# Patient Record
Sex: FEMALE | Race: WHITE | NOT HISPANIC OR LATINO | Employment: FULL TIME | URBAN - METROPOLITAN AREA
[De-identification: names, ages, dates, MRNs, and addresses within clinical notes are randomized per-mention and may not be internally consistent; named-entity substitution may affect disease eponyms.]

---

## 2017-03-08 ENCOUNTER — APPOINTMENT (OUTPATIENT)
Dept: WOMENS IMAGING | Facility: HOSPITAL | Age: 47
End: 2017-03-08

## 2017-03-08 PROCEDURE — 77067 SCR MAMMO BI INCL CAD: CPT | Performed by: RADIOLOGY

## 2017-03-08 PROCEDURE — 77063 BREAST TOMOSYNTHESIS BI: CPT | Performed by: RADIOLOGY

## 2018-03-13 ENCOUNTER — APPOINTMENT (OUTPATIENT)
Dept: WOMENS IMAGING | Facility: HOSPITAL | Age: 48
End: 2018-03-13

## 2018-03-13 PROCEDURE — 77067 SCR MAMMO BI INCL CAD: CPT | Performed by: RADIOLOGY

## 2018-03-13 PROCEDURE — 77063 BREAST TOMOSYNTHESIS BI: CPT | Performed by: RADIOLOGY

## 2018-08-15 ENCOUNTER — TELEPHONE (OUTPATIENT)
Dept: INTERNAL MEDICINE | Facility: CLINIC | Age: 48
End: 2018-08-15

## 2018-08-15 DIAGNOSIS — Z01.84 IMMUNITY STATUS TESTING: Primary | ICD-10-CM

## 2018-08-15 NOTE — TELEPHONE ENCOUNTER
The patient requested to have labs drawn at the office for her clinicals that will start soon. She needs varicella and MMR titers as well a TB test. Patient's last office visit was 8/24/16. Is this ok?    This is okay.  The TB test will be a quantiferon gold or spot T.

## 2018-08-29 LAB
ANNOTATION COMMENT IMP: NORMAL
GAMMA INTERFERON BACKGROUND BLD IA-ACNC: 0.07 IU/ML
M TB IFN-G BLD-IMP: NEGATIVE
M TB IFN-G CD4+ BCKGRND COR BLD-ACNC: 0.03 IU/ML
M TB IFN-G CD4+ T-CELLS BLD-ACNC: 0.1 IU/ML
MEV IGG SER IA-ACNC: 100 AU/ML
MITOGEN IGNF BLD-ACNC: >10 IU/ML
MUV IGG SER IA-ACNC: 85.5 AU/ML
QUANTIFERON INCUBATION: NORMAL
RUBV IGG SERPL IA-ACNC: 5.49 INDEX
SERVICE CMNT-IMP: NORMAL
VZV IGG SER IA-ACNC: 845 INDEX
VZV IGM SER IA-ACNC: <0.91 INDEX (ref 0–0.9)

## 2018-10-01 ENCOUNTER — OFFICE VISIT (OUTPATIENT)
Dept: INTERNAL MEDICINE | Facility: CLINIC | Age: 48
End: 2018-10-01

## 2018-10-01 VITALS
DIASTOLIC BLOOD PRESSURE: 82 MMHG | BODY MASS INDEX: 39.49 KG/M2 | RESPIRATION RATE: 16 BRPM | SYSTOLIC BLOOD PRESSURE: 152 MMHG | OXYGEN SATURATION: 98 % | HEART RATE: 83 BPM | WEIGHT: 237 LBS | HEIGHT: 65 IN | TEMPERATURE: 98.5 F

## 2018-10-01 DIAGNOSIS — I10 ESSENTIAL HYPERTENSION: ICD-10-CM

## 2018-10-01 DIAGNOSIS — Z23 NEED FOR IMMUNIZATION AGAINST INFLUENZA: ICD-10-CM

## 2018-10-01 DIAGNOSIS — Z00.00 ENCOUNTER FOR PREVENTIVE HEALTH EXAMINATION: Primary | ICD-10-CM

## 2018-10-01 LAB
ALBUMIN SERPL-MCNC: 4.2 G/DL (ref 3.5–5.2)
ALBUMIN/GLOB SERPL: 1.6 G/DL
ALP SERPL-CCNC: 99 U/L (ref 39–117)
ALT SERPL-CCNC: 15 U/L (ref 1–33)
AST SERPL-CCNC: 15 U/L (ref 1–32)
BASOPHILS # BLD AUTO: 0.04 10*3/MM3 (ref 0–0.2)
BASOPHILS NFR BLD AUTO: 0.6 % (ref 0–1.5)
BILIRUB SERPL-MCNC: 0.3 MG/DL (ref 0.1–1.2)
BUN SERPL-MCNC: 14 MG/DL (ref 6–20)
BUN/CREAT SERPL: 16.1 (ref 7–25)
CALCIUM SERPL-MCNC: 9.3 MG/DL (ref 8.6–10.5)
CHLORIDE SERPL-SCNC: 104 MMOL/L (ref 98–107)
CO2 SERPL-SCNC: 24 MMOL/L (ref 22–29)
CREAT SERPL-MCNC: 0.87 MG/DL (ref 0.57–1)
EOSINOPHIL # BLD AUTO: 0.14 10*3/MM3 (ref 0–0.7)
EOSINOPHIL NFR BLD AUTO: 2 % (ref 0.3–6.2)
ERYTHROCYTE [DISTWIDTH] IN BLOOD BY AUTOMATED COUNT: 12.9 % (ref 11.7–13)
GLOBULIN SER CALC-MCNC: 2.6 GM/DL
GLUCOSE SERPL-MCNC: 94 MG/DL (ref 65–99)
HCT VFR BLD AUTO: 41.7 % (ref 35.6–45.5)
HGB BLD-MCNC: 13.6 G/DL (ref 11.9–15.5)
IMM GRANULOCYTES # BLD: 0.02 10*3/MM3 (ref 0–0.03)
IMM GRANULOCYTES NFR BLD: 0.3 % (ref 0–0.5)
LYMPHOCYTES # BLD AUTO: 2.39 10*3/MM3 (ref 0.9–4.8)
LYMPHOCYTES NFR BLD AUTO: 34.1 % (ref 19.6–45.3)
MCH RBC QN AUTO: 29.7 PG (ref 26.9–32)
MCHC RBC AUTO-ENTMCNC: 32.6 G/DL (ref 32.4–36.3)
MCV RBC AUTO: 91 FL (ref 80.5–98.2)
MONOCYTES # BLD AUTO: 0.52 10*3/MM3 (ref 0.2–1.2)
MONOCYTES NFR BLD AUTO: 7.4 % (ref 5–12)
NEUTROPHILS # BLD AUTO: 3.89 10*3/MM3 (ref 1.9–8.1)
NEUTROPHILS NFR BLD AUTO: 55.6 % (ref 42.7–76)
PLATELET # BLD AUTO: 271 10*3/MM3 (ref 140–500)
POTASSIUM SERPL-SCNC: 4.4 MMOL/L (ref 3.5–5.2)
PROT SERPL-MCNC: 6.8 G/DL (ref 6–8.5)
RBC # BLD AUTO: 4.58 10*6/MM3 (ref 3.9–5.2)
SODIUM SERPL-SCNC: 140 MMOL/L (ref 136–145)
TSH SERPL DL<=0.005 MIU/L-ACNC: 1.1 MIU/ML (ref 0.27–4.2)
WBC # BLD AUTO: 7 10*3/MM3 (ref 4.5–10.7)

## 2018-10-01 PROCEDURE — 93000 ELECTROCARDIOGRAM COMPLETE: CPT | Performed by: INTERNAL MEDICINE

## 2018-10-01 PROCEDURE — 90471 IMMUNIZATION ADMIN: CPT | Performed by: INTERNAL MEDICINE

## 2018-10-01 PROCEDURE — 99396 PREV VISIT EST AGE 40-64: CPT | Performed by: INTERNAL MEDICINE

## 2018-10-01 PROCEDURE — 90674 CCIIV4 VAC NO PRSV 0.5 ML IM: CPT | Performed by: INTERNAL MEDICINE

## 2018-10-01 RX ORDER — FLUOXETINE HYDROCHLORIDE 40 MG/1
40 CAPSULE ORAL DAILY
Qty: 90 CAPSULE | Refills: 1 | Status: SHIPPED | OUTPATIENT
Start: 2018-10-01 | End: 2019-04-17

## 2018-10-01 RX ORDER — OLMESARTAN MEDOXOMIL 20 MG/1
20 TABLET ORAL DAILY
Qty: 14 TABLET | Refills: 0 | Status: SHIPPED | OUTPATIENT
Start: 2018-10-01 | End: 2018-10-01 | Stop reason: SDUPTHER

## 2018-10-01 RX ORDER — BUPROPION HYDROCHLORIDE 150 MG/1
150 TABLET ORAL DAILY
Qty: 90 TABLET | Refills: 1 | Status: SHIPPED | OUTPATIENT
Start: 2018-10-01 | End: 2019-04-17

## 2018-10-01 RX ORDER — OLMESARTAN MEDOXOMIL 20 MG/1
20 TABLET ORAL DAILY
Qty: 90 TABLET | Refills: 1 | Status: SHIPPED | OUTPATIENT
Start: 2018-10-01 | End: 2018-10-02

## 2018-10-01 NOTE — PROGRESS NOTES
"Subjective   Savannah Valenzuela is a 48 y.o. female.     Chief Complaint   Patient presents with   • Hypertension     \"147/90\" & \"180/90\"         In for annual preventative exam.  Sleeps about 6 hours per night.  No formal exercise.  Energy is poor.  Diet is poor.      Hypertension   This is a new problem. The current episode started in the past 7 days. The problem is unchanged. The problem is uncontrolled. Associated symptoms include chest pain and peripheral edema. Pertinent negatives include no headaches, orthopnea, palpitations or shortness of breath. There are no associated agents to hypertension. Risk factors for coronary artery disease include obesity. Past treatments include nothing. Current antihypertension treatment includes nothing. There is no history of angina, kidney disease, CVA or heart failure.        The following portions of the patient's history were reviewed and updated as appropriate: allergies, current medications, past social history and problem list.    HISTORY  Outpatient Prescriptions Marked as Taking for the 10/1/18 encounter (Office Visit) with Mathew Lemos MD   Medication Sig Dispense Refill   • buPROPion XL (WELLBUTRIN XL) 150 MG 24 hr tablet Take 1 tablet by mouth Daily. 90 tablet 1   • FLUoxetine (PROzac) 40 MG capsule Take 1 capsule by mouth Daily. 90 capsule 1   • [DISCONTINUED] buPROPion XL (WELLBUTRIN XL) 150 MG 24 hr tablet Take 1 tablet by mouth Daily. 90 tablet 1   • [DISCONTINUED] FLUoxetine (PROzac) 40 MG capsule TAKE 1 CAPSULE EVERY DAY 90 capsule 1     Social History     Social History   • Marital status:      Spouse name: N/A   • Number of children: N/A   • Years of education: N/A     Occupational History   • Not on file.     Social History Main Topics   • Smoking status: Never Smoker   • Smokeless tobacco: Not on file   • Alcohol use Not on file      Comment: Kay: rare   • Drug use: Unknown   • Sexual activity: Not on file     Other Topics Concern   • Not on " file     Social History Narrative   • No narrative on file     No family history on file.  Past Medical History:   Diagnosis Date   • Allergic rhinitis    • Depression    • Fatigue    • Hematuria, microscopic    • Medication management    • Migraine    • Multiple food allergies    • Physical exam, annual      Past Surgical History:   Procedure Laterality Date   •  SECTION     • DILATATION AND CURETTAGE     • HYSTERECTOMY         Review of Systems   Constitutional: Negative for appetite change, chills, fatigue and fever.   HENT: Negative for congestion, ear pain, hearing loss, nosebleeds, postnasal drip, rhinorrhea, sinus pressure and trouble swallowing.    Eyes: Negative for pain, itching and visual disturbance.   Respiratory: Negative for cough, chest tightness, shortness of breath and wheezing.    Cardiovascular: Positive for chest pain. Negative for palpitations, orthopnea and leg swelling.   Gastrointestinal: Positive for abdominal pain (Rare reflux). Negative for anal bleeding, constipation, diarrhea, nausea, rectal pain and vomiting.   Endocrine: Negative for cold intolerance, heat intolerance and polyuria.   Genitourinary: Negative for difficulty urinating, dysuria, flank pain, frequency, hematuria and urgency.   Musculoskeletal: Negative for arthralgias, back pain and myalgias.   Skin: Negative for rash.   Allergic/Immunologic: Negative for environmental allergies.   Neurological: Negative for dizziness, syncope, speech difficulty, weakness, light-headedness, numbness and headaches.   Hematological: Does not bruise/bleed easily.   Psychiatric/Behavioral: Positive for dysphoric mood. Negative for agitation, confusion and sleep disturbance. The patient is nervous/anxious.        Objective   Vitals:    10/01/18 1400   BP: 152/82   Pulse: 83   Resp: 16   Temp: 98.5 °F (36.9 °C)   SpO2: 98%      1    10/01/18  1400   Weight: 108 kg (237 lb)    [unfilled]  Body mass index is 39.44  kg/m².      Physical Exam   Constitutional: She is oriented to person, place, and time. She appears well-developed and well-nourished.   HENT:   Head: Normocephalic and atraumatic.   Right Ear: External ear normal.   Left Ear: External ear normal.   Nose: Nose normal.   Mouth/Throat: Oropharynx is clear and moist.   Eyes: Pupils are equal, round, and reactive to light. Conjunctivae and EOM are normal.   Neck: Normal range of motion. Neck supple. No JVD present. No thyromegaly present.   Cardiovascular: Normal rate, regular rhythm, normal heart sounds and intact distal pulses.  Exam reveals no gallop.    No murmur heard.  Pulmonary/Chest: Effort normal and breath sounds normal. No respiratory distress. She has no wheezes. She has no rales.   Abdominal: Soft. Bowel sounds are normal. She exhibits no distension and no mass. There is no tenderness. There is no guarding. No hernia.   Musculoskeletal: Normal range of motion. She exhibits no edema.   Lymphadenopathy:     She has no cervical adenopathy.   Neurological: She is alert and oriented to person, place, and time. She displays normal reflexes. No cranial nerve deficit. Coordination normal.   Skin: Skin is warm and dry.   Psychiatric: She has a normal mood and affect. Her behavior is normal. Judgment and thought content normal.   Nursing note and vitals reviewed.        Problem List Items Addressed This Visit        Cardiovascular and Mediastinum    Essential hypertension      Other Visit Diagnoses     Encounter for preventive health examination    -  Primary    Need for immunization against influenza        Relevant Orders    Flucelvax Quad=>4Years (PFS) (Completed)        Assessment/Plan   In today for annual preventative exam and with elevated blood pressure over the past few days.  She's been checking it at home.  Was high first at a biometric exam for work.  She's been having some pressure in her chest that may be associated.  Hyped up and stressed out.  We'll  add Benicar 20 mg daily for blood pressure today.  I think a beta blocker would be problematic with her history of depression and especially with her weight.  Recheck blood pressure in one month.  Lab work today including CBC, CMP, UA.  EKG.  Tobacco questionnaire today.      ECG 12 Lead  Date/Time: 10/1/2018 4:28 PM  Performed by: CARLOS العلي  Authorized by: CARLOS العلي   Comparison: compared with previous ECG from 9/5/2017  Similar to previous ECG  Rhythm: sinus rhythm  Rate: normal  Conduction: conduction normal  ST Segments: ST segments normal  T Waves: T waves normal  QRS axis: normal  Other: no other findings  Clinical impression: normal ECG  Comments: Normal sinus rhythm.  Heart rate is 71.  This is a normal EKG.  There is no change from a prior EKG dated 9/5/2017                Body mass index is 39.44 kg/m².           Dragon disclaimer:   Much of this encounter note is an electronic transcription/translation of spoken language to printed text. The electronic translation of spoken language may permit erroneous, or at times, nonsensical words or phrases to be inadvertently transcribed; Although I have reviewed the note for such errors, some may still exist.

## 2018-10-01 NOTE — PATIENT INSTRUCTIONS
Influenza Virus Vaccine injection  What is this medicine?  INFLUENZA VIRUS VACCINE (in [employer] EN [employer] SEEN) helps to reduce the risk of getting influenza also known as the flu. The vaccine only helps protect you against some strains of the flu.  This medicine may be used for other purposes; ask your health care provider or pharmacist if you have questions.  COMMON BRAND NAME(S): Afluria, Agriflu, Alfuria, FLUAD, Fluarix, Fluarix Quadrivalent, Flublok, Flublok Quadrivalent, FLUCELVAX, Flulaval, Fluvirin, Fluzone, Fluzone High-Dose, Fluzone Intradermal  What should I tell my health care provider before I take this medicine?  They need to know if you have any of these conditions:  -bleeding disorder like hemophilia  -fever or infection  -Guillain-[place] syndrome or other neurological problems  -immune system problems  -infection with the human immunodeficiency virus (HIV) or AIDS  -low blood platelet counts  -multiple sclerosis  -an unusual or allergic reaction to influenza virus vaccine, latex, other medicines, foods, dyes, or preservatives. Different brands of vaccines contain different allergens. Some may contain latex or eggs. Talk to your doctor about your allergies to make sure that you get the right vaccine.  -pregnant or trying to get pregnant  -breast-feeding  How should I use this medicine?  This vaccine is for injection into a muscle or under the skin. It is given by a health care professional.  A copy of Vaccine Information Statements will be given before each vaccination. Read this sheet carefully each time. The sheet may change frequently.  Talk to your healthcare provider to see which vaccines are right for you. Some vaccines should not be used in all age groups.  Overdosage: If you think you have taken too much of this medicine contact a poison control center or emergency room at once.  NOTE: This medicine is only for you. Do not share this medicine with others.  What if I miss a dose?  This  does not apply.  What may interact with this medicine?  -chemotherapy or radiation therapy  -medicines that lower your immune system like etanercept, anakinra, infliximab, and adalimumab  -medicines that treat or prevent blood clots like warfarin  -phenytoin  -steroid medicines like prednisone or cortisone  -theophylline  -vaccines  This list may not describe all possible interactions. Give your health care provider a list of all the medicines, herbs, non-prescription drugs, or dietary supplements you use. Also tell them if you smoke, drink alcohol, or use illegal drugs. Some items may interact with your medicine.  What should I watch for while using this medicine?  Report any side effects that do not go away within 3 days to your doctor or health care professional. Call your health care provider if any unusual symptoms occur within 6 weeks of receiving this vaccine.  You may still catch the flu, but the illness is not usually as bad. You cannot get the flu from the vaccine. The vaccine will not protect against colds or other illnesses that may cause fever. The vaccine is needed every year.  What side effects may I notice from receiving this medicine?  Side effects that you should report to your doctor or health care professional as soon as possible:  -allergic reactions like skin rash, itching or hives, swelling of the face, lips, or tongue  Side effects that usually do not require medical attention (report to your doctor or health care professional if they continue or are bothersome):  -fever  -headache  -muscle aches and pains  -pain, tenderness, redness, or swelling at the injection site  -tiredness  This list may not describe all possible side effects. Call your doctor for medical advice about side effects. You may report side effects to FDA at 0-121-FDA-0291.  Where should I keep my medicine?  The vaccine will be given by a health care professional in a clinic, pharmacy, doctor's office, or other health care  setting. You will not be given vaccine doses to store at home.  NOTE: This sheet is a summary. It may not cover all possible information. If you have questions about this medicine, talk to your doctor, pharmacist, or health care provider.  © 2018 [employer]vier/Gold Standard (2016-07-08 10:07:28)    Exercising to Lose Weight  Exercising can help you to lose weight. In order to lose weight through exercise, you need to do vigorous-intensity exercise. You can tell that you are exercising with vigorous intensity if you are breathing very hard and fast and cannot hold a conversation while exercising.  Moderate-intensity exercise helps to maintain your current weight. You can tell that you are exercising at a moderate level if you have a higher heart rate and faster breathing, but you are still able to hold a conversation.  How often should I exercise?  Choose an activity that you enjoy and set realistic goals. Your health care provider can help you to make an activity plan that works for you. Exercise regularly as directed by your health care provider. This may include:  · Doing resistance training twice each week, such as:  ? Push-ups.  ? Sit-ups.  ? Lifting weights.  ? Using resistance bands.  · Doing a given intensity of exercise for a given amount of time. Choose from these options:  ? 150 minutes of moderate-intensity exercise every week.  ? 75 minutes of vigorous-intensity exercise every week.  ? A mix of moderate-intensity and vigorous-intensity exercise every week.    Children, pregnant women, people who are out of shape, people who are overweight, and older adults may need to consult a health care provider for individual recommendations. If you have any sort of medical condition, be sure to consult your health care provider before starting a new exercise program.  What are some activities that can help me to lose weight?  · Walking at a rate of at least 4.5 miles an hour.  · Jogging or running at a rate of 5 miles  per hour.  · Biking at a rate of at least 10 miles per hour.  · Lap swimming.  · Roller-skating or in-line skating.  · Cross-country skiing.  · Vigorous competitive sports, such as football, basketball, and soccer.  · Jumping rope.  · Aerobic dancing.  How can I be more active in my day-to-day activities?  · Use the stairs instead of the elevator.  · Take a walk during your lunch break.  · If you drive, park your car farther away from work or school.  · If you take public transportation, get off one stop early and walk the rest of the way.  · Make all of your phone calls while standing up and walking around.  · Get up, stretch, and walk around every 30 minutes throughout the day.  What guidelines should I follow while exercising?  · Do not exercise so much that you hurt yourself, feel dizzy, or get very short of breath.  · Consult your health care provider prior to starting a new exercise program.  · Wear comfortable clothes and shoes with good support.  · Drink plenty of water while you exercise to prevent dehydration or heat stroke. Body water is lost during exercise and must be replaced.  · Work out until you breathe faster and your heart beats faster.  This information is not intended to replace advice given to you by your health care provider. Make sure you discuss any questions you have with your health care provider.  Document Released: 01/20/2012 Document Revised: 05/25/2017 Document Reviewed: 05/21/2015  [employer] Interactive Patient Education © 2018 [employer]  BMI for Adults  Body mass index (BMI) is a number that is calculated from a person's weight and height. In most adults, the number is used to find how much of an adult's weight is made up of fat. BMI is not as accurate as a direct measure of body fat.  How is BMI calculated?  BMI is calculated by dividing weight in kilograms by height in meters squared. It can also be calculated by dividing weight in pounds by height in inches squared, then  multiplying the resulting number by 703. Charts are available to help you find your BMI quickly and easily without doing this calculation.  How is BMI interpreted?  Health care professionals use BMI charts to identify whether an adult is underweight, at a normal weight, or overweight based on the following guidelines:  · Underweight: BMI less than 18.5.  · Normal weight: BMI between 18.5 and 24.9.  · Overweight: BMI between 25 and 29.9.  · Obese: BMI of 30 and above.    BMI is usually interpreted the same for males and females.  Weight includes both fat and muscle, so someone with a muscular build, such as an athlete, may have a BMI that is higher than 24.9. In cases like these, BMI may not accurately depict body fat. To determine if excess body fat is the cause of a BMI of 25 or higher, further assessments may need to be done by a health care provider.  Why is BMI a useful tool?  BMI is used to identify a possible weight problem that may be related to a medical problem or may increase the risk for medical problems. BMI can also be used to promote changes to reach a healthy weight.  This information is not intended to replace advice given to you by your health care provider. Make sure you discuss any questions you have with your health care provider.  Document Released: 08/29/2005 Document Revised: 04/27/2017 Document Reviewed: 05/15/2015  [employer] Interactive Patient Education © 2018 [employer]

## 2018-10-02 ENCOUNTER — TELEPHONE (OUTPATIENT)
Dept: INTERNAL MEDICINE | Facility: CLINIC | Age: 48
End: 2018-10-02

## 2018-10-02 RX ORDER — LOSARTAN POTASSIUM 50 MG/1
50 TABLET ORAL DAILY
Qty: 30 TABLET | Refills: 2 | Status: SHIPPED | OUTPATIENT
Start: 2018-10-02 | End: 2018-11-05 | Stop reason: SDUPTHER

## 2018-10-02 NOTE — TELEPHONE ENCOUNTER
Per pharmacy, Benicar is not covered by insurance, but will cover alternative like Losartan. Please advise.    Go with losartan 50 mg 1 daily #30.  2 refills.

## 2018-11-05 ENCOUNTER — OFFICE VISIT (OUTPATIENT)
Dept: INTERNAL MEDICINE | Facility: CLINIC | Age: 48
End: 2018-11-05

## 2018-11-05 VITALS
DIASTOLIC BLOOD PRESSURE: 82 MMHG | SYSTOLIC BLOOD PRESSURE: 134 MMHG | BODY MASS INDEX: 37.32 KG/M2 | RESPIRATION RATE: 16 BRPM | WEIGHT: 224 LBS | TEMPERATURE: 99 F | OXYGEN SATURATION: 98 % | HEART RATE: 85 BPM | HEIGHT: 65 IN

## 2018-11-05 DIAGNOSIS — F32.A DEPRESSION, UNSPECIFIED DEPRESSION TYPE: ICD-10-CM

## 2018-11-05 DIAGNOSIS — I10 ESSENTIAL HYPERTENSION: Primary | ICD-10-CM

## 2018-11-05 PROCEDURE — 99213 OFFICE O/P EST LOW 20 MIN: CPT | Performed by: INTERNAL MEDICINE

## 2018-11-05 RX ORDER — LOSARTAN POTASSIUM 100 MG/1
100 TABLET ORAL DAILY
Qty: 90 TABLET | Refills: 0 | Status: SHIPPED | OUTPATIENT
Start: 2018-11-05 | End: 2019-04-16

## 2018-11-05 RX ORDER — LOSARTAN POTASSIUM 100 MG/1
100 TABLET ORAL DAILY
Qty: 14 TABLET | Refills: 0 | Status: SHIPPED | OUTPATIENT
Start: 2018-11-05 | End: 2018-11-05 | Stop reason: SDUPTHER

## 2018-11-05 RX ORDER — LOSARTAN POTASSIUM 100 MG/1
100 TABLET ORAL DAILY
Qty: 90 TABLET | Refills: 0 | Status: SHIPPED | OUTPATIENT
Start: 2018-11-05 | End: 2018-11-05 | Stop reason: SDUPTHER

## 2018-11-05 NOTE — PROGRESS NOTES
Subjective   Savannah Valenzuela is a 48 y.o. female.     Chief Complaint   Patient presents with   • Hypertension         In for recheck of chronic depression.  She's doing very well on her Wellbutrin.  So on Prozac.  A little anxious at times but depression is not an issue right now.      Hypertension   This is a new problem. The current episode started 1 to 4 weeks ago. The problem has been rapidly improving since onset. The problem is controlled. Associated symptoms include headaches. Pertinent negatives include no chest pain, palpitations, peripheral edema, PND or shortness of breath. There are no associated agents to hypertension. There are no known risk factors for coronary artery disease. Current antihypertension treatment includes angiotensin blockers. The current treatment provides significant improvement. There are no compliance problems.  There is no history of angina, kidney disease, CAD/MI, CVA or heart failure.        The following portions of the patient's history were reviewed and updated as appropriate: allergies, current medications, past social history and problem list.    No outpatient prescriptions have been marked as taking for the 11/5/18 encounter (Office Visit) with Mathew Lemos MD.       Review of Systems   Respiratory: Negative for shortness of breath and wheezing.    Cardiovascular: Negative for chest pain, palpitations, leg swelling and PND.   Neurological: Positive for headaches.       Objective   Vitals:    11/05/18 1130   BP: 134/82   Pulse: 85   Resp: 16   Temp: 99 °F (37.2 °C)   SpO2: 98%      1    11/05/18  1130   Weight: 102 kg (224 lb)    [unfilled]  Body mass index is 37.28 kg/m².      Physical Exam   Constitutional: She appears well-developed and well-nourished. No distress.   HENT:   Head: Normocephalic and atraumatic.   Neck: Carotid bruit is not present.   Cardiovascular: Normal rate, regular rhythm, normal heart sounds and intact distal pulses.  Exam reveals no gallop.     No murmur heard.  Pulmonary/Chest: Effort normal and breath sounds normal. No respiratory distress. She has no wheezes. She has no rales.   Abdominal: Soft. Bowel sounds are normal. She exhibits no mass. There is no tenderness. There is no guarding.         Problem List Items Addressed This Visit        Cardiovascular and Mediastinum    Essential hypertension - Primary    Relevant Medications    losartan (COZAAR) 100 MG tablet       Other    Depression        Assessment/Plan   In for recheck of hypertension.  Also depression.  Depression doing well.  Hypertension also doing very well.  She's a little higher today than I would like to clearly improved.  We'll boost the Cozaar from 50 mg daily up to 100 mg per day.  Recheck blood pressure in 3 months.  Benicar has been okay so we can switch that if we need to at some point.           .bmifollowup  Dragon disclaimer:   Much of this encounter note is an electronic transcription/translation of spoken language to printed text. The electronic translation of spoken language may permit erroneous, or at times, nonsensical words or phrases to be inadvertently transcribed; Although I have reviewed the note for such errors, some may still exist.

## 2019-03-07 ENCOUNTER — OFFICE VISIT (OUTPATIENT)
Dept: INTERNAL MEDICINE | Facility: CLINIC | Age: 49
End: 2019-03-07

## 2019-03-07 VITALS
RESPIRATION RATE: 16 BRPM | TEMPERATURE: 98.3 F | WEIGHT: 229 LBS | SYSTOLIC BLOOD PRESSURE: 122 MMHG | BODY MASS INDEX: 38.15 KG/M2 | DIASTOLIC BLOOD PRESSURE: 88 MMHG | HEART RATE: 81 BPM | OXYGEN SATURATION: 98 % | HEIGHT: 65 IN

## 2019-03-07 DIAGNOSIS — F32.A DEPRESSION, UNSPECIFIED DEPRESSION TYPE: ICD-10-CM

## 2019-03-07 DIAGNOSIS — I10 ESSENTIAL HYPERTENSION: Primary | ICD-10-CM

## 2019-03-07 DIAGNOSIS — G43.009 MIGRAINE WITHOUT AURA AND WITHOUT STATUS MIGRAINOSUS, NOT INTRACTABLE: ICD-10-CM

## 2019-03-07 PROCEDURE — 99214 OFFICE O/P EST MOD 30 MIN: CPT | Performed by: INTERNAL MEDICINE

## 2019-03-07 RX ORDER — OLMESARTAN MEDOXOMIL 40 MG/1
40 TABLET ORAL DAILY
Qty: 90 TABLET | Refills: 1 | Status: SHIPPED | OUTPATIENT
Start: 2019-03-07 | End: 2019-06-17

## 2019-03-07 NOTE — PROGRESS NOTES
Subjective   Savannah Valenzuela is a 48 y.o. female.     Chief Complaint   Patient presents with   • Hypertension         In for recheck of chronic depression.  She's doing very well on her Wellbutrin.  So on Prozac.  A little anxious at times but depression is not an issue right now.      Hypertension   This is a new problem. The current episode started 1 to 4 weeks ago. The problem has been rapidly improving since onset. The problem is controlled. Associated symptoms include headaches. Pertinent negatives include no chest pain, palpitations, peripheral edema, PND or shortness of breath. There are no associated agents to hypertension. There are no known risk factors for coronary artery disease. Current antihypertension treatment includes angiotensin blockers. The current treatment provides significant improvement. There are no compliance problems.  There is no history of angina, kidney disease, CAD/MI, CVA or heart failure.        The following portions of the patient's history were reviewed and updated as appropriate: allergies, current medications, past social history and problem list.    Outpatient Medications Marked as Taking for the 3/7/19 encounter (Office Visit) with Mathew Lemos MD   Medication Sig Dispense Refill   • buPROPion XL (WELLBUTRIN XL) 150 MG 24 hr tablet Take 1 tablet by mouth Daily. 90 tablet 1   • FLUoxetine (PROzac) 40 MG capsule Take 1 capsule by mouth Daily. 90 capsule 1   • losartan (COZAAR) 100 MG tablet Take 1 tablet by mouth Daily. 90 tablet 0       Review of Systems   Respiratory: Negative for shortness of breath and wheezing.    Cardiovascular: Negative for chest pain, palpitations, leg swelling and PND.   Neurological: Positive for headaches.       Objective   Vitals:    03/07/19 0754   BP: 122/88   Pulse: 81   Resp: 16   Temp: 98.3 °F (36.8 °C)   SpO2: 98%          03/07/19  0754   Weight: 104 kg (229 lb)    [unfilled]  Body mass index is 38.11 kg/m².      Physical Exam    Constitutional: She appears well-developed and well-nourished. No distress.   HENT:   Head: Normocephalic and atraumatic.   Neck: Carotid bruit is not present.   Cardiovascular: Normal rate, regular rhythm, normal heart sounds and intact distal pulses. Exam reveals no gallop.   No murmur heard.  Pulmonary/Chest: Effort normal and breath sounds normal. No respiratory distress. She has no wheezes. She has no rales.   Abdominal: Soft. Bowel sounds are normal. She exhibits no mass. There is no tenderness. There is no guarding.         Problem List Items Addressed This Visit        Cardiovascular and Mediastinum    Migraine    Essential hypertension - Primary       Other    Depression        Assessment/Plan   In for recheck of hypertension.  Also depression.  Depression doing well.  Blood pressures have been up at home.  140s over low 90s.  We will switch Cozaar to Benicar 40 mg daily.  If still up next time we will add a diuretic.  Recheck blood pressure in 3 months.  Annual lab work will be October 2019.  Annual preventive exam October 2019.           .[email or web address]  Dragon disclaimer:   Much of this encounter note is an electronic transcription/translation of spoken language to printed text. The electronic translation of spoken language may permit erroneous, or at times, nonsensical words or phrases to be inadvertently transcribed; Although I have reviewed the note for such errors, some may still exist.

## 2019-04-16 ENCOUNTER — OFFICE VISIT (OUTPATIENT)
Dept: INTERNAL MEDICINE | Facility: CLINIC | Age: 49
End: 2019-04-16

## 2019-04-16 VITALS
WEIGHT: 231.2 LBS | BODY MASS INDEX: 38.52 KG/M2 | OXYGEN SATURATION: 94 % | TEMPERATURE: 98.4 F | DIASTOLIC BLOOD PRESSURE: 78 MMHG | HEART RATE: 78 BPM | RESPIRATION RATE: 16 BRPM | SYSTOLIC BLOOD PRESSURE: 122 MMHG | HEIGHT: 65 IN

## 2019-04-16 DIAGNOSIS — H92.02 LEFT EAR PAIN: Primary | ICD-10-CM

## 2019-04-16 PROCEDURE — 99213 OFFICE O/P EST LOW 20 MIN: CPT | Performed by: INTERNAL MEDICINE

## 2019-04-16 NOTE — PROGRESS NOTES
Subjective   Savannah Valenzuela is a 48 y.o. female.     Chief Complaint   Patient presents with   • Earache     left         Earache    There is pain in the left ear. This is a new problem. The current episode started in the past 7 days. The problem occurs hourly. The problem has been gradually worsening. There has been no fever. The pain is severe. Pertinent negatives include no coughing, headaches, rhinorrhea or sore throat. She has tried NSAIDs for the symptoms. The treatment provided no relief.        The following portions of the patient's history were reviewed and updated as appropriate: allergies, current medications, past social history and problem list.    Outpatient Medications Marked as Taking for the 4/16/19 encounter (Office Visit) with Mathew Lemos MD   Medication Sig Dispense Refill   • buPROPion XL (WELLBUTRIN XL) 150 MG 24 hr tablet Take 1 tablet by mouth Daily. 90 tablet 1   • FLUoxetine (PROzac) 40 MG capsule Take 1 capsule by mouth Daily. 90 capsule 1   • olmesartan (BENICAR) 40 MG tablet Take 1 tablet by mouth Daily. 90 tablet 1   • [DISCONTINUED] losartan (COZAAR) 100 MG tablet Take 1 tablet by mouth Daily. 90 tablet 0       Review of Systems   Constitutional: Negative for chills and fever.   HENT: Positive for ear pain. Negative for rhinorrhea and sore throat.    Respiratory: Negative for cough.    Neurological: Negative for headaches.       Objective   Vitals:    04/16/19 0957   BP: 122/78   Pulse: 78   Resp: 16   Temp: 98.4 °F (36.9 °C)   SpO2: 94%          04/16/19  0957   Weight: 105 kg (231 lb 3.2 oz)    [unfilled]  Body mass index is 38.47 kg/m².      Physical Exam   Constitutional: She appears well-developed and well-nourished.   HENT:   Head: Normocephalic and atraumatic.   Right Ear: External ear normal.   Left Ear: External ear normal.   Nose: Nose normal.   Mouth/Throat: Oropharynx is clear and moist.   Eyes: Conjunctivae are normal. Pupils are equal, round, and reactive to  light.   Pulmonary/Chest: Effort normal and breath sounds normal. No respiratory distress. She has no wheezes. She has no rales.   Skin: Skin is warm and dry.         Problem List Items Addressed This Visit     None      Visit Diagnoses     Left ear pain    -  Primary        Assessment/Plan   In with left ear pain for 4 days.  No URI symptoms.  No fever.  No trauma.  Tender when she pushes her finger on the tragus.  There is some increased blood vessels along the center part of the TM the look fairly normal other than the fact that the right ear is perfectly normal.  This is a nonspecific finding.  May be a viral illness.  Right now all we really have is nonspecific otalgia.  Will treat with Auralgan solution as needed and observe.  We can always add some amoxicillin if symptoms progress.           .bmifollowup  Dragon disclaimer:   Much of this encounter note is an electronic transcription/translation of spoken language to printed text. The electronic translation of spoken language may permit erroneous, or at times, nonsensical words or phrases to be inadvertently transcribed; Although I have reviewed the note for such errors, some may still exist.

## 2019-04-18 RX ORDER — BUPROPION HYDROCHLORIDE 150 MG/1
150 TABLET ORAL DAILY
Qty: 90 TABLET | Refills: 1 | Status: SHIPPED | OUTPATIENT
Start: 2019-04-18 | End: 2020-04-21 | Stop reason: SDUPTHER

## 2019-04-18 RX ORDER — FLUOXETINE HYDROCHLORIDE 40 MG/1
40 CAPSULE ORAL DAILY
Qty: 90 CAPSULE | Refills: 1 | Status: SHIPPED | OUTPATIENT
Start: 2019-04-18 | End: 2019-10-17 | Stop reason: SDUPTHER

## 2019-06-17 ENCOUNTER — OFFICE VISIT (OUTPATIENT)
Dept: INTERNAL MEDICINE | Facility: CLINIC | Age: 49
End: 2019-06-17

## 2019-06-17 VITALS
HEIGHT: 65 IN | TEMPERATURE: 98.4 F | HEART RATE: 82 BPM | BODY MASS INDEX: 38.49 KG/M2 | RESPIRATION RATE: 16 BRPM | SYSTOLIC BLOOD PRESSURE: 112 MMHG | DIASTOLIC BLOOD PRESSURE: 62 MMHG | OXYGEN SATURATION: 95 % | WEIGHT: 231 LBS

## 2019-06-17 DIAGNOSIS — F32.A DEPRESSION, UNSPECIFIED DEPRESSION TYPE: ICD-10-CM

## 2019-06-17 DIAGNOSIS — G43.009 MIGRAINE WITHOUT AURA AND WITHOUT STATUS MIGRAINOSUS, NOT INTRACTABLE: ICD-10-CM

## 2019-06-17 DIAGNOSIS — I10 ESSENTIAL HYPERTENSION: Primary | ICD-10-CM

## 2019-06-17 PROCEDURE — 99213 OFFICE O/P EST LOW 20 MIN: CPT | Performed by: INTERNAL MEDICINE

## 2019-06-17 RX ORDER — HYDROCHLOROTHIAZIDE 12.5 MG/1
12.5 CAPSULE, GELATIN COATED ORAL DAILY
Qty: 90 CAPSULE | Refills: 1 | Status: SHIPPED | OUTPATIENT
Start: 2019-06-17 | End: 2019-10-18

## 2019-06-17 RX ORDER — OLMESARTAN MEDOXOMIL 40 MG/1
20 TABLET ORAL DAILY
Qty: 45 TABLET | Refills: 1 | Status: SHIPPED | OUTPATIENT
Start: 2019-06-17 | End: 2019-10-17 | Stop reason: SDUPTHER

## 2019-06-17 RX ORDER — OLMESARTAN MEDOXOMIL 40 MG/1
20 TABLET ORAL DAILY
Qty: 45 TABLET | Refills: 1 | Status: SHIPPED | OUTPATIENT
Start: 2019-06-17 | End: 2019-06-17

## 2019-06-17 NOTE — PROGRESS NOTES
Subjective   Savannah Valenzuela is a 48 y.o. female.     Chief Complaint   Patient presents with   • Hypertension   • Depression         In for recheck of chronic depression.  She's doing very well on her Wellbutrin.  So on Prozac.  A little anxious at times but depression is not an issue right now.      Hypertension   This is a new problem. The current episode started 1 to 4 weeks ago. The problem has been rapidly improving since onset. The problem is controlled. Associated symptoms include headaches. Pertinent negatives include no chest pain, palpitations, peripheral edema, PND or shortness of breath. There are no associated agents to hypertension. There are no known risk factors for coronary artery disease. Current antihypertension treatment includes angiotensin blockers. The current treatment provides significant improvement. There are no compliance problems.  There is no history of angina, kidney disease, CAD/MI, CVA or heart failure.        The following portions of the patient's history were reviewed and updated as appropriate: allergies, current medications, past social history and problem list.    Outpatient Medications Marked as Taking for the 6/17/19 encounter (Office Visit) with Mathew Lemos MD   Medication Sig Dispense Refill   • buPROPion XL (WELLBUTRIN XL) 150 MG 24 hr tablet Take 1 tablet by mouth Daily. 90 tablet 1   • FLUoxetine (PROzac) 40 MG capsule Take 1 capsule by mouth Daily. 90 capsule 1   • olmesartan (BENICAR) 40 MG tablet Take 1 tablet by mouth Daily. 90 tablet 1   • [DISCONTINUED] antipyrine-benzocaine (AURALGAN) 5.4-1.4 % otic solution Administer 3 drops into the left ear Every 2 (Two) Hours As Needed for Ear Pain. 15 mL 0       Review of Systems   Respiratory: Negative for shortness of breath and wheezing.    Cardiovascular: Negative for chest pain, palpitations, leg swelling and PND.   Neurological: Positive for headaches.       Objective   Vitals:    06/17/19 0832   BP: 112/62    Pulse: 82   Resp: 16   Temp: 98.4 °F (36.9 °C)   SpO2: 95%          06/17/19  0832   Weight: 105 kg (231 lb)    [unfilled]  Body mass index is 38.44 kg/m².      Physical Exam   Constitutional: She appears well-developed and well-nourished. No distress.   HENT:   Head: Normocephalic and atraumatic.   Neck: Carotid bruit is not present.   Cardiovascular: Normal rate, regular rhythm, normal heart sounds and intact distal pulses. Exam reveals no gallop.   No murmur heard.  Pulmonary/Chest: Effort normal and breath sounds normal. No respiratory distress. She has no wheezes. She has no rales.   Abdominal: Soft. Bowel sounds are normal. She exhibits no mass. There is no tenderness. There is no guarding.         Problem List Items Addressed This Visit        Cardiovascular and Mediastinum    Essential hypertension - Primary    Migraine       Other    Depression        Assessment/Plan   In for recheck of hypertension.  Also depression.  Depression doing well.  Blood pressures have been good on Benicar 40 mg daily.  She still complains of ankle swelling.  Will cut Benicar down to 20 mg/day and add HCTZ 12.5 mg daily.  Annual lab work will be October 2019.  Annual preventive exam October 2019.  We will move office visits out to 4 months.           .bmifollowup  Dragon disclaimer:   Much of this encounter note is an electronic transcription/translation of spoken language to printed text. The electronic translation of spoken language may permit erroneous, or at times, nonsensical words or phrases to be inadvertently transcribed; Although I have reviewed the note for such errors, some may still exist.

## 2019-10-08 ENCOUNTER — OFFICE VISIT (OUTPATIENT)
Dept: INTERNAL MEDICINE | Facility: CLINIC | Age: 49
End: 2019-10-08

## 2019-10-08 VITALS
TEMPERATURE: 98.8 F | SYSTOLIC BLOOD PRESSURE: 138 MMHG | DIASTOLIC BLOOD PRESSURE: 72 MMHG | HEART RATE: 88 BPM | HEIGHT: 65 IN | WEIGHT: 242.6 LBS | OXYGEN SATURATION: 97 % | BODY MASS INDEX: 40.42 KG/M2 | RESPIRATION RATE: 16 BRPM

## 2019-10-08 DIAGNOSIS — H69.83 DYSFUNCTION OF BOTH EUSTACHIAN TUBES: ICD-10-CM

## 2019-10-08 DIAGNOSIS — H60.502 ACUTE OTITIS EXTERNA OF LEFT EAR, UNSPECIFIED TYPE: Primary | ICD-10-CM

## 2019-10-08 PROCEDURE — 99213 OFFICE O/P EST LOW 20 MIN: CPT | Performed by: INTERNAL MEDICINE

## 2019-10-08 RX ORDER — METHYLPREDNISOLONE 4 MG/1
TABLET ORAL
Qty: 21 TABLET | Refills: 0 | Status: SHIPPED | OUTPATIENT
Start: 2019-10-08 | End: 2019-10-17

## 2019-10-17 ENCOUNTER — OFFICE VISIT (OUTPATIENT)
Dept: INTERNAL MEDICINE | Facility: CLINIC | Age: 49
End: 2019-10-17

## 2019-10-17 VITALS
BODY MASS INDEX: 40.19 KG/M2 | HEART RATE: 78 BPM | OXYGEN SATURATION: 96 % | SYSTOLIC BLOOD PRESSURE: 128 MMHG | HEIGHT: 65 IN | WEIGHT: 241.2 LBS | RESPIRATION RATE: 16 BRPM | TEMPERATURE: 98.9 F | DIASTOLIC BLOOD PRESSURE: 74 MMHG

## 2019-10-17 DIAGNOSIS — F32.A DEPRESSION, UNSPECIFIED DEPRESSION TYPE: ICD-10-CM

## 2019-10-17 DIAGNOSIS — I10 ESSENTIAL HYPERTENSION: ICD-10-CM

## 2019-10-17 DIAGNOSIS — Z00.00 ENCOUNTER FOR PREVENTIVE HEALTH EXAMINATION: Primary | ICD-10-CM

## 2019-10-17 PROCEDURE — 99396 PREV VISIT EST AGE 40-64: CPT | Performed by: INTERNAL MEDICINE

## 2019-10-17 RX ORDER — OLMESARTAN MEDOXOMIL AND HYDROCHLOROTHIAZIDE 40/12.5 40; 12.5 MG/1; MG/1
1 TABLET ORAL DAILY
Qty: 90 TABLET | Refills: 1 | Status: SHIPPED | OUTPATIENT
Start: 2019-10-17 | End: 2020-09-02 | Stop reason: SDUPTHER

## 2019-10-17 NOTE — PROGRESS NOTES
Subjective   Savannah Valenzuela is a 49 y.o. female.     Chief Complaint   Patient presents with   • Annual Exam         In for annual preventative exam.  Sleeps about 6-7 hours per night.  No formal exercise.  Energy is poor.  Diet is poor.      Hypertension   This is a new problem. The current episode started in the past 7 days. The problem is unchanged. The problem is uncontrolled. Associated symptoms include peripheral edema. Pertinent negatives include no chest pain, headaches, orthopnea, palpitations or shortness of breath. There are no associated agents to hypertension. Risk factors for coronary artery disease include obesity. Past treatments include nothing. Current antihypertension treatment includes nothing. There is no history of angina, kidney disease, CVA or heart failure.        The following portions of the patient's history were reviewed and updated as appropriate: allergies, current medications, past social history and problem list.    HISTORY  Outpatient Medications Marked as Taking for the 10/17/19 encounter (Office Visit) with Mathew Lemos MD   Medication Sig Dispense Refill   • buPROPion XL (WELLBUTRIN XL) 150 MG 24 hr tablet Take 1 tablet by mouth Daily. 90 tablet 1   • FLUoxetine (PROzac) 40 MG capsule Take 1 capsule by mouth Daily. 90 capsule 1   • hydrochlorothiazide (MICROZIDE) 12.5 MG capsule Take 1 capsule by mouth Daily. 90 capsule 1   • olmesartan (BENICAR) 40 MG tablet Take 0.5 tablets by mouth Daily. 45 tablet 1     Social History     Socioeconomic History   • Marital status:      Spouse name: Not on file   • Number of children: Not on file   • Years of education: Not on file   • Highest education level: Not on file   Tobacco Use   • Smoking status: Never Smoker   • Smokeless tobacco: Never Used   Substance and Sexual Activity   • Alcohol use: No     Comment: Kay: rare   • Drug use: No   • Sexual activity: Not Currently     Partners: Male     Family History   Problem  Relation Age of Onset   • Heart disease Father    • Hyperlipidemia Father      Past Medical History:   Diagnosis Date   • Allergic rhinitis    • Depression    • Fatigue    • Hematuria, microscopic    • Hypertension    • Medication management    • Migraine    • Multiple food allergies    • Physical exam, annual      Past Surgical History:   Procedure Laterality Date   •  SECTION     • DILATATION AND CURETTAGE     • HYSTERECTOMY         Review of Systems   Constitutional: Negative for appetite change, chills, fatigue and fever.   HENT: Negative for congestion, ear pain, hearing loss, nosebleeds, postnasal drip, rhinorrhea, sinus pressure and trouble swallowing.    Eyes: Negative for pain, itching and visual disturbance.   Respiratory: Negative for cough, chest tightness, shortness of breath and wheezing.    Cardiovascular: Negative for chest pain, palpitations, orthopnea and leg swelling.   Gastrointestinal: Positive for abdominal pain (Rare reflux). Negative for anal bleeding, constipation, diarrhea, nausea, rectal pain and vomiting.   Endocrine: Negative for cold intolerance, heat intolerance and polyuria.   Genitourinary: Negative for difficulty urinating, dysuria, flank pain, frequency, hematuria and urgency.   Musculoskeletal: Negative for arthralgias, back pain and myalgias.   Skin: Negative for rash.   Allergic/Immunologic: Positive for environmental allergies.   Neurological: Negative for dizziness, syncope, speech difficulty, weakness, light-headedness, numbness and headaches.   Hematological: Does not bruise/bleed easily.   Psychiatric/Behavioral: Positive for dysphoric mood. Negative for agitation, confusion and sleep disturbance. The patient is nervous/anxious.        Objective   Vitals:    10/17/19 1018   BP: 128/74   Pulse: 78   Resp: 16   Temp: 98.9 °F (37.2 °C)   SpO2: 96%          10/17/19  1018   Weight: 109 kg (241 lb 3.2 oz)    [unfilled]  Body mass index is 40.14  kg/m².      Physical Exam   Constitutional: She is oriented to person, place, and time. She appears well-developed and well-nourished.   HENT:   Head: Normocephalic and atraumatic.   Right Ear: External ear normal.   Left Ear: External ear normal.   Nose: Nose normal.   Mouth/Throat: Oropharynx is clear and moist.   Eyes: Conjunctivae and EOM are normal. Pupils are equal, round, and reactive to light.   Neck: Normal range of motion. Neck supple. No JVD present. No thyromegaly present.   Cardiovascular: Normal rate, regular rhythm, normal heart sounds and intact distal pulses. Exam reveals no gallop.   No murmur heard.  Pulmonary/Chest: Effort normal and breath sounds normal. No respiratory distress. She has no wheezes. She has no rales.   Abdominal: Soft. Bowel sounds are normal. She exhibits no distension and no mass. There is no tenderness. There is no guarding. No hernia.   Musculoskeletal: Normal range of motion. She exhibits no edema.   Lymphadenopathy:     She has no cervical adenopathy.   Neurological: She is alert and oriented to person, place, and time. She displays normal reflexes. No cranial nerve deficit. Coordination normal.   Skin: Skin is warm and dry.   Psychiatric: She has a normal mood and affect. Her behavior is normal. Judgment and thought content normal.   Nursing note and vitals reviewed.        Problem List Items Addressed This Visit     None        Assessment/Plan   In today for annual preventative exam and follow-up of hypertension and depression.  Blood pressure is excellent.  Depressions doing very well.  She's been checking it at home.  Lab work today October 2019 including CBC, CMP.  Recheck blood pressure in 4 months.    Procedures         Dragon disclaimer:   Much of this encounter note is an electronic transcription/translation of spoken language to printed text. The electronic translation of spoken language may permit erroneous, or at times, nonsensical words or phrases to be  inadvertently transcribed; Although I have reviewed the note for such errors, some may still exist.

## 2019-10-18 LAB
ALBUMIN SERPL-MCNC: 4.6 G/DL (ref 3.5–5.2)
ALBUMIN/GLOB SERPL: 2.1 G/DL
ALP SERPL-CCNC: 107 U/L (ref 39–117)
ALT SERPL-CCNC: 19 U/L (ref 1–33)
AST SERPL-CCNC: 16 U/L (ref 1–32)
BASOPHILS # BLD AUTO: 0.06 10*3/MM3 (ref 0–0.2)
BASOPHILS NFR BLD AUTO: 1.1 % (ref 0–1.5)
BILIRUB SERPL-MCNC: 0.3 MG/DL (ref 0.2–1.2)
BUN SERPL-MCNC: 14 MG/DL (ref 6–20)
BUN/CREAT SERPL: 16.7 (ref 7–25)
CALCIUM SERPL-MCNC: 9.1 MG/DL (ref 8.6–10.5)
CHLORIDE SERPL-SCNC: 102 MMOL/L (ref 98–107)
CHOLEST SERPL-MCNC: 226 MG/DL (ref 0–200)
CO2 SERPL-SCNC: 24.2 MMOL/L (ref 22–29)
CREAT SERPL-MCNC: 0.84 MG/DL (ref 0.57–1)
EOSINOPHIL # BLD AUTO: 0.27 10*3/MM3 (ref 0–0.4)
EOSINOPHIL NFR BLD AUTO: 4.8 % (ref 0.3–6.2)
ERYTHROCYTE [DISTWIDTH] IN BLOOD BY AUTOMATED COUNT: 13.1 % (ref 12.3–15.4)
GLOBULIN SER CALC-MCNC: 2.2 GM/DL
GLUCOSE SERPL-MCNC: 93 MG/DL (ref 65–99)
HCT VFR BLD AUTO: 38.9 % (ref 34–46.6)
HDLC SERPL-MCNC: 47 MG/DL (ref 40–60)
HGB BLD-MCNC: 12.8 G/DL (ref 12–15.9)
IMM GRANULOCYTES # BLD AUTO: 0.02 10*3/MM3 (ref 0–0.05)
IMM GRANULOCYTES NFR BLD AUTO: 0.4 % (ref 0–0.5)
LDLC SERPL CALC-MCNC: 141 MG/DL (ref 0–100)
LYMPHOCYTES # BLD AUTO: 1.85 10*3/MM3 (ref 0.7–3.1)
LYMPHOCYTES NFR BLD AUTO: 32.6 % (ref 19.6–45.3)
MCH RBC QN AUTO: 28.3 PG (ref 26.6–33)
MCHC RBC AUTO-ENTMCNC: 32.9 G/DL (ref 31.5–35.7)
MCV RBC AUTO: 86.1 FL (ref 79–97)
MONOCYTES # BLD AUTO: 0.44 10*3/MM3 (ref 0.1–0.9)
MONOCYTES NFR BLD AUTO: 7.8 % (ref 5–12)
NEUTROPHILS # BLD AUTO: 3.03 10*3/MM3 (ref 1.7–7)
NEUTROPHILS NFR BLD AUTO: 53.3 % (ref 42.7–76)
NRBC BLD AUTO-RTO: 0 /100 WBC (ref 0–0.2)
PLATELET # BLD AUTO: 319 10*3/MM3 (ref 140–450)
POTASSIUM SERPL-SCNC: 4.6 MMOL/L (ref 3.5–5.2)
PROT SERPL-MCNC: 6.8 G/DL (ref 6–8.5)
RBC # BLD AUTO: 4.52 10*6/MM3 (ref 3.77–5.28)
SODIUM SERPL-SCNC: 139 MMOL/L (ref 136–145)
TRIGL SERPL-MCNC: 189 MG/DL (ref 0–150)
VLDLC SERPL CALC-MCNC: 37.8 MG/DL
WBC # BLD AUTO: 5.67 10*3/MM3 (ref 3.4–10.8)

## 2020-01-28 RX ORDER — FLUOXETINE HYDROCHLORIDE 40 MG/1
40 CAPSULE ORAL DAILY
Qty: 90 CAPSULE | Refills: 1 | Status: SHIPPED | OUTPATIENT
Start: 2020-01-28 | End: 2020-09-02 | Stop reason: SDUPTHER

## 2020-02-17 ENCOUNTER — OFFICE VISIT (OUTPATIENT)
Dept: INTERNAL MEDICINE | Facility: CLINIC | Age: 50
End: 2020-02-17

## 2020-02-17 VITALS
SYSTOLIC BLOOD PRESSURE: 122 MMHG | RESPIRATION RATE: 16 BRPM | OXYGEN SATURATION: 97 % | HEART RATE: 87 BPM | DIASTOLIC BLOOD PRESSURE: 78 MMHG | BODY MASS INDEX: 39.99 KG/M2 | HEIGHT: 65 IN | WEIGHT: 240 LBS | TEMPERATURE: 98.5 F

## 2020-02-17 DIAGNOSIS — G43.009 MIGRAINE WITHOUT AURA AND WITHOUT STATUS MIGRAINOSUS, NOT INTRACTABLE: ICD-10-CM

## 2020-02-17 DIAGNOSIS — I10 ESSENTIAL HYPERTENSION: Primary | ICD-10-CM

## 2020-02-17 DIAGNOSIS — F32.A DEPRESSION, UNSPECIFIED DEPRESSION TYPE: ICD-10-CM

## 2020-02-17 PROCEDURE — 99213 OFFICE O/P EST LOW 20 MIN: CPT | Performed by: INTERNAL MEDICINE

## 2020-02-17 NOTE — PROGRESS NOTES
Subjective   Savannah Valenzuela is a 49 y.o. female.     Chief Complaint   Patient presents with   • Hypertension   • Depression         In for recheck of chronic depression.  She's doing very well on her Wellbutrin.  So on Prozac.  A little anxious at times but depression is not an issue right now.    Hypertension   This is a new problem. The current episode started 1 to 4 weeks ago. The problem has been rapidly improving since onset. The problem is controlled. Pertinent negatives include no chest pain, headaches, palpitations, peripheral edema, PND or shortness of breath. There are no associated agents to hypertension. There are no known risk factors for coronary artery disease. Current antihypertension treatment includes angiotensin blockers. The current treatment provides significant improvement. There are no compliance problems.  There is no history of angina, kidney disease, CAD/MI, CVA or heart failure.   Depression   Visit Type: follow-up  Patient is not experiencing: palpitations and shortness of breath.  Frequency of symptoms: rarely          The following portions of the patient's history were reviewed and updated as appropriate: allergies, current medications, past social history and problem list.    Outpatient Medications Marked as Taking for the 2/17/20 encounter (Office Visit) with Mathew Lemos MD   Medication Sig Dispense Refill   • buPROPion XL (WELLBUTRIN XL) 150 MG 24 hr tablet Take 1 tablet by mouth Daily. 90 tablet 1   • FLUoxetine (PROzac) 40 MG capsule Take 1 capsule by mouth Daily. 90 capsule 1   • olmesartan-hydrochlorothiazide (BENICAR HCT) 40-12.5 MG per tablet Take 1 tablet by mouth Daily. 90 tablet 1       Review of Systems   Respiratory: Negative for shortness of breath and wheezing.    Cardiovascular: Negative for chest pain, palpitations, leg swelling and PND.   Neurological: Negative for headaches.       Objective   Vitals:    02/17/20 0849   BP: 122/78   Pulse: 87   Resp: 16   Temp:  98.5 °F (36.9 °C)   SpO2: 97%          02/17/20  0849   Weight: 109 kg (240 lb)    [unfilled]  Body mass index is 39.94 kg/m².      Physical Exam   Constitutional: She appears well-developed and well-nourished. No distress.   HENT:   Head: Normocephalic and atraumatic.   Neck: Carotid bruit is not present.   Cardiovascular: Normal rate, regular rhythm, normal heart sounds and intact distal pulses. Exam reveals no gallop.   No murmur heard.  Pulmonary/Chest: Effort normal and breath sounds normal. No respiratory distress. She has no wheezes. She has no rales.   Abdominal: Soft. Bowel sounds are normal. She exhibits no mass. There is no tenderness. There is no guarding.         Problem List Items Addressed This Visit        Cardiovascular and Mediastinum    Essential hypertension - Primary    Migraine       Other    Depression        Assessment/Plan   In for recheck of hypertension.  Also depression.  Depression doing well.  Blood pressures have been good on Benicar 20 mg/day and HCTZ 12.5 mg daily.  Edema resolved with the addition of HCTZ and blood pressure came down.  Annual lab work will be October 2020.  Annual preventive exam October 2019.  We will continue office visits out to 4 months.           .bmifollowup  Dragon disclaimer:   Much of this encounter note is an electronic transcription/translation of spoken language to printed text. The electronic translation of spoken language may permit erroneous, or at times, nonsensical words or phrases to be inadvertently transcribed; Although I have reviewed the note for such errors, some may still exist.

## 2020-03-27 PROBLEM — E78.2 ELEVATED TRIGLYCERIDES WITH HIGH CHOLESTEROL: Status: ACTIVE | Noted: 2020-03-27

## 2020-03-27 PROBLEM — R53.82 CHRONIC FATIGUE: Status: ACTIVE | Noted: 2020-03-27

## 2020-03-27 PROBLEM — M25.50 MULTIPLE JOINT PAIN: Status: ACTIVE | Noted: 2020-03-27

## 2020-04-16 PROBLEM — E66.813 CLASS 3 SEVERE OBESITY WITH BODY MASS INDEX (BMI) OF 40.0 TO 44.9 IN ADULT: Status: ACTIVE | Noted: 2020-04-16

## 2020-04-16 PROBLEM — E66.01 CLASS 3 SEVERE OBESITY WITH BODY MASS INDEX (BMI) OF 40.0 TO 44.9 IN ADULT: Status: ACTIVE | Noted: 2020-04-16

## 2020-04-20 ENCOUNTER — TELEMEDICINE (OUTPATIENT)
Dept: BARIATRICS/WEIGHT MGMT | Facility: CLINIC | Age: 50
End: 2020-04-20

## 2020-04-20 VITALS — BODY MASS INDEX: 41.15 KG/M2 | HEIGHT: 64 IN | WEIGHT: 241 LBS

## 2020-04-20 DIAGNOSIS — M25.50 MULTIPLE JOINT PAIN: ICD-10-CM

## 2020-04-20 DIAGNOSIS — E78.2 ELEVATED TRIGLYCERIDES WITH HIGH CHOLESTEROL: ICD-10-CM

## 2020-04-20 DIAGNOSIS — R53.82 CHRONIC FATIGUE: ICD-10-CM

## 2020-04-20 DIAGNOSIS — F32.A DEPRESSION, UNSPECIFIED DEPRESSION TYPE: ICD-10-CM

## 2020-04-20 DIAGNOSIS — E66.01 CLASS 3 SEVERE OBESITY WITH SERIOUS COMORBIDITY AND BODY MASS INDEX (BMI) OF 40.0 TO 44.9 IN ADULT, UNSPECIFIED OBESITY TYPE (HCC): Primary | ICD-10-CM

## 2020-04-20 DIAGNOSIS — I10 ESSENTIAL HYPERTENSION: ICD-10-CM

## 2020-04-20 PROBLEM — G89.29 CHRONIC KNEE PAIN: Status: ACTIVE | Noted: 2020-04-20

## 2020-04-20 PROBLEM — M25.569 CHRONIC KNEE PAIN: Status: ACTIVE | Noted: 2020-04-20

## 2020-04-20 PROCEDURE — 99205 OFFICE O/P NEW HI 60 MIN: CPT | Performed by: NURSE PRACTITIONER

## 2020-04-20 NOTE — PROGRESS NOTES
[facility]  [address] [facility] [address]  [facility] [id number] [phone]  [address] [facility] [address]  [facility] [id number] [phone]  Dept: [phone]  4/20/2020      Savannah Valenzuela.  [id number]  [id number]  1970  female      Chief Complaint of weight gain; unable to maintain weight loss    History of Present Illness:   Savannah is a 49 y.o. female who presents today for evaluation, education and consultation regarding weight loss surgery. The patient is interested in the sleeve gastrectomy.- or lapband      Diet History:Savannah has been overweight for at least 20 years, has been 35 pounds or more overweight for at least 20 years, has been 100 pounds or more overweight for 20 or more years. The most weight Savannah lost was 25 pounds on low carb diet and exercising 2 days per week and maintained the weight loss for 6 months . Savannah describes her eating habits as drinking soda, snacking without portion control, drinking caffeine. Savannah Valenzuela has tried Atkins, Alexa Renzo, Weight Watchers and Dietician monitored among others with success of losing up to 20 pounds, but in each instance regained the weight.   See dietician documentation for complete history.    Bariatric Surgery Evaluation: The patient is being seen for an initial visit for bariatric surgery evaluation.     Bariatric Co-morbidities:  hypertension, knee pain and depression    Patient Active Problem List   Diagnosis   • Migraine   • Allergic rhinitis   • Hematuria, microscopic   • Depression   • Essential hypertension   • Chronic fatigue   • Elevated triglycerides with high cholesterol   • Class 3 severe obesity with body mass index (BMI) of 40.0 to 44.9 in adult (CMS/[facility])   • Chronic knee pain       Past Medical History:   Diagnosis Date   • Allergic rhinitis    • Depression    • Fatigue    • Hematuria, microscopic    • Hyperlipidemia    • Hypertension    • Medication  management    • Migraine    • Multiple food allergies    • Physical exam, annual        Past Surgical History:   Procedure Laterality Date   •  SECTION     • DILATATION AND CURETTAGE  2008       Allergies   Allergen Reactions   • Latex Irritability     Painful urination with latex cath         Current Outpatient Medications:   •  aspirin-acetaminophen-caffeine (EXCEDRIN MIGRAINE) 250-250-65 MG per tablet, Take 1 tablet by mouth Every 6 (Six) Hours As Needed for Headache., Disp: , Rfl:   •  aspirin-sod bicarb-citric acid (BARRY-SELTZER) 325 MG effervescent tablet, Take 325 mg by mouth As Needed., Disp: , Rfl:   •  buPROPion XL (WELLBUTRIN XL) 150 MG 24 hr tablet, Take 1 tablet by mouth Daily., Disp: 90 tablet, Rfl: 1  •  FLUoxetine (PROzac) 40 MG capsule, Take 1 capsule by mouth Daily., Disp: 90 capsule, Rfl: 1  •  ibuprofen (ADVIL,MOTRIN) 600 MG tablet, Take 600 mg by mouth Every 6 (Six) Hours As Needed for Mild Pain ., Disp: , Rfl:   •  Multiple Vitamins-Minerals (MULTIVITAMIN WITH MINERALS) tablet tablet, Take 1 tablet by mouth Daily., Disp: , Rfl:   •  olmesartan-hydrochlorothiazide (BENICAR HCT) 40-12.5 MG per tablet, Take 1 tablet by mouth Daily., Disp: 90 tablet, Rfl: 1    Social History     Socioeconomic History   • Marital status:      Spouse name: Not on file   • Number of children: Not on file   • Years of education: Not on file   • Highest education level: Not on file   Tobacco Use   • Smoking status: Never Smoker   • Smokeless tobacco: Never Used   Substance and Sexual Activity   • Alcohol use: No     Comment: Kay: rare   • Drug use: No   • Sexual activity: Not Currently     Partners: Male       Family History   Problem Relation Age of Onset   • Heart disease Father    • Hyperlipidemia Father          Review of Systems:  Review of Systems   Constitutional: Positive for appetite change and fatigue.   HENT: Negative.    Respiratory: Negative.    Cardiovascular: Negative.     Gastrointestinal: Negative.    Neurological: Negative.    Psychiatric/Behavioral: Negative.        Physical Exam:  Vital Signs:  Weight: 109 kg (241 lb)   Body mass index is 40.1 kg/m².                Physical Exam   Constitutional: She is oriented to person, place, and time. She appears well-developed and well-nourished.   Pulmonary/Chest: Effort normal.   Neurological: She is alert and oriented to person, place, and time.   Skin: She is not diaphoretic.   Psychiatric: She has a normal mood and affect. Her behavior is normal.   Nursing note and vitals reviewed.         Assessment:         Savannah Valenzuela is a 49 y.o. year old female with medically complicated severe obesity. Weight: 109 kg (241 lb), Body mass index is 40.1 kg/m². and weight related problems including hypertension, knee pain and mental health disease.    I explained in detail the procedures that we are performing.  All of those procedures can be performed laparoscopically but there is a chance to convert to open if any technical challenges or complications do occur.  Bariatric surgery is not cosmetic surgery but rather a tool to help a patient make a life-long commitment lifestyle changes including diet, exercise, behavior changes, and taking supplemental vitamins and minerals.    Due to the patient's BMI and co-morbidities they are at a high risk for surgery and will have to follow our process for obtaining pre-operative testing and evaluation at their next in person appointment.      The risks, benefits, alternatives, and potential complications of all of the procedures were explained in detail including, but not limited to death, anesthesia and medication adverse effect/DVT, pulmonary embolism, trocar site/incisional hernia, wound infection, abdominal infection, bleeding, failure to lose weight or gain weight and change in body image, metabolic complications with calcium, thiamine, vitamin B12, folate, iron, and anemia.    The patient was  advised to start a high protein, low fat and low carbohydrate diet.     The patient was encouraged to start routine exercise including but not limited to 150 minutes per week.     The consultation plan was reviewed with the patient but will be discussed in further detail at their next appointment.    The patient understands the surgical procedures and the different surgical options that are available.  She understands the lifestyle changes that would be required after surgery and has agreed to participate in a pre-operative and postoperative weight management program.  She also expressed understanding of possible risks, had several questions answered and desires to proceed.    I think she is a good candidate for this surgery, and is interested in a sleeve gastrectomy or lapband    The patient was counseled face to face regarding this for a total time of 60 minutes with 45 spent on counseling.    Encounter Diagnoses   Name Primary?   • Class 3 severe obesity with serious comorbidity and body mass index (BMI) of 40.0 to 44.9 in adult, unspecified obesity type (CMS/HCC) Yes   • Essential hypertension    • Multiple joint pain    • Chronic fatigue    • Depression, unspecified depression type    • Elevated triglycerides with high cholesterol        Plan:    Patient will follow back up in our office for in detailed physical exam, dietary and exercise education and pre-operative testing scheduling. They will have evaluations and follow up with bariatric dieticians and a psychologist before undergoing a multidisciplinary review of her candidacy.  We also discussed the weight loss requirement and rationale, and other program requirements.      Bobbi Chavarria, CHAN  4/20/2020

## 2020-04-21 RX ORDER — BUPROPION HYDROCHLORIDE 150 MG/1
150 TABLET ORAL DAILY
Qty: 90 TABLET | Refills: 1 | Status: SHIPPED | OUTPATIENT
Start: 2020-04-21 | End: 2021-07-12 | Stop reason: SDUPTHER

## 2020-05-14 ENCOUNTER — HOSPITAL ENCOUNTER (OUTPATIENT)
Dept: GENERAL RADIOLOGY | Facility: HOSPITAL | Age: 50
Discharge: HOME | End: 2020-05-14
Admitting: NURSE PRACTITIONER

## 2020-05-14 ENCOUNTER — HOSPITAL ENCOUNTER (OUTPATIENT)
Dept: CARDIOLOGY | Facility: HOSPITAL | Age: 50
Discharge: HOME | End: 2020-05-14

## 2020-05-14 ENCOUNTER — CONSULT (OUTPATIENT)
Dept: BARIATRICS/WEIGHT MGMT | Facility: CLINIC | Age: 50
End: 2020-05-14

## 2020-05-14 ENCOUNTER — LAB (OUTPATIENT)
Dept: LAB | Facility: HOSPITAL | Age: 50
End: 2020-05-14

## 2020-05-14 ENCOUNTER — TRANSCRIBE ORDERS (OUTPATIENT)
Dept: CARDIOLOGY | Facility: HOSPITAL | Age: 50
End: 2020-05-14

## 2020-05-14 VITALS
HEIGHT: 65 IN | RESPIRATION RATE: 18 BRPM | WEIGHT: 239 LBS | BODY MASS INDEX: 39.82 KG/M2 | DIASTOLIC BLOOD PRESSURE: 84 MMHG | HEART RATE: 80 BPM | SYSTOLIC BLOOD PRESSURE: 145 MMHG | TEMPERATURE: 98.6 F

## 2020-05-14 DIAGNOSIS — E78.2 ELEVATED TRIGLYCERIDES WITH HIGH CHOLESTEROL: ICD-10-CM

## 2020-05-14 DIAGNOSIS — F32.A DEPRESSION, UNSPECIFIED DEPRESSION TYPE: ICD-10-CM

## 2020-05-14 DIAGNOSIS — K21.9 GASTROESOPHAGEAL REFLUX DISEASE, ESOPHAGITIS PRESENCE NOT SPECIFIED: ICD-10-CM

## 2020-05-14 DIAGNOSIS — M25.569 CHRONIC KNEE PAIN, UNSPECIFIED LATERALITY: ICD-10-CM

## 2020-05-14 DIAGNOSIS — R53.82 CHRONIC FATIGUE: ICD-10-CM

## 2020-05-14 DIAGNOSIS — G89.29 CHRONIC KNEE PAIN, UNSPECIFIED LATERALITY: ICD-10-CM

## 2020-05-14 DIAGNOSIS — I10 ESSENTIAL HYPERTENSION: ICD-10-CM

## 2020-05-14 DIAGNOSIS — E66.01 CLASS 3 SEVERE OBESITY WITH SERIOUS COMORBIDITY AND BODY MASS INDEX (BMI) OF 40.0 TO 44.9 IN ADULT, UNSPECIFIED OBESITY TYPE (HCC): ICD-10-CM

## 2020-05-14 DIAGNOSIS — E66.01 CLASS 3 SEVERE OBESITY WITH SERIOUS COMORBIDITY AND BODY MASS INDEX (BMI) OF 40.0 TO 44.9 IN ADULT, UNSPECIFIED OBESITY TYPE (HCC): Primary | ICD-10-CM

## 2020-05-14 DIAGNOSIS — Z01.811 PRE-OP CHEST EXAM: Primary | ICD-10-CM

## 2020-05-14 LAB
ALBUMIN SERPL-MCNC: 4.1 G/DL (ref 3.5–5.2)
ALBUMIN/GLOB SERPL: 1.3 G/DL
ALP SERPL-CCNC: 105 U/L (ref 39–117)
ALT SERPL W P-5'-P-CCNC: 23 U/L (ref 1–33)
ANION GAP SERPL CALCULATED.3IONS-SCNC: 13.4 MMOL/L (ref 5–15)
AST SERPL-CCNC: 19 U/L (ref 1–32)
BASOPHILS # BLD AUTO: 0.07 10*3/MM3 (ref 0–0.2)
BASOPHILS NFR BLD AUTO: 1.4 % (ref 0–1.5)
BILIRUB SERPL-MCNC: 0.6 MG/DL (ref 0.2–1.2)
BUN BLD-MCNC: 15 MG/DL (ref 6–20)
BUN/CREAT SERPL: 16.9 (ref 7–25)
CALCIUM SPEC-SCNC: 9.6 MG/DL (ref 8.6–10.5)
CHLORIDE SERPL-SCNC: 102 MMOL/L (ref 98–107)
CHOLEST SERPL-MCNC: 194 MG/DL (ref 0–200)
CO2 SERPL-SCNC: 26.6 MMOL/L (ref 22–29)
CREAT BLD-MCNC: 0.89 MG/DL (ref 0.57–1)
DEPRECATED RDW RBC AUTO: 40.4 FL (ref 37–54)
EOSINOPHIL # BLD AUTO: 0.11 10*3/MM3 (ref 0–0.4)
EOSINOPHIL NFR BLD AUTO: 2.2 % (ref 0.3–6.2)
ERYTHROCYTE [DISTWIDTH] IN BLOOD BY AUTOMATED COUNT: 13 % (ref 12.3–15.4)
GFR SERPL CREATININE-BSD FRML MDRD: 68 ML/MIN/1.73
GLOBULIN UR ELPH-MCNC: 3.1 GM/DL
GLUCOSE BLD-MCNC: 106 MG/DL (ref 65–99)
HBA1C MFR BLD: 5.59 % (ref 4.8–5.6)
HCT VFR BLD AUTO: 36.7 % (ref 34–46.6)
HDLC SERPL-MCNC: 48 MG/DL (ref 40–60)
HGB BLD-MCNC: 11.8 G/DL (ref 12–15.9)
IMM GRANULOCYTES # BLD AUTO: 0.02 10*3/MM3 (ref 0–0.05)
IMM GRANULOCYTES NFR BLD AUTO: 0.4 % (ref 0–0.5)
LDLC SERPL CALC-MCNC: 123 MG/DL (ref 0–100)
LDLC/HDLC SERPL: 2.56 {RATIO}
LYMPHOCYTES # BLD AUTO: 1.67 10*3/MM3 (ref 0.7–3.1)
LYMPHOCYTES NFR BLD AUTO: 34.1 % (ref 19.6–45.3)
MCH RBC QN AUTO: 27.6 PG (ref 26.6–33)
MCHC RBC AUTO-ENTMCNC: 32.2 G/DL (ref 31.5–35.7)
MCV RBC AUTO: 85.7 FL (ref 79–97)
MONOCYTES # BLD AUTO: 0.43 10*3/MM3 (ref 0.1–0.9)
MONOCYTES NFR BLD AUTO: 8.8 % (ref 5–12)
NEUTROPHILS # BLD AUTO: 2.6 10*3/MM3 (ref 1.7–7)
NEUTROPHILS NFR BLD AUTO: 53.1 % (ref 42.7–76)
NRBC BLD AUTO-RTO: 0 /100 WBC (ref 0–0.2)
PLATELET # BLD AUTO: 283 10*3/MM3 (ref 140–450)
PMV BLD AUTO: 9.6 FL (ref 6–12)
POTASSIUM BLD-SCNC: 4.1 MMOL/L (ref 3.5–5.2)
PROT SERPL-MCNC: 7.2 G/DL (ref 6–8.5)
RBC # BLD AUTO: 4.28 10*6/MM3 (ref 3.77–5.28)
SODIUM BLD-SCNC: 142 MMOL/L (ref 136–145)
TRIGL SERPL-MCNC: 116 MG/DL (ref 0–150)
TSH SERPL DL<=0.05 MIU/L-ACNC: 0.97 UIU/ML (ref 0.27–4.2)
VLDLC SERPL-MCNC: 23.2 MG/DL (ref 5–40)
WBC NRBC COR # BLD: 4.9 10*3/MM3 (ref 3.4–10.8)

## 2020-05-14 PROCEDURE — 80053 COMPREHEN METABOLIC PANEL: CPT

## 2020-05-14 PROCEDURE — 93010 ELECTROCARDIOGRAM REPORT: CPT | Performed by: INTERNAL MEDICINE

## 2020-05-14 PROCEDURE — 99214 OFFICE O/P EST MOD 30 MIN: CPT | Performed by: NURSE PRACTITIONER

## 2020-05-14 PROCEDURE — 84443 ASSAY THYROID STIM HORMONE: CPT

## 2020-05-14 PROCEDURE — 80061 LIPID PANEL: CPT

## 2020-05-14 PROCEDURE — 71046 X-RAY EXAM CHEST 2 VIEWS: CPT

## 2020-05-14 PROCEDURE — 85025 COMPLETE CBC W/AUTO DIFF WBC: CPT

## 2020-05-14 PROCEDURE — 93005 ELECTROCARDIOGRAM TRACING: CPT

## 2020-05-14 PROCEDURE — 36415 COLL VENOUS BLD VENIPUNCTURE: CPT

## 2020-05-14 PROCEDURE — 83036 HEMOGLOBIN GLYCOSYLATED A1C: CPT

## 2020-05-14 NOTE — PROGRESS NOTES
[facility] BARIATRIC SURGERY  [address] [address]  [phone]  [address] [address]  [phone]  Dept: [phone]  2020      Savannah Valenzuela.  [id number]  [id number]  1970  female      Chief Complaint of weight gain; unable to maintain weight loss    History of Present Illness:   Savannah is a 49 y.o. female who presents today for evaluation, education and consultation regarding weight loss surgery. The patient is interested in the sleeve gastrectomy.      Diet History: was addressed at their initial appointment and will be reviewed in detail with the dietician as well.  See dietician documentation for complete history.    Bariatric Surgery Evaluation: The patient is being seen for a follow up on their initial visit for bariatric surgery evaluation.     Bariatric Co-morbidities:  hypertension, dyslipidemia, knee pain, GERD and depression    Patient Active Problem List   Diagnosis   • Migraine   • Allergic rhinitis   • Hematuria, microscopic   • Depression   • Essential hypertension   • Chronic fatigue   • Elevated triglycerides with high cholesterol   • Class 3 severe obesity with body mass index (BMI) of 40.0 to 44.9 in adult (CMS/[facility])   • Chronic knee pain   • GERD (gastroesophageal reflux disease)       Past Medical History:   Diagnosis Date   • Allergic rhinitis    • Depression    • Fatigue    • Hematuria, microscopic    • Hyperlipidemia    • Hypertension    • Medication management    • Migraine    • Multiple food allergies    • Physical exam, annual        Past Surgical History:   Procedure Laterality Date   •  SECTION     • DILATATION AND CURETTAGE         Allergies   Allergen Reactions   • Latex Irritability     Painful urination with latex cath         Current Outpatient Medications:   •  aspirin-acetaminophen-caffeine (EXCEDRIN MIGRAINE) 250-250-65 MG per tablet, Take 1 tablet by  mouth Every 6 (Six) Hours As Needed for Headache., Disp: , Rfl:   •  aspirin-sod bicarb-citric acid (BARRY-SELTZER) 325 MG effervescent tablet, Take 325 mg by mouth As Needed., Disp: , Rfl:   •  buPROPion XL (WELLBUTRIN XL) 150 MG 24 hr tablet, Take 1 tablet by mouth Daily., Disp: 90 tablet, Rfl: 1  •  FLUoxetine (PROzac) 40 MG capsule, Take 1 capsule by mouth Daily., Disp: 90 capsule, Rfl: 1  •  ibuprofen (ADVIL,MOTRIN) 600 MG tablet, Take 600 mg by mouth Every 6 (Six) Hours As Needed for Mild Pain ., Disp: , Rfl:   •  Multiple Vitamins-Minerals (MULTIVITAMIN WITH MINERALS) tablet tablet, Take 1 tablet by mouth Daily., Disp: , Rfl:   •  olmesartan-hydrochlorothiazide (BENICAR HCT) 40-12.5 MG per tablet, Take 1 tablet by mouth Daily., Disp: 90 tablet, Rfl: 1    Social History     Socioeconomic History   • Marital status:      Spouse name: Not on file   • Number of children: Not on file   • Years of education: Not on file   • Highest education level: Not on file   Tobacco Use   • Smoking status: Never Smoker   • Smokeless tobacco: Never Used   Substance and Sexual Activity   • Alcohol use: No     Comment: Kay: rare   • Drug use: No   • Sexual activity: Not Currently     Partners: Male       Family History   Problem Relation Age of Onset   • Heart disease Father    • Hyperlipidemia Father          Review of Systems:  Review of Systems   Constitutional: Positive for fatigue. Negative for unexpected weight change.   HENT: Negative.    Respiratory: Negative.    Cardiovascular: Negative.    Gastrointestinal: Negative for constipation.   Endocrine: Negative.    Genitourinary: Negative.    Musculoskeletal: Negative for back pain.   Neurological: Negative.    Hematological: Negative.    Psychiatric/Behavioral: Negative.        Physical Exam:  Vital Signs:  Weight: 108 kg (239 lb)   Body mass index is 40.31 kg/m².  Temp: 98.6 °F (37 °C)   Heart Rate: 80   BP: 145/84     Physical Exam   Constitutional: She is  oriented to person, place, and time. She appears well-developed and well-nourished.   HENT:   Head: Normocephalic and atraumatic.   Eyes: Pupils are equal, round, and reactive to light.   Neck: Normal range of motion.   Cardiovascular: Normal rate and regular rhythm.   Pulmonary/Chest: Effort normal and breath sounds normal. No respiratory distress. She has no wheezes.   Abdominal: Soft. Bowel sounds are normal. She exhibits no distension. There is no tenderness.   Musculoskeletal: Normal range of motion. She exhibits no edema.   Neurological: She is alert and oriented to person, place, and time.   Skin: Skin is warm and dry.   Psychiatric: She has a normal mood and affect. Her behavior is normal.   Nursing note and vitals reviewed.         Assessment:         Savannah Valenzuela is a 49 y.o. year old female with medically complicated severe obesity. Weight: 108 kg (239 lb), Body mass index is 40.31 kg/m². and weight related problems including hypertension, dyslipidemia, knee pain, GERD and depression.    I explained in detail the procedures that we are performing.  All of those procedures can be performed laparoscopically but there is a chance to convert to open if any technical challenges or complications do occur.  Bariatric surgery is not cosmetic surgery but rather a tool to help a patient make a life-long commitment lifestyle changes including diet, exercise, behavior changes, and taking supplemental vitamins and minerals.    Due to the patient's BMI and co-morbidities they are at a high risk for surgery and will obtain the following:  The patient has been advised that a letter of medical support and a history and physical must be obtained from her primary care physician. A psychological evaluation will be arranged for this patient. CBC, CMP, FLP, TSH and HgbA1C will be drawn and patient will be notified of results. Savannah Valenzuela will obtain a pre-operative CXR and EKG.       Savannah Valenzuela will be set up for a  pre-operative diagnostic esophagogastroduodenoscopy with biopsy for evaluation. The risks and benefits of the procedure were discussed with the patient in detail and all questions were answered.  Possibility of perforation, bleeding, aspiration, anoxic brain injury, respiratory and/or cardiac arrest and death were discussed.   She received handouts regarding, all questions were answered.     The risks, benefits, alternatives, and potential complications of all of the procedures were explained in detail including, but not limited to death, anesthesia and medication adverse effect/DVT, pulmonary embolism, trocar site/incisional hernia, wound infection, abdominal infection, bleeding, failure to lose weight or gain weight and change in body image, metabolic complications with calcium, thiamine, vitamin B12, folate, iron, and anemia.    The patient was advised to start a high protein, low fat and low carbohydrate diet. The patient was given individualized information by our dietician along with general group information and handouts.       The patient was given information regarding the MIQUEL educational video. MIQUEL is an internet based educational video which explains the surgical procedure and answers basic questions regarding the procedure. The patient was provided with instructions and a password to watch the video.    The patient was encouraged to start routine exercise including but not limited to 150 minutes per week. The patient received a resistance band along with a handout of exercises.     The consultation plan was reviewed with the patient.    The patient understands the surgical procedures and the different surgical options that are available.  She understands the lifestyle changes that would be required after surgery and has agreed to participate in a pre-operative and postoperative weight management program.  She also expressed understanding of possible risks, had several questions answered and desires to  proceed.    I think she is a good candidate for this surgery, and is interested in a sleeve gastrectomy.    Encounter Diagnoses   Name Primary?   • Class 3 severe obesity with serious comorbidity and body mass index (BMI) of 40.0 to 44.9 in adult, unspecified obesity type (CMS/HCC) Yes   • Essential hypertension    • Elevated triglycerides with high cholesterol    • Chronic fatigue    • Depression, unspecified depression type    • Chronic knee pain, unspecified laterality    • Gastroesophageal reflux disease, esophagitis presence not specified        Plan:    Patient will have evaluations and follow up with bariatric dieticians and a psychologist before undergoing a multidisciplinary review of her candidacy.  We also discussed the weight loss requirement and rationale, and other program requirements.      Bobbi Chavarria, CHAN  5/14/2020

## 2020-05-15 ENCOUNTER — TELEPHONE (OUTPATIENT)
Dept: BARIATRICS/WEIGHT MGMT | Facility: CLINIC | Age: 50
End: 2020-05-15

## 2020-05-15 NOTE — TELEPHONE ENCOUNTER
Spoke to patient regarding her lab results and normal chest x-ray. Patient gave verbal understanding.     ----- Message from CHAN Forman sent at 5/15/2020  1:16 PM EDT -----  Mildly elevated fasting blood sugar. Otherwise these look fine.

## 2020-05-19 PROBLEM — K21.9 GASTROESOPHAGEAL REFLUX DISEASE: Status: ACTIVE | Noted: 2020-05-19

## 2020-05-19 PROBLEM — E66.813 CLASS 3 SEVERE OBESITY WITH SERIOUS COMORBIDITY AND BODY MASS INDEX (BMI) OF 40.0 TO 44.9 IN ADULT: Status: ACTIVE | Noted: 2020-05-19

## 2020-05-19 PROBLEM — E66.01 CLASS 3 SEVERE OBESITY WITH SERIOUS COMORBIDITY AND BODY MASS INDEX (BMI) OF 40.0 TO 44.9 IN ADULT (HCC): Status: ACTIVE | Noted: 2020-05-19

## 2020-06-01 ENCOUNTER — TRANSCRIBE ORDERS (OUTPATIENT)
Dept: SLEEP MEDICINE | Facility: HOSPITAL | Age: 50
End: 2020-06-01

## 2020-06-01 DIAGNOSIS — Z01.818 OTHER SPECIFIED PRE-OPERATIVE EXAMINATION: Primary | ICD-10-CM

## 2020-06-03 ENCOUNTER — LAB (OUTPATIENT)
Dept: LAB | Facility: HOSPITAL | Age: 50
End: 2020-06-03

## 2020-06-03 DIAGNOSIS — Z01.818 OTHER SPECIFIED PRE-OPERATIVE EXAMINATION: ICD-10-CM

## 2020-06-03 PROCEDURE — U0004 COV-19 TEST NON-CDC HGH THRU: HCPCS

## 2020-06-04 LAB
REF LAB TEST METHOD: NORMAL
SARS-COV-2 RNA RESP QL NAA+PROBE: NOT DETECTED

## 2020-06-05 ENCOUNTER — HOSPITAL ENCOUNTER (OUTPATIENT)
Facility: HOSPITAL | Age: 50
Setting detail: HOSPITAL OUTPATIENT SURGERY
Discharge: HOME | End: 2020-06-05
Attending: SURGERY | Admitting: SURGERY

## 2020-06-05 ENCOUNTER — ANESTHESIA EVENT (OUTPATIENT)
Dept: GASTROENTEROLOGY | Facility: HOSPITAL | Age: 50
End: 2020-06-05

## 2020-06-05 ENCOUNTER — ANESTHESIA (OUTPATIENT)
Dept: GASTROENTEROLOGY | Facility: HOSPITAL | Age: 50
End: 2020-06-05

## 2020-06-05 VITALS
DIASTOLIC BLOOD PRESSURE: 93 MMHG | TEMPERATURE: 98.1 F | WEIGHT: 241 LBS | HEART RATE: 75 BPM | HEIGHT: 64 IN | OXYGEN SATURATION: 98 % | RESPIRATION RATE: 14 BRPM | SYSTOLIC BLOOD PRESSURE: 131 MMHG | BODY MASS INDEX: 41.15 KG/M2

## 2020-06-05 DIAGNOSIS — M25.569 CHRONIC KNEE PAIN, UNSPECIFIED LATERALITY: ICD-10-CM

## 2020-06-05 DIAGNOSIS — E66.01 CLASS 3 SEVERE OBESITY WITH SERIOUS COMORBIDITY AND BODY MASS INDEX (BMI) OF 40.0 TO 44.9 IN ADULT, UNSPECIFIED OBESITY TYPE (HCC): ICD-10-CM

## 2020-06-05 DIAGNOSIS — K21.9 GASTROESOPHAGEAL REFLUX DISEASE, ESOPHAGITIS PRESENCE NOT SPECIFIED: ICD-10-CM

## 2020-06-05 DIAGNOSIS — G89.29 CHRONIC KNEE PAIN, UNSPECIFIED LATERALITY: ICD-10-CM

## 2020-06-05 DIAGNOSIS — E78.2 ELEVATED TRIGLYCERIDES WITH HIGH CHOLESTEROL: ICD-10-CM

## 2020-06-05 DIAGNOSIS — R53.82 CHRONIC FATIGUE: ICD-10-CM

## 2020-06-05 DIAGNOSIS — F32.A DEPRESSION, UNSPECIFIED DEPRESSION TYPE: ICD-10-CM

## 2020-06-05 DIAGNOSIS — I10 ESSENTIAL HYPERTENSION: ICD-10-CM

## 2020-06-05 PROBLEM — K44.9 HIATAL HERNIA: Status: ACTIVE | Noted: 2020-06-05

## 2020-06-05 LAB
B-HCG UR QL: NEGATIVE
INTERNAL NEGATIVE CONTROL: NEGATIVE
INTERNAL POSITIVE CONTROL: POSITIVE
Lab: NORMAL

## 2020-06-05 PROCEDURE — 88305 TISSUE EXAM BY PATHOLOGIST: CPT | Performed by: SURGERY

## 2020-06-05 PROCEDURE — 43239 EGD BIOPSY SINGLE/MULTIPLE: CPT | Performed by: SURGERY

## 2020-06-05 PROCEDURE — 81025 URINE PREGNANCY TEST: CPT | Performed by: SURGERY

## 2020-06-05 PROCEDURE — 87081 CULTURE SCREEN ONLY: CPT | Performed by: SURGERY

## 2020-06-05 PROCEDURE — 25010000002 PROPOFOL 10 MG/ML EMULSION: Performed by: ANESTHESIOLOGY

## 2020-06-05 RX ORDER — LIDOCAINE HYDROCHLORIDE 20 MG/ML
INJECTION, SOLUTION INFILTRATION; PERINEURAL AS NEEDED
Status: DISCONTINUED | OUTPATIENT
Start: 2020-06-05 | End: 2020-06-05 | Stop reason: SURG

## 2020-06-05 RX ORDER — PANTOPRAZOLE SODIUM 40 MG/1
40 TABLET, DELAYED RELEASE ORAL DAILY
Qty: 30 TABLET | Refills: 5 | Status: SHIPPED | OUTPATIENT
Start: 2020-06-05 | End: 2021-01-13

## 2020-06-05 RX ORDER — PROPOFOL 10 MG/ML
VIAL (ML) INTRAVENOUS AS NEEDED
Status: DISCONTINUED | OUTPATIENT
Start: 2020-06-05 | End: 2020-06-05 | Stop reason: SURG

## 2020-06-05 RX ORDER — SODIUM CHLORIDE, SODIUM LACTATE, POTASSIUM CHLORIDE, CALCIUM CHLORIDE 600; 310; 30; 20 MG/100ML; MG/100ML; MG/100ML; MG/100ML
30 INJECTION, SOLUTION INTRAVENOUS CONTINUOUS PRN
Status: DISCONTINUED | OUTPATIENT
Start: 2020-06-05 | End: 2020-06-05 | Stop reason: HOSPADM

## 2020-06-05 RX ADMIN — LIDOCAINE HYDROCHLORIDE 60 MG: 20 INJECTION, SOLUTION INFILTRATION; PERINEURAL at 06:57

## 2020-06-05 RX ADMIN — PROPOFOL 200 MG: 10 INJECTION, EMULSION INTRAVENOUS at 06:57

## 2020-06-05 RX ADMIN — PROPOFOL 50 MG: 10 INJECTION, EMULSION INTRAVENOUS at 07:04

## 2020-06-05 RX ADMIN — SODIUM CHLORIDE, POTASSIUM CHLORIDE, SODIUM LACTATE AND CALCIUM CHLORIDE 30 ML/HR: 600; 310; 30; 20 INJECTION, SOLUTION INTRAVENOUS at 06:21

## 2020-06-05 NOTE — ANESTHESIA PREPROCEDURE EVALUATION
Anesthesia Evaluation     Patient summary reviewed and Nursing notes reviewed                Airway   Mallampati: I  TM distance: >3 FB  Neck ROM: full  No difficulty expected  Dental - normal exam     Pulmonary - negative pulmonary ROS and normal exam   Cardiovascular - normal exam    (+) hypertension, hyperlipidemia,       Neuro/Psych- negative ROS  GI/Hepatic/Renal/Endo    (+) obesity, morbid obesity, GERD,      Musculoskeletal (-) negative ROS    Abdominal  - normal exam    Bowel sounds: normal.   Substance History - negative use     OB/GYN negative ob/gyn ROS         Other                        Anesthesia Plan    ASA 3     MAC       Anesthetic plan, all risks, benefits, and alternatives have been provided, discussed and informed consent has been obtained with: patient.

## 2020-06-05 NOTE — OP NOTE
Surgeon: Cayetano Khan Jr., M.D.    Preoperative Diagnosis: Dyspepsia    Postoperative Diagnosis: #1 gastritis #2 LA grade A esophagitis #3 hiatal hernia    Procedure Performed: Transoral esophagogastroduodenoscopy with gastric antral and distal esophageal biopsies    Indications: 49-year-old female with morbid obesity with complaints of heartburn.  Patient does not take H2 blocker or PPI on regular basis.    Procedure:     The procedure, indications, preparation and potential complications were explained to the patient, who indicated understanding and signed the corresponding consent forms.  The patient was identified, taken to the endoscopy suite, and placed on the left side down decubitus position.  The patient underwent a MAC anesthesia and was appropriately monitored through the case by the anesthesia personnel with continuous pulse oximetry, blood pressure, and cardiac monitoring.  A bite block was placed.  After adequate IV sedation and using a transoral technique a lubed flexible endoscope was placed in the hypopharynx and advanced to the second portion of the duodenum without difficulty. The scope was then withdrawn back into the antrum of the stomach.  Cold forcep biopsies of the antrum were taken to rule out Helicobacter pylori.  The scope was retroflexed noting the body, fundus and cardia.  The scope was then withdrawn back into the esophagus after decompressing the stomach.  The Z line was noted and GE junction measured from the incisors.  The scope was then completely withdrawn.  The patient tolerated the procedure well and left the endoscopy suite in stable condition.  The findings are listed below.    Duodenum: Unremarkable  Antrum: Mild patchy erythema  Body/Fundus: Unremarkable  Cardia: Small to moderate size defect  Esophagus: Z line irregular with 2 areas extending proximally less than 5 mm.  These were biopsies with cold forceps and sent off to pathology rule out Tobias's.  No active bleeding  noted    Recommendations:     We will start on PPI or H2 blocker and await biopsy results and follow-up in the office as scheduled

## 2020-06-05 NOTE — ANESTHESIA POSTPROCEDURE EVALUATION
Patient: Savannah Valenzuela    Procedure Summary     Date:  06/05/20 Room / Location:  [facility] ENDOSCOPY 9 /  [facility]    Anesthesia Start:  0655 Anesthesia Stop:  0711    Procedure:  ESOPHAGOGASTRODUODENOSCOPY WITH BIOPSY (N/A Esophagus) Diagnosis:       Class 3 severe obesity with serious comorbidity and body mass index (BMI) of 40.0 to 44.9 in adult, unspecified obesity type (CMS/HCC)      Essential hypertension      Elevated triglycerides with high cholesterol      Chronic fatigue      Depression, unspecified depression type      Chronic knee pain, unspecified laterality      Gastroesophageal reflux disease, esophagitis presence not specified      (Class 3 severe obesity with serious comorbidity and body mass index (BMI) of 40.0 to 44.9 in adult, unspecified obesity type (CMS/HCC) [E66.01, Z68.41])      (Essential hypertension [I10])      (Elevated triglycerides with high cholesterol [E78.2])      (Chronic fatigue [R53.82])      (Depression, unspecified depression type [F32.9])      (Chronic knee pain, unspecified laterality [M25.569, G89.29])      (Gastroesophageal reflux disease, esophagitis presence not specified [K21.9])    Surgeon:  Cayetano Khan Jr., MD Provider:  Bronson Us MD    Anesthesia Type:  MAC ASA Status:  3          Anesthesia Type: MAC    Vitals  Vitals Value Taken Time   /93 6/5/2020  7:31 AM   Temp     Pulse 75 6/5/2020  7:31 AM   Resp 14 6/5/2020  7:31 AM   SpO2 98 % 6/5/2020  7:31 AM           Post Anesthesia Care and Evaluation    Patient location during evaluation: PACU  Patient participation: complete - patient participated  Level of consciousness: awake  Pain score: 0  Pain management: adequate  Airway patency: patent  Anesthetic complications: No anesthetic complications  PONV Status: none  Cardiovascular status: acceptable  Respiratory status: acceptable  Hydration status: acceptable    Comments: /93 (BP Location: Left arm, Patient Position: Lying)   Pulse 75  "  Temp 36.7 °C (98.1 °F) (Oral)   Resp 14   Ht 162.6 cm (64\")   Wt 109 kg (241 lb)   SpO2 98%   BMI 41.37 kg/m²       "

## 2020-06-05 NOTE — DISCHARGE INSTRUCTIONS
For the next 24 hours patient needs to be with a responsible adult.    For 24 hours DO NOT drive, operate machinery, appliances, drink alcohol, make important decisions or sign legal documents.    Start with a light or bland diet and advance to regular diet as tolerated.    Follow recommendations on procedure report provided by your doctor.    Call Dr Khan for problems [phone] and for biopsy results in 7-10 days    Problems may include but not limited to: large amounts of bleeding, trouble breathing, repeated vomiting, severe unrelieved pain, fever or chills.

## 2020-06-06 LAB — UREASE TISS QL: NEGATIVE

## 2020-06-08 ENCOUNTER — TELEPHONE (OUTPATIENT)
Dept: BARIATRICS/WEIGHT MGMT | Facility: CLINIC | Age: 50
End: 2020-06-08

## 2020-06-08 LAB
CYTO UR: NORMAL
LAB AP CASE REPORT: NORMAL
PATH REPORT.FINAL DX SPEC: NORMAL
PATH REPORT.GROSS SPEC: NORMAL

## 2020-06-08 NOTE — TELEPHONE ENCOUNTER
LVM for pt to call office regarding test results      ----- Message from Cayetano Khan Jr., MD sent at 6/8/2020  6:59 AM EDT -----  Please call patient with negative results.

## 2020-06-16 ENCOUNTER — OFFICE VISIT (OUTPATIENT)
Dept: INTERNAL MEDICINE | Facility: CLINIC | Age: 50
End: 2020-06-16

## 2020-06-16 VITALS
OXYGEN SATURATION: 100 % | RESPIRATION RATE: 16 BRPM | TEMPERATURE: 97.1 F | SYSTOLIC BLOOD PRESSURE: 124 MMHG | HEART RATE: 70 BPM | HEIGHT: 64 IN | DIASTOLIC BLOOD PRESSURE: 82 MMHG | BODY MASS INDEX: 41.73 KG/M2 | WEIGHT: 244.4 LBS

## 2020-06-16 DIAGNOSIS — I10 ESSENTIAL HYPERTENSION: Primary | ICD-10-CM

## 2020-06-16 DIAGNOSIS — K21.9 GASTROESOPHAGEAL REFLUX DISEASE, ESOPHAGITIS PRESENCE NOT SPECIFIED: ICD-10-CM

## 2020-06-16 DIAGNOSIS — F32.A DEPRESSION, UNSPECIFIED DEPRESSION TYPE: ICD-10-CM

## 2020-06-16 DIAGNOSIS — G43.009 MIGRAINE WITHOUT AURA AND WITHOUT STATUS MIGRAINOSUS, NOT INTRACTABLE: ICD-10-CM

## 2020-06-16 DIAGNOSIS — E78.2 ELEVATED TRIGLYCERIDES WITH HIGH CHOLESTEROL: ICD-10-CM

## 2020-06-16 PROCEDURE — 99214 OFFICE O/P EST MOD 30 MIN: CPT | Performed by: INTERNAL MEDICINE

## 2020-06-16 NOTE — PROGRESS NOTES
Subjective   Savannah Valenzuela is a 49 y.o. female.     Chief Complaint   Patient presents with   • Follow-up     B/P follow up and bariatric surgery form to be completed.         In for recheck of chronic depression.  She's doing very well on her Wellbutrin.  So on Prozac.  A little anxious at times but depression is not an issue right now.    Hypertension   This is a new problem. The current episode started 1 to 4 weeks ago. The problem has been rapidly improving since onset. The problem is controlled. Pertinent negatives include no chest pain, headaches, palpitations, peripheral edema, PND or shortness of breath. There are no associated agents to hypertension. There are no known risk factors for coronary artery disease. Current antihypertension treatment includes angiotensin blockers. The current treatment provides significant improvement. There are no compliance problems.  There is no history of angina, kidney disease, CAD/MI, CVA or heart failure.   Depression   Visit Type: follow-up  Patient is not experiencing: palpitations and shortness of breath.  Frequency of symptoms: rarely          The following portions of the patient's history were reviewed and updated as appropriate: allergies, current medications, past social history and problem list.    Outpatient Medications Marked as Taking for the 6/16/20 encounter (Office Visit) with Mathew Lemos MD   Medication Sig Dispense Refill   • aspirin-acetaminophen-caffeine (EXCEDRIN MIGRAINE) 250-250-65 MG per tablet Take 1 tablet by mouth Every 6 (Six) Hours As Needed for Headache.     • aspirin-sod bicarb-citric acid (BARRY-SELTZER) 325 MG effervescent tablet Take 325 mg by mouth As Needed.     • buPROPion XL (WELLBUTRIN XL) 150 MG 24 hr tablet Take 1 tablet by mouth Daily. 90 tablet 1   • FLUoxetine (PROzac) 40 MG capsule Take 1 capsule by mouth Daily. 90 capsule 1   • ibuprofen (ADVIL,MOTRIN) 600 MG tablet Take 600 mg by mouth Every 6 (Six) Hours As Needed for Mild  Pain .     • Multiple Vitamins-Minerals (MULTIVITAMIN WITH MINERALS) tablet tablet Take 1 tablet by mouth Daily.     • olmesartan-hydrochlorothiazide (BENICAR HCT) 40-12.5 MG per tablet Take 1 tablet by mouth Daily. 90 tablet 1   • pantoprazole (PROTONIX) 40 MG EC tablet Take 1 tablet by mouth Daily. 30 tablet 5       Review of Systems   Respiratory: Negative for shortness of breath and wheezing.    Cardiovascular: Negative for chest pain, palpitations, leg swelling and PND.   Gastrointestinal: Negative for constipation, diarrhea and nausea.   Neurological: Negative for headaches.   Psychiatric/Behavioral: Negative for dysphoric mood.       Objective   Vitals:    06/16/20 0911   BP: 124/82   Pulse: 70   Resp: 16   Temp: 97.1 °F (36.2 °C)   SpO2: 100%          06/16/20 0911   Weight: 111 kg (244 lb 6.4 oz)    [unfilled]  Body mass index is 41.95 kg/m².      Physical Exam   Constitutional: She appears well-developed and well-nourished. No distress.   HENT:   Head: Normocephalic and atraumatic.   Neck: Carotid bruit is not present.   Cardiovascular: Normal rate, regular rhythm, normal heart sounds and intact distal pulses. Exam reveals no gallop.   No murmur heard.  Pulmonary/Chest: Effort normal and breath sounds normal. No respiratory distress. She has no wheezes. She has no rales.   Abdominal: Soft. Bowel sounds are normal. She exhibits no mass. There is no tenderness. There is no guarding.         Problem List Items Addressed This Visit        Cardiovascular and Mediastinum    Elevated triglycerides with high cholesterol    Essential hypertension - Primary    Migraine       Digestive    GERD (gastroesophageal reflux disease)       Other    Depression        Assessment/Plan   In for recheck of hypertension.  Also depression.  Depression doing well.  Blood pressures have been good on Benicar 20 mg/day and HCTZ 12.5 mg daily.  Annual lab work will be October 2020.  Annual preventive exam October 2020.  We will  continue office visits out to 4 months.  Has plans for upcoming gastric sleeve.  Has a 6-month waiting period per her insurance carrier.  Patient advised that she may need to cut down on blood pressure medicine or stop it entirely at some point after her weight loss.  Several medication changes will be made postoperatively most notably switching Wellbutrin to  mg daily to Wellbutrin 75 twice daily.  She will not be able to take Advil or aspirin or Amy-Washburn with aspirin after surgery.  She is due for colorectal cancer screening with new guidelines and would prefer to go with [facility].  We will need to check out her insurance coverage.  Form filled out today for bariatric surgery.           .bmifollowup  Dragon disclaimer:   Much of this encounter note is an electronic transcription/translation of spoken language to printed text. The electronic translation of spoken language may permit erroneous, or at times, nonsensical words or phrases to be inadvertently transcribed; Although I have reviewed the note for such errors, some may still exist.

## 2020-06-24 ENCOUNTER — OFFICE VISIT (OUTPATIENT)
Dept: BARIATRICS/WEIGHT MGMT | Facility: CLINIC | Age: 50
End: 2020-06-24

## 2020-06-24 VITALS
TEMPERATURE: 97.5 F | WEIGHT: 238 LBS | RESPIRATION RATE: 18 BRPM | BODY MASS INDEX: 40.63 KG/M2 | SYSTOLIC BLOOD PRESSURE: 140 MMHG | HEART RATE: 77 BPM | DIASTOLIC BLOOD PRESSURE: 87 MMHG | HEIGHT: 64 IN

## 2020-06-24 DIAGNOSIS — I10 ESSENTIAL HYPERTENSION: ICD-10-CM

## 2020-06-24 DIAGNOSIS — K21.9 GASTROESOPHAGEAL REFLUX DISEASE, ESOPHAGITIS PRESENCE NOT SPECIFIED: ICD-10-CM

## 2020-06-24 DIAGNOSIS — E66.01 CLASS 3 SEVERE OBESITY WITH SERIOUS COMORBIDITY AND BODY MASS INDEX (BMI) OF 40.0 TO 44.9 IN ADULT, UNSPECIFIED OBESITY TYPE (HCC): Primary | ICD-10-CM

## 2020-06-24 DIAGNOSIS — F32.A DEPRESSION, UNSPECIFIED DEPRESSION TYPE: ICD-10-CM

## 2020-06-24 PROBLEM — E66.813 CLASS 3 SEVERE OBESITY WITH SERIOUS COMORBIDITY AND BODY MASS INDEX (BMI) OF 40.0 TO 44.9 IN ADULT: Status: RESOLVED | Noted: 2020-05-19 | Resolved: 2020-06-24

## 2020-06-24 PROCEDURE — 99024 POSTOP FOLLOW-UP VISIT: CPT | Performed by: NURSE PRACTITIONER

## 2020-06-24 NOTE — PROGRESS NOTES
[facility]ATR[facility] BARIATRIC SURGERY  [address] [facility] [address]  [facility] [address]  [phone]  [address] [facility] [address]  [facility] [address]  [phone]  Dept: [phone]  6/24/2020      Savannah Valenzuela.  [id number]  [id number]  1970  female      Chief Complaint   Patient presents with   • Nutrition Counseling     Diet visit 3/6       The patient is here for month 3/6 of their pre-operative physician supervised diet. She had a loss of 1 lbs. The patient states that she is following the recommendations given by our office and dietician including a high lean protein, low carb and low fat diet. We recommended adequate fruits and vegetable intake along with limited portion sizes. Patient is working on eliminating fast foods, fried foods, sweets and soda. Savannah Valenzuela has been increasing her daily water intake. She has been exercising: he has been walking more often and putting in longer distances, she is planning to get back to the gym with her sister to [place].    Patient states they have made positive changes including she is trying to eat more often throughout the day instead of just at dinner. Previously during the week she would eat lunch. She has been trying to get more real meals. She has been trying to get breakfast as well. She has added a protein shake- to her morning routine. She is working on increasing her water intake, struggling with wearing her mask during the day to get it in. She reports that when she first came back in person work. She had started going to the cafe and picking up a coffee and realized all the sugar added to it. She has cut out sugar added to coffee and gotten back to making her own in her office.       Review of Systems   Constitutional: Positive for appetite change. Negative for fatigue and unexpected weight change.   HENT: Negative.    Eyes: Negative.    Respiratory: Negative.    Cardiovascular: Negative.  Negative for leg  swelling.   Gastrointestinal: Negative for abdominal distention, abdominal pain, constipation, diarrhea, nausea and vomiting.   Genitourinary: Negative for difficulty urinating, frequency and urgency.   Musculoskeletal: Negative for back pain.   Skin: Negative.    Psychiatric/Behavioral: Negative.    All other systems reviewed and are negative.    Vitals:    06/24/20 0957   BP: 140/87   Pulse: 77   Resp: 18   Temp: 97.5 °F (36.4 °C)     Patient Active Problem List   Diagnosis   • Migraine   • Allergic rhinitis   • Hematuria, microscopic   • Depression   • Essential hypertension   • Chronic fatigue   • Elevated triglycerides with high cholesterol   • Class 3 severe obesity with body mass index (BMI) of 40.0 to 44.9 in adult (CMS/HCC)   • Chronic knee pain   • GERD (gastroesophageal reflux disease)   • Hiatal hernia     Body mass index is 40.83 kg/m².    The following portions of the patient's history were reviewed and updated as appropriate: active problem list, medication list, notes from last encounter    Physical Exam   Constitutional: She appears well-developed and well-nourished.   Neck: No thyromegaly present.   Cardiovascular: Normal rate, regular rhythm and normal heart sounds.   Pulmonary/Chest: Effort normal and breath sounds normal. No respiratory distress. She has no wheezes.   Abdominal: Soft. Bowel sounds are normal. She exhibits no distension. There is no tenderness. There is no guarding. No hernia.   Musculoskeletal: She exhibits no edema or tenderness.   Neurological: She is alert.   Skin: Skin is warm and dry. No rash noted. No erythema.   Psychiatric: She has a normal mood and affect. Her behavior is normal.   Nursing note and vitals reviewed.      Discussion/Plan:  Obesity/Morbid Obesity: Currently the patient's weight is decreasing. There are no medications prescribed.Treatment plan includes prescribed diet, prescribed exercise regimen and behavior modification.    I reviewed the appropriate  dietary choices with the patient and encouraged the necessary changes. Recommended at least 70 grams of protein per day, around 35 grams of fats and less than 100 grams of carbohydrates. Reviewed calorie intake if patient wanted to calorie count and/or had BMR. Instructed patient to drink half of body weight in ounces per day and exercise a minimum of 150 minutes per week including both cardio and strength training. Discussed the option of keeping a food journal which will help patient become more aware of the nutritional value of foods so they are more prepared after surgery.    The patient was given written materials from our office for education.   I answered all of the patients questions regarding dietary changes, exercise or surgical options.  The patient will follow up in 1 month. The total time spent face to face was 20 minutes with 15 minutes spent counseling.    Encounter Diagnoses   Name Primary?   • Class 3 severe obesity with serious comorbidity and body mass index (BMI) of 40.0 to 44.9 in adult, unspecified obesity type (CMS/[facility]) Yes   • Essential hypertension    • Gastroesophageal reflux disease, esophagitis presence not specified    • Depression, unspecified depression type

## 2020-06-29 ENCOUNTER — APPOINTMENT (OUTPATIENT)
Dept: WOMENS IMAGING | Facility: HOSPITAL | Age: 50
End: 2020-06-29

## 2020-06-29 PROCEDURE — 77067 SCR MAMMO BI INCL CAD: CPT | Performed by: RADIOLOGY

## 2020-06-29 PROCEDURE — 77063 BREAST TOMOSYNTHESIS BI: CPT | Performed by: RADIOLOGY

## 2020-07-29 ENCOUNTER — OFFICE VISIT (OUTPATIENT)
Dept: BARIATRICS/WEIGHT MGMT | Facility: CLINIC | Age: 50
End: 2020-07-29

## 2020-07-29 VITALS
RESPIRATION RATE: 18 BRPM | BODY MASS INDEX: 39.44 KG/M2 | WEIGHT: 231 LBS | HEART RATE: 82 BPM | TEMPERATURE: 98 F | HEIGHT: 64 IN | SYSTOLIC BLOOD PRESSURE: 122 MMHG | DIASTOLIC BLOOD PRESSURE: 75 MMHG

## 2020-07-29 DIAGNOSIS — R53.82 CHRONIC FATIGUE: ICD-10-CM

## 2020-07-29 DIAGNOSIS — K21.9 GASTROESOPHAGEAL REFLUX DISEASE, ESOPHAGITIS PRESENCE NOT SPECIFIED: ICD-10-CM

## 2020-07-29 DIAGNOSIS — E66.9 OBESITY, CLASS II, BMI 35-39.9: Primary | ICD-10-CM

## 2020-07-29 DIAGNOSIS — F32.A DEPRESSION, UNSPECIFIED DEPRESSION TYPE: ICD-10-CM

## 2020-07-29 DIAGNOSIS — I10 ESSENTIAL HYPERTENSION: ICD-10-CM

## 2020-07-29 PROBLEM — E66.812 OBESITY, CLASS II, BMI 35-39.9: Status: ACTIVE | Noted: 2020-04-16

## 2020-07-29 PROCEDURE — 99213 OFFICE O/P EST LOW 20 MIN: CPT | Performed by: NURSE PRACTITIONER

## 2020-07-29 NOTE — PROGRESS NOTES
[facility]ATR[facility] BARIATRIC SURGERY  [address] [facility] [address]  [facility] [address]  [phone]  [address] [id number] [phone]  Dept: [phone]  7/29/2020      Savannah Valenzuela.  [id number]  [id number]  1970  female      Chief Complaint   Patient presents with   • Nutrition Counseling     Diet 4/6       The patient is here for month 4/6 of their pre-operative physician supervised diet. Today's weight is 105 kg (231 lb) pounds and had a loss of 7 lbs. The patient states that she is following the recommendations given by our office and dietician including a high lean protein, low carb and low fat diet. We recommended adequate fruits and vegetable intake along with limited portion sizes. Patient is working on eliminating fast foods, fried foods, sweets and soda. Savannah Valenzuela has been increasing her daily water intake. 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    Patient states they have made positive changes including she cut way back on sodas and coffee. She is getting two meals per day. She isn't much of a snacker. She is getting close to 50oz of water in daily. She does do propel zero water or body armour lite to help get her fluids in. She is cooking most of her meals at home. She gets protein at most meals- always at dinner, not always with lunch.     Review of Systems   Constitutional: Positive for appetite change. Negative for fatigue and unexpected weight change.   HENT: Negative.    Eyes: Negative.    Respiratory: Negative.    Cardiovascular: Negative.  Negative for leg swelling.   Gastrointestinal: Negative for abdominal distention, abdominal pain, constipation, diarrhea, nausea and vomiting.   Genitourinary: Negative for difficulty urinating, frequency and urgency.   Musculoskeletal: Negative for back pain.   Skin: Negative.    Psychiatric/Behavioral: Negative.    All  other systems reviewed and are negative.    Vitals:    07/29/20 1506   BP: 122/75   Pulse: 82   Resp: 18   Temp: 98 °F (36.7 °C)     Patient Active Problem List   Diagnosis   • Migraine   • Allergic rhinitis   • Hematuria, microscopic   • Depression   • Essential hypertension   • Chronic fatigue   • Elevated triglycerides with high cholesterol   • Obesity, Class II, BMI 35-39.9   • Chronic knee pain   • GERD (gastroesophageal reflux disease)   • Hiatal hernia     Body mass index is 39.63 kg/m².    The following portions of the patient's history were reviewed and updated as appropriate: active problem list, medication list, family history, health maintenance, notes from last encounter    Physical Exam   Constitutional: She appears well-developed and well-nourished.   Neck: No thyromegaly present.   Cardiovascular: Normal rate, regular rhythm and normal heart sounds.   Pulmonary/Chest: Effort normal and breath sounds normal. No respiratory distress. She has no wheezes.   Abdominal: Soft. Bowel sounds are normal. She exhibits no distension. There is no tenderness. There is no guarding. No hernia.   Musculoskeletal: She exhibits no edema or tenderness.   Neurological: She is alert.   Skin: Skin is warm and dry. No rash noted. No erythema.   Psychiatric: She has a normal mood and affect. Her behavior is normal.   Nursing note and vitals reviewed.      Discussion/Plan:  Obesity/Morbid Obesity: Currently the patient's weight is decreasing. There are no medications prescribed.Treatment plan includes prescribed diet, prescribed exercise regimen and behavior modification.    I reviewed the appropriate dietary choices with the patient and encouraged the necessary changes. Recommended at least 70 grams of protein per day, around 35 grams of fats and less than 100 grams of carbohydrates. Reviewed calorie intake if patient wanted to calorie count and/or had BMR. Instructed patient to drink half of body weight in ounces per day  and exercise a minimum of 150 minutes per week including both cardio and strength training. Discussed the option of keeping a food journal which will help patient become more aware of the nutritional value of foods so they are more prepared after surgery.    The patient was given written materials from our office for education.   I answered all of the patients questions regarding dietary changes, exercise or surgical options.  The patient will follow up in 1 month. The total time spent face to face was 20 minutes with 15 minutes spent counseling.    Encounter Diagnoses   Name Primary?   • Obesity, Class II, BMI 35-39.9 Yes   • Essential hypertension    • Gastroesophageal reflux disease, esophagitis presence not specified    • Chronic fatigue    • Depression, unspecified depression type

## 2020-08-27 ENCOUNTER — OFFICE VISIT (OUTPATIENT)
Dept: BARIATRICS/WEIGHT MGMT | Facility: CLINIC | Age: 50
End: 2020-08-27

## 2020-09-02 RX ORDER — FLUOXETINE HYDROCHLORIDE 40 MG/1
40 CAPSULE ORAL DAILY
Qty: 90 CAPSULE | Refills: 1 | Status: SHIPPED | OUTPATIENT
Start: 2020-09-02 | End: 2021-11-09 | Stop reason: SDUPTHER

## 2020-09-02 RX ORDER — OLMESARTAN MEDOXOMIL AND HYDROCHLOROTHIAZIDE 40/12.5 40; 12.5 MG/1; MG/1
1 TABLET ORAL DAILY
Qty: 90 TABLET | Refills: 1 | Status: SHIPPED | OUTPATIENT
Start: 2020-09-02 | End: 2020-11-11

## 2020-09-17 ENCOUNTER — PREP FOR SURGERY (OUTPATIENT)
Dept: OTHER | Facility: HOSPITAL | Age: 50
End: 2020-09-17

## 2020-09-17 DIAGNOSIS — E66.01 OBESITY, CLASS III, BMI 40-49.9 (MORBID OBESITY) (HCC): Primary | ICD-10-CM

## 2020-09-17 DIAGNOSIS — K44.9 HIATAL HERNIA: ICD-10-CM

## 2020-09-17 RX ORDER — SODIUM CHLORIDE 0.9 % (FLUSH) 0.9 %
3 SYRINGE (ML) INJECTION EVERY 12 HOURS SCHEDULED
Status: CANCELLED | OUTPATIENT
Start: 2020-09-17

## 2020-09-17 RX ORDER — ACETAMINOPHEN 160 MG/5ML
975 SOLUTION ORAL ONCE
Status: CANCELLED | OUTPATIENT
Start: 2020-10-05 | End: 2020-09-17

## 2020-09-17 RX ORDER — SODIUM CHLORIDE, SODIUM LACTATE, POTASSIUM CHLORIDE, CALCIUM CHLORIDE 600; 310; 30; 20 MG/100ML; MG/100ML; MG/100ML; MG/100ML
100 INJECTION, SOLUTION INTRAVENOUS CONTINUOUS
Status: CANCELLED | OUTPATIENT
Start: 2020-10-05

## 2020-09-17 RX ORDER — CHLORHEXIDINE GLUCONATE 0.12 MG/ML
15 RINSE ORAL SEE ADMIN INSTRUCTIONS
Status: CANCELLED | OUTPATIENT
Start: 2020-10-05

## 2020-09-17 RX ORDER — SODIUM CHLORIDE 0.9 % (FLUSH) 0.9 %
3-10 SYRINGE (ML) INJECTION AS NEEDED
Status: CANCELLED | OUTPATIENT
Start: 2020-10-05

## 2020-09-17 RX ORDER — PANTOPRAZOLE SODIUM 40 MG/10ML
40 INJECTION, POWDER, LYOPHILIZED, FOR SOLUTION INTRAVENOUS ONCE
Status: CANCELLED | OUTPATIENT
Start: 2020-10-05 | End: 2020-09-17

## 2020-09-17 RX ORDER — SCOLOPAMINE TRANSDERMAL SYSTEM 1 MG/1
1 PATCH, EXTENDED RELEASE TRANSDERMAL CONTINUOUS
Status: CANCELLED | OUTPATIENT
Start: 2020-10-05 | End: 2020-10-08

## 2020-09-17 RX ORDER — CEFAZOLIN SODIUM IN 0.9 % NACL 3 G/100 ML
3 INTRAVENOUS SOLUTION, PIGGYBACK (ML) INTRAVENOUS
Status: CANCELLED | OUTPATIENT
Start: 2020-10-05

## 2020-09-17 RX ORDER — METOCLOPRAMIDE HYDROCHLORIDE 5 MG/ML
10 INJECTION INTRAMUSCULAR; INTRAVENOUS ONCE
Status: CANCELLED | OUTPATIENT
Start: 2020-10-05 | End: 2020-09-17

## 2020-09-24 ENCOUNTER — LAB (OUTPATIENT)
Dept: LAB | Facility: HOSPITAL | Age: 50
End: 2020-09-24

## 2020-09-24 ENCOUNTER — CONSULT (OUTPATIENT)
Dept: BARIATRICS/WEIGHT MGMT | Facility: CLINIC | Age: 50
End: 2020-09-24

## 2020-09-24 VITALS
SYSTOLIC BLOOD PRESSURE: 151 MMHG | RESPIRATION RATE: 18 BRPM | HEIGHT: 65 IN | HEART RATE: 78 BPM | WEIGHT: 230 LBS | DIASTOLIC BLOOD PRESSURE: 83 MMHG | TEMPERATURE: 98.2 F | BODY MASS INDEX: 38.32 KG/M2

## 2020-09-24 DIAGNOSIS — G89.29 CHRONIC KNEE PAIN, UNSPECIFIED LATERALITY: ICD-10-CM

## 2020-09-24 DIAGNOSIS — G43.009 MIGRAINE WITHOUT AURA AND WITHOUT STATUS MIGRAINOSUS, NOT INTRACTABLE: ICD-10-CM

## 2020-09-24 DIAGNOSIS — E66.9 OBESITY, CLASS II, BMI 35-39.9: Primary | ICD-10-CM

## 2020-09-24 DIAGNOSIS — E66.01 OBESITY, CLASS III, BMI 40-49.9 (MORBID OBESITY) (HCC): ICD-10-CM

## 2020-09-24 DIAGNOSIS — F32.A DEPRESSION, UNSPECIFIED DEPRESSION TYPE: ICD-10-CM

## 2020-09-24 DIAGNOSIS — M25.569 CHRONIC KNEE PAIN, UNSPECIFIED LATERALITY: ICD-10-CM

## 2020-09-24 DIAGNOSIS — K44.9 HIATAL HERNIA: ICD-10-CM

## 2020-09-24 DIAGNOSIS — R53.82 CHRONIC FATIGUE: ICD-10-CM

## 2020-09-24 DIAGNOSIS — K21.9 GASTROESOPHAGEAL REFLUX DISEASE WITHOUT ESOPHAGITIS: ICD-10-CM

## 2020-09-24 DIAGNOSIS — I10 ESSENTIAL HYPERTENSION: ICD-10-CM

## 2020-09-24 LAB
ALBUMIN SERPL-MCNC: 4.5 G/DL (ref 3.5–5.2)
ALBUMIN/GLOB SERPL: 1.9 G/DL
ALP SERPL-CCNC: 125 U/L (ref 39–117)
ALT SERPL W P-5'-P-CCNC: 17 U/L (ref 1–33)
ANION GAP SERPL CALCULATED.3IONS-SCNC: 10.6 MMOL/L (ref 5–15)
AST SERPL-CCNC: 13 U/L (ref 1–32)
BILIRUB SERPL-MCNC: 0.3 MG/DL (ref 0–1.2)
BUN SERPL-MCNC: 16 MG/DL (ref 6–20)
BUN/CREAT SERPL: 18.4 (ref 7–25)
CALCIUM SPEC-SCNC: 9.3 MG/DL (ref 8.6–10.5)
CHLORIDE SERPL-SCNC: 103 MMOL/L (ref 98–107)
CO2 SERPL-SCNC: 25.4 MMOL/L (ref 22–29)
CREAT SERPL-MCNC: 0.87 MG/DL (ref 0.57–1)
DEPRECATED RDW RBC AUTO: 44.5 FL (ref 37–54)
ERYTHROCYTE [DISTWIDTH] IN BLOOD BY AUTOMATED COUNT: 14.6 % (ref 12.3–15.4)
GFR SERPL CREATININE-BSD FRML MDRD: 69 ML/MIN/1.73
GLOBULIN UR ELPH-MCNC: 2.4 GM/DL
GLUCOSE SERPL-MCNC: 104 MG/DL (ref 65–99)
HCT VFR BLD AUTO: 37.5 % (ref 34–46.6)
HGB BLD-MCNC: 12.5 G/DL (ref 12–15.9)
MCH RBC QN AUTO: 27.8 PG (ref 26.6–33)
MCHC RBC AUTO-ENTMCNC: 33.3 G/DL (ref 31.5–35.7)
MCV RBC AUTO: 83.5 FL (ref 79–97)
PLATELET # BLD AUTO: 305 10*3/MM3 (ref 140–450)
PMV BLD AUTO: 9.7 FL (ref 6–12)
POTASSIUM SERPL-SCNC: 4.5 MMOL/L (ref 3.5–5.2)
PROT SERPL-MCNC: 6.9 G/DL (ref 6–8.5)
RBC # BLD AUTO: 4.49 10*6/MM3 (ref 3.77–5.28)
SODIUM SERPL-SCNC: 139 MMOL/L (ref 136–145)
WBC # BLD AUTO: 6.74 10*3/MM3 (ref 3.4–10.8)

## 2020-09-24 PROCEDURE — 80053 COMPREHEN METABOLIC PANEL: CPT

## 2020-09-24 PROCEDURE — 36415 COLL VENOUS BLD VENIPUNCTURE: CPT

## 2020-09-24 PROCEDURE — 99215 OFFICE O/P EST HI 40 MIN: CPT | Performed by: SURGERY

## 2020-09-24 PROCEDURE — 85027 COMPLETE CBC AUTOMATED: CPT

## 2020-09-24 RX ORDER — URSODIOL 300 MG/1
300 CAPSULE ORAL 2 TIMES DAILY
Qty: 60 CAPSULE | Refills: 5 | Status: SHIPPED | OUTPATIENT
Start: 2020-09-24 | End: 2021-04-14

## 2020-09-24 NOTE — H&P
Bariatric Consult:  Referred by Mathew Lemos MD    Savannah Valenzuela is here today for consult on Consult ([facility]LE[facility]E CONSULTATION )      History of Present Illness:     Savannah Valenzuela is a 50 y.o. female with morbid obesity with co-morbidities including hypertension, dyslipidemia, osteoarthritis, knee pain, GERD and depression who presents for surgical consultation for the above procedure. Savannah has completed the initial intake visit and has been examined by our nurse practitioner, dietician, psychologist and underwent the extensive educational teaching process under the guidance of our bariatric coordinator and myself. Savannah also has seen the educational video MIQUEL on the surgical procedure if available. Savannah attended today more educational teaching from our bariatric coordinator and myself. Savannah has had an extensive medical workup including a visit with their primary care physician, EKG, chest radiograph, blood work, EGD or UGI and possibly further testing. These have been reviewed by me and discussed with the patient. Savannah is now ready to proceed with surgery. Savannah presently denies nausea, vomiting, fever, chills, chest pain, shortness of air, melena, hematochezia, hemetemesis, dysuria, frequency, hematuria, jaundice or abdominal pain.       Past Medical History:   Diagnosis Date   • Allergic rhinitis    • Depression    • Fatigue    • Hematuria, microscopic    • Hyperlipidemia    • Hypertension    • Medication management    • Migraine    • Multiple food allergies    • Physical exam, annual        Encounter Diagnoses   Name Primary?   • Obesity, Class II, BMI 35-39.9 Yes   • Hiatal hernia    • Gastroesophageal reflux disease without esophagitis    • Essential hypertension    • Depression, unspecified depression type    • Chronic fatigue    • Migraine without aura and without status migrainosus, not intractable    • Chronic knee pain, unspecified laterality        Past Surgical History:    Procedure Laterality Date   •  SECTION     • DILATATION AND CURETTAGE     • ENDOSCOPY N/A 2020    Procedure: ESOPHAGOGASTRODUODENOSCOPY WITH BIOPSY;  Surgeon: Cayetano Khan Jr., MD;  Location: [facility] ENDOSCOPY;  Service: General;  Laterality: N/A;  PREOP/ DYSPEPSIA  POSTOP/ GASTRITIS, ESOPHAGITIS, HIATAL HERNIA   • WISDOM TOOTH EXTRACTION         Patient Active Problem List   Diagnosis   • Migraine   • Allergic rhinitis   • Hematuria, microscopic   • Depression   • Essential hypertension   • Chronic fatigue   • Elevated triglycerides with high cholesterol   • Obesity, Class II, BMI 35-39.9   • Chronic knee pain   • GERD (gastroesophageal reflux disease)   • Hiatal hernia       Allergies   Allergen Reactions   • Latex Irritability     Painful urination with latex cath         Current Outpatient Medications:   •  aspirin-acetaminophen-caffeine (EXCEDRIN MIGRAINE) 250-250-65 MG per tablet, Take 1 tablet by mouth Every 6 (Six) Hours As Needed for Headache., Disp: , Rfl:   •  aspirin-sod bicarb-citric acid (BARRY-SELTZER) 325 MG effervescent tablet, Take 325 mg by mouth As Needed., Disp: , Rfl:   •  buPROPion XL (WELLBUTRIN XL) 150 MG 24 hr tablet, Take 1 tablet by mouth Daily., Disp: 90 tablet, Rfl: 1  •  FLUoxetine (PROzac) 40 MG capsule, Take 1 capsule by mouth Daily., Disp: 90 capsule, Rfl: 1  •  ibuprofen (ADVIL,MOTRIN) 600 MG tablet, Take 600 mg by mouth Every 6 (Six) Hours As Needed for Mild Pain ., Disp: , Rfl:   •  Multiple Vitamins-Minerals (MULTIVITAMIN WITH MINERALS) tablet tablet, Take 1 tablet by mouth Daily., Disp: , Rfl:   •  olmesartan-hydrochlorothiazide (BENICAR HCT) 40-12.5 MG per tablet, Take 1 tablet by mouth Daily., Disp: 90 tablet, Rfl: 1  •  pantoprazole (PROTONIX) 40 MG EC tablet, Take 1 tablet by mouth Daily., Disp: 30 tablet, Rfl: 5  •  folic acid-vit B6-vit B12 (FOLBEE) 2.5-25-1 MG tablet tablet, Take 1 tablet by mouth Daily., Disp: 40 tablet, Rfl: 0  •  ursodiol  (Actigall) 300 MG capsule, Take 1 capsule by mouth 2 (Two) Times a Day., Disp: 60 capsule, Rfl: 5    Social History     Socioeconomic History   • Marital status:      Spouse name: Not on file   • Number of children: Not on file   • Years of education: Not on file   • Highest education level: Not on file   Tobacco Use   • Smoking status: Never Smoker   • Smokeless tobacco: Never Used   Substance and Sexual Activity   • Alcohol use: No     Comment: Kay: rare   • Drug use: No   • Sexual activity: Not Currently     Partners: Male       Family History   Problem Relation Age of Onset   • Heart disease Father    • Hyperlipidemia Father    • Malig Hyperthermia Neg Hx        Review of Systems:  Review of Systems   Constitutional: Positive for fatigue.   Musculoskeletal: Positive for arthralgias.   All other systems reviewed and are negative.        Physical Exam:    Vital Signs:  Weight: 104 kg (230 lb)   Body mass index is 38.79 kg/m².  Temp: 98.2 °F (36.8 °C)   Heart Rate: 78   BP: 151/83       Physical Exam  Vitals signs reviewed.   HENT:      Head: Normocephalic and atraumatic.   Eyes:      General: No scleral icterus.     Conjunctiva/sclera: Conjunctivae normal.      Pupils: Pupils are equal, round, and reactive to light.   Neck:      Musculoskeletal: Normal range of motion and neck supple.      Thyroid: No thyromegaly.   Cardiovascular:      Rate and Rhythm: Normal rate and regular rhythm.   Pulmonary:      Effort: Pulmonary effort is normal.      Breath sounds: Normal breath sounds.   Abdominal:      General: Bowel sounds are normal. There is no distension.      Palpations: Abdomen is soft. There is no mass.      Tenderness: There is no abdominal tenderness. There is no right CVA tenderness, left CVA tenderness, guarding or rebound.      Hernia: No hernia is present.   Musculoskeletal: Normal range of motion.   Lymphadenopathy:      Cervical: No cervical adenopathy.   Skin:     General: Skin is warm and  dry.      Findings: No erythema.   Neurological:      Mental Status: She is alert and oriented to person, place, and time.      Cranial Nerves: No cranial nerve deficit.      Coordination: Coordination normal.   Psychiatric:         Behavior: Behavior normal.           Assessment:    Savannah Valenzuela is a 50 y.o. year old female with medically complicated severe obesity with a BMI of Body mass index is 38.79 kg/m². and multiple co-morbidities listed in the encounter diagnosis.    I think she is an appropriate candidate for this surgery, and is ready to proceed.      Plan/Discussion/Summary:  Hiatal hernia per me.  Patient does take PPI.  H. pylori negative.  Esophageal biopsies negative    The patient has returned to the office for a surgical consultation and has requested to proceed with a laparoscopic gastric sleeve.  I have had the opportunity to obtain a history, examine the patient and review the patient's chart.    The patient understands that surgery is a tool and that weight loss is not guaranteed but only seen in the context of appropriate use, regular follow up, exercise and making appropriate food choices.     I personally discussed the potential complications of the laparoscopic gastric sleeve with this patient.  The patient is well aware of potential complications of the surgery that include but not limited to bleeding, infections, deep vein thrombosis, pulmonary embolism, pulmonary complications such as pneumonia, cardiac event, hernias, small bowel obstruction, damage to the spleen or other organs, bowel injury, disfiguring scars, failure to lose weight, need for additional surgery, conversion to an open procedure and death.  The patient is also aware of complications which apply in particular to the gastric sleeve and can include but not limited to the leakage of gastric contents at the staple line, the development of an intra-abdominal abscess, gastroesophageal reflux disease, Tobias's esophagus,  ulcers, vitamin/mineral deficiencies, strictures, and the possibility of converting this procedure to a Catrina-en-Y gastric bypass. The patient also understands the possibility of requiring an acid reducer medication for the rest of their life.    The risks, benefits, potential complications and alternative therapies were discussed at great length as outlined in our extensive consent forms, online consent and educational teaching processes.    The patient has confirmed the participation in the programs extensive educational activities.    All questions and concerns were answered to patient's satisfaction.  The patient now wishes to proceed with surgery.    Patient has declined the pre-operative insertion of an IVC filter.     The patient has declined a postoperative course of anitcoagulant therapy.      I instructed patient to start on a H2 blocker or proton pump inhibitor if not already on one of these medications.    I explained in detail the procedures that we perform.  All of these procedures have a chance to convert to open if any technical challenges or complications do occur.  Bariatric surgery is not cosmetic surgery but rather a tool to help a patient make a life-long commitment lifestyle change including diet, exercise, behavior changes, and taking supplemental vitamins and minerals.    Problems after surgery may require more operations to correct them.    The risks, benefits, alternatives, and potential complications of all of the procedures were explained in detail including, but not limited to death, anesthesia and medication adverse effect, deep venous thrombosis, pulmonary embolism, trocar site/incisional hernia, wound infection, abdominal infection, bleeding, failure to lose weight, gain weight, a change in body image, metabolic complications with vitamin deficiences and anemia.    Weight loss expectations were discussed with the patient in detail. The weight loss operations most commonly performed are  the sleeve gastrectomy and the Catrina-en-Y gastric bypass. These operations result in weight losses up to approximately 25-35% of initial body weight 12 to 24 months after surgery with the gastric bypass usually the higher percent of weight loss but depends on patient using the tool.    For the gastric bypass and loop duodenal switch (JENNY-S) the risks include but not limited to the following early complications:  Anastomotic leak/peritonitis, Catrina/Alimentary/biliopancreatic limb obstruction, severe & minor wound infection/seroma, and nausea/vomiting.  Late complications can include but are not limited to malnutrition, vitamin deficiencies, frequent loose stools,  stomal stenosis, marginal ulcer, bowel obstruction, intussusception, internal, and incisional hernia.    Regarding the gastric sleeve, there is less long-term outcome data and higher risk of dysphagia and reflux compared to a gastric bypass, as well as risk of internal visceral/organ injury, splenectomy, bleeding, infection, leak (which could require further intervention possible conversion to Catrina-en-Y gastric bypass), stenosis and possibility of regaining weight.    Savannah was counseled regarding diagnostic results, instructions for management, risk factor reductions, prognosis, patient and family education, impressions, risks and benefits of treatment options and importance of compliance with treatment. Total face to face time of the encounter was over 45 minutes and over 30 minutes was spent counseling.     David Quinonez   As part of this patient's treatment plan I am prescribing controlled substances. The patient has been made aware of appropriate use of such medications, including potential risk of somnolence, limited ability to drive and /or work safely, and potential for dependence or overdose. It has also been made clear that these medications are for use by this patient only, without concomitant use of alcohol or other substances unless  prescribed.    Savannah has completed prescribing agreement detailing terms of continued prescribing of controlled substances, including monitoring SERGIO reports, urine drug screening, and pill counts if necessary. Savannah is aware that inappropriate use will result in cessation of prescribing such medications.    SERGIO report has been reviewed      History and physical exam exhibit continued safe and appropriate use of controlled substances.      Savannah understands the surgical procedures and the different surgical options that are available.  She understands the lifestyle changes that are required after surgery and has agreed to follow the guidelines outlined in the weight management program.  She also expressed understanding of the risks involved and had all of female questions answered and desires to proceed.      Cayetano Khan MD  9/24/2020

## 2020-09-24 NOTE — PATIENT INSTRUCTIONS
Bariatric Manual    You were provided a manual specific to the procedure that you have chosen.  Please refer to that with any questions or call the office at [phone]

## 2020-09-24 NOTE — PROGRESS NOTES
Answers for HPI/ROS submitted by the patient on 9/24/2020   What is the primary reason for your visit?: Other  Please describe your symptoms.: No symptoms. Preop visit for surgery  Have you had these symptoms before?: No  How long have you been having these symptoms?: 1-4 days

## 2020-09-28 PROBLEM — E66.01 OBESITY, CLASS III, BMI 40-49.9 (MORBID OBESITY) (HCC): Status: ACTIVE | Noted: 2020-09-18

## 2020-10-02 ENCOUNTER — APPOINTMENT (OUTPATIENT)
Dept: PREADMISSION TESTING | Facility: HOSPITAL | Age: 50
End: 2020-10-02

## 2020-10-02 VITALS
OXYGEN SATURATION: 99 % | HEART RATE: 81 BPM | TEMPERATURE: 98.4 F | RESPIRATION RATE: 16 BRPM | DIASTOLIC BLOOD PRESSURE: 92 MMHG | SYSTOLIC BLOOD PRESSURE: 143 MMHG | BODY MASS INDEX: 38.27 KG/M2 | HEIGHT: 65 IN

## 2020-10-02 PROCEDURE — U0004 COV-19 TEST NON-CDC HGH THRU: HCPCS | Performed by: NURSE PRACTITIONER

## 2020-10-02 PROCEDURE — C9803 HOPD COVID-19 SPEC COLLECT: HCPCS

## 2020-10-02 NOTE — DISCHARGE INSTRUCTIONS
Take only the following medications the morning of surgery:   PANTOPRAZOLE, BUPROPION,FLUOXETINE     PLEASE ARRIVE AT THE HOSPITAL AT 7:30 AM ON October 5, 2020    Do not take Bariatric Vitamins, Folic Acid, Actigall (if applicable) or Lovenox Injections (if applicable) the morning of surgery.  If you have a history of blood clots or have a BMI greater than 50, Dr. Khan may order Lovenox for after surgery. Do not take Lovenox blood thinner before surgery.      General Instructions:   • Drink one 20 ounce Gatorade G2 the evening before surgery.  Nothing red in color.  • Do not eat solid food after midnight the night before surgery.    • The morning of surgery have another 20 ounce Gatorade G2.  Again, nothing red in color.  Your drink must be completed 2 hours before your arrival time. NOTHING TO DRINK AFTER 5:30 AM  • Patients who avoid smoking, chewing tobacco and alcohol for 4 weeks prior to surgery have a reduced risk of post-operative complications.  Quit smoking as many days before surgery as you can.  • Do not smoke, use chewing tobacco or drink alcohol the day of surgery.   • Bring any papers given to you in the doctor's office.  Wear clean comfortable clothes.  • Do not wear contact lenses, false eyelashes or make-up.  Bring a case for your glasses.   • Bring crutches or walker if applicable.  • Remove all piercings.  Leave jewelry and any other valuables at home.  • Remove fingernail polish, gel overlays or any artificial nails.  • Hair extensions with metal clips must be removed prior to surgery.  • The Pre-Admission Testing nurse will instruct you to bring medications if unable to obtain an accurate list in Pre-Admission Testing.    Preventing a Surgical Site Infection:  • For 2 to 3 days before surgery, avoid shaving with a razor because the razor can irritate skin and make it easier to develop an infection.    • Any areas of open skin can increase the risk of a post-operative wound infection by  allowing bacteria to enter and travel throughout the body.  Notify your surgeon if you have any skin wounds / rashes even if it is not near the expected surgical site.  The area will need assessed to determine if surgery should be delayed until it is healed.  • 2 days prior to surgery, take a shower using a fresh bar of anti-bacterial soap (such as Dial).  Use a clean washcloth and dry with a clean towel.    • The day prior to surgery, take a shower using a fresh bar of anti-bacterial soap (such as Dial).  Use a clean washcloth and dry with a clean towel.  Sleep in a clean bed with clean clothing.  Do not allow pets to sleep with you.  • The morning of surgery shower using a fresh bar of anti-bacterial soap (such as Dial).  Use a clean washcloth and dry with a clean towel.  Follow the Chlorhexidine instructions below.    CHLORHEXIDINE CLOTH INSTRUCTIONS  The morning of surgery follow these instructions using the Chlorhexidine cloths you've been given.  These steps reduce bacteria on the body.  Do not use the cloths near your eyes, ears mouth, genitalia or on open wounds.  Throw the cloths away after use but do not try to flush them down a toilet.    • Open and remove one cloth at a time from the package.    • Leave the cloth unfolded and begin the bathing.  • Massage the skin with the cloths using gentle pressure to remove bacteria.  Do not scrub harshly.   • Follow the steps below with one 2% CHG cloth per area (6 total cloths).  • One cloth for neck, shoulders and chest.  • One cloth for both arms, hands, fingers and underarms (do underarms last).  • One cloth for the abdomen followed by groin.  • One cloth for right leg and foot including between the toes.  • One cloth for left leg and foot including between the toes.  • The last cloth is to be used for the back of the neck, back and buttocks.    Allow the CHG to air dry 3 minutes on the skin which will give it time to work and decrease the chance of irritation.   The skin may feel sticky until it is dry.  Do not rinse with water or any other liquid or you will lose the beneficial effects of the CHG.  If mild skin irritation occurs, do rinse the skin to remove the CHG.  Report this to the nurse at time of admission.  Do not apply lotions, creams, ointments, deodorants or perfumes after using the clothes. Dress in clean clothes before coming to the hospital.    • Ask your surgeon if you will be receiving antibiotics prior to surgery.  • Make sure you, your family, and all healthcare providers clean their hands with soap and water or an alcohol based hand  before caring for you or your wound.      Day of surgery:  Your arrival time is approximately two hours before your scheduled surgery time.  Upon arrival, a Pre-op nurse and Anesthesiologist will review your health history, obtain vital signs, and answer questions you may have.  A Pre-op nurse will start an IV and you may receive medication in preparation for surgery, including something to help you relax.  Your family will be able to see you in the Pre-op area.  Two visitors at a time will be allowed in the Pre-op room.  While you are in surgery, your family should notify the waiting room  if they leave the waiting room area and provide a contact phone number.  If you are staying overnight your family can leave your belongings in the car and bring them to your room later.  If applicable, we do ask that you have your C-PAP/BI-PAP machine available. It can be utilized the night of surgery.     Please be aware that surgery does come with discomfort.  We want to make every effort to control your discomfort so please discuss any uncontrolled symptoms with your nurse.   Your doctor will most likely have prescribed pain medications.      If you are going home after surgery you will receive individualized written care instructions before being discharged.  A responsible adult must drive you to and from the  hospital on the day of your surgery and stay with you for 24 hours.    If you are staying overnight following surgery, you will be transported to your hospital room following the recovery period.  [facility] has all private rooms.    If you have any questions please call Pre-Admission Testing at [phone].  Deductibles and co-payments are collected on the day of service. Please be prepared to pay the required co-pay, deductible or deposit on the day of service as defined by your plan.    Patient Education for Self-Quarantine Process    Following your COVID testing, we strongly recommend that you do not leave your home after you have been tested for COVID except to get medical care. This includes not going to work, school or to public areas.  If this is not possible for you to do please limit your activities to only required outings.  Be sure to wear a mask when you are with other people, practice social distancing and wash your hands frequently.      The following items provide additional details to keep you safe.  • Wash your hands with soap and water frequently for at least 20 seconds.   • Avoid touching your eyes, nose and mouth with unwashed hands.  • Do not share anything - utensils, towels, food from the same bowl.   • Have your own utensils, drinking glass, dishes, towels and bedding.   • Do not have visitors.   • Do use FaceTime to stay in touch with family and friends.  • You should stay in a specific room away from others if possible.   • Stay at least 6 feet away from others in the home if you cannot have a dedicated room to yourself.   • Do not snuggle with your pet. While the CDC says there is no evidence that pets can spread COVID-19 or be infected from humans, it is probably best to avoid “petting, snuggling, being kissed or licked and sharing food (during self-quarantine)”, according to the CDC.   • Sanitize household surfaces daily. Include all high touch areas (door handles,  light switches, phones, countertops, etc.)  • Do not share a bathroom with others, if possible.   • Wear a mask around others in your home if you are unable to stay in a separate room or 6 feet apart. If  you are unable to wear a mask, have your family member wear a mask if they must be within 6 feet of you.   Call your surgeon immediately if you experience any of the following symptoms:  • Sore Throat  • Shortness of Breath or difficulty breathing  • Cough  • Chills  • Body soreness or muscle pain  • Headache  • Fever  • New loss of taste or smell  • Do not arrive for your surgery ill.  Your procedure will need to be rescheduled to another time.  You will need to call your physician before the day of surgery to avoid any unnecessary exposure to hospital staff as well as other patients.

## 2020-10-03 LAB — SARS-COV-2 RNA RESP QL NAA+PROBE: NOT DETECTED

## 2020-10-05 ENCOUNTER — HOSPITAL ENCOUNTER (INPATIENT)
Facility: HOSPITAL | Age: 50
LOS: 1 days | Discharge: HOME | End: 2020-10-06
Attending: SURGERY | Admitting: SURGERY

## 2020-10-05 ENCOUNTER — ANESTHESIA (OUTPATIENT)
Dept: PERIOP | Facility: HOSPITAL | Age: 50
End: 2020-10-05

## 2020-10-05 ENCOUNTER — ANESTHESIA EVENT (OUTPATIENT)
Dept: PERIOP | Facility: HOSPITAL | Age: 50
End: 2020-10-05

## 2020-10-05 DIAGNOSIS — E66.01 OBESITY, CLASS III, BMI 40-49.9 (MORBID OBESITY) (HCC): ICD-10-CM

## 2020-10-05 DIAGNOSIS — Z98.84 S/P LAPAROSCOPIC SLEEVE GASTRECTOMY: Primary | ICD-10-CM

## 2020-10-05 DIAGNOSIS — K44.9 HIATAL HERNIA: ICD-10-CM

## 2020-10-05 PROCEDURE — 43281 LAP PARAESOPHAG HERN REPAIR: CPT | Performed by: SURGERY

## 2020-10-05 PROCEDURE — 25010000002 METOCLOPRAMIDE PER 10 MG: Performed by: SURGERY

## 2020-10-05 PROCEDURE — 43775 LAP SLEEVE GASTRECTOMY: CPT | Performed by: SURGERY

## 2020-10-05 PROCEDURE — 25010000002 FENTANYL CITRATE (PF) 100 MCG/2ML SOLUTION: Performed by: REGISTERED NURSE

## 2020-10-05 PROCEDURE — 0BQT4ZZ REPAIR DIAPHRAGM, PERCUTANEOUS ENDOSCOPIC APPROACH: ICD-10-PCS | Performed by: SURGERY

## 2020-10-05 PROCEDURE — 25010000002 MIDAZOLAM PER 1 MG: Performed by: STUDENT IN AN ORGANIZED HEALTH CARE EDUCATION/TRAINING PROGRAM

## 2020-10-05 PROCEDURE — 81025 URINE PREGNANCY TEST: CPT | Performed by: STUDENT IN AN ORGANIZED HEALTH CARE EDUCATION/TRAINING PROGRAM

## 2020-10-05 PROCEDURE — 0DB64Z3 EXCISION OF STOMACH, PERCUTANEOUS ENDOSCOPIC APPROACH, VERTICAL: ICD-10-PCS | Performed by: SURGERY

## 2020-10-05 PROCEDURE — 25010000002 HYDROMORPHONE PER 4 MG: Performed by: REGISTERED NURSE

## 2020-10-05 PROCEDURE — 25010000002 ONDANSETRON PER 1 MG: Performed by: REGISTERED NURSE

## 2020-10-05 PROCEDURE — 88307 TISSUE EXAM BY PATHOLOGIST: CPT | Performed by: SURGERY

## 2020-10-05 PROCEDURE — 25010000002 PROPOFOL 10 MG/ML EMULSION: Performed by: REGISTERED NURSE

## 2020-10-05 PROCEDURE — 25010000002 DEXAMETHASONE PER 1 MG: Performed by: REGISTERED NURSE

## 2020-10-05 PROCEDURE — 25010000002 SUCCINYLCHOLINE PER 20 MG: Performed by: REGISTERED NURSE

## 2020-10-05 DEVICE — ENDOPATH ECHELON ENDOSCOPIC LINEAR CUTTER RELOADS, GREEN, 60MM
Type: IMPLANTABLE DEVICE | Site: STOMACH | Status: FUNCTIONAL
Brand: ECHELON ENDOPATH

## 2020-10-05 DEVICE — SEALANT WND FIBRIN TISSEEL PREFIL/SYR/PRIMAFZ 4ML: Type: IMPLANTABLE DEVICE | Site: STOMACH | Status: FUNCTIONAL

## 2020-10-05 DEVICE — ENDOPATH ECHELON ENDOSCOPIC LINEAR CUTTER RELOADS, GOLD, 60MM
Type: IMPLANTABLE DEVICE | Site: STOMACH | Status: FUNCTIONAL
Brand: ECHELON ENDOPATH

## 2020-10-05 DEVICE — STPL/LN REINF PERISTRIP DRY VERITAS THN: Type: IMPLANTABLE DEVICE | Site: STOMACH | Status: FUNCTIONAL

## 2020-10-05 RX ORDER — LORAZEPAM 1 MG/1
1 TABLET ORAL EVERY 12 HOURS PRN
Status: DISCONTINUED | OUTPATIENT
Start: 2020-10-05 | End: 2020-10-06 | Stop reason: HOSPADM

## 2020-10-05 RX ORDER — MIDAZOLAM HYDROCHLORIDE 1 MG/ML
1 INJECTION INTRAMUSCULAR; INTRAVENOUS
Status: DISCONTINUED | OUTPATIENT
Start: 2020-10-05 | End: 2020-10-05 | Stop reason: HOSPADM

## 2020-10-05 RX ORDER — PROMETHAZINE HYDROCHLORIDE 25 MG/1
25 TABLET ORAL ONCE AS NEEDED
Status: DISCONTINUED | OUTPATIENT
Start: 2020-10-05 | End: 2020-10-05 | Stop reason: HOSPADM

## 2020-10-05 RX ORDER — METOCLOPRAMIDE HYDROCHLORIDE 5 MG/ML
10 INJECTION INTRAMUSCULAR; INTRAVENOUS EVERY 6 HOURS
Status: DISCONTINUED | OUTPATIENT
Start: 2020-10-05 | End: 2020-10-06 | Stop reason: HOSPADM

## 2020-10-05 RX ORDER — DEXAMETHASONE SODIUM PHOSPHATE 10 MG/ML
INJECTION INTRAMUSCULAR; INTRAVENOUS AS NEEDED
Status: DISCONTINUED | OUTPATIENT
Start: 2020-10-05 | End: 2020-10-05 | Stop reason: SURG

## 2020-10-05 RX ORDER — LORAZEPAM 2 MG/ML
1 INJECTION INTRAMUSCULAR EVERY 12 HOURS PRN
Status: DISCONTINUED | OUTPATIENT
Start: 2020-10-05 | End: 2020-10-06 | Stop reason: HOSPADM

## 2020-10-05 RX ORDER — LABETALOL HYDROCHLORIDE 5 MG/ML
5 INJECTION, SOLUTION INTRAVENOUS
Status: DISCONTINUED | OUTPATIENT
Start: 2020-10-05 | End: 2020-10-05 | Stop reason: HOSPADM

## 2020-10-05 RX ORDER — SODIUM CHLORIDE, SODIUM LACTATE, POTASSIUM CHLORIDE, CALCIUM CHLORIDE 600; 310; 30; 20 MG/100ML; MG/100ML; MG/100ML; MG/100ML
150 INJECTION, SOLUTION INTRAVENOUS CONTINUOUS
Status: DISCONTINUED | OUTPATIENT
Start: 2020-10-05 | End: 2020-10-06 | Stop reason: HOSPADM

## 2020-10-05 RX ORDER — SODIUM CHLORIDE, SODIUM LACTATE, POTASSIUM CHLORIDE, CALCIUM CHLORIDE 600; 310; 30; 20 MG/100ML; MG/100ML; MG/100ML; MG/100ML
100 INJECTION, SOLUTION INTRAVENOUS CONTINUOUS
Status: DISCONTINUED | OUTPATIENT
Start: 2020-10-05 | End: 2020-10-05 | Stop reason: HOSPADM

## 2020-10-05 RX ORDER — SODIUM CHLORIDE 0.9 % (FLUSH) 0.9 %
3 SYRINGE (ML) INJECTION EVERY 12 HOURS SCHEDULED
Status: DISCONTINUED | OUTPATIENT
Start: 2020-10-05 | End: 2020-10-05 | Stop reason: HOSPADM

## 2020-10-05 RX ORDER — ONDANSETRON 2 MG/ML
4 INJECTION INTRAMUSCULAR; INTRAVENOUS ONCE AS NEEDED
Status: DISCONTINUED | OUTPATIENT
Start: 2020-10-05 | End: 2020-10-05 | Stop reason: HOSPADM

## 2020-10-05 RX ORDER — HYDROCODONE BITARTRATE AND ACETAMINOPHEN 7.5; 325 MG/1; MG/1
1 TABLET ORAL ONCE AS NEEDED
Status: DISCONTINUED | OUTPATIENT
Start: 2020-10-05 | End: 2020-10-05 | Stop reason: HOSPADM

## 2020-10-05 RX ORDER — MORPHINE SULFATE 2 MG/ML
2 INJECTION, SOLUTION INTRAMUSCULAR; INTRAVENOUS
Status: DISCONTINUED | OUTPATIENT
Start: 2020-10-05 | End: 2020-10-06 | Stop reason: HOSPADM

## 2020-10-05 RX ORDER — SCOLOPAMINE TRANSDERMAL SYSTEM 1 MG/1
1 PATCH, EXTENDED RELEASE TRANSDERMAL CONTINUOUS
Status: DISCONTINUED | OUTPATIENT
Start: 2020-10-05 | End: 2020-10-06 | Stop reason: HOSPADM

## 2020-10-05 RX ORDER — MAGNESIUM HYDROXIDE 1200 MG/15ML
LIQUID ORAL AS NEEDED
Status: DISCONTINUED | OUTPATIENT
Start: 2020-10-05 | End: 2020-10-05 | Stop reason: HOSPADM

## 2020-10-05 RX ORDER — SODIUM CHLORIDE 0.9 % (FLUSH) 0.9 %
3-10 SYRINGE (ML) INJECTION AS NEEDED
Status: DISCONTINUED | OUTPATIENT
Start: 2020-10-05 | End: 2020-10-05 | Stop reason: HOSPADM

## 2020-10-05 RX ORDER — LABETALOL HYDROCHLORIDE 5 MG/ML
10 INJECTION, SOLUTION INTRAVENOUS
Status: DISCONTINUED | OUTPATIENT
Start: 2020-10-05 | End: 2020-10-06 | Stop reason: HOSPADM

## 2020-10-05 RX ORDER — ALBUTEROL SULFATE 2.5 MG/3ML
2.5 SOLUTION RESPIRATORY (INHALATION) EVERY 6 HOURS PRN
Status: DISCONTINUED | OUTPATIENT
Start: 2020-10-05 | End: 2020-10-06 | Stop reason: HOSPADM

## 2020-10-05 RX ORDER — NALOXONE HCL 0.4 MG/ML
0.1 VIAL (ML) INJECTION
Status: DISCONTINUED | OUTPATIENT
Start: 2020-10-05 | End: 2020-10-06 | Stop reason: HOSPADM

## 2020-10-05 RX ORDER — PROMETHAZINE HYDROCHLORIDE 25 MG/1
25 SUPPOSITORY RECTAL ONCE AS NEEDED
Status: DISCONTINUED | OUTPATIENT
Start: 2020-10-05 | End: 2020-10-05 | Stop reason: HOSPADM

## 2020-10-05 RX ORDER — ONDANSETRON 4 MG/1
4 TABLET, ORALLY DISINTEGRATING ORAL EVERY 4 HOURS PRN
Status: DISCONTINUED | OUTPATIENT
Start: 2020-10-05 | End: 2020-10-06 | Stop reason: HOSPADM

## 2020-10-05 RX ORDER — NALOXONE HCL 0.4 MG/ML
0.2 VIAL (ML) INJECTION AS NEEDED
Status: DISCONTINUED | OUTPATIENT
Start: 2020-10-05 | End: 2020-10-05 | Stop reason: HOSPADM

## 2020-10-05 RX ORDER — GABAPENTIN 300 MG/1
300 CAPSULE ORAL EVERY 8 HOURS SCHEDULED
Status: DISCONTINUED | OUTPATIENT
Start: 2020-10-05 | End: 2020-10-06 | Stop reason: HOSPADM

## 2020-10-05 RX ORDER — HALOPERIDOL 5 MG/ML
0.5 INJECTION INTRAMUSCULAR EVERY 4 HOURS PRN
Status: DISCONTINUED | OUTPATIENT
Start: 2020-10-05 | End: 2020-10-06 | Stop reason: HOSPADM

## 2020-10-05 RX ORDER — BUPIVACAINE HYDROCHLORIDE AND EPINEPHRINE 5; 5 MG/ML; UG/ML
INJECTION, SOLUTION PERINEURAL AS NEEDED
Status: DISCONTINUED | OUTPATIENT
Start: 2020-10-05 | End: 2020-10-05 | Stop reason: HOSPADM

## 2020-10-05 RX ORDER — CHLORHEXIDINE GLUCONATE 0.12 MG/ML
15 RINSE ORAL SEE ADMIN INSTRUCTIONS
Status: COMPLETED | OUTPATIENT
Start: 2020-10-05 | End: 2020-10-05

## 2020-10-05 RX ORDER — OXYCODONE AND ACETAMINOPHEN 7.5; 325 MG/1; MG/1
1 TABLET ORAL ONCE AS NEEDED
Status: DISCONTINUED | OUTPATIENT
Start: 2020-10-05 | End: 2020-10-05 | Stop reason: HOSPADM

## 2020-10-05 RX ORDER — ACETAMINOPHEN 160 MG/5ML
975 SOLUTION ORAL ONCE
Status: COMPLETED | OUTPATIENT
Start: 2020-10-05 | End: 2020-10-05

## 2020-10-05 RX ORDER — PANTOPRAZOLE SODIUM 40 MG/10ML
40 INJECTION, POWDER, LYOPHILIZED, FOR SOLUTION INTRAVENOUS ONCE
Status: DISCONTINUED | OUTPATIENT
Start: 2020-10-05 | End: 2020-10-05 | Stop reason: HOSPADM

## 2020-10-05 RX ORDER — DIPHENHYDRAMINE HYDROCHLORIDE 50 MG/ML
12.5 INJECTION INTRAMUSCULAR; INTRAVENOUS
Status: DISCONTINUED | OUTPATIENT
Start: 2020-10-05 | End: 2020-10-05 | Stop reason: HOSPADM

## 2020-10-05 RX ORDER — MIRTAZAPINE 15 MG/1
15 TABLET, ORALLY DISINTEGRATING ORAL NIGHTLY
Status: DISCONTINUED | OUTPATIENT
Start: 2020-10-05 | End: 2020-10-06 | Stop reason: HOSPADM

## 2020-10-05 RX ORDER — HYDROMORPHONE HYDROCHLORIDE 1 MG/ML
0.5 INJECTION, SOLUTION INTRAMUSCULAR; INTRAVENOUS; SUBCUTANEOUS
Status: DISCONTINUED | OUTPATIENT
Start: 2020-10-05 | End: 2020-10-06 | Stop reason: HOSPADM

## 2020-10-05 RX ORDER — FAMOTIDINE 10 MG/ML
20 INJECTION, SOLUTION INTRAVENOUS EVERY 12 HOURS SCHEDULED
Status: DISCONTINUED | OUTPATIENT
Start: 2020-10-05 | End: 2020-10-06 | Stop reason: HOSPADM

## 2020-10-05 RX ORDER — NITROGLYCERIN 0.4 MG/1
0.4 TABLET SUBLINGUAL
Status: DISCONTINUED | OUTPATIENT
Start: 2020-10-05 | End: 2020-10-06 | Stop reason: HOSPADM

## 2020-10-05 RX ORDER — ROCURONIUM BROMIDE 10 MG/ML
INJECTION, SOLUTION INTRAVENOUS AS NEEDED
Status: DISCONTINUED | OUTPATIENT
Start: 2020-10-05 | End: 2020-10-05 | Stop reason: SURG

## 2020-10-05 RX ORDER — ACETAMINOPHEN 160 MG/5ML
1000 SOLUTION ORAL EVERY 6 HOURS
Status: DISCONTINUED | OUTPATIENT
Start: 2020-10-05 | End: 2020-10-06 | Stop reason: HOSPADM

## 2020-10-05 RX ORDER — LIDOCAINE HYDROCHLORIDE 20 MG/ML
INJECTION, SOLUTION INFILTRATION; PERINEURAL AS NEEDED
Status: DISCONTINUED | OUTPATIENT
Start: 2020-10-05 | End: 2020-10-05 | Stop reason: SURG

## 2020-10-05 RX ORDER — PROPOFOL 10 MG/ML
VIAL (ML) INTRAVENOUS AS NEEDED
Status: DISCONTINUED | OUTPATIENT
Start: 2020-10-05 | End: 2020-10-05 | Stop reason: SURG

## 2020-10-05 RX ORDER — SODIUM CHLORIDE 9 MG/ML
INJECTION, SOLUTION INTRAVENOUS AS NEEDED
Status: DISCONTINUED | OUTPATIENT
Start: 2020-10-05 | End: 2020-10-05 | Stop reason: HOSPADM

## 2020-10-05 RX ORDER — DIPHENHYDRAMINE HCL 25 MG
25 CAPSULE ORAL
Status: DISCONTINUED | OUTPATIENT
Start: 2020-10-05 | End: 2020-10-05 | Stop reason: HOSPADM

## 2020-10-05 RX ORDER — HYDROMORPHONE HYDROCHLORIDE 1 MG/ML
0.5 INJECTION, SOLUTION INTRAMUSCULAR; INTRAVENOUS; SUBCUTANEOUS
Status: DISCONTINUED | OUTPATIENT
Start: 2020-10-05 | End: 2020-10-05 | Stop reason: HOSPADM

## 2020-10-05 RX ORDER — ONDANSETRON 2 MG/ML
4 INJECTION INTRAMUSCULAR; INTRAVENOUS EVERY 4 HOURS PRN
Status: DISCONTINUED | OUTPATIENT
Start: 2020-10-05 | End: 2020-10-06 | Stop reason: HOSPADM

## 2020-10-05 RX ORDER — NALOXONE HCL 0.4 MG/ML
0.4 VIAL (ML) INJECTION
Status: DISCONTINUED | OUTPATIENT
Start: 2020-10-05 | End: 2020-10-06 | Stop reason: HOSPADM

## 2020-10-05 RX ORDER — FLUMAZENIL 0.1 MG/ML
0.2 INJECTION INTRAVENOUS AS NEEDED
Status: DISCONTINUED | OUTPATIENT
Start: 2020-10-05 | End: 2020-10-05 | Stop reason: HOSPADM

## 2020-10-05 RX ORDER — ACETAMINOPHEN 500 MG
1000 TABLET ORAL EVERY 6 HOURS
Status: DISCONTINUED | OUTPATIENT
Start: 2020-10-05 | End: 2020-10-05

## 2020-10-05 RX ORDER — FENTANYL CITRATE 50 UG/ML
INJECTION, SOLUTION INTRAMUSCULAR; INTRAVENOUS AS NEEDED
Status: DISCONTINUED | OUTPATIENT
Start: 2020-10-05 | End: 2020-10-05 | Stop reason: SURG

## 2020-10-05 RX ORDER — HYDROMORPHONE HYDROCHLORIDE 2 MG/1
2 TABLET ORAL EVERY 4 HOURS PRN
Status: DISCONTINUED | OUTPATIENT
Start: 2020-10-05 | End: 2020-10-06 | Stop reason: HOSPADM

## 2020-10-05 RX ORDER — HYDRALAZINE HYDROCHLORIDE 20 MG/ML
5 INJECTION INTRAMUSCULAR; INTRAVENOUS
Status: DISCONTINUED | OUTPATIENT
Start: 2020-10-05 | End: 2020-10-05 | Stop reason: HOSPADM

## 2020-10-05 RX ORDER — ONDANSETRON 2 MG/ML
INJECTION INTRAMUSCULAR; INTRAVENOUS AS NEEDED
Status: DISCONTINUED | OUTPATIENT
Start: 2020-10-05 | End: 2020-10-05 | Stop reason: SURG

## 2020-10-05 RX ORDER — SODIUM CHLORIDE 0.9 % (FLUSH) 0.9 %
3 SYRINGE (ML) INJECTION EVERY 12 HOURS SCHEDULED
Status: DISCONTINUED | OUTPATIENT
Start: 2020-10-05 | End: 2020-10-06 | Stop reason: HOSPADM

## 2020-10-05 RX ORDER — CYANOCOBALAMIN 1000 UG/ML
1000 INJECTION, SOLUTION INTRAMUSCULAR; SUBCUTANEOUS ONCE
Status: COMPLETED | OUTPATIENT
Start: 2020-10-06 | End: 2020-10-06

## 2020-10-05 RX ORDER — METOCLOPRAMIDE HYDROCHLORIDE 5 MG/ML
10 INJECTION INTRAMUSCULAR; INTRAVENOUS ONCE
Status: COMPLETED | OUTPATIENT
Start: 2020-10-05 | End: 2020-10-05

## 2020-10-05 RX ORDER — SUCCINYLCHOLINE CHLORIDE 20 MG/ML
INJECTION INTRAMUSCULAR; INTRAVENOUS AS NEEDED
Status: DISCONTINUED | OUTPATIENT
Start: 2020-10-05 | End: 2020-10-05 | Stop reason: SURG

## 2020-10-05 RX ORDER — ALBUTEROL SULFATE 2.5 MG/3ML
2.5 SOLUTION RESPIRATORY (INHALATION)
Status: DISCONTINUED | OUTPATIENT
Start: 2020-10-05 | End: 2020-10-05

## 2020-10-05 RX ORDER — ONDANSETRON 4 MG/1
4 TABLET, FILM COATED ORAL EVERY 4 HOURS PRN
Status: DISCONTINUED | OUTPATIENT
Start: 2020-10-05 | End: 2020-10-06 | Stop reason: HOSPADM

## 2020-10-05 RX ORDER — CEFAZOLIN SODIUM IN 0.9 % NACL 3 G/100 ML
3 INTRAVENOUS SOLUTION, PIGGYBACK (ML) INTRAVENOUS
Status: COMPLETED | OUTPATIENT
Start: 2020-10-05 | End: 2020-10-05

## 2020-10-05 RX ORDER — EPHEDRINE SULFATE 50 MG/ML
5 INJECTION, SOLUTION INTRAVENOUS ONCE AS NEEDED
Status: DISCONTINUED | OUTPATIENT
Start: 2020-10-05 | End: 2020-10-05 | Stop reason: HOSPADM

## 2020-10-05 RX ORDER — SODIUM CHLORIDE, SODIUM LACTATE, POTASSIUM CHLORIDE, CALCIUM CHLORIDE 600; 310; 30; 20 MG/100ML; MG/100ML; MG/100ML; MG/100ML
9 INJECTION, SOLUTION INTRAVENOUS CONTINUOUS
Status: DISCONTINUED | OUTPATIENT
Start: 2020-10-05 | End: 2020-10-05

## 2020-10-05 RX ORDER — FENTANYL CITRATE 50 UG/ML
50 INJECTION, SOLUTION INTRAMUSCULAR; INTRAVENOUS
Status: DISCONTINUED | OUTPATIENT
Start: 2020-10-05 | End: 2020-10-05 | Stop reason: HOSPADM

## 2020-10-05 RX ORDER — DIPHENHYDRAMINE HYDROCHLORIDE 50 MG/ML
25 INJECTION INTRAMUSCULAR; INTRAVENOUS EVERY 4 HOURS PRN
Status: DISCONTINUED | OUTPATIENT
Start: 2020-10-05 | End: 2020-10-06 | Stop reason: HOSPADM

## 2020-10-05 RX ORDER — LIDOCAINE HYDROCHLORIDE 10 MG/ML
0.5 INJECTION, SOLUTION EPIDURAL; INFILTRATION; INTRACAUDAL; PERINEURAL ONCE AS NEEDED
Status: DISCONTINUED | OUTPATIENT
Start: 2020-10-05 | End: 2020-10-05 | Stop reason: HOSPADM

## 2020-10-05 RX ADMIN — METOCLOPRAMIDE HYDROCHLORIDE 10 MG: 5 INJECTION INTRAMUSCULAR; INTRAVENOUS at 09:15

## 2020-10-05 RX ADMIN — GABAPENTIN 300 MG: 300 CAPSULE ORAL at 16:10

## 2020-10-05 RX ADMIN — THIAMINE HYDROCHLORIDE 250 ML/HR: 100 INJECTION, SOLUTION INTRAMUSCULAR; INTRAVENOUS at 23:50

## 2020-10-05 RX ADMIN — LIDOCAINE HYDROCHLORIDE 100 MG: 20 INJECTION, SOLUTION INFILTRATION; PERINEURAL at 11:21

## 2020-10-05 RX ADMIN — METOCLOPRAMIDE HYDROCHLORIDE 10 MG: 5 INJECTION INTRAMUSCULAR; INTRAVENOUS at 20:20

## 2020-10-05 RX ADMIN — CEFAZOLIN 3 G: 1 INJECTION, POWDER, FOR SOLUTION INTRAMUSCULAR; INTRAVENOUS; PARENTERAL at 11:25

## 2020-10-05 RX ADMIN — PROPOFOL 30 MCG/KG/MIN: 10 INJECTION, EMULSION INTRAVENOUS at 11:25

## 2020-10-05 RX ADMIN — SODIUM CHLORIDE, POTASSIUM CHLORIDE, SODIUM LACTATE AND CALCIUM CHLORIDE 150 ML/HR: 600; 310; 30; 20 INJECTION, SOLUTION INTRAVENOUS at 17:19

## 2020-10-05 RX ADMIN — FENTANYL CITRATE 100 MCG: 50 INJECTION INTRAMUSCULAR; INTRAVENOUS at 11:21

## 2020-10-05 RX ADMIN — ACETAMINOPHEN ORAL SOLUTION 1000 MG: 325 SOLUTION ORAL at 16:10

## 2020-10-05 RX ADMIN — SUCCINYLCHOLINE CHLORIDE 150 MG: 20 INJECTION, SOLUTION INTRAMUSCULAR; INTRAVENOUS at 11:21

## 2020-10-05 RX ADMIN — SCOPALAMINE 1 PATCH: 1 PATCH, EXTENDED RELEASE TRANSDERMAL at 08:58

## 2020-10-05 RX ADMIN — ONDANSETRON HYDROCHLORIDE 4 MG: 2 SOLUTION INTRAMUSCULAR; INTRAVENOUS at 11:30

## 2020-10-05 RX ADMIN — MIDAZOLAM 1 MG: 1 INJECTION INTRAMUSCULAR; INTRAVENOUS at 09:16

## 2020-10-05 RX ADMIN — ACETAMINOPHEN ORAL SOLUTION 975 MG: 325 SOLUTION ORAL at 09:00

## 2020-10-05 RX ADMIN — SODIUM CHLORIDE, POTASSIUM CHLORIDE, SODIUM LACTATE AND CALCIUM CHLORIDE: 600; 310; 30; 20 INJECTION, SOLUTION INTRAVENOUS at 12:12

## 2020-10-05 RX ADMIN — CHLORHEXIDINE GLUCONATE 15 ML: 1.2 RINSE ORAL at 09:00

## 2020-10-05 RX ADMIN — PROPOFOL 200 MG: 10 INJECTION, EMULSION INTRAVENOUS at 11:21

## 2020-10-05 RX ADMIN — HYDROMORPHONE HYDROCHLORIDE 0.5 MG: 1 INJECTION, SOLUTION INTRAMUSCULAR; INTRAVENOUS; SUBCUTANEOUS at 14:25

## 2020-10-05 RX ADMIN — METOCLOPRAMIDE HYDROCHLORIDE 10 MG: 5 INJECTION INTRAMUSCULAR; INTRAVENOUS at 15:00

## 2020-10-05 RX ADMIN — ACETAMINOPHEN ORAL SOLUTION 1000 MG: 325 SOLUTION ORAL at 21:36

## 2020-10-05 RX ADMIN — ROCURONIUM BROMIDE 50 MG: 10 INJECTION, SOLUTION INTRAVENOUS at 11:28

## 2020-10-05 RX ADMIN — FAMOTIDINE 20 MG: 10 INJECTION INTRAVENOUS at 21:36

## 2020-10-05 RX ADMIN — GABAPENTIN 300 MG: 300 CAPSULE ORAL at 21:36

## 2020-10-05 RX ADMIN — HYDROMORPHONE HYDROCHLORIDE 0.5 MG: 1 INJECTION, SOLUTION INTRAMUSCULAR; INTRAVENOUS; SUBCUTANEOUS at 12:31

## 2020-10-05 RX ADMIN — MIRTAZAPINE 15 MG: 15 TABLET, ORALLY DISINTEGRATING ORAL at 21:36

## 2020-10-05 RX ADMIN — SODIUM CHLORIDE, POTASSIUM CHLORIDE, SODIUM LACTATE AND CALCIUM CHLORIDE 500 ML: 600; 310; 30; 20 INJECTION, SOLUTION INTRAVENOUS at 08:48

## 2020-10-05 RX ADMIN — SODIUM CHLORIDE, PRESERVATIVE FREE 3 ML: 5 INJECTION INTRAVENOUS at 21:00

## 2020-10-05 RX ADMIN — DEXAMETHASONE SODIUM PHOSPHATE 8 MG: 10 INJECTION INTRAMUSCULAR; INTRAVENOUS at 11:30

## 2020-10-05 NOTE — ANESTHESIA PROCEDURE NOTES
Airway  Urgency: elective    Date/Time: 10/5/2020 11:23 AM  Airway not difficult    General Information and Staff    Patient location during procedure: OR  CRNA: Giuliana Whitman CRNA    Indications and Patient Condition  Indications for airway management: airway protection    Preoxygenated: yes  MILS maintained throughout  Mask difficulty assessment: 1 - vent by mask    Final Airway Details  Final airway type: endotracheal airway      Successful airway: ETT  Cuffed: yes   Successful intubation technique: direct laryngoscopy  Endotracheal tube insertion site: oral  Blade: [place]rett  Blade size: 3  ETT size (mm): 7.0  Cormack-Lehane Classification: grade IIa - partial view of glottis  Placement verified by: chest auscultation and capnometry   Measured from: lips  ETT/EBT  to lips (cm): 21  Number of attempts at approach: 1  Assessment: lips, teeth, and gum same as pre-op and atraumatic intubation    Additional Comments  Atraumatic insertion

## 2020-10-05 NOTE — ANESTHESIA POSTPROCEDURE EVALUATION
Patient: Savannah Valenzuela    Procedure Summary     Date: 10/05/20 Room / Location:  [facility]  [facility]    Anesthesia Start: 1111 Anesthesia Stop: 1220    Procedure: GASTRIC SLEEVE LAPAROSCOPIC WITH paraesophageal hernia repair, lysis of adhesions (N/A Abdomen) Diagnosis:       Obesity, Class III, BMI 40-49.9 (morbid obesity) (CMS/HCC)      Hiatal hernia      (Obesity, Class III, BMI 40-49.9 (morbid obesity) (CMS/HCC) [E66.01])      (Hiatal hernia [K44.9])    Surgeon: Cayetano Khan Jr., MD Provider: Mundo Saleh MD    Anesthesia Type: general ASA Status: 3          Anesthesia Type: general    Vitals  Vitals Value Taken Time   /73 10/05/20 1300   Temp 36.6 °C (97.8 °F) 10/05/20 1217   Pulse 73 10/05/20 1302   Resp 16 10/05/20 1245   SpO2 99 % 10/05/20 1302   Vitals shown include unvalidated device data.        Post Anesthesia Care and Evaluation    Patient location during evaluation: bedside  Patient participation: complete - patient participated  Level of consciousness: awake and alert  Pain management: adequate  Airway patency: patent  Anesthetic complications: No anesthetic complications  PONV Status: controlled  Cardiovascular status: blood pressure returned to baseline and acceptable  Respiratory status: acceptable  Hydration status: acceptable

## 2020-10-05 NOTE — PLAN OF CARE
Goal Outcome Evaluation:  Plan of Care Reviewed With: patient  Progress: improving  Outcome Summary: Admit from [facility] s/p gastric sleeve with hernia repair. No c/o pain or soa. Tolerating ice and sips of clears. Tolerating PO medications scheduled. Ambulated in hallway, +void.

## 2020-10-05 NOTE — OP NOTE
PREOPERATIVE DIAGNOSIS:  Morbid obesity with multiple comorbidities as referenced in the most recent history and physical.    POSTOPERATIVE DIAGNOSIS:    1:  Morbid obesity with multiple comorbidities as referenced in the most recent history and physical.  2:  Paraesophageal Hernia  3.  Adhesions upper abdomen    PROCEDURES PERFORMED:  1.  Laparoscopic sleeve gastrectomy.  2.  Laparoscopic paraesophageal hernia repair.  3.  Tisseel application.   4.  Lysis of adhesions    SURGEON:  Cayetano Khan Jr., MD    ASSISTANT: Magy Frost MD      Surgery assisted and facilitated by a certified physician assistant, who directly resulted in a decreased operative time, anesthetic time, wound exposure, and possibly of an operative wound infection, thereby decreasing patient morbidity and ultimately total expenditures. The surgical assistant assisted in placement of trochars, take down of the gastrocolic omentum, short gastric vessels and dissection at the angle of His.  Also assisted in retraction of the stomach during stapling so as not to kink the gastric sleeve.  Also assisted in removing of the gastric specimen, closure of the fascial defect as well as closure of the skin incisions. They also assisted in retraction of the stomach during the hiatal hernia repair, dissection of the hernia sac and closure of the crura.      ANESTHESIA:  General endotracheal.    ESTIMATED BLOOD LOSS:   Less than 25 mL unless dictated below.    FLUIDS:  Crystalloids.    SPECIMENS:  Gastric remnant    DRAINS:  None.    COUNTS:  Correct.    COMPLICATIONS:  None.    INDICATIONS:  This patient with morbid obesity and associated comorbidities presents for elective laparoscopic, possible open sleeve gastrectomy.  The patient has received medical clearance to proceed.  The patient has undergone our extensive educational process and consent process and wishes to proceed.    DESCRIPTION OF PROCEDURE:  The patient was brought to the operating room and  placed supine upon the operating room table. SCD hose were placed.  The patient underwent uneventful general endotracheal anesthesia per the anesthesiology staff. The abdomen was prepped with ChloraPrep and draped in the usual sterile fashion.  An Ioban was used as well if not allergic.  Anesthesia staff either passed a 18 Albanian gastric tube into the stomach to decompress.  A 5-10 mm transverse incision was made a few centimeters above and to the left of the umbilicus and the peritoneal cavity entered under direct camera visualization using a 5 or 10 mm 0° laparoscope and an Optiview trocar.  The abdomen was then insufflated to a pressure of 15-16 mmHg with CO2 gas.  Exploratory laparoscopy revealed no evidence of injury from the entrance technique and no significant abnormalities unless addendum dictated below.  An angled laparoscope was then used.  The patient was placed in reverse Trendelenburg position.  Under direct camera visualization, a 5 or 12 mm trocar was placed in the right lateral subcostal position.  A 12 mm trocar was placed in the right midabdominal position.  A 5-12 mm trocar was placed in the left midabdominal position. A Alicia retractor was placed through an epigastric incision and used to elevate the left lobe of the liver.  The fat pad was elevated and the left margy exposed.  At this point approximately [facility] along the greater curvature, the gastrocolic omentum was divided with the Enseal and this proceeded superiorly to the angle of His taking down the short gastric vessels.  All posterior attachments of the lesser sac and posterior aspect of the stomach to the pancreas were taken down as well.  The left margy was exposed along its length.  Dissection then proceeded medially taking down the greater curvature with an Enseal until just proximal to the pylorus.  The standard clamp and 18 Albanian orogastric tube were used to size the gastric sleeve. The stomach was marked in the 3 positions  using the indelible ink pen.  The 3 markings were at the angle of Hiss, the incisura and antrum using 1cm, 3cm and 5cm respectively. Green cartridges were used for a total of 4-5. The 1st load on the Lasana Flex stapler with Veritas Ban-Strip and this was placed 5 cm proximal to the pylorus.  The next several loads with absorbable Veritas Ban-Strips depending on the size of the stomach. Careful attention was made to stay 1 cm from the esophagus.  Areas of the reinforced staple line were oversewn with absorbable sutures as needed for bleeding or questionable staple lines. At this point, the gastrectomy specimen was withdrawn through the 12 mm trocar site incision. The specimen staple line was inspected and was intact.  The specimen was then sent off to pathology.   At this point, the sleeve was submerged under saline and using the orogastric tube to insufflate the stomach, a leak test was performed.  This revealed the sleeve to be watertight, no air bubbles, no leak, and no bleeding seen from the staple lines and no significant abnormalities.  Irrigation fluid from the abdomen was then suctioned free.  The gastric sleeve staple line was then treated with  Tisseel fibrin glue. The fascia at the 12 mm trocar site incision was closed with a single 0 Vicryl suture in a figure-of-eight fashion placed under direct laparoscopic camera visualization with a suture passer and tying the knot extracorporeally.  The fascia in the area was infiltrated with local anesthesia. All incisions were then infiltrated with local anesthetic. The remaining trocars were removed under direct camera visualization with no bleeding noted from their sites.  The abdomen was desufflated of gas. The skin in each incision was closed using 4-0 antibiotic impregnated Monocryl in a subcuticular stitch followed by Dermabond.  The patient tolerated the procedure well without complication and was taken to the recovery room in stable condition.  All  sponge, needle, and instrument counts were correct.    The patient was noted to have a paraesophageal hernia.  The phrenoesophageal membrane was opened up with electrocautery and the right and left crura were cleaned off using sharp and blunt dissection.  The peritoneum overlying the right margy was incised and the plane along the hernia sac was developed.  The dissection then extended anteriorly and laterally to the left margy.  The base of the crural confluence was dissected free of adhesions to the hernia sac.  The hernia sac was carefully dissected into the mediastinum with caudal traction.  The interfaces between the pleura, pericardium, spine, and aorta were developed as a dissection was carried cephalically to the top of the hernia sac.  Sac contents were completely reduced back into the abdominal cavity.  Careful attention was made not to injure the vagus nerve.  The esophagus was identified and dissected circumferentially and along its previous stomach course in order to reduce tension, allowing the gastroesophageal junction to rest comfortably within the abdominal cavity.  The gastrosplenic ligament and the short gastric vessels were divided with the Enseal device.  The retroesophageal window was developed and the esophagus was retracted caudally.  The crural pillars were then approximated with 0 Ethibond sutures.  Anterior reinforcement was performed as well if needed.    Adhesions upper abdomen taken down with enseal.

## 2020-10-05 NOTE — ANESTHESIA PREPROCEDURE EVALUATION
Anesthesia Evaluation     Patient summary reviewed and Nursing notes reviewed   no history of anesthetic complications:  NPO Solid Status: > 8 hours  NPO Liquid Status: > 2 hours           Airway   Mallampati: I  TM distance: >3 FB  Neck ROM: full  No difficulty expected  Dental - normal exam     Pulmonary - normal exam   Cardiovascular - normal exam  Exercise tolerance: good (4-7 METS)    ECG reviewed  Rhythm: regular  Rate: normal    (+) hypertension, hyperlipidemia,       Neuro/Psych  GI/Hepatic/Renal/Endo    (+) obesity,  hiatal hernia, GERD,      Musculoskeletal     Abdominal  - normal exam    Bowel sounds: normal.   Substance History      OB/GYN          Other                        Anesthesia Plan    ASA 3     general     intravenous induction     Anesthetic plan, all risks, benefits, and alternatives have been provided, discussed and informed consent has been obtained with: patient.    Plan discussed with CRNA.

## 2020-10-06 ENCOUNTER — READMISSION MANAGEMENT (OUTPATIENT)
Dept: CALL CENTER | Facility: HOSPITAL | Age: 50
End: 2020-10-06

## 2020-10-06 VITALS
BODY MASS INDEX: 37.87 KG/M2 | SYSTOLIC BLOOD PRESSURE: 136 MMHG | RESPIRATION RATE: 16 BRPM | HEIGHT: 64 IN | HEART RATE: 61 BPM | WEIGHT: 221.8 LBS | OXYGEN SATURATION: 92 % | DIASTOLIC BLOOD PRESSURE: 80 MMHG | TEMPERATURE: 98.4 F

## 2020-10-06 LAB
ALBUMIN SERPL-MCNC: 3.7 G/DL (ref 3.5–5.2)
ALBUMIN/GLOB SERPL: 1.7 G/DL
ALP SERPL-CCNC: 85 U/L (ref 39–117)
ALT SERPL W P-5'-P-CCNC: 19 U/L (ref 1–33)
ANION GAP SERPL CALCULATED.3IONS-SCNC: 10.1 MMOL/L (ref 5–15)
AST SERPL-CCNC: 22 U/L (ref 1–32)
BASOPHILS # BLD AUTO: 0.01 10*3/MM3 (ref 0–0.2)
BASOPHILS NFR BLD AUTO: 0.1 % (ref 0–1.5)
BILIRUB SERPL-MCNC: 0.5 MG/DL (ref 0–1.2)
BUN SERPL-MCNC: 10 MG/DL (ref 6–20)
BUN/CREAT SERPL: 12 (ref 7–25)
CALCIUM SPEC-SCNC: 8.5 MG/DL (ref 8.6–10.5)
CHLORIDE SERPL-SCNC: 105 MMOL/L (ref 98–107)
CO2 SERPL-SCNC: 22.9 MMOL/L (ref 22–29)
CREAT SERPL-MCNC: 0.83 MG/DL (ref 0.57–1)
CYTO UR: NORMAL
DEPRECATED RDW RBC AUTO: 42.9 FL (ref 37–54)
EOSINOPHIL # BLD AUTO: 0 10*3/MM3 (ref 0–0.4)
EOSINOPHIL NFR BLD AUTO: 0 % (ref 0.3–6.2)
ERYTHROCYTE [DISTWIDTH] IN BLOOD BY AUTOMATED COUNT: 14.4 % (ref 12.3–15.4)
GFR SERPL CREATININE-BSD FRML MDRD: 73 ML/MIN/1.73
GLOBULIN UR ELPH-MCNC: 2.2 GM/DL
GLUCOSE SERPL-MCNC: 104 MG/DL (ref 65–99)
HCT VFR BLD AUTO: 29 % (ref 34–46.6)
HGB BLD-MCNC: 9.8 G/DL (ref 12–15.9)
IMM GRANULOCYTES # BLD AUTO: 0.04 10*3/MM3 (ref 0–0.05)
IMM GRANULOCYTES NFR BLD AUTO: 0.4 % (ref 0–0.5)
LAB AP CASE REPORT: NORMAL
LYMPHOCYTES # BLD AUTO: 1.52 10*3/MM3 (ref 0.7–3.1)
LYMPHOCYTES NFR BLD AUTO: 15.7 % (ref 19.6–45.3)
MAGNESIUM SERPL-MCNC: 2 MG/DL (ref 1.6–2.6)
MCH RBC QN AUTO: 28.1 PG (ref 26.6–33)
MCHC RBC AUTO-ENTMCNC: 33.8 G/DL (ref 31.5–35.7)
MCV RBC AUTO: 83.1 FL (ref 79–97)
MONOCYTES # BLD AUTO: 0.67 10*3/MM3 (ref 0.1–0.9)
MONOCYTES NFR BLD AUTO: 6.9 % (ref 5–12)
NEUTROPHILS NFR BLD AUTO: 7.43 10*3/MM3 (ref 1.7–7)
NEUTROPHILS NFR BLD AUTO: 76.9 % (ref 42.7–76)
NRBC BLD AUTO-RTO: 0 /100 WBC (ref 0–0.2)
PATH REPORT.FINAL DX SPEC: NORMAL
PATH REPORT.GROSS SPEC: NORMAL
PHOSPHATE SERPL-MCNC: 3.4 MG/DL (ref 2.5–4.5)
PLATELET # BLD AUTO: 275 10*3/MM3 (ref 140–450)
PMV BLD AUTO: 9.3 FL (ref 6–12)
POTASSIUM SERPL-SCNC: 4.3 MMOL/L (ref 3.5–5.2)
PROT SERPL-MCNC: 5.9 G/DL (ref 6–8.5)
RBC # BLD AUTO: 3.49 10*6/MM3 (ref 3.77–5.28)
SODIUM SERPL-SCNC: 138 MMOL/L (ref 136–145)
WBC # BLD AUTO: 9.67 10*3/MM3 (ref 3.4–10.8)

## 2020-10-06 PROCEDURE — 83735 ASSAY OF MAGNESIUM: CPT | Performed by: SURGERY

## 2020-10-06 PROCEDURE — 25010000002 METOCLOPRAMIDE PER 10 MG: Performed by: SURGERY

## 2020-10-06 PROCEDURE — 84100 ASSAY OF PHOSPHORUS: CPT | Performed by: SURGERY

## 2020-10-06 PROCEDURE — 25010000002 ENOXAPARIN PER 10 MG: Performed by: SURGERY

## 2020-10-06 PROCEDURE — 80053 COMPREHEN METABOLIC PANEL: CPT | Performed by: SURGERY

## 2020-10-06 PROCEDURE — 85025 COMPLETE CBC W/AUTO DIFF WBC: CPT | Performed by: SURGERY

## 2020-10-06 PROCEDURE — 25010000002 THIAMINE PER 100 MG: Performed by: SURGERY

## 2020-10-06 PROCEDURE — 25010000002 CYANOCOBALAMIN PER 1000 MCG: Performed by: SURGERY

## 2020-10-06 RX ORDER — HYDROMORPHONE HYDROCHLORIDE 2 MG/1
2 TABLET ORAL EVERY 4 HOURS PRN
Qty: 18 TABLET | Refills: 0 | Status: SHIPPED | OUTPATIENT
Start: 2020-10-06 | End: 2020-10-09

## 2020-10-06 RX ORDER — ONDANSETRON 4 MG/1
4 TABLET, ORALLY DISINTEGRATING ORAL EVERY 4 HOURS PRN
Qty: 14 TABLET | Refills: 0 | Status: SHIPPED | OUTPATIENT
Start: 2020-10-06 | End: 2020-10-26

## 2020-10-06 RX ADMIN — CYANOCOBALAMIN 1000 MCG: 1000 INJECTION, SOLUTION INTRAMUSCULAR at 09:54

## 2020-10-06 RX ADMIN — LOSARTAN POTASSIUM: 50 TABLET, FILM COATED ORAL at 09:54

## 2020-10-06 RX ADMIN — METOCLOPRAMIDE HYDROCHLORIDE 10 MG: 5 INJECTION INTRAMUSCULAR; INTRAVENOUS at 09:54

## 2020-10-06 RX ADMIN — ACETAMINOPHEN ORAL SOLUTION 1000 MG: 325 SOLUTION ORAL at 04:38

## 2020-10-06 RX ADMIN — FAMOTIDINE 20 MG: 10 INJECTION INTRAVENOUS at 09:53

## 2020-10-06 RX ADMIN — ENOXAPARIN SODIUM 40 MG: 40 INJECTION SUBCUTANEOUS at 09:54

## 2020-10-06 RX ADMIN — GABAPENTIN 300 MG: 300 CAPSULE ORAL at 06:12

## 2020-10-06 RX ADMIN — METOCLOPRAMIDE HYDROCHLORIDE 10 MG: 5 INJECTION INTRAMUSCULAR; INTRAVENOUS at 01:21

## 2020-10-06 RX ADMIN — ACETAMINOPHEN ORAL SOLUTION 1000 MG: 325 SOLUTION ORAL at 09:55

## 2020-10-06 RX ADMIN — SODIUM CHLORIDE, PRESERVATIVE FREE 3 ML: 5 INJECTION INTRAVENOUS at 09:54

## 2020-10-06 RX ADMIN — SODIUM CHLORIDE, POTASSIUM CHLORIDE, SODIUM LACTATE AND CALCIUM CHLORIDE 150 ML/HR: 600; 310; 30; 20 INJECTION, SOLUTION INTRAVENOUS at 04:38

## 2020-10-06 NOTE — PROGRESS NOTES
Case Management Discharge Note      Final Note: Home with no needs. Transport by private auto.         Selected Continued Care - Admitted Since 10/5/2020     Destination    No services have been selected for the patient.              Durable Medical Equipment    No services have been selected for the patient.              Dialysis/Infusion    No services have been selected for the patient.              Home Medical Care    No services have been selected for the patient.              Therapy    No services have been selected for the patient.              Community Resources    No services have been selected for the patient.                  Transportation Services  Private: Car    Final Discharge Disposition Code: 01 - home or self-care

## 2020-10-06 NOTE — PLAN OF CARE
Goal Outcome Evaluation:  Plan of Care Reviewed With: patient  Progress: improving  Outcome Summary: VSS, very successful shift. Pt indepently ambulates and uses IS. Has had only c/o pressure to abdomen which is relieved by walking. No pain meds outside of ERAS. Will continue to monitor.

## 2020-10-06 NOTE — DISCHARGE INSTRUCTIONS
GOING HOME AFTER GASTRIC SLEEVE/ GASTRIC BYPASS SURGERY  [facility] Weight Loss: Post-Operative Information/Instructions  Cayetano Khan Jr., MD  General Patient Instructions for Discharge   - Call Surgeon's office at [phone] for follow-up appointment.    - Be sure you, the patient, have a follow-up appointment to be seen within seven (7) days after discharge. If not, please call [phone] to schedule an appointment. If you are discharged on a Saturday or Sunday, please call Monday to schedule the appointment.  - Contact the Surgeon at [phone] for any questions or concerns, including temperature greater than or equal to 101F, shortness of breath, leg swelling, redness at incision sites, nausea, vomiting, chills, or problems or questions.    - Follow the Gastric Stage 1 Diet    à Clear liquids, room temperature, sugar-free, caffeine-free, non-carbonated, 70 grams of protein, No Straws.  - You may shower. No tub bath for 2 weeks.  - No lifting, pushing, pulling, or tugging >25 pounds for 3 weeks.  - Ambulate every 3 hours while awake minimum for seven (7) days, increase distance daily.  - For the next several weeks, you are at an increased risk for blood clot formation. Therefore, you should walk regularly. You should not sit for prolonged periods of time, more than 45 minutes, without getting up and walking for 5-10 minutes. This includes any car rides, including the drive home from the hospital. If driving any distance greater than 30 miles over the next two (2) weeks, stop every 30-45 minutes and walk for 5-10 minutes each time.  - Continue using Incentive Spirometer and coughing exercises at least every two (2) hours while awake for one week.  - Continue use of CPAP/BIPAP for diagnosis of sleep apnea as directed.  - No driving or operating machinery allowed while taking narcotic (prescription) pain medication, and until you feel comfortable forcefully applying the brakes if needed. (This  usually takes more than 3 days.)    - Make an appointment with your Primary Care Physician within one week post-op to look at your home medications for possible changes or discontinuity.   Medications  - The nurse will provide a list of medications for you to continue at home   - If you received a Lovenox (Enoxaparin) or Apixiban (Eliquis) prescription at pre-op visit with Surgeon, start taking the medicine the morning after discharge unless directed otherwise.    - If you were prescribed Lovenox (Enoxaparin), review the education/teaching material/video with the nurse.    - Take post op pain meds as prescribed as needed.   - Continue Foltx until finished.   - Resume use of Actigall (Ursodiol) one (1) week after surgery if patient still has gallbladder. You should have been given a prescription at your pre-op visit. Contact the office if you do not have the prescription.   - Resume bariatric vitamin regimen as instructed in pre-op education with bariatric coordinator.    - Zegerid or Prilosec OTC (or generic) by mouth once daily for four (4) weeks unless you are already taking a proton pump inhibitor as home medication. Follow dosing instructions on package.   Nausea/Vomiting:  The following are possible causes for nausea/vomiting:  - Drinking too much or too fast.  - Sinus drainage/post nasal drip for allergy sufferers (you may take Sudafed, Claritin, Tylenol Sinus/Allergy, or other decongestants and nose sprays to help with this discomfort).  - Low blood sugar (sweating, shaky, irritable, weakness, dizzy or tunnel-vision) - treatment is to sip 100% fruit juice - no sugar added until symptoms subside.  - Acid in fruit juice - (may dilute with water or avoid).  - Eating or drinking something that is not on clear liquid (stage 1) diet.  Any nausea/vomiting that prohibits you from keeping fluids down for greater than 24 hours requires a call to the surgeon's office.  Urine:  Use your urine color as a guide to  determine if you are drinking enough fluid. The darker the urine, the more fluids you need to drink. Urine should be clear to light yellow if you are getting enough fluid. If you should experience frequency, burning or pain with urination, blood in urine, contact us or your primary care physician for possible UTI (urinary tract infection), which could require antibiotics (liquid preferred).  Bowel Movements:  You may not have a bowel movement for 2-5 days after going home. You may then experience liquid, runny or loose stools for approximately 3-4 weeks following surgery. This would require you to drink even more fluids to prevent dehydration. Some patients may experience constipation, which can be treated with increased fluids, drinking warm liquids, increased activity and the use of a Fleets Enema, Milk of Magnesia, or suppositories. The first couple of bowel movements could be bloody, tarry black or dark maroon in color. This is OK as long as the stool returns to a normal color in 1-2 days. If however, you have frequent or a large amount of bloody or tarry black stools and/or become light-headed or dizzy, you may be bleeding and require urgent attention. Please call us right away.  Abdominal Incisions:  You will have small incisions. Do not scrub incisions, but allow the warm, soapy water to run over the incisions, rinse well, and pat dry. You may use any brand of anti-bacterial soap. Do not use Peroxide or Neosporin type ointments on sites, unless instructed to do so by a surgeon or nurse. Monitor daily for signs/symptoms of infection, which might include: drainage with a foul odor, pain, redness, swelling or heat at the incision sight; fever, body aches and chills. If you suspect infection or have a fever, give us a call.  Pain:  You will be given a prescription for pain medication to control your pain. If you feel the dose is too strong, you may take half the ordered dose, or you may take Tylenol adult liquid  per package instructions for minor pain. Do not take any medications that contain aspirin or aspirin products.  Do not take medications like: Motrin, Aleve, Ibuprofen, Advil, Naproxen, Celebrex, Daypro, Bextra, Meloxicam or other medications commonly used for arthritis or joint pain.  No steroids or cortisone injections. There may be pain, which should improve every few days. Pain should not suddenly get worse or more intense. Pain that suddenly changes and is constant and severe should be called in to the surgeon's office. Any sudden pain in the lower extremities with associated warmth and redness should be called in to the surgeon's office immediately. Do not rub or massage this area, as it could be a blood clot.  Diet:  Remain on the clear liquid diet (stage 1) per your  which includes 70 grams of protein each day, sugar free, non carbonated and no straws. Day 1 is the day of surgery. If you are tolerating the stage 1 diet, you may then proceed to stage 2 diet, as instructed in the . Do not progress to the stage 2 diet if you are having nausea/vomiting. Refer to the Basic Nutrition and Food Principles guide.  Medications:  The nurse will let you know which medications you will need to continue once you go home. Do not take any medications that are extended or time released if you had the gastric bypass procedure, OK to take if you had the gastric sleeve procedure. Large capsules can be opened and diluted with clear liquids. Check with your physician or pharmacist as to which pills may be crushed and which capsules may be opened and diluted safely. Continue taking Foltx as surgeon orders. If you still have your gall bladder and were prescribed Actigall (Ursodiol), you may resume this medication one week after your surgery. You will remain on Actigall (Ursodiol) for approximately 6 months. The dose is 1 pill, 2 times each day for 6 months.  Activity:  Continue your deep breathing and  coughing exercises with your Incentive Spirometer breathing device at least every 3 hours while awake (10 repetitions each time) for one week. May use CPAP. This will help to prevent respiratory problems such as pneumonia. No lifting, pulling or tugging anything over 25 pounds for 3 weeks after surgery. You may shower but no tub baths, hot tubs or swimming for 2 weeks. Moderate walking is recommended every 3 hours while awake minimum, increase distance daily. Further exercise will be discussed at the first post-op visit. No driving or operating machinery allow until off narcotic pain medication and until you feel comfortable forcefully applying the brakes (usually takes 3 or more days). For the next few weeks you are at an increased risk for blood clot formation. Therefore you should walk regularly and you should not sit for prolonged periods of time, more than 45 minutes without getting up and walking for 5-10 minutes. This includes car rides. Including riding home from the hospital. If riding a distance greater than 30 miles over the next 2 weeks stop every 30-45 minutes and walk 5-10 mintues each time. No tanning bed use for 8 weeks after surgery and in general, not recommended due to the increased risk for skin cancer. Incisions will burn/blister very badly with tanning bed use.  Illness:  Your primary care physician should treat general illness such as ear infections, sinus infections, and viral type illnesses, etc. Medications prescribed should be liquid/elixir form when possible, for the first 30 days.  General:  In general, it is recommended that you weigh yourself no more than once per week. Let the weight come off you and concentrate on more important things. Remember the weight was not gained overnight, nor will it be lost overnight. Gastric Bypass/ Gastric Sleeve weight loss will continue over a period of 12-18 months. Do not  yourself according to how others are doing after surgery, as this will  cause unnecessary discouragement.  THE ABOVE ARE GENERAL GUIDELINES TO ASSIST YOU ONCE HOME, IF YOU ARE IN DOUBT, OR YOU HAVE ANY QUESTIONS, CALL US AT THE NUMBERS LISTED BELOW.  IN THE EVENT OF SUDDEN CHEST PAIN, SHORTNESS OF BREATH, OR ANY LIFE THREATENING CONDITION, CALL 911.  Any time you are evaluated or admitted to another facility, please have someone notify the surgeon's office.  Supplements:  70 grams of protein taken EVERY DAY. Remember to drink at least 64 ounces of fluid a day, sipping slowly early on. Increase this amount during the summertime. Sipping slowly will not stretch your new stomach. Drinking too fast or gulping liquids will cause brief discomfort and early could cause staple line disruption (leak). With eating, tiny bites, then chew, chew, chew, and swallow. Lay your fork/spoon down for 2-3 minutes, and then take your next bite. Your pouch will tell you within 1-2 bites if it is going to tolerate what you are eating.   Protein Vendors:  Refer to protein vendors' handout from consult class. You can always find protein drinks at the bariatric office, grocery stores, [employer], drug [employer], [employer], health food stores, and on the Internet. Find one high in protein (15-30 grams per serving) and low carb (less than 18 grams per serving).  Now is a great time to re-read your . Please review specific instructions given to you at discharge by your physician (surgeon).  HOW/WHEN TO CONTACT US:  It is imperative that you contact us with any of the following:    Ÿ fever greater than 101 degrees  Ÿ shortness of breath  Ÿ leg swelling  Ÿ body aches  Ÿ shaking chills  Ÿ nausea and vomitting  Ÿ pain that has worsened  Ÿ redness at incision sites  Ÿ pus or foul smelling drainage from an incision or wound  Ÿ inability to keep fluids down for more than a day  Ÿ any other condition you feel needs our attention.  [facility] - Bariatric: [phone] call this number anytime 24 hours a  day / 7 days a week.  Teach-back Questions to be answered by the patient prior to discharge.   What complications would prompt you to call your doctor when you return home? _________________    What is the purpose of your prescribed medication? ________________  What are some potential side effects of the medications you will be taking at home? _______________

## 2020-10-06 NOTE — OUTREACH NOTE
Prep Survey      Responses   [facility] patient discharged from?  [place]   Is LACE score < 7 ?  Yes   Eligibility  [facility]   Date of Admission  10/05/20   Date of Discharge  10/06/20   Discharge Disposition  Home or Self Care   Discharge diagnosis  Laparoscopic sleeve gastrectomy with paraesophageal hernia repair   Does the patient have one of the following disease processes/diagnoses(primary or secondary)?  General Surgery   Does the patient have Home health ordered?  No   Is there a DME ordered?  No   Prep survey completed?  Yes          Anabel Cherry RN

## 2020-10-06 NOTE — DISCHARGE SUMMARY
"Discharge Summary    Patient name: Savannah Valenzuela    Medical record number: [id number]    Admission date: 10/5/2020  Discharge date:  10/6/2020    Attending physician: Dr. Cayetano Khan    Primary care physician: Mathew Lemos MD    Referring physician: Cayetano Khan Jr., MD  [address] [address]    Condition on discharge: Stable    Primary Diagnoses:  Morbid obesity with co-morbidities    Operative Procedure: Laparoscopic sleeve gastrectomy with paraesophageal hernia repair     Savannah Valenzuela  is post op day one status post procedure listed. Patient denies shortness of air and lower extremity pain. Feels better than yesterday. No vomiting this am. Ambulating well and using incentive spirometer.          /80 (BP Location: Right arm, Patient Position: Lying)   Pulse 61   Temp 98.4 °F (36.9 °C) (Oral)   Resp 16   Ht 162.6 cm (64\")   Wt 101 kg (221 lb 12.8 oz)   LMP 08/05/2020 (Approximate)   SpO2 92%   BMI 38.07 kg/m²     General:  alert, appears stated age and cooperative   Abdomen: soft, bowel sounds active, appropriate tenderness   Incision:   healing well, no drainage, no erythema, no hernia, no seroma, no swelling, no dehiscence, incision well approximated   Heart: Regular rate   Lungs: Clear to auscultation bilaterally     I reviewed the patient's new clinical results.     Lab Results (last 24 hours)     Procedure Component Value Units Date/Time    Comprehensive Metabolic Panel [[id number]]  (Abnormal) Collected: 10/06/20 0723    Specimen: Blood Updated: 10/06/20 0813     Glucose 104 mg/dL      BUN 10 mg/dL      Creatinine 0.83 mg/dL      Sodium 138 mmol/L      Potassium 4.3 mmol/L      Comment: Slight hemolysis detected by analyzer. Results may be affected.        Chloride 105 mmol/L      CO2 22.9 mmol/L      Calcium 8.5 mg/dL      Total Protein 5.9 g/dL      Albumin 3.70 g/dL      ALT (SGPT) 19 U/L      AST (SGOT) 22 U/L      Alkaline Phosphatase 85 U/L      " Total Bilirubin 0.5 mg/dL      eGFR Non African Amer 73 mL/min/1.73      Globulin 2.2 gm/dL      A/G Ratio 1.7 g/dL      BUN/Creatinine Ratio 12.0     Anion Gap 10.1 mmol/L     Narrative:      GFR Normal >60  Chronic Kidney Disease <60  Kidney Failure <15      Phosphorus [[id number]]  (Normal) Collected: 10/06/20 0723    Specimen: Blood Updated: 10/06/20 0813     Phosphorus 3.4 mg/dL     Magnesium [[id number]]  (Normal) Collected: 10/06/20 0723    Specimen: Blood Updated: 10/06/20 0813     Magnesium 2.0 mg/dL     CBC & Differential [[id number]]  (Abnormal) Collected: 10/06/20 0723    Specimen: Blood Updated: 10/06/20 0748    Narrative:      The following orders were created for panel order CBC & Differential.  Procedure                               Abnormality         Status                     ---------                               -----------         ------                     CBC Auto Differential[[id number]]        Abnormal            Final result                 Please view results for these tests on the individual orders.    CBC Auto Differential [[id number]]  (Abnormal) Collected: 10/06/20 0723    Specimen: Blood Updated: 10/06/20 0748     WBC 9.67 10*3/mm3      RBC 3.49 10*6/mm3      Hemoglobin 9.8 g/dL      Hematocrit 29.0 %      MCV 83.1 fL      MCH 28.1 pg      MCHC 33.8 g/dL      RDW 14.4 %      RDW-SD 42.9 fl      MPV 9.3 fL      Platelets 275 10*3/mm3      Neutrophil % 76.9 %      Lymphocyte % 15.7 %      Monocyte % 6.9 %      Eosinophil % 0.0 %      Basophil % 0.1 %      Immature Grans % 0.4 %      Neutrophils, Absolute 7.43 10*3/mm3      Lymphocytes, Absolute 1.52 10*3/mm3      Monocytes, Absolute 0.67 10*3/mm3      Eosinophils, Absolute 0.00 10*3/mm3      Basophils, Absolute 0.01 10*3/mm3      Immature Grans, Absolute 0.04 10*3/mm3      nRBC 0.0 /100 WBC     Tissue Pathology Exam [[id number]] Collected: 10/05/20 1138    Specimen: Tissue from Stomach Updated: 10/05/20 1261             Assessment:       Doing well postoperatively.      Plan:   1. Continue Stage 1 diet  2. Continue with ambulation and Incentive spirometry  3. Plan for d/c home    Patient was seen and examined by Dr. Khan.    Hospital Course: The patient is a very pleasant 50 y.o. female that was admitted to the hospital with morbid obesity who underwent above procedure.  Postoperatively the patient was transferred to the bariatric unit per protocol.  Patient remained afebrile and hemodynamically stable.  Patient was up ambulating well and using incentive spirometer.  Postoperatively day 1 patient was started on stage I bariatric diet and continued with good ambulation.  Lovenox was continued.  Patient was then discharged home.      Discharge medications:      Discharge Medications      New Medications      Instructions Start Date   HYDROmorphone 2 MG tablet  Commonly known as: Dilaudid   2 mg, Oral, Every 4 Hours PRN      ondansetron ODT 4 MG disintegrating tablet  Commonly known as: ZOFRAN-ODT   4 mg, Translingual, Every 4 Hours PRN         Continue These Medications      Instructions Start Date   aspirin-sod bicarb-citric acid 325 MG effervescent tablet  Commonly known as: BARRY-SELTZER   325 mg, Oral, As Needed, HOLDING FOR SURGERY      buPROPion  MG 24 hr tablet  Commonly known as: WELLBUTRIN XL   150 mg, Oral, Daily      FLUoxetine 40 MG capsule  Commonly known as: PROzac   40 mg, Oral, Daily      Folbee 2.5-25-1 MG tablet tablet  Generic drug: folic acid-vit B6-vit B12   1 tablet, Oral, Daily      multivitamin with minerals tablet tablet   1 tablet, Oral, Daily, HOLD FOR SURGERY      olmesartan-hydrochlorothiazide 40-12.5 MG per tablet  Commonly known as: BENICAR HCT   1 tablet, Oral, Daily      pantoprazole 40 MG EC tablet  Commonly known as: PROTONIX   40 mg, Oral, Daily      ursodiol 300 MG capsule  Commonly known as: Actigall   300 mg, Oral, 2 Times Daily         Stop These Medications    aspirin-acetaminophen-caffeine 250-250-65 MG  per tablet  Commonly known as: EXCEDRIN MIGRAINE     ibuprofen 600 MG tablet  Commonly known as: ADVIL,MOTRIN            Discharge instructions:  Per Bariatric manual; per our protocol      Follow-up appointment: Follow up with Dr. Khan in the office as scheduled.  If not already scheduled call for appointment at [phone].

## 2020-10-07 ENCOUNTER — TRANSITIONAL CARE MANAGEMENT TELEPHONE ENCOUNTER (OUTPATIENT)
Dept: CALL CENTER | Facility: HOSPITAL | Age: 50
End: 2020-10-07

## 2020-10-07 NOTE — OUTREACH NOTE
Call Center TCM Note      Responses   [facility] patient discharged from?  [place]   Does the patient have one of the following disease processes/diagnoses(primary or secondary)?  General Surgery   TCM attempt successful?  Yes   Discharge diagnosis  Laparoscopic sleeve gastrectomy with paraesophageal hernia repair   Meds reviewed with patient/caregiver?  Yes   Is the patient having any side effects they believe may be caused by any medication additions or changes?  No   Does the patient have all medications related to this admission filled (includes all antibiotics, pain medications, etc.)  Yes   Medication comments  Pt has not needed Zofran or Dilaudid. Taqking obly Tylenol for pain   Does the patient have a follow up appointment scheduled with their surgeon?  Yes   Has the patient kept scheduled appointments due by today?  Yes   Has home health visited the patient within 72 hours of discharge?  N/A   Did the patient receive a copy of their discharge instructions?  Yes   Nursing interventions  Reviewed instructions with patient   What is the patient's perception of their health status since discharge?  Improving   Is the patient /caregiver able to teach back basic post-op care?  Continue use of incentive spirometry at least 1 week post discharge, Take showers only when approved by MD-sponge bathe until then, Do not remove steri-strips, Lifting as instructed by MD in discharge instructions, No tub bath, swimming, or hot tub until instructed by MD, Practice 'cough and deep breath', Drive as instructed by MD in discharge instructions, Keep incision areas clean,dry and protected   Is the patient/caregiver able to teach back signs and symptoms of incisional infection?  Incisional warmth, Increased drainage or bleeding, Fever, Pus or odor from incision, Increased redness, swelling or pain at the incisonal site   Is the patient/caregiver able to teach back steps to recovery at home?  Set small, achievable goals  for return to baseline health, Practice good oral hygiene, Eat a well-balance diet, Rest and rebuild strength, gradually increase activity, Weigh daily, Make a list of questions for surgeon's appointment   Is the patient/caregiver able to teach back the hierarchy of who to call/visit for symptoms/problems? PCP, Specialist, Home health nurse, Urgent Care, ED, 911  Yes   TCM call completed?  Yes   Wrap up additional comments  Pt feeling great s/p Lap Sleeve Gastrectomy. Pain controlled with just Tylenol, no nausea, fever, chills. Pt sees Bariatric ofc tomorrow and will keep 10/26 fwp with PCP          Kellen Burgess MA    10/7/2020, 14:22 EDT

## 2020-10-08 ENCOUNTER — OFFICE VISIT (OUTPATIENT)
Dept: BARIATRICS/WEIGHT MGMT | Facility: CLINIC | Age: 50
End: 2020-10-08

## 2020-10-08 VITALS
TEMPERATURE: 97.8 F | HEIGHT: 64 IN | WEIGHT: 218 LBS | HEART RATE: 82 BPM | BODY MASS INDEX: 37.22 KG/M2 | DIASTOLIC BLOOD PRESSURE: 76 MMHG | SYSTOLIC BLOOD PRESSURE: 122 MMHG | RESPIRATION RATE: 18 BRPM

## 2020-10-08 DIAGNOSIS — F32.A DEPRESSION, UNSPECIFIED DEPRESSION TYPE: ICD-10-CM

## 2020-10-08 DIAGNOSIS — G89.29 CHRONIC KNEE PAIN, UNSPECIFIED LATERALITY: ICD-10-CM

## 2020-10-08 DIAGNOSIS — M25.569 CHRONIC KNEE PAIN, UNSPECIFIED LATERALITY: ICD-10-CM

## 2020-10-08 DIAGNOSIS — E66.9 OBESITY, CLASS II, BMI 35-39.9: Primary | ICD-10-CM

## 2020-10-08 DIAGNOSIS — K21.9 GASTROESOPHAGEAL REFLUX DISEASE WITHOUT ESOPHAGITIS: ICD-10-CM

## 2020-10-08 DIAGNOSIS — I10 ESSENTIAL HYPERTENSION: ICD-10-CM

## 2020-10-08 DIAGNOSIS — R53.82 CHRONIC FATIGUE: ICD-10-CM

## 2020-10-08 PROBLEM — E66.812 OBESITY, CLASS II, BMI 35-39.9: Status: RESOLVED | Noted: 2020-10-08 | Resolved: 2020-10-08

## 2020-10-08 PROBLEM — E66.812 OBESITY, CLASS II, BMI 35-39.9: Status: ACTIVE | Noted: 2020-10-08

## 2020-10-08 PROBLEM — E66.01 OBESITY, CLASS III, BMI 40-49.9 (MORBID OBESITY) (HCC): Status: RESOLVED | Noted: 2020-09-18 | Resolved: 2020-10-08

## 2020-10-08 PROCEDURE — 99024 POSTOP FOLLOW-UP VISIT: CPT | Performed by: NURSE PRACTITIONER

## 2020-10-08 NOTE — PROGRESS NOTES
[facility] BARIATRIC [facility] BARIATRIC SURGERY  [address] [facility] [address]  [facility] [address]  [phone]  [address] [address]  [phone]  Dept: [phone]  10/8/2020      Savannah Valenzuela.  [id number]  [id number]  1970  female      Chief Complaint   Patient presents with   • Follow-up     1 week post op sleeve       BH Post-Op Bariatric Surgery:   Savannah Valenzuela is status post laparopscopic Laparoscopic Sleeve/PEH procedure, performed on 10/5/20.     HPI:   Today's weight is 98.9 kg (218 lb) pounds, today's BMI is Body mass index is 37.4 kg/m².,@ has a  loss of 12 pounds since the last visit and@ weight loss since surgery is 12 pounds. The patient reports a decreased portion size and loss of appetite. Savannah Valenzuela denies nausea, vomiting, relfux, reports pain is improving daily. The patient c/o appropriate post-op incisional discomfort that is improving. she is doing well with protein and water intake so far. Taking their vitamins, walking and using IS. Denies fevers, chills, chest pain or shortness of air.     She has been using premier clear, adding genepro to her chicken broth.      Diet and Exercise: Diet history reviewed and discussed with the patient. Weight loss/gains to date discussed with the patient. No carbonated beverage consumption and exercising regularly- walking frequently.   Supplements: multivitamins, B-12, calcium, iron, B-1 and Vitamin D.   Current outpatient and discharge medications have been reconciled for the patient.  Reviewed by: CHAN Forman    Review of Systems   Constitutional: Positive for fatigue. Negative for appetite change, fever and unexpected weight change.   HENT: Negative.    Eyes: Negative.    Respiratory: Negative.    Cardiovascular: Negative.  Negative for leg swelling.   Gastrointestinal: Positive for abdominal pain. Negative for abdominal distention (improving daily), constipation, diarrhea,  nausea and vomiting.   Genitourinary: Negative for difficulty urinating, frequency and urgency.   Musculoskeletal: Negative for back pain.   Skin: Positive for wound.   Psychiatric/Behavioral: Negative.    All other systems reviewed and are negative.      Patient Active Problem List   Diagnosis   • Migraine   • Allergic rhinitis   • Hematuria, microscopic   • Depression   • Essential hypertension   • Chronic fatigue   • Elevated triglycerides with high cholesterol   • Obesity, Class II, BMI 35-39.9   • Chronic knee pain   • GERD (gastroesophageal reflux disease)   • Hiatal hernia       The following portions of the patient's history were reviewed and updated as appropriate: allergies, current medications, past family history, past medical history, past social history, past surgical history and problem list.    Vitals:    10/08/20 0856   BP: 122/76   Pulse: 82   Resp: 18   Temp: 97.8 °F (36.6 °C)       Physical Exam  Constitutional:       Appearance: She is not diaphoretic.   Cardiovascular:      Rate and Rhythm: Normal rate and regular rhythm.      Heart sounds: Normal heart sounds.   Pulmonary:      Effort: Pulmonary effort is normal. No respiratory distress.      Breath sounds: Normal breath sounds. No wheezing.   Abdominal:      General: Bowel sounds are normal. There is no distension.      Palpations: Abdomen is soft.      Tenderness: There is no abdominal tenderness.   Skin:     General: Skin is warm and dry.      Capillary Refill: Capillary refill takes less than 2 seconds.      Findings: No ecchymosis, erythema or rash.      Comments: Incisions closed without drainage and appear to be healing well. Dermabond intact.          Assessment:   Post-op, the patient is doing well.     Encounter Diagnoses   Name Primary?   • Obesity, Class II, BMI 35-39.9 Yes   • Gastroesophageal reflux disease without esophagitis    • Essential hypertension    • Chronic knee pain, unspecified laterality    • Chronic fatigue    •  Depression, unspecified depression type        Plan:   Reviewed with patient the importance of following the manual for diet progression. Increase activity as tolerated. Continue increasing daily intake of protein and water.   Return to work: the patient is to return to 3 weeks from their surgery date with no restrictions unless they develop medical problems in which we will see them back in the office. They received a note in our office today with their return to work date.  Activity restrictions: no lifting, pushing or pulling over 25lbs for 3 weeks.   Recommended patient be sure to get at least 70 grams of protein per day. Discussed with the patient the recommended amount of water per day to intake. Reviewed vitamin requirements. Be sure to do routine exercise and increase activity as tolerated. No asa, nsaids or steroids for 8 weeks if gastric sleeve procedure and lifelong if gastric bypass procedure.. Patient may use miralax as needed if necessary.     Instructions / Recommendations: dietary counseling recommended, recommended a daily protein intake of  grams, vitamin supplement(s) recommended, recommended exercising at least 150 minutes per week, behavior modifications recommended and instructed to call the office for concerns, questions, or problems.     The patient was instructed to follow up at one month follow up appt.     The patient was counseled regarding post op bariatric manual

## 2020-10-26 ENCOUNTER — RESULTS ENCOUNTER (OUTPATIENT)
Dept: INTERNAL MEDICINE | Facility: CLINIC | Age: 50
End: 2020-10-26

## 2020-10-26 ENCOUNTER — OFFICE VISIT (OUTPATIENT)
Dept: INTERNAL MEDICINE | Facility: CLINIC | Age: 50
End: 2020-10-26

## 2020-10-26 VITALS
DIASTOLIC BLOOD PRESSURE: 64 MMHG | WEIGHT: 214 LBS | SYSTOLIC BLOOD PRESSURE: 102 MMHG | BODY MASS INDEX: 36.54 KG/M2 | HEART RATE: 74 BPM | OXYGEN SATURATION: 97 % | HEIGHT: 64 IN | TEMPERATURE: 96.6 F | RESPIRATION RATE: 16 BRPM

## 2020-10-26 DIAGNOSIS — Z23 NEED FOR VACCINATION: ICD-10-CM

## 2020-10-26 DIAGNOSIS — K21.9 GASTROESOPHAGEAL REFLUX DISEASE WITHOUT ESOPHAGITIS: ICD-10-CM

## 2020-10-26 DIAGNOSIS — Z12.11 SCREEN FOR COLON CANCER: ICD-10-CM

## 2020-10-26 DIAGNOSIS — I10 ESSENTIAL HYPERTENSION: ICD-10-CM

## 2020-10-26 DIAGNOSIS — G43.009 MIGRAINE WITHOUT AURA AND WITHOUT STATUS MIGRAINOSUS, NOT INTRACTABLE: ICD-10-CM

## 2020-10-26 DIAGNOSIS — Z00.00 ENCOUNTER FOR PREVENTIVE HEALTH EXAMINATION: Primary | ICD-10-CM

## 2020-10-26 DIAGNOSIS — E78.2 ELEVATED TRIGLYCERIDES WITH HIGH CHOLESTEROL: ICD-10-CM

## 2020-10-26 LAB
CLARITY, POC: ABNORMAL
COLOR UR: ABNORMAL
PH UR: 5 [PH] (ref 5–8)
PROT UR STRIP-MCNC: ABNORMAL MG/DL
RBC # UR STRIP: ABNORMAL /UL
SP GR UR: 1.03 (ref 1–1.03)

## 2020-10-26 PROCEDURE — 81003 URINALYSIS AUTO W/O SCOPE: CPT | Performed by: INTERNAL MEDICINE

## 2020-10-26 PROCEDURE — 90471 IMMUNIZATION ADMIN: CPT | Performed by: INTERNAL MEDICINE

## 2020-10-26 PROCEDURE — 99396 PREV VISIT EST AGE 40-64: CPT | Performed by: INTERNAL MEDICINE

## 2020-10-26 PROCEDURE — 90686 IIV4 VACC NO PRSV 0.5 ML IM: CPT | Performed by: INTERNAL MEDICINE

## 2020-10-26 PROCEDURE — 99495 TRANSJ CARE MGMT MOD F2F 14D: CPT | Performed by: INTERNAL MEDICINE

## 2020-10-26 NOTE — PATIENT INSTRUCTIONS
Influenza Virus Vaccine injection  What is this medicine?  INFLUENZA VIRUS VACCINE (in floo EN [facility]k SEEN) helps to reduce the risk of getting influenza also known as the flu. The vaccine only helps protect you against some strains of the flu.  This medicine may be used for other purposes; ask your health care provider or pharmacist if you have questions.  COMMON BRAND NAME(S): Afluria, Afluria Quadrivalent, Agriflu, Alfuria, FLUAD, FLUAD Quadrivalent, Fluarix, Fluarix Quadrivalent, Flublok, Flublok Quadrivalent, FLUCELVAX, FLUCELVAX Quadrivalent, Flulaval, Flulaval Quadrivalent, Fluvirin, Fluzone, Fluzone High-Dose, Fluzone Intradermal, Fluzone Quadrivalent  What should I tell my health care provider before I take this medicine?  They need to know if you have any of these conditions:  · bleeding disorder like hemophilia  · fever or infection  · Guillain-Pembina syndrome or other neurological problems  · immune system problems  · infection with the human immunodeficiency virus (HIV) or AIDS  · low blood platelet counts  · multiple sclerosis  · an unusual or allergic reaction to influenza virus vaccine, latex, other medicines, foods, dyes, or preservatives. Different brands of vaccines contain different allergens. Some may contain latex or eggs. Talk to your doctor about your allergies to make sure that you get the right vaccine.  · pregnant or trying to get pregnant  · breast-feeding  How should I use this medicine?  This vaccine is for injection into a muscle or under the skin. It is given by a health care professional.  A copy of Vaccine Information Statements will be given before each vaccination. Read this sheet carefully each time. The sheet may change frequently.  Talk to your healthcare provider to see which vaccines are right for you. Some vaccines should not be used in all age groups.  Overdosage: If you think you have taken too much of this medicine contact a poison control center or emergency  room at once.  NOTE: This medicine is only for you. Do not share this medicine with others.  What if I miss a dose?  This does not apply.  What may interact with this medicine?  · chemotherapy or radiation therapy  · medicines that lower your immune system like etanercept, anakinra, infliximab, and adalimumab  · medicines that treat or prevent blood clots like warfarin  · phenytoin  · steroid medicines like prednisone or cortisone  · theophylline  · vaccines  This list may not describe all possible interactions. Give your health care provider a list of all the medicines, herbs, non-prescription drugs, or dietary supplements you use. Also tell them if you smoke, drink alcohol, or use illegal drugs. Some items may interact with your medicine.  What should I watch for while using this medicine?  Report any side effects that do not go away within 3 days to your doctor or health care professional. Call your health care provider if any unusual symptoms occur within 6 weeks of receiving this vaccine.  You may still catch the flu, but the illness is not usually as bad. You cannot get the flu from the vaccine. The vaccine will not protect against colds or other illnesses that may cause fever. The vaccine is needed every year.  What side effects may I notice from receiving this medicine?  Side effects that you should report to your doctor or health care professional as soon as possible:  · allergic reactions like skin rash, itching or hives, swelling of the face, lips, or tongue  Side effects that usually do not require medical attention (report to your doctor or health care professional if they continue or are bothersome):  · fever  · headache  · muscle aches and pains  · pain, tenderness, redness, or swelling at the injection site  · tiredness  This list may not describe all possible side effects. Call your doctor for medical advice about side effects. You may report side effects to FDA at 9-192-FDA-1835.  Where should I  keep my medicine?  The vaccine will be given by a health care professional in a clinic, pharmacy, doctor's office, or other health care setting. You will not be given vaccine doses to store at home.  NOTE: This sheet is a summary. It may not cover all possible information. If you have questions about this medicine, talk to your doctor, pharmacist, or health care provider.  © 2020 [employer]vier/Gold Standard (2019-11-12 08:45:43)

## 2020-10-26 NOTE — PROGRESS NOTES
Subjective   Savannah Valenzuela is a 50 y.o. female.     Chief Complaint   Patient presents with   • Annual Exam   • Transitional Care Management         In for annual preventative exam.  Sleeps about 6-7 hours per night.  No formal exercise.  Energy is poor.  Diet is poor.    Hypertension  This is a new problem. The current episode started in the past 7 days. The problem is unchanged. The problem is uncontrolled. Associated symptoms include peripheral edema. Pertinent negatives include no chest pain, headaches, orthopnea, palpitations or shortness of breath. There are no associated agents to hypertension. Risk factors for coronary artery disease include obesity. Past treatments include nothing. Current antihypertension treatment includes nothing. There is no history of angina, kidney disease, CVA or heart failure.        The following portions of the patient's history were reviewed and updated as appropriate: allergies, current medications, past social history and problem list.    HISTORY  Outpatient Medications Marked as Taking for the 10/26/20 encounter (Office Visit) with Mathew Lemos MD   Medication Sig Dispense Refill   • buPROPion XL (WELLBUTRIN XL) 150 MG 24 hr tablet Take 1 tablet by mouth Daily. 90 tablet 1   • FLUoxetine (PROzac) 40 MG capsule Take 1 capsule by mouth Daily. 90 capsule 1   • folic acid-vit B6-vit B12 (FOLBEE) 2.5-25-1 MG tablet tablet Take 1 tablet by mouth Daily. 40 tablet 0   • olmesartan-hydrochlorothiazide (BENICAR HCT) 40-12.5 MG per tablet Take 1 tablet by mouth Daily. 90 tablet 1   • pantoprazole (PROTONIX) 40 MG EC tablet Take 1 tablet by mouth Daily. 30 tablet 5   • ursodiol (Actigall) 300 MG capsule Take 1 capsule by mouth 2 (Two) Times a Day. 60 capsule 5     Social History     Socioeconomic History   • Marital status:      Spouse name: Not on file   • Number of children: Not on file   • Years of education: Not on file   • Highest education level: Not on file   Tobacco  Use   • Smoking status: Never Smoker   • Smokeless tobacco: Never Used   Substance and Sexual Activity   • Alcohol use: No     Comment: Kay: rare   • Drug use: No   • Sexual activity: Not Currently     Partners: Male     Family History   Problem Relation Age of Onset   • Heart disease Father    • Hyperlipidemia Father    • Malig Hyperthermia Neg Hx      Past Medical History:   Diagnosis Date   • Allergic rhinitis    • Depression    • Fatigue    • GERD (gastroesophageal reflux disease)    • Hiatal hernia    • Hyperlipidemia    • Hypertension    • Medication management    • Migraine    • Multiple food allergies    • Physical exam, annual      Past Surgical History:   Procedure Laterality Date   •  SECTION     • DILATATION AND CURETTAGE     • ENDOSCOPY N/A 2020    Procedure: ESOPHAGOGASTRODUODENOSCOPY WITH BIOPSY;  Surgeon: Cayetano Khan Jr., MD;  Location: [facility] ENDOSCOPY;  Service: General;  Laterality: N/A;  PREOP/ DYSPEPSIA  POSTOP/ GASTRITIS, ESOPHAGITIS, HIATAL HERNIA   • GASTRIC SLEEVE LAPAROSCOPIC N/A 10/5/2020    Procedure: GASTRIC SLEEVE LAPAROSCOPIC WITH paraesophageal hernia repair, lysis of adhesions;  Surgeon: Cayetano Khan Jr., MD;  Location: [facility] OR [facility];  Service: Bariatric;  Laterality: N/A;   • WISDOM TOOTH EXTRACTION         Review of Systems   Constitutional: Negative for appetite change, chills, fatigue and fever.   HENT: Negative for congestion, ear pain, hearing loss, nosebleeds, postnasal drip, rhinorrhea, sinus pressure and trouble swallowing.    Eyes: Negative for pain, itching and visual disturbance.   Respiratory: Negative for cough, chest tightness, shortness of breath and wheezing.    Cardiovascular: Negative for chest pain, palpitations, orthopnea and leg swelling.   Gastrointestinal: Positive for abdominal pain (Rare reflux). Negative for anal bleeding, constipation, diarrhea, nausea, rectal pain and vomiting.   Endocrine: Negative for cold  intolerance, heat intolerance and polyuria.   Genitourinary: Negative for difficulty urinating, dysuria, flank pain, frequency, hematuria and urgency.   Musculoskeletal: Negative for arthralgias, back pain and myalgias.   Skin: Negative for rash.   Allergic/Immunologic: Positive for environmental allergies.   Neurological: Negative for dizziness, syncope, speech difficulty, weakness, light-headedness, numbness and headaches.   Hematological: Does not bruise/bleed easily.   Psychiatric/Behavioral: Positive for dysphoric mood. Negative for agitation, confusion and sleep disturbance. The patient is nervous/anxious.        Objective   Vitals:    10/26/20 0933   BP: 102/64   Pulse: 74   Resp: 16   Temp: 96.6 °F (35.9 °C)   SpO2: 97%          10/26/20  0933   Weight: 97.1 kg (214 lb)    [unfilled]  Body mass index is 36.71 kg/m².      Physical Exam   Constitutional: She is oriented to person, place, and time. She appears well-developed.   HENT:   Head: Normocephalic and atraumatic.   Right Ear: External ear normal.   Left Ear: External ear normal.   Nose: Nose normal.   Eyes: Pupils are equal, round, and reactive to light. Conjunctivae are normal.   Neck: Normal range of motion. Neck supple. No JVD present. No thyromegaly present.   Cardiovascular: Normal rate, regular rhythm and normal heart sounds. Exam reveals no gallop.   No murmur heard.  Pulmonary/Chest: Effort normal and breath sounds normal. No respiratory distress. She has no wheezes. She has no rales.   Abdominal: Soft. Bowel sounds are normal. She exhibits no distension and no mass. There is no abdominal tenderness. There is no guarding. No hernia.   Musculoskeletal: Normal range of motion.   Lymphadenopathy:     She has no cervical adenopathy.   Neurological: She is alert and oriented to person, place, and time. She displays normal reflexes. No cranial nerve deficit. Coordination normal.   Skin: Skin is warm and dry.   Psychiatric: Her behavior is normal.  Judgment and thought content normal.   Nursing note and vitals reviewed.        Problems Addressed this Visit        Cardiovascular and Mediastinum    Elevated triglycerides with high cholesterol    Essential hypertension    Migraine       Digestive    GERD (gastroesophageal reflux disease)      Other Visit Diagnoses     Encounter for preventive health examination    -  Primary    Relevant Orders    POCT urinalysis dipstick, automated (Completed)    Need for vaccination        Relevant Orders    Fluarix/Fluzone/Afluria Quad>6 Months (Completed)      Diagnoses       Codes Comments    Encounter for preventive health examination    -  Primary ICD-10-CM: Z00.00  ICD-9-CM: V70.0     Need for vaccination     ICD-10-CM: Z23  ICD-9-CM: V05.9     Essential hypertension     ICD-10-CM: I10  ICD-9-CM: 401.9     Elevated triglycerides with high cholesterol     ICD-10-CM: E78.2  ICD-9-CM: 272.2     Migraine without aura and without status migrainosus, not intractable     ICD-10-CM: G43.009  ICD-9-CM: 346.10     Gastroesophageal reflux disease without esophagitis     ICD-10-CM: K21.9  ICD-9-CM: 530.81         Assessment/Plan   In today for annual preventative exam and follow-up of hypertension and depression.  Blood pressure is excellent.  Depression is doing very well.  She's been checking it at home.  Recheck blood pressure in 4 months.  Annual flu shot today.  Annual lab work was done October 2020 with her bariatric surgery, a gastric sleeve procedure.  Blood pressure is already running low and she held her medicine today.  She is advised to hold it completely for now.  If it goes up she can start on 1/2 tablet daily.  She knows to watch blood pressure carefully over the next 3 to 6 months as she may need to come off of medicine with her bariatric surgery.  We discussed colorectal cancer screening and she will offer a Cologuard this year.    Prevention counseling was performed today. The counseling performed was routine health  maintenance topics.    PPE today included face mask and eye shield.       Dragon disclaimer:   Much of this encounter note is an electronic transcription/translation of spoken language to printed text. The electronic translation of spoken language may permit erroneous, or at times, nonsensical words or phrases to be inadvertently transcribed; Although I have reviewed the note for such errors, some may still exist.

## 2020-10-26 NOTE — PROGRESS NOTES
Transitional Care Follow Up Visit  Subjective     Savannah Valenzuela is a 50 y.o. female who presents for a transitional care management visit.    Within 48 business hours after discharge our office contacted her via telephone to coordinate her care and needs.      I reviewed and discussed the details of that call along with the discharge summary, hospital problems, inpatient lab results, inpatient diagnostic studies, and consultation reports with Savannah.     Current outpatient and discharge medications have been reconciled for the patient.  Reviewed by: Annette Fernandez MA      Date of TCM Phone Call 10/6/2020   [facility]   Date of Admission 10/5/2020   Date of Discharge 10/6/2020   Discharge Disposition Home or Self Care     Risk for Readmission (LACE) No data recorded    History of Present Illness   Course During Hospital Stay:  Hospital Course: The patient is a very pleasant 50 y.o. female that was admitted to the hospital with morbid obesity who underwent above procedure.  Postoperatively the patient was transferred to the bariatric unit per protocol.  Patient remained afebrile and hemodynamically stable.  Patient was up ambulating well and using incentive spirometer.  Postoperatively day 1 patient was started on stage I bariatric diet and continued with good ambulation.  Lovenox was continued.  Patient was then discharged home.     The following portions of the patient's history were reviewed and updated as appropriate: allergies, current medications, past family history, past medical history, past social history, past surgical history and problem list.    Review of Systems    Objective   Physical Exam    Assessment/Plan   Diagnoses and all orders for this visit:    1. Encounter for preventive health examination (Primary)  -     POCT urinalysis dipstick, automated    2. Need for vaccination  -     Fluarix/Fluzone/Afluria Quad>6 Months    3. Essential hypertension    4. Elevated  triglycerides with high cholesterol    5. Migraine without aura and without status migrainosus, not intractable    6. Gastroesophageal reflux disease without esophagitis    Discharged on 10/6/2020 following a sleeve procedure for bariatric surgery.  She tolerated the surgery well.  She is making a nice recovery.  GERD symptoms have resolved completely.  Blood pressure is dropping low and she is now symptomatic.  She held her medicine today.  Advised to simply hold her medicine for now.  If blood pressure goes up will begin on half a pill a day.  Advised that we will need to watch her blood pressure carefully as she will likely come off of blood pressure medicine with her weight loss.

## 2020-11-11 ENCOUNTER — OFFICE VISIT (OUTPATIENT)
Dept: BARIATRICS/WEIGHT MGMT | Facility: CLINIC | Age: 50
End: 2020-11-11

## 2020-11-11 ENCOUNTER — LAB (OUTPATIENT)
Dept: LAB | Facility: HOSPITAL | Age: 50
End: 2020-11-11

## 2020-11-11 VITALS
DIASTOLIC BLOOD PRESSURE: 87 MMHG | BODY MASS INDEX: 34.66 KG/M2 | SYSTOLIC BLOOD PRESSURE: 124 MMHG | RESPIRATION RATE: 18 BRPM | TEMPERATURE: 97.5 F | HEART RATE: 66 BPM | HEIGHT: 65 IN | WEIGHT: 208 LBS

## 2020-11-11 DIAGNOSIS — R53.82 CHRONIC FATIGUE: ICD-10-CM

## 2020-11-11 DIAGNOSIS — E66.9 OBESITY, CLASS II, BMI 35-39.9: ICD-10-CM

## 2020-11-11 DIAGNOSIS — Z98.84 S/P LAPAROSCOPIC SLEEVE GASTRECTOMY: ICD-10-CM

## 2020-11-11 DIAGNOSIS — I10 ESSENTIAL HYPERTENSION: ICD-10-CM

## 2020-11-11 DIAGNOSIS — E66.9 OBESITY, CLASS II, BMI 35-39.9: Primary | ICD-10-CM

## 2020-11-11 DIAGNOSIS — K21.9 GASTROESOPHAGEAL REFLUX DISEASE WITHOUT ESOPHAGITIS: ICD-10-CM

## 2020-11-11 LAB
25(OH)D3 SERPL-MCNC: 41.3 NG/ML (ref 30–100)
ALBUMIN SERPL-MCNC: 4.1 G/DL (ref 3.5–5.2)
ALBUMIN/GLOB SERPL: 1.6 G/DL
ALP SERPL-CCNC: 101 U/L (ref 39–117)
ALT SERPL W P-5'-P-CCNC: 12 U/L (ref 1–33)
ANION GAP SERPL CALCULATED.3IONS-SCNC: 10.2 MMOL/L (ref 5–15)
AST SERPL-CCNC: 16 U/L (ref 1–32)
BASOPHILS # BLD AUTO: 0.05 10*3/MM3 (ref 0–0.2)
BASOPHILS NFR BLD AUTO: 1.1 % (ref 0–1.5)
BILIRUB SERPL-MCNC: 0.4 MG/DL (ref 0–1.2)
BUN SERPL-MCNC: 15 MG/DL (ref 6–20)
BUN/CREAT SERPL: 16.7 (ref 7–25)
CALCIUM SPEC-SCNC: 9 MG/DL (ref 8.6–10.5)
CHLORIDE SERPL-SCNC: 105 MMOL/L (ref 98–107)
CO2 SERPL-SCNC: 23.8 MMOL/L (ref 22–29)
CREAT SERPL-MCNC: 0.9 MG/DL (ref 0.57–1)
DEPRECATED RDW RBC AUTO: 45.9 FL (ref 37–54)
EOSINOPHIL # BLD AUTO: 0.08 10*3/MM3 (ref 0–0.4)
EOSINOPHIL NFR BLD AUTO: 1.8 % (ref 0.3–6.2)
ERYTHROCYTE [DISTWIDTH] IN BLOOD BY AUTOMATED COUNT: 14.2 % (ref 12.3–15.4)
FERRITIN SERPL-MCNC: 8.15 NG/ML (ref 13–150)
FOLATE SERPL-MCNC: >20 NG/ML (ref 4.78–24.2)
GFR SERPL CREATININE-BSD FRML MDRD: 67 ML/MIN/1.73
GLOBULIN UR ELPH-MCNC: 2.6 GM/DL
GLUCOSE SERPL-MCNC: 95 MG/DL (ref 65–99)
HCT VFR BLD AUTO: 38.7 % (ref 34–46.6)
HGB BLD-MCNC: 12.1 G/DL (ref 12–15.9)
IMM GRANULOCYTES # BLD AUTO: 0.01 10*3/MM3 (ref 0–0.05)
IMM GRANULOCYTES NFR BLD AUTO: 0.2 % (ref 0–0.5)
IRON 24H UR-MRATE: 134 MCG/DL (ref 37–145)
LYMPHOCYTES # BLD AUTO: 1.74 10*3/MM3 (ref 0.7–3.1)
LYMPHOCYTES NFR BLD AUTO: 38.2 % (ref 19.6–45.3)
MCH RBC QN AUTO: 27.3 PG (ref 26.6–33)
MCHC RBC AUTO-ENTMCNC: 31.3 G/DL (ref 31.5–35.7)
MCV RBC AUTO: 87.4 FL (ref 79–97)
MONOCYTES # BLD AUTO: 0.44 10*3/MM3 (ref 0.1–0.9)
MONOCYTES NFR BLD AUTO: 9.6 % (ref 5–12)
NEUTROPHILS NFR BLD AUTO: 2.24 10*3/MM3 (ref 1.7–7)
NEUTROPHILS NFR BLD AUTO: 49.1 % (ref 42.7–76)
NRBC BLD AUTO-RTO: 0 /100 WBC (ref 0–0.2)
PLATELET # BLD AUTO: 266 10*3/MM3 (ref 140–450)
PMV BLD AUTO: 10.9 FL (ref 6–12)
POTASSIUM SERPL-SCNC: 4.4 MMOL/L (ref 3.5–5.2)
PREALB SERPL-MCNC: 19.3 MG/DL (ref 20–40)
PROT SERPL-MCNC: 6.7 G/DL (ref 6–8.5)
RBC # BLD AUTO: 4.43 10*6/MM3 (ref 3.77–5.28)
SODIUM SERPL-SCNC: 139 MMOL/L (ref 136–145)
WBC # BLD AUTO: 4.56 10*3/MM3 (ref 3.4–10.8)

## 2020-11-11 PROCEDURE — 85025 COMPLETE CBC W/AUTO DIFF WBC: CPT

## 2020-11-11 PROCEDURE — 84134 ASSAY OF PREALBUMIN: CPT

## 2020-11-11 PROCEDURE — 83921 ORGANIC ACID SINGLE QUANT: CPT

## 2020-11-11 PROCEDURE — 99024 POSTOP FOLLOW-UP VISIT: CPT | Performed by: NURSE PRACTITIONER

## 2020-11-11 PROCEDURE — 80053 COMPREHEN METABOLIC PANEL: CPT

## 2020-11-11 PROCEDURE — 82306 VITAMIN D 25 HYDROXY: CPT

## 2020-11-11 PROCEDURE — 36415 COLL VENOUS BLD VENIPUNCTURE: CPT

## 2020-11-11 PROCEDURE — 82728 ASSAY OF FERRITIN: CPT

## 2020-11-11 PROCEDURE — 83540 ASSAY OF IRON: CPT

## 2020-11-11 PROCEDURE — 84425 ASSAY OF VITAMIN B-1: CPT

## 2020-11-11 PROCEDURE — 82746 ASSAY OF FOLIC ACID SERUM: CPT

## 2020-11-11 NOTE — PROGRESS NOTES
[facility] BARIATRIC SURGERY  [address] [facility] [address]  [facility] [address]  [phone]  [address] [address] [phone]  Dept: [phone]  11/11/2020      Savannah Valenzuela.  [id number]  [id number]  1970  female      Chief Complaint   Patient presents with   • Follow-up     1 month post op sleeve       BH Post-Op Bariatric Surgery:   Savannah Valenzuela is status post Laparoscopic sleeve gastrectomy with paraesophageal hernia repair procedure, performed on 10/15/20     HPI:   Today's weight is 94.3 kg (208 lb) pounds, today's BMI is Body mass index is 35.08 kg/m².,@ has a  loss of 10 pounds since the last visit and@ weight loss since surgery is 31 pounds. The patient reports a decreased portion size and loss of appetite.      Savannah Valenzuela denies nausea vomiting reflux regurgitation dysphagia and reports that she is tolerating her vitamins well     Diet and Exercise: Diet history reviewed and discussed with the patient. Weight loss/gains to date discussed with the patient. The patient states they are eating 90 grams of protein per day. She reports eating 3 meals per day, a typical portion size of 1/2 cup, eating 1 snacks per day, drinking 5-6 or more 8-oz. glasses of water per day, no carbonated beverage consumption and exercising regularly-     She is tolerating chicken and tuna well as well as eggs, she does one premier per day, sometimes two.     Supplements: bariatric advantage US with iron and calcium     Review of Systems   Constitutional: Positive for appetite change. Negative for fatigue and unexpected weight change.   HENT: Negative.    Eyes: Negative.    Respiratory: Negative.    Cardiovascular: Negative.  Negative for leg swelling.   Gastrointestinal: Negative for abdominal distention, abdominal pain, constipation, diarrhea, nausea and vomiting.   Genitourinary: Negative for difficulty urinating, frequency and urgency.    Musculoskeletal: Negative for back pain.   Skin: Negative.    Psychiatric/Behavioral: Negative.    All other systems reviewed and are negative.      Patient Active Problem List   Diagnosis   • Migraine   • Allergic rhinitis   • Hematuria, microscopic   • Depression   • Essential hypertension   • Chronic fatigue   • Elevated triglycerides with high cholesterol   • Obesity, Class II, BMI 35-39.9   • Chronic knee pain   • GERD (gastroesophageal reflux disease)   • Hiatal hernia   • S/P laparoscopic sleeve gastrectomy       Past Medical History:   Diagnosis Date   • Allergic rhinitis    • Depression    • Fatigue    • GERD (gastroesophageal reflux disease)    • Hiatal hernia    • Hyperlipidemia    • Hypertension    • Medication management    • Migraine    • Multiple food allergies    • Physical exam, annual        The following portions of the patient's history were reviewed and updated as appropriate: allergies, current medications, past family history, past medical history, past social history, past surgical history and problem list.    Vitals:    11/11/20 0953   BP: 124/87   Pulse: 66   Resp: 18   Temp: 97.5 °F (36.4 °C)       Physical Exam  Vitals signs and nursing note reviewed.   Constitutional:       Appearance: She is well-developed.   Neck:      Thyroid: No thyromegaly.   Cardiovascular:      Rate and Rhythm: Normal rate and regular rhythm.      Heart sounds: Normal heart sounds.   Pulmonary:      Effort: Pulmonary effort is normal. No respiratory distress.      Breath sounds: Normal breath sounds. No wheezing.   Abdominal:      General: Bowel sounds are normal. There is no distension.      Palpations: Abdomen is soft.      Tenderness: There is no abdominal tenderness. There is no guarding.      Hernia: No hernia is present.   Musculoskeletal:         General: No tenderness.   Skin:     General: Skin is warm and dry.      Findings: No erythema or rash.   Neurological:      Mental Status: She is alert.    Psychiatric:         Behavior: Behavior normal.         Assessment:   Post-op, the patient is doing well.     Encounter Diagnoses   Name Primary?   • Obesity, Class II, BMI 35-39.9 Yes   • S/P laparoscopic sleeve gastrectomy    • Chronic fatigue    • Gastroesophageal reflux disease without esophagitis    • Essential hypertension        Plan:   Check her 1 month set of lab work to check on her vitamin levels for any signs of deficiency was encouraged to keep the great work with protein intake fluid intake.  She was encouraged to try to start focusing her exercise a little bit more on the strengthening of resistance from to help support her muscle mass in an ongoing way.   Encouraged patient to be sure to get plenty of lean protein per day through small frequent meals all with a protein source.   Activity restrictions: none.   Recommended patient be sure to get at least 70 grams of protein per day by eating small, frequent meals all with high lean protein choices. Be sure to limit/cut back on daily carbohydrate intake. Discussed with the patient the recommended amount of water per day to intake- half of body weight in ounces. Reviewed vitamin requirements. Be sure to do routine exercise, 150 minutes per week minimum, including both cardio and strength training.     Instructions / Recommendations: dietary counseling recommended, recommended a daily protein intake of  grams, vitamin supplement(s) recommended, recommended exercising at least 150 minutes per week, behavior modifications recommended and instructed to call the office for concerns, questions, or problems.     The patient was instructed to follow up in 3 months .      Total time spent face to face was 25 minutes and 20 minutes was spent counseling.

## 2020-11-17 LAB
Lab: NORMAL
METHYLMALONATE SERPL-SCNC: 131 NMOL/L (ref 0–378)

## 2020-11-19 LAB — VIT B1 BLD-SCNC: 136.8 NMOL/L (ref 66.5–200)

## 2020-11-19 RX ORDER — LANOLIN ALCOHOL/MO/W.PET/CERES
325 CREAM (GRAM) TOPICAL
Qty: 30 TABLET | Refills: 2 | Status: SHIPPED | OUTPATIENT
Start: 2020-11-19 | End: 2021-02-20

## 2020-12-07 ENCOUNTER — TELEPHONE (OUTPATIENT)
Dept: BARIATRICS/WEIGHT MGMT | Facility: CLINIC | Age: 50
End: 2020-12-07

## 2020-12-07 NOTE — TELEPHONE ENCOUNTER
Spoke to patient and informed her of lab results. Patient had no questions at this time.    ----- Message from CHAN Forman sent at 11/19/2020  3:43 PM EST -----  Replacing iron. Patient should focus on increasing her protein intake but otherwise these look good.

## 2020-12-31 ENCOUNTER — IMMUNIZATION (OUTPATIENT)
Dept: VACCINE CLINIC | Facility: HOSPITAL | Age: 50
End: 2020-12-31

## 2020-12-31 PROCEDURE — 0001A: CPT | Performed by: INTERNAL MEDICINE

## 2020-12-31 PROCEDURE — 91300 HC SARSCOV02 VAC 30MCG/0.3ML IM: CPT | Performed by: INTERNAL MEDICINE

## 2021-01-13 ENCOUNTER — OFFICE VISIT (OUTPATIENT)
Dept: BARIATRICS/WEIGHT MGMT | Facility: CLINIC | Age: 51
End: 2021-01-13

## 2021-01-13 VITALS
DIASTOLIC BLOOD PRESSURE: 87 MMHG | HEIGHT: 65 IN | SYSTOLIC BLOOD PRESSURE: 144 MMHG | TEMPERATURE: 97.1 F | WEIGHT: 195 LBS | RESPIRATION RATE: 18 BRPM | HEART RATE: 68 BPM | BODY MASS INDEX: 32.49 KG/M2

## 2021-01-13 DIAGNOSIS — K21.9 GASTROESOPHAGEAL REFLUX DISEASE WITHOUT ESOPHAGITIS: ICD-10-CM

## 2021-01-13 DIAGNOSIS — I10 ESSENTIAL HYPERTENSION: ICD-10-CM

## 2021-01-13 DIAGNOSIS — E66.9 OBESITY, CLASS I, BMI 30-34.9: Primary | ICD-10-CM

## 2021-01-13 DIAGNOSIS — Z98.84 S/P LAPAROSCOPIC SLEEVE GASTRECTOMY: ICD-10-CM

## 2021-01-13 DIAGNOSIS — R53.82 CHRONIC FATIGUE: ICD-10-CM

## 2021-01-13 PROBLEM — K44.9 HIATAL HERNIA: Status: RESOLVED | Noted: 2020-06-05 | Resolved: 2021-01-13

## 2021-01-13 PROBLEM — E66.812 OBESITY, CLASS II, BMI 35-39.9: Status: RESOLVED | Noted: 2020-04-16 | Resolved: 2021-01-13

## 2021-01-13 PROBLEM — E66.811 OBESITY, CLASS I, BMI 30-34.9: Status: ACTIVE | Noted: 2021-01-13

## 2021-01-13 PROCEDURE — 99214 OFFICE O/P EST MOD 30 MIN: CPT | Performed by: NURSE PRACTITIONER

## 2021-01-13 RX ORDER — PANTOPRAZOLE SODIUM 20 MG/1
20 TABLET, DELAYED RELEASE ORAL DAILY
Qty: 90 TABLET | Refills: 0 | Status: SHIPPED | OUTPATIENT
Start: 2021-01-13 | End: 2021-04-14

## 2021-01-13 NOTE — PROGRESS NOTES
[facility]ATR[facility] BARIATRIC SURGERY  [address] [id number] [phone]  [address] [address] [phone]  Dept: [phone]  1/13/2021      Savannah Valenzuela.  [id number]  [id number]  1970  female      Chief Complaint   Patient presents with   • Follow-up     3 month sleeve       BH Post-Op Bariatric Surgery:   Savannah Valenzuela is status post Laparoscopic Sleeve procedure, performed on 10/5/20     HPI:   Today's weight is 88.5 kg (195 lb) pounds, today's BMI is Body mass index is 32.89 kg/m².,@ has a  loss of 13 pounds since the last visit and@ weight loss since surgery is 35 pounds. The patient reports a decreased portion size and loss of appetite.      Savannah Valenzuela denies nausea, vomiting and reports that she is doing well, tolerating all foods, she has had less time for exercise this last month and due to being busier at work, sometimes forgets to eat.      Diet and Exercise: Diet history reviewed and discussed with the patient. Weight loss/gains to date discussed with the patient. The patient states they are eating 80 grams of protein per day. She reports eating 3 meals per day, a typical portion size of 1/2 cup, eating 2 snacks per day, drinking 5-6 or more 8-oz. glasses of water per day, no carbonated beverage consumption and exercising regularly- cardio and strength (pilates) 1-2 days per week    Supplements: BA MTV with iron and calcium.     Review of Systems   Constitutional: Positive for appetite change. Negative for fatigue and unexpected weight change.   HENT: Negative.    Eyes: Negative.    Respiratory: Negative.    Cardiovascular: Negative.  Negative for leg swelling.   Gastrointestinal: Negative for abdominal distention, abdominal pain, constipation, diarrhea, nausea and vomiting.   Genitourinary: Negative for difficulty urinating, frequency and urgency.   Musculoskeletal: Negative for back pain.    Skin: Negative.    Psychiatric/Behavioral: Negative.    All other systems reviewed and are negative.      Patient Active Problem List   Diagnosis   • Migraine   • Allergic rhinitis   • Hematuria, microscopic   • Depression   • Essential hypertension   • Chronic fatigue   • Elevated triglycerides with high cholesterol   • Chronic knee pain   • GERD (gastroesophageal reflux disease)   • S/P laparoscopic sleeve gastrectomy   • Obesity, Class I, BMI 30-34.9       Past Medical History:   Diagnosis Date   • Allergic rhinitis    • Depression    • Fatigue    • GERD (gastroesophageal reflux disease)    • Hiatal hernia    • Hyperlipidemia    • Hypertension    • Medication management    • Migraine    • Multiple food allergies    • Physical exam, annual        The following portions of the patient's history were reviewed and updated as appropriate: allergies, current medications, past family history, past medical history, past social history, past surgical history and problem list.    Vitals:    01/13/21 1120   BP: 144/87   Pulse: 68   Resp: 18   Temp: 97.1 °F (36.2 °C)       Physical Exam  Vitals signs and nursing note reviewed.   Constitutional:       Appearance: She is well-developed.   Neck:      Thyroid: No thyromegaly.   Cardiovascular:      Rate and Rhythm: Normal rate and regular rhythm.      Heart sounds: Normal heart sounds.   Pulmonary:      Effort: Pulmonary effort is normal. No respiratory distress.      Breath sounds: Normal breath sounds. No wheezing.   Abdominal:      General: Bowel sounds are normal. There is no distension.      Palpations: Abdomen is soft.      Tenderness: There is no abdominal tenderness. There is no guarding.      Hernia: No hernia is present.   Musculoskeletal:         General: No tenderness.   Skin:     General: Skin is warm and dry.      Findings: No erythema or rash.   Neurological:      Mental Status: She is alert.   Psychiatric:         Behavior: Behavior normal.         Assessment:    Post-op, the patient is doing well.     Encounter Diagnoses   Name Primary?   • Obesity, Class I, BMI 30-34.9 Yes   • S/P laparoscopic sleeve gastrectomy    • Gastroesophageal reflux disease without esophagitis    • Essential hypertension    • Chronic fatigue        Plan:     Encouraged patient to be sure to get plenty of lean protein per day through small frequent meals all with a protein source.   Activity restrictions: none.   Recommended patient be sure to get at least 70 grams of protein per day by eating small, frequent meals all with high lean protein choices. Be sure to limit/cut back on daily carbohydrate intake. Discussed with the patient the recommended amount of water per day to intake- half of body weight in ounces. Reviewed vitamin requirements. Be sure to do routine exercise, 150 minutes per week minimum, including both cardio and strength training.     Instructions / Recommendations: dietary counseling recommended, recommended a daily protein intake of  grams, vitamin supplement(s) recommended, recommended exercising at least 150 minutes per week, behavior modifications recommended and instructed to call the office for concerns, questions, or problems.     The patient was instructed to follow up in 3 months .     Total time spent face to face was 20 minutes and 15 minutes was spent counseling.

## 2021-01-21 ENCOUNTER — IMMUNIZATION (OUTPATIENT)
Dept: VACCINE CLINIC | Facility: HOSPITAL | Age: 51
End: 2021-01-21

## 2021-01-21 PROCEDURE — 91300 HC SARSCOV02 VAC 30MCG/0.3ML IM: CPT | Performed by: INTERNAL MEDICINE

## 2021-01-21 PROCEDURE — 0002A: CPT | Performed by: INTERNAL MEDICINE

## 2021-01-22 ENCOUNTER — OFFICE VISIT (OUTPATIENT)
Dept: BARIATRICS/WEIGHT MGMT | Facility: CLINIC | Age: 51
End: 2021-01-22

## 2021-01-22 NOTE — PROGRESS NOTES
3 months post op sleeve  Intake weight: 239lbs  Current weight: 195 lbs  Lost 35 lbs, doing great  Diet hx reveals 1 meal, 1 snack (quest high protein chips), premier protein drink, manages lunch 3/7.   Often so busy at work that she forgets to eat but does have a fridge in her office    Plan:  Bring foods to work with her--deli meat, low fat cheese, 2% cheese sticks, leftovers from dinner, protein drinks, fruit. Plan and prepare in advance  Set an alarm reminding her to eat  Check propel is propel zero  Encouraged ++

## 2021-02-26 ENCOUNTER — OFFICE VISIT (OUTPATIENT)
Dept: INTERNAL MEDICINE | Facility: CLINIC | Age: 51
End: 2021-02-26

## 2021-02-26 VITALS
SYSTOLIC BLOOD PRESSURE: 104 MMHG | DIASTOLIC BLOOD PRESSURE: 60 MMHG | WEIGHT: 196 LBS | HEART RATE: 78 BPM | RESPIRATION RATE: 18 BRPM | BODY MASS INDEX: 32.65 KG/M2 | TEMPERATURE: 97.3 F | HEIGHT: 65 IN | OXYGEN SATURATION: 98 %

## 2021-02-26 DIAGNOSIS — K21.9 GASTROESOPHAGEAL REFLUX DISEASE WITHOUT ESOPHAGITIS: Chronic | ICD-10-CM

## 2021-02-26 DIAGNOSIS — E78.2 ELEVATED TRIGLYCERIDES WITH HIGH CHOLESTEROL: Chronic | ICD-10-CM

## 2021-02-26 DIAGNOSIS — I10 ESSENTIAL HYPERTENSION: Primary | Chronic | ICD-10-CM

## 2021-02-26 DIAGNOSIS — Z12.11 SCREEN FOR COLON CANCER: ICD-10-CM

## 2021-02-26 DIAGNOSIS — F32.A DEPRESSION, UNSPECIFIED DEPRESSION TYPE: Chronic | ICD-10-CM

## 2021-02-26 PROCEDURE — 99213 OFFICE O/P EST LOW 20 MIN: CPT | Performed by: INTERNAL MEDICINE

## 2021-02-26 RX ORDER — FERROUS SULFATE 325(65) MG
325 TABLET ORAL
COMMUNITY
End: 2021-04-15 | Stop reason: SDUPTHER

## 2021-02-26 NOTE — PROGRESS NOTES
Subjective   Savannah Valenzuela is a 50 y.o. female.     Chief Complaint   Patient presents with   • Hypertension         In for recheck of chronic depression.  She's doing very well on her Wellbutrin.  Also on Prozac.  A little anxious at times but depression is not an issue right now.    Hypertension  This is a new problem. The current episode started 1 to 4 weeks ago. The problem has been rapidly improving since onset. The problem is controlled. Pertinent negatives include no chest pain, headaches, palpitations, peripheral edema, PND or shortness of breath. There are no associated agents to hypertension. There are no known risk factors for coronary artery disease. Current antihypertension treatment includes angiotensin blockers. The current treatment provides significant improvement. There are no compliance problems.  There is no history of angina, kidney disease, CAD/MI, CVA or heart failure.   Depression  Visit Type: follow-up  Patient is not experiencing: nervousness/anxiety, palpitations and shortness of breath.  Frequency of symptoms: rarely          The following portions of the patient's history were reviewed and updated as appropriate: allergies, current medications, past social history and problem list.    Outpatient Medications Marked as Taking for the 2/26/21 encounter (Office Visit) with Mathew Lemos MD   Medication Sig Dispense Refill   • buPROPion XL (WELLBUTRIN XL) 150 MG 24 hr tablet Take 1 tablet by mouth Daily. 90 tablet 1   • ferrous sulfate 325 (65 FE) MG tablet Take 325 mg by mouth Daily With Breakfast.     • FLUoxetine (PROzac) 40 MG capsule Take 1 capsule by mouth Daily. 90 capsule 1   • pantoprazole (Protonix) 20 MG EC tablet Take 1 tablet by mouth Daily. 90 tablet 0   • ursodiol (Actigall) 300 MG capsule Take 1 capsule by mouth 2 (Two) Times a Day. 60 capsule 5       Review of Systems   Respiratory: Negative for shortness of breath and wheezing.    Cardiovascular: Negative for chest  pain, palpitations, leg swelling and PND.   Gastrointestinal: Negative for constipation and diarrhea.   Neurological: Negative for headaches.   Psychiatric/Behavioral: Negative for dysphoric mood. The patient is not nervous/anxious.        Objective   Vitals:    02/26/21 0927   BP: 104/60   Pulse: 78   Resp: 18   Temp: 97.3 °F (36.3 °C)   SpO2: 98%          02/26/21 0927   Weight: 88.9 kg (196 lb)    [unfilled]  Body mass index is 33.05 kg/m².      Physical Exam   Constitutional: She appears well-developed.   Cardiovascular: Normal rate, regular rhythm and normal heart sounds. Exam reveals no gallop.   No murmur heard.  Pulmonary/Chest: Effort normal and breath sounds normal. No respiratory distress. She has no wheezes. She has no rales.   Abdominal: Soft. Normal appearance and bowel sounds are normal. She exhibits no mass. There is no abdominal tenderness. There is no guarding.   Neurological: She is alert.   Psychiatric: Mood normal.         Problems Addressed this Visit        Cardiac and Vasculature    Essential hypertension - Primary (Chronic)    Elevated triglycerides with high cholesterol (Chronic)       Gastrointestinal Abdominal     GERD (gastroesophageal reflux disease) (Chronic)       Mental Health    Depression (Chronic)      Diagnoses       Codes Comments    Essential hypertension    -  Primary ICD-10-CM: I10  ICD-9-CM: 401.9     Elevated triglycerides with high cholesterol     ICD-10-CM: E78.2  ICD-9-CM: 272.2     Gastroesophageal reflux disease without esophagitis     ICD-10-CM: K21.9  ICD-9-CM: 530.81     Depression, unspecified depression type     ICD-10-CM: F32.9  ICD-9-CM: 311         Assessment/Plan   In for recheck of hypertension.  Also depression.  Depression doing well.  Blood pressures have been good off of all medicines since bariatric surgery.  Weight is down 46 pounds total.  Annual lab work will be October 2020.  Annual preventive exam November 2021. We will continue office visits  out to 4 months.  She will not be able to take Advil or aspirin or [place] with aspirin now since gastric sleeve.  She is due for colorectal cancer screening with new guidelines and would prefer to go with colonoscopy.      The above information was reviewed again today 02/26/21.  It continues to be accurate as reflected above and is unchanged.  History, physical and review of systems all reviewed and are unchanged.  Medications were reviewed today and continue the current dosing.    PPE today includes face mask and eye shield.               Dragon disclaimer:   Much of this encounter note is an electronic transcription/translation of spoken language to printed text. The electronic translation of spoken language may permit erroneous, or at times, nonsensical words or phrases to be inadvertently transcribed; Although I have reviewed the note for such errors, some may still exist.

## 2021-03-04 ENCOUNTER — TELEPHONE (OUTPATIENT)
Dept: GASTROENTEROLOGY | Facility: CLINIC | Age: 51
End: 2021-03-04

## 2021-03-26 ENCOUNTER — TELEPHONE (OUTPATIENT)
Dept: INTERNAL MEDICINE | Facility: CLINIC | Age: 51
End: 2021-03-26

## 2021-03-26 NOTE — TELEPHONE ENCOUNTER
Cologuard Verification  Name: Alban  Ref: [id number]  Coverage: Covered 100%, No Copay, No deductible, No Precert.  Lab: Need to contact [phone] to confirm if it is in network with local PPO. Unable to get through to JOSE peters due to no Member ID found.      Cologuard Verification  Name: Mathew  Ref: [id number]  Lab: Out Of Network  Will call [phone] to confirm if In-Network with local PPO. Unable to get answer.    Order: Not faxed. Needs GI referral or Pay-Out-Pocket for Cologuard.

## 2021-04-14 ENCOUNTER — LAB (OUTPATIENT)
Dept: LAB | Facility: HOSPITAL | Age: 51
End: 2021-04-14

## 2021-04-14 ENCOUNTER — OFFICE VISIT (OUTPATIENT)
Dept: BARIATRICS/WEIGHT MGMT | Facility: CLINIC | Age: 51
End: 2021-04-14

## 2021-04-14 VITALS
TEMPERATURE: 97.5 F | HEART RATE: 64 BPM | DIASTOLIC BLOOD PRESSURE: 85 MMHG | RESPIRATION RATE: 18 BRPM | SYSTOLIC BLOOD PRESSURE: 142 MMHG | BODY MASS INDEX: 31.65 KG/M2 | WEIGHT: 190 LBS | HEIGHT: 65 IN

## 2021-04-14 DIAGNOSIS — Z98.84 S/P LAPAROSCOPIC SLEEVE GASTRECTOMY: ICD-10-CM

## 2021-04-14 DIAGNOSIS — F32.A DEPRESSION, UNSPECIFIED DEPRESSION TYPE: ICD-10-CM

## 2021-04-14 DIAGNOSIS — F32.A DEPRESSION, UNSPECIFIED DEPRESSION TYPE: Chronic | ICD-10-CM

## 2021-04-14 DIAGNOSIS — E66.9 OBESITY, CLASS I, BMI 30-34.9: Primary | ICD-10-CM

## 2021-04-14 DIAGNOSIS — K21.9 GASTROESOPHAGEAL REFLUX DISEASE WITHOUT ESOPHAGITIS: Chronic | ICD-10-CM

## 2021-04-14 DIAGNOSIS — K21.9 GASTROESOPHAGEAL REFLUX DISEASE WITHOUT ESOPHAGITIS: ICD-10-CM

## 2021-04-14 DIAGNOSIS — R53.82 CHRONIC FATIGUE: ICD-10-CM

## 2021-04-14 DIAGNOSIS — I10 ESSENTIAL HYPERTENSION: Chronic | ICD-10-CM

## 2021-04-14 DIAGNOSIS — E66.9 OBESITY, CLASS I, BMI 30-34.9: ICD-10-CM

## 2021-04-14 DIAGNOSIS — I10 ESSENTIAL HYPERTENSION: ICD-10-CM

## 2021-04-14 LAB
BASOPHILS # BLD AUTO: 0.07 10*3/MM3 (ref 0–0.2)
BASOPHILS NFR BLD AUTO: 1.1 % (ref 0–1.5)
DEPRECATED RDW RBC AUTO: 49.8 FL (ref 37–54)
EOSINOPHIL # BLD AUTO: 0.12 10*3/MM3 (ref 0–0.4)
EOSINOPHIL NFR BLD AUTO: 1.9 % (ref 0.3–6.2)
ERYTHROCYTE [DISTWIDTH] IN BLOOD BY AUTOMATED COUNT: 16.4 % (ref 12.3–15.4)
FERRITIN SERPL-MCNC: 9.6 NG/ML (ref 13–150)
HCT VFR BLD AUTO: 38.8 % (ref 34–46.6)
HGB BLD-MCNC: 12.1 G/DL (ref 12–15.9)
IMM GRANULOCYTES # BLD AUTO: 0.01 10*3/MM3 (ref 0–0.05)
IMM GRANULOCYTES NFR BLD AUTO: 0.2 % (ref 0–0.5)
IRON 24H UR-MRATE: 76 MCG/DL (ref 37–145)
LYMPHOCYTES # BLD AUTO: 1.96 10*3/MM3 (ref 0.7–3.1)
LYMPHOCYTES NFR BLD AUTO: 31.3 % (ref 19.6–45.3)
MCH RBC QN AUTO: 25.8 PG (ref 26.6–33)
MCHC RBC AUTO-ENTMCNC: 31.2 G/DL (ref 31.5–35.7)
MCV RBC AUTO: 82.7 FL (ref 79–97)
MONOCYTES # BLD AUTO: 0.5 10*3/MM3 (ref 0.1–0.9)
MONOCYTES NFR BLD AUTO: 8 % (ref 5–12)
NEUTROPHILS NFR BLD AUTO: 3.6 10*3/MM3 (ref 1.7–7)
NEUTROPHILS NFR BLD AUTO: 57.5 % (ref 42.7–76)
NRBC BLD AUTO-RTO: 0 /100 WBC (ref 0–0.2)
PLATELET # BLD AUTO: 315 10*3/MM3 (ref 140–450)
PMV BLD AUTO: 10 FL (ref 6–12)
RBC # BLD AUTO: 4.69 10*6/MM3 (ref 3.77–5.28)
WBC # BLD AUTO: 6.26 10*3/MM3 (ref 3.4–10.8)

## 2021-04-14 PROCEDURE — 99214 OFFICE O/P EST MOD 30 MIN: CPT | Performed by: NURSE PRACTITIONER

## 2021-04-14 PROCEDURE — 83540 ASSAY OF IRON: CPT

## 2021-04-14 PROCEDURE — 36415 COLL VENOUS BLD VENIPUNCTURE: CPT

## 2021-04-14 PROCEDURE — 85025 COMPLETE CBC W/AUTO DIFF WBC: CPT

## 2021-04-14 PROCEDURE — 82728 ASSAY OF FERRITIN: CPT

## 2021-04-14 NOTE — PROGRESS NOTES
[facility] BARIATRIC [facility] BARIATRIC SURGERY  [address] [id number] [phone]  [address] [address] [phone]  Dept: [phone]  4/14/2021      Savannah Valenzuela.  [id number]  [id number]  1970  female      Chief Complaint   Patient presents with   • Follow-up     6 month sleeve       BH Post-Op Bariatric Surgery:   Savannah Valenzuela is status post Laparoscopic Sleeve/PEH procedure, performed on 10/5/20     HPI:   Today's weight is 86.2 kg (190 lb) pounds, today's BMI is Body mass index is 32.04 kg/m².,@ has a  loss of 5 pounds since the last visit and@ weight loss since surgery is 40 pounds. The patient reports a decreased portion size and loss of appetite.      Savannah Valenzuela denies nausea, vomiting, relfux and reports that she is tolerating her diet well     Diet and Exercise: Diet history reviewed and discussed with the patient. Weight loss/gains to date discussed with the patient. The patient states they are eating 60 grams of protein per day. She reports eating 3 meals per day, a typical portion size of 1/2 cup, eating 1 snacks per day, drinking 5-6 or more 8-oz. glasses of water per day, no carbonated beverage consumption and exercising regularly- walking twice per week    Supplements: celebrate one with 45mg iron and calcium citrate, hair skin and nail vitamin daily- she did have a ferritin level of 8 at her last lab draw and has been taking supplemental ferrous sulfate for the last three months    Review of Systems   Constitutional: Positive for appetite change. Negative for fatigue and unexpected weight change.        Hair loss/thinning   HENT: Negative.    Eyes: Negative.    Respiratory: Negative.    Cardiovascular: Negative.  Negative for leg swelling.   Gastrointestinal: Negative for abdominal distention, abdominal pain, constipation, diarrhea, nausea and vomiting.   Genitourinary: Negative for difficulty  urinating, frequency and urgency.   Musculoskeletal: Negative for back pain.   Skin: Negative.    Psychiatric/Behavioral: Negative.    All other systems reviewed and are negative.      Patient Active Problem List   Diagnosis   • Migraine   • Allergic rhinitis   • Hematuria, microscopic   • Depression   • Essential hypertension   • Chronic fatigue   • Elevated triglycerides with high cholesterol   • Chronic knee pain   • GERD (gastroesophageal reflux disease)   • S/P laparoscopic sleeve gastrectomy   • Obesity, Class I, BMI 30-34.9       Past Medical History:   Diagnosis Date   • Allergic rhinitis    • Depression    • Fatigue    • GERD (gastroesophageal reflux disease)    • Hiatal hernia    • Hyperlipidemia    • Hypertension    • Medication management    • Migraine    • Multiple food allergies    • Physical exam, annual        The following portions of the patient's history were reviewed and updated as appropriate: allergies, current medications, past family history, past medical history, past social history, past surgical history and problem list.    Vitals:    04/14/21 0932   BP: 142/85   Pulse: 64   Resp: 18   Temp: 97.5 °F (36.4 °C)       Physical Exam  Vitals and nursing note reviewed.   Constitutional:       Appearance: She is well-developed.   Neck:      Thyroid: No thyromegaly.   Cardiovascular:      Rate and Rhythm: Normal rate and regular rhythm.      Heart sounds: Normal heart sounds.   Pulmonary:      Effort: Pulmonary effort is normal. No respiratory distress.      Breath sounds: Normal breath sounds. No wheezing.   Abdominal:      General: Bowel sounds are normal. There is no distension.      Palpations: Abdomen is soft.      Tenderness: There is no abdominal tenderness. There is no guarding.      Hernia: No hernia is present.   Musculoskeletal:         General: No tenderness.   Skin:     General: Skin is warm and dry.      Findings: No erythema or rash.   Neurological:      Mental Status: She is alert.    Psychiatric:         Behavior: Behavior normal.         Assessment:   Post-op, the patient is doing well.     Encounter Diagnoses   Name Primary?   • Obesity, Class I, BMI 30-34.9 Yes   • Essential hypertension    • Gastroesophageal reflux disease without esophagitis    • Depression, unspecified depression type    • Chronic fatigue    • S/P laparoscopic sleeve gastrectomy        Plan:   She will top protonix and use pepcid as needed of the next two weeks before discontinuing both if asymptomatic. She will continue ferrous sulfate until she finishes her prescription, we will trend CBC, iron, and ferritin today. She was encouraged to start incorporating strength training into her routine, continue meeting her protein and fluid intake goals   Encouraged patient to be sure to get plenty of lean protein per day through small frequent meals all with a protein source.   Activity restrictions: none.   Recommended patient be sure to get at least 70 grams of protein per day by eating small, frequent meals all with high lean protein choices. Be sure to limit/cut back on daily carbohydrate intake. Discussed with the patient the recommended amount of water per day to intake- half of body weight in ounces. Reviewed vitamin requirements. Be sure to do routine exercise, 150 minutes per week minimum, including both cardio and strength training.     Instructions / Recommendations: dietary counseling recommended, recommended a daily protein intake of  grams, vitamin supplement(s) recommended, recommended exercising at least 150 minutes per week, behavior modifications recommended and instructed to call the office for concerns, questions, or problems.     The patient was instructed to follow up in 3 months .     . Total time spent during this encounter today was 30 minutes

## 2021-04-15 ENCOUNTER — TELEPHONE (OUTPATIENT)
Dept: BARIATRICS/WEIGHT MGMT | Facility: CLINIC | Age: 51
End: 2021-04-15

## 2021-04-15 RX ORDER — LANOLIN ALCOHOL/MO/W.PET/CERES
325 CREAM (GRAM) TOPICAL
Qty: 90 TABLET | Refills: 0 | Status: SHIPPED | OUTPATIENT
Start: 2021-04-15 | End: 2021-07-14

## 2021-04-15 NOTE — TELEPHONE ENCOUNTER
Spoke to patient regarding lab results. Informed patient of ferritin sulfate sent to her pharmacy. Patient gave a verbal understanding with no further questions.       ----- Message from CHAN Forman sent at 4/15/2021  2:26 PM EDT -----  Ferritin improved slightly, but she is still low. I will refill her ferrous sulfate

## 2021-04-28 ENCOUNTER — TELEPHONE (OUTPATIENT)
Dept: GASTROENTEROLOGY | Facility: CLINIC | Age: 51
End: 2021-04-28

## 2021-04-28 NOTE — TELEPHONE ENCOUNTER
Screening colonoscopy-- no personal or family hx of polyps or colon ca-- no ASA or blood thinners--  Medications:    buPROPion XL (WELLBUTRIN XL) 150 MG 24 hr tablet  ferrous sulfate 325 (65 FE) MG EC tablet  FLUoxetine (PROzac) 40 MG capsule  Celebrate one Vitamin  Pantoprazole     OA form scanned into media

## 2021-05-03 DIAGNOSIS — Z12.11 ENCOUNTER FOR SCREENING FOR MALIGNANT NEOPLASM OF COLON: Primary | ICD-10-CM

## 2021-05-03 RX ORDER — SODIUM CHLORIDE, SODIUM LACTATE, POTASSIUM CHLORIDE, CALCIUM CHLORIDE 600; 310; 30; 20 MG/100ML; MG/100ML; MG/100ML; MG/100ML
30 INJECTION, SOLUTION INTRAVENOUS CONTINUOUS
Status: CANCELLED | OUTPATIENT
Start: 2021-07-12

## 2021-05-19 ENCOUNTER — TELEPHONE (OUTPATIENT)
Dept: GASTROENTEROLOGY | Facility: CLINIC | Age: 51
End: 2021-05-19

## 2021-05-19 PROBLEM — Z12.11 ENCOUNTER FOR SCREENING FOR MALIGNANT NEOPLASM OF COLON: Status: ACTIVE | Noted: 2021-05-19

## 2021-05-19 NOTE — TELEPHONE ENCOUNTER
SPOKE WITH PT SCHEDULED AT [facility] ON JULY 12 ARRIVE  PM VIDYA SINGLETARY--FELISA

## 2021-05-26 ENCOUNTER — OFFICE VISIT (OUTPATIENT)
Dept: INTERNAL MEDICINE | Facility: CLINIC | Age: 51
End: 2021-05-26

## 2021-05-26 VITALS
SYSTOLIC BLOOD PRESSURE: 110 MMHG | TEMPERATURE: 97.7 F | WEIGHT: 190 LBS | RESPIRATION RATE: 18 BRPM | DIASTOLIC BLOOD PRESSURE: 68 MMHG | HEIGHT: 65 IN | HEART RATE: 74 BPM | OXYGEN SATURATION: 99 % | BODY MASS INDEX: 31.65 KG/M2

## 2021-05-26 DIAGNOSIS — F32.A DEPRESSION, UNSPECIFIED DEPRESSION TYPE: Primary | Chronic | ICD-10-CM

## 2021-05-26 DIAGNOSIS — E78.2 ELEVATED TRIGLYCERIDES WITH HIGH CHOLESTEROL: ICD-10-CM

## 2021-05-26 PROBLEM — I10 ESSENTIAL HYPERTENSION: Chronic | Status: RESOLVED | Noted: 2018-10-01 | Resolved: 2021-05-26

## 2021-05-26 LAB
CHOLEST SERPL-MCNC: 185 MG/DL (ref 0–200)
CHOLEST/HDLC SERPL: 3.7 {RATIO}
HDLC SERPL-MCNC: 50 MG/DL (ref 40–60)
LDLC SERPL CALC-MCNC: 116 MG/DL (ref 0–100)
TRIGL SERPL-MCNC: 103 MG/DL (ref 0–150)
VLDLC SERPL CALC-MCNC: 19 MG/DL (ref 5–40)

## 2021-05-26 PROCEDURE — 99213 OFFICE O/P EST LOW 20 MIN: CPT | Performed by: INTERNAL MEDICINE

## 2021-05-26 RX ORDER — MULTIPLE VITAMINS W/ MINERALS TAB 9MG-400MCG
1 TAB ORAL DAILY
COMMUNITY

## 2021-05-26 NOTE — PROGRESS NOTES
Subjective   Savannah Valenzuela is a 50 y.o. female.     Chief Complaint   Patient presents with   • Hypertension   • Depression         In for recheck of chronic depression.  She's doing very well on her Wellbutrin.  Also on Prozac.  A little anxious at times but depression is not an issue right now.    Hypertension  This is a chronic problem. The problem has been resolved since onset. The problem is controlled. Pertinent negatives include no peripheral edema or PND. There are no associated agents to hypertension.   Depression  Visit Type: follow-up  Patient is not experiencing: nervousness/anxiety.  Frequency of symptoms: rarely          The following portions of the patient's history were reviewed and updated as appropriate: allergies, current medications, past social history and problem list.    Outpatient Medications Marked as Taking for the 5/26/21 encounter (Office Visit) with Mathew Lemos MD   Medication Sig Dispense Refill   • buPROPion XL (WELLBUTRIN XL) 150 MG 24 hr tablet Take 1 tablet by mouth Daily. 90 tablet 1   • ferrous sulfate 325 (65 FE) MG EC tablet Take 1 tablet by mouth Daily With Breakfast for 90 days. 90 tablet 0   • FLUoxetine (PROzac) 40 MG capsule Take 1 capsule by mouth Daily. 90 capsule 1   • multivitamin with minerals (MULTIVITAMIN ADULT PO) Take 1 tablet by mouth Daily.         Review of Systems   Cardiovascular: Negative for PND.   Gastrointestinal: Negative for constipation and diarrhea.   Psychiatric/Behavioral: Negative for dysphoric mood. The patient is not nervous/anxious.        Objective   Vitals:    05/26/21 0949   BP: 110/68   Pulse: 74   Resp: 18   Temp: 97.7 °F (36.5 °C)   SpO2: 99%          05/26/21  0949   Weight: 86.2 kg (190 lb)    [unfilled]  Body mass index is 32.04 kg/m².      Physical Exam   Constitutional: She appears well-developed.   Abdominal: Normal appearance.   Neurological: She is alert.   Psychiatric: Her behavior is normal. Mood, judgment and thought  content normal.         Problems Addressed this Visit        Mental Health    Depression - Primary (Chronic)      Diagnoses       Codes Comments    Depression, unspecified depression type    -  Primary ICD-10-CM: F32.9  ICD-9-CM: 311         Assessment/Plan   In for recheck of hypertension.  Also depression.  Depression doing well.  Blood pressures have been good off of all medicines since bariatric surgery.  Weight is down 60 pounds total.  Annual lab work will be October 2020.  Annual preventive exam November 2021.  She will not be able to take Advil or aspirin or Amy-Newry with aspirin now since gastric sleeve.  Iron levels are running low.  She is due for colorectal cancer screening with new guidelines and would prefer to go with colonoscopy.  That has been scheduled with Dr. Perez.  We will check lipid profile today.  Move visits out to 6 months.  The above information was reviewed again today 05/26/21.  It continues to be accurate as reflected above and is unchanged.  History, physical and review of systems all reviewed and are unchanged.  Medications were reviewed today and continue the current dosing.  20 minutes post coffee with low-fat creamer.    PPE today includes face mask and eye shield.         Dragon disclaimer:   Much of this encounter note is an electronic transcription/translation of spoken language to printed text. The electronic translation of spoken language may permit erroneous, or at times, nonsensical words or phrases to be inadvertently transcribed; Although I have reviewed the note for such errors, some may still exist.

## 2021-07-06 ENCOUNTER — APPOINTMENT (OUTPATIENT)
Dept: WOMENS IMAGING | Facility: HOSPITAL | Age: 51
End: 2021-07-06

## 2021-07-06 PROCEDURE — 77063 BREAST TOMOSYNTHESIS BI: CPT | Performed by: RADIOLOGY

## 2021-07-06 PROCEDURE — 77067 SCR MAMMO BI INCL CAD: CPT | Performed by: RADIOLOGY

## 2021-07-12 ENCOUNTER — ANESTHESIA EVENT (OUTPATIENT)
Dept: GASTROENTEROLOGY | Facility: HOSPITAL | Age: 51
End: 2021-07-12

## 2021-07-12 ENCOUNTER — HOSPITAL ENCOUNTER (OUTPATIENT)
Facility: HOSPITAL | Age: 51
Setting detail: HOSPITAL OUTPATIENT SURGERY
Discharge: HOME | End: 2021-07-12
Attending: INTERNAL MEDICINE | Admitting: INTERNAL MEDICINE

## 2021-07-12 ENCOUNTER — ANESTHESIA (OUTPATIENT)
Dept: GASTROENTEROLOGY | Facility: HOSPITAL | Age: 51
End: 2021-07-12

## 2021-07-12 VITALS
DIASTOLIC BLOOD PRESSURE: 79 MMHG | HEART RATE: 74 BPM | SYSTOLIC BLOOD PRESSURE: 122 MMHG | HEIGHT: 65 IN | BODY MASS INDEX: 32.04 KG/M2 | TEMPERATURE: 98.3 F | OXYGEN SATURATION: 99 % | RESPIRATION RATE: 16 BRPM

## 2021-07-12 DIAGNOSIS — Z12.11 ENCOUNTER FOR SCREENING FOR MALIGNANT NEOPLASM OF COLON: ICD-10-CM

## 2021-07-12 LAB
B-HCG UR QL: NEGATIVE
INTERNAL NEGATIVE CONTROL: NORMAL
INTERNAL POSITIVE CONTROL: NORMAL
Lab: NORMAL

## 2021-07-12 PROCEDURE — 88342 IMHCHEM/IMCYTCHM 1ST ANTB: CPT | Performed by: INTERNAL MEDICINE

## 2021-07-12 PROCEDURE — 25010000002 PROPOFOL 10 MG/ML EMULSION: Performed by: NURSE ANESTHETIST, CERTIFIED REGISTERED

## 2021-07-12 PROCEDURE — 81025 URINE PREGNANCY TEST: CPT | Performed by: INTERNAL MEDICINE

## 2021-07-12 PROCEDURE — 45380 COLONOSCOPY AND BIOPSY: CPT | Performed by: INTERNAL MEDICINE

## 2021-07-12 PROCEDURE — 88305 TISSUE EXAM BY PATHOLOGIST: CPT | Performed by: INTERNAL MEDICINE

## 2021-07-12 PROCEDURE — 88312 SPECIAL STAINS GROUP 1: CPT | Performed by: INTERNAL MEDICINE

## 2021-07-12 PROCEDURE — S0260 H&P FOR SURGERY: HCPCS | Performed by: INTERNAL MEDICINE

## 2021-07-12 PROCEDURE — 88313 SPECIAL STAINS GROUP 2: CPT | Performed by: INTERNAL MEDICINE

## 2021-07-12 PROCEDURE — 88341 IMHCHEM/IMCYTCHM EA ADD ANTB: CPT | Performed by: INTERNAL MEDICINE

## 2021-07-12 RX ORDER — PROPOFOL 10 MG/ML
VIAL (ML) INTRAVENOUS AS NEEDED
Status: DISCONTINUED | OUTPATIENT
Start: 2021-07-12 | End: 2021-07-12 | Stop reason: SURG

## 2021-07-12 RX ORDER — SODIUM CHLORIDE, SODIUM LACTATE, POTASSIUM CHLORIDE, CALCIUM CHLORIDE 600; 310; 30; 20 MG/100ML; MG/100ML; MG/100ML; MG/100ML
30 INJECTION, SOLUTION INTRAVENOUS CONTINUOUS
Status: DISCONTINUED | OUTPATIENT
Start: 2021-07-12 | End: 2021-07-12 | Stop reason: HOSPADM

## 2021-07-12 RX ORDER — LIDOCAINE HYDROCHLORIDE 20 MG/ML
INJECTION, SOLUTION INFILTRATION; PERINEURAL AS NEEDED
Status: DISCONTINUED | OUTPATIENT
Start: 2021-07-12 | End: 2021-07-12 | Stop reason: SURG

## 2021-07-12 RX ORDER — SODIUM CHLORIDE, SODIUM LACTATE, POTASSIUM CHLORIDE, CALCIUM CHLORIDE 600; 310; 30; 20 MG/100ML; MG/100ML; MG/100ML; MG/100ML
1000 INJECTION, SOLUTION INTRAVENOUS CONTINUOUS
Status: DISCONTINUED | OUTPATIENT
Start: 2021-07-12 | End: 2021-07-12 | Stop reason: HOSPADM

## 2021-07-12 RX ORDER — PROPOFOL 10 MG/ML
VIAL (ML) INTRAVENOUS CONTINUOUS PRN
Status: DISCONTINUED | OUTPATIENT
Start: 2021-07-12 | End: 2021-07-12 | Stop reason: SURG

## 2021-07-12 RX ADMIN — PROPOFOL 200 MCG/KG/MIN: 10 INJECTION, EMULSION INTRAVENOUS at 14:47

## 2021-07-12 RX ADMIN — PROPOFOL 50 MG: 10 INJECTION, EMULSION INTRAVENOUS at 14:47

## 2021-07-12 RX ADMIN — PROPOFOL 50 MG: 10 INJECTION, EMULSION INTRAVENOUS at 14:50

## 2021-07-12 RX ADMIN — LIDOCAINE HYDROCHLORIDE 40 MG: 20 INJECTION, SOLUTION INFILTRATION; PERINEURAL at 14:47

## 2021-07-12 RX ADMIN — SODIUM CHLORIDE, POTASSIUM CHLORIDE, SODIUM LACTATE AND CALCIUM CHLORIDE 30 ML/HR: 600; 310; 30; 20 INJECTION, SOLUTION INTRAVENOUS at 14:10

## 2021-07-12 NOTE — ANESTHESIA POSTPROCEDURE EVALUATION
"Patient: Savannah Valenzuela    Procedure Summary     Date: 07/12/21 Room / Location: [facility] ENDOSCOPY 10 / [facility]COPY    Anesthesia Start: 1442 Anesthesia Stop: 1517    Procedure: COLONOSCOPY INTO CECUM AND T.I. WITH COLD BIOPSY POLYPECTOMIES (N/A ) Diagnosis:       Encounter for screening for malignant neoplasm of colon      (Encounter for screening for malignant neoplasm of colon [Z12.11])    Surgeons: Ursula Torres MD Provider: Wing Steele MD    Anesthesia Type: MAC ASA Status: 2          Anesthesia Type: MAC    Vitals  Vitals Value Taken Time   /79 07/12/21 1539   Temp     Pulse 74 07/12/21 1539   Resp 16 07/12/21 1539   SpO2 99 % 07/12/21 1539           Post Anesthesia Care and Evaluation    Patient location during evaluation: bedside  Patient participation: complete - patient participated  Level of consciousness: sleepy but conscious  Pain score: 0  Pain management: adequate  Airway patency: patent  Anesthetic complications: No anesthetic complications    Cardiovascular status: acceptable  Respiratory status: acceptable  Hydration status: acceptable    Comments: /79 (BP Location: Left arm, Patient Position: Lying)   Pulse 74   Temp 36.8 °C (98.3 °F) (Oral)   Resp 16   Ht 164 cm (64.57\")   SpO2 99%   BMI 32.04 kg/m²         "

## 2021-07-12 NOTE — H&P
"[facility]  Pre Procedure History & Physical    Chief Complaint: Colon cancer screening      HPI: 51yo W with PMH as below here for screening colonoscopy.  No family history of GI malignancies nor colon polyps.  Denies blood in stool, change in bowel habits, abdominal pain.  Otherwise feels well.        Past Medical History:   Past Medical History:   Diagnosis Date   • Allergic rhinitis    • Depression    • Fatigue    • GERD (gastroesophageal reflux disease)    • Hiatal hernia    • Hyperlipidemia    • Hypertension    • Medication management    • Migraine    • Multiple food allergies    • Physical exam, annual        Family History:  Family History   Problem Relation Age of Onset   • Heart disease Father    • Hyperlipidemia Father    • Malig Hyperthermia Neg Hx        Social History:   reports that she has never smoked. She has never used smokeless tobacco. She reports that she does not drink alcohol and does not use drugs.    Medications:   No medications prior to admission.       Allergies:  Latex    ROS:    Pertinent items are noted in HPI     Objective     Height 164 cm (64.57\").    Physical Exam   Constitutional: Pt is oriented to person, place, and time and well-developed, well-nourished, and in no distress.   HENT:   Mouth/Throat: Oropharynx is clear and moist.   Neck: Normal range of motion. Neck supple.   Cardiovascular: Normal rate, regular rhythm and normal heart sounds.    Pulmonary/Chest: Effort normal and breath sounds normal. No respiratory distress. No  wheezes.   Abdominal: Soft. Bowel sounds are normal.   Skin: Skin is warm and dry.   Psychiatric: Mood, memory, affect and judgment normal.     Assessment/Plan     Diagnosis: Colon cancer screening      Anticipated Surgical Procedure:    Colonoscopy    The risks, benefits, and alternatives of this procedure have been discussed with the patient or the responsible party- the patient understands and agrees to proceed.  "

## 2021-07-12 NOTE — ANESTHESIA PREPROCEDURE EVALUATION
Anesthesia Evaluation     Patient summary reviewed and Nursing notes reviewed   NPO Solid Status: > 8 hours  NPO Liquid Status: > 2 hours           Airway   Mallampati: I  TM distance: >3 FB  Neck ROM: full  No difficulty expected  Dental - normal exam     Pulmonary - negative pulmonary ROS and normal exam   Cardiovascular - normal exam    (+) hypertension, hyperlipidemia,       Neuro/Psych  (+) headaches, psychiatric history Depression,     GI/Hepatic/Renal/Endo    (+) obesity,  hiatal hernia, GERD,    (-) morbid obesity    Musculoskeletal (-) negative ROS    Abdominal  - normal exam    Bowel sounds: normal.   Substance History - negative use     OB/GYN negative ob/gyn ROS         Other                      Anesthesia Plan    ASA 2     MAC       Anesthetic plan, all risks, benefits, and alternatives have been provided, discussed and informed consent has been obtained with: patient.

## 2021-07-13 ENCOUNTER — OFFICE VISIT (OUTPATIENT)
Dept: BARIATRICS/WEIGHT MGMT | Facility: CLINIC | Age: 51
End: 2021-07-13

## 2021-07-13 VITALS
BODY MASS INDEX: 30.66 KG/M2 | HEIGHT: 65 IN | HEART RATE: 88 BPM | SYSTOLIC BLOOD PRESSURE: 121 MMHG | WEIGHT: 184 LBS | DIASTOLIC BLOOD PRESSURE: 74 MMHG | TEMPERATURE: 97.4 F | RESPIRATION RATE: 18 BRPM

## 2021-07-13 DIAGNOSIS — K21.9 GASTROESOPHAGEAL REFLUX DISEASE WITHOUT ESOPHAGITIS: Chronic | ICD-10-CM

## 2021-07-13 DIAGNOSIS — Z98.84 S/P LAPAROSCOPIC SLEEVE GASTRECTOMY: ICD-10-CM

## 2021-07-13 DIAGNOSIS — F32.A DEPRESSION, UNSPECIFIED DEPRESSION TYPE: Chronic | ICD-10-CM

## 2021-07-13 DIAGNOSIS — E66.9 OBESITY, CLASS I, BMI 30-34.9: Primary | ICD-10-CM

## 2021-07-13 DIAGNOSIS — R53.82 CHRONIC FATIGUE: ICD-10-CM

## 2021-07-13 PROBLEM — Z12.11 ENCOUNTER FOR SCREENING FOR MALIGNANT NEOPLASM OF COLON: Status: RESOLVED | Noted: 2021-05-19 | Resolved: 2021-07-13

## 2021-07-13 PROCEDURE — 99213 OFFICE O/P EST LOW 20 MIN: CPT | Performed by: NURSE PRACTITIONER

## 2021-07-13 RX ORDER — BUPROPION HYDROCHLORIDE 150 MG/1
150 TABLET ORAL DAILY
Qty: 90 TABLET | Refills: 1 | Status: SHIPPED | OUTPATIENT
Start: 2021-07-13 | End: 2022-06-09 | Stop reason: SDUPTHER

## 2021-07-13 RX ORDER — LANOLIN ALCOHOL/MO/W.PET/CERES
325 CREAM (GRAM) TOPICAL
Qty: 90 TABLET | Refills: 0 | Status: CANCELLED | OUTPATIENT
Start: 2021-07-13 | End: 2021-10-11

## 2021-07-13 NOTE — PROGRESS NOTES
[facility]  [address] [facility] [address]  [facility] [address]  [phone]  [address] [id number] [phone]  Dept: [phone]  7/13/2021      Savannah Valenzuela.  [id number]  [id number]  1970  female      Chief Complaint   Patient presents with   • Follow-up     9 MONTH SLEEVE FOLLOW UP       BH Post-Op Bariatric Surgery:   Savannah Valenzuela is status post Laparoscopic Sleeve/PEH procedure, performed on 10/5/2020     HPI:   Today's weight is 83.5 kg (184 lb) pounds, today's BMI is Body mass index is 31.03 kg/m².,@ has a  loss of 6 pounds since the last visit and@ weight loss since surgery is 46 pounds. The patient reports a decreased portion size and loss of appetite.      Savannah Valenzuela denies nausea, vomiting, reflux and reports that she is doing well. She does report that greasy foods don't typically sit well, example breakfast sausage. She is tolerating her solid proteins well.      Diet and Exercise: Diet history reviewed and discussed with the patient. Weight loss/gains to date discussed with the patient. The patient states they are eating 60 grams of protein per day. She reports eating 3 meals per day, a typical portion size of 1/2 cup, eating 1-2 snacks per day, drinking 5-6 or more 8-oz. glasses of water per day, no carbonated beverage consumption and exercising regularly- walking most days.      She underwent conoloscopy yesterday, patient reports a few small polyps were removed, no bleeding areas noted.     Supplements: celebrate MTV with iron and calcium, ferrous sulfate to supplement iron    Review of Systems   Constitutional: Positive for appetite change. Negative for fatigue and unexpected weight change.   HENT: Negative.    Eyes: Negative.    Respiratory: Negative.    Cardiovascular: Negative.  Negative for leg swelling.   Gastrointestinal: Negative for abdominal distention, abdominal pain, constipation,  diarrhea, nausea and vomiting.   Genitourinary: Negative for difficulty urinating, frequency and urgency.   Musculoskeletal: Negative for back pain.   Skin: Negative.    Psychiatric/Behavioral: Negative.    All other systems reviewed and are negative.      Patient Active Problem List   Diagnosis   • Migraine   • Allergic rhinitis   • Hematuria, microscopic   • Depression   • Elevated triglycerides with high cholesterol   • Chronic knee pain   • GERD (gastroesophageal reflux disease)   • S/P laparoscopic sleeve gastrectomy   • Obesity, Class I, BMI 30-34.9       Past Medical History:   Diagnosis Date   • Allergic rhinitis    • Chronic fatigue 3/27/2020   • Depression    • Encounter for screening for malignant neoplasm of colon 5/19/2021    Added automatically from request for surgery [id number]   • Fatigue    • GERD (gastroesophageal reflux disease)    • Hiatal hernia    • Hyperlipidemia    • Hypertension    • Medication management    • Migraine    • Multiple food allergies    • Physical exam, annual        The following portions of the patient's history were reviewed and updated as appropriate: allergies, current medications, past family history, past medical history, past social history, past surgical history and problem list.    Vitals:    07/13/21 0904   BP: 121/74   Pulse: 88   Resp: 18   Temp: 97.4 °F (36.3 °C)       Physical Exam  Vitals and nursing note reviewed.   Constitutional:       Appearance: She is well-developed.   Neck:      Thyroid: No thyromegaly.   Cardiovascular:      Rate and Rhythm: Normal rate and regular rhythm.      Heart sounds: Normal heart sounds.   Pulmonary:      Effort: Pulmonary effort is normal. No respiratory distress.      Breath sounds: Normal breath sounds. No wheezing.   Abdominal:      General: Bowel sounds are normal. There is no distension.      Palpations: Abdomen is soft.      Tenderness: There is no abdominal tenderness. There is no guarding.      Hernia: No hernia is  present.   Musculoskeletal:         General: No tenderness.   Skin:     General: Skin is warm and dry.      Findings: No erythema or rash.   Neurological:      Mental Status: She is alert.   Psychiatric:         Behavior: Behavior normal.         Assessment:   Post-op, the patient is doing well. Her iron is improving very slowly, we will continue ferrous sulfate and celebrate [id number] and recheck iron levels at her next appointment.    Encounter Diagnoses   Name Primary?   • Obesity, Class I, BMI 30-34.9 Yes   • Gastroesophageal reflux disease without esophagitis    • Chronic fatigue    • Depression, unspecified depression type    • S/P laparoscopic sleeve gastrectomy        Plan:   Encouraged patient to be sure to get plenty of lean protein per day through small frequent meals all with a protein source.   Activity restrictions: none.   Recommended patient be sure to get at least 70 grams of protein per day by eating small, frequent meals all with high lean protein choices. Be sure to limit/cut back on daily carbohydrate intake. Discussed with the patient the recommended amount of water per day to intake- half of body weight in ounces. Reviewed vitamin requirements. Be sure to do routine exercise, 150 minutes per week minimum, including both cardio and strength training.     Instructions / Recommendations: dietary counseling recommended, recommended a daily protein intake of  grams, vitamin supplement(s) recommended, recommended exercising at least 150 minutes per week, behavior modifications recommended and instructed to call the office for concerns, questions, or problems.     The patient was instructed to follow up in 3 months .    Total time spent during this encounter today was 25 minutes

## 2021-07-14 RX ORDER — LANOLIN ALCOHOL/MO/W.PET/CERES
325 CREAM (GRAM) TOPICAL
Qty: 90 TABLET | Refills: 0 | OUTPATIENT
Start: 2021-07-14 | End: 2021-10-12

## 2021-07-21 NOTE — PROGRESS NOTES
The small rectal polyps included benign hyperplastic polyps as well as a benign xanthoma which is a small cholesterol filled type of polyp.  Xanthomas can be seen in hypertriglyceridemia so I do recommend she follow-up with Dr. Lemos regarding that possibility.    Please place in 10-year colonoscopy recall, thanks

## 2021-08-17 ENCOUNTER — TELEPHONE (OUTPATIENT)
Dept: GASTROENTEROLOGY | Facility: CLINIC | Age: 51
End: 2021-08-17

## 2021-08-17 NOTE — TELEPHONE ENCOUNTER
----- Message from Ursula Torres MD sent at 7/21/2021 11:55 AM EDT -----  The small rectal polyps included benign hyperplastic polyps as well as a benign xanthoma which is a small cholesterol filled type of polyp.  Xanthomas can be seen in hypertriglyceridemia so I do recommend she follow-up with Dr. Lemos regarding that possibility.    Please place in 10-year colonoscopy recall, thanks

## 2021-09-20 ENCOUNTER — TELEMEDICINE (OUTPATIENT)
Dept: INTERNAL MEDICINE | Facility: CLINIC | Age: 51
End: 2021-09-20

## 2021-09-20 DIAGNOSIS — M77.11 EPICONDYLITIS, LATERAL, RIGHT: Primary | ICD-10-CM

## 2021-09-20 PROCEDURE — 99213 OFFICE O/P EST LOW 20 MIN: CPT | Performed by: INTERNAL MEDICINE

## 2021-09-20 RX ORDER — DICLOFENAC EPOLAMINE 0.01 G/1
1 SYSTEM TOPICAL 2 TIMES DAILY
Qty: 60 PATCH | Refills: 0 | Status: SHIPPED | OUTPATIENT
Start: 2021-09-20 | End: 2022-05-11

## 2021-09-20 NOTE — PROGRESS NOTES
Subjective   Savannah Valenzuela is a 51 y.o. female.     Chief Complaint   Patient presents with   • Elbow Injury         Elbow Injury  This is a new problem. The current episode started 1 to 4 weeks ago. The problem occurs intermittently. Associated symptoms include arthralgias. Pertinent negatives include no chills or fever.        The following portions of the patient's history were reviewed and updated as appropriate: allergies, current medications, past social history and problem list.    Outpatient Medications Marked as Taking for the 9/20/21 encounter (Telemedicine) with Mathew Lemos MD   Medication Sig Dispense Refill   • buPROPion XL (WELLBUTRIN XL) 150 MG 24 hr tablet Take 1 tablet by mouth Daily. 90 tablet 1   • Calcium 250 MG capsule Take 500 mg by mouth 3 (Three) Times a Day.     • FLUoxetine (PROzac) 40 MG capsule Take 1 capsule by mouth Daily. 90 capsule 1   • multivitamin with minerals (MULTIVITAMIN ADULT PO) Take 1 tablet by mouth Daily.         Review of Systems   Constitutional: Negative for chills and fever.   Musculoskeletal: Positive for arthralgias.   Skin: Negative for color change.       Objective   There were no vitals filed for this visit.   Wt Readings from Last 3 Encounters:   07/13/21 83.5 kg (184 lb)   05/26/21 86.2 kg (190 lb)   04/14/21 86.2 kg (190 lb)    There is no height or weight on file to calculate BMI.      Physical Exam  Constitutional:       Appearance: Normal appearance.   Musculoskeletal:         General: Tenderness present. Normal range of motion.   Neurological:      Mental Status: She is alert.           Problems Addressed this Visit     None      Visit Diagnoses     Epicondylitis, lateral, right    -  Primary      Diagnoses       Codes Comments    Epicondylitis, lateral, right    -  Primary ICD-10-CM: M77.11  ICD-9-CM: 726.32         Assessment/Plan   Video visit today due to Covid pandemic.  For 2 weeks he has had pain in the right lateral elbow.  Seem to  occur after she did some lifting of furniture and using a furniture keri.  She has tenderness right over the lateral epicondyle and I suspect this is tendinitis related to her heavy lifting.  She will try a tennis elbow splint along with diclofenac patch twice daily.  Ice as needed.  Spent 10 minutes with patient on the visit today.    The above information was reviewed again today 09/20/21.  It continues to be accurate as reflected above and is unchanged.  History, physical and review of systems all reviewed and are unchanged.  Medications were reviewed today and continue the current dosing.    PPE today includes face mask and eye shield.           Dragon disclaimer:   Much of this encounter note is an electronic transcription/translation of spoken language to printed text. The electronic translation of spoken language may permit erroneous, or at times, nonsensical words or phrases to be inadvertently transcribed; Although I have reviewed the note for such errors, some may still exist.

## 2021-10-04 ENCOUNTER — IMMUNIZATION (OUTPATIENT)
Dept: VACCINE CLINIC | Facility: HOSPITAL | Age: 51
End: 2021-10-04

## 2021-10-04 PROCEDURE — 0003A: CPT | Performed by: INTERNAL MEDICINE

## 2021-10-04 PROCEDURE — 0004A ADM SARSCOV2 30MCG/0.3ML BOOSTER: CPT | Performed by: INTERNAL MEDICINE

## 2021-10-04 PROCEDURE — 91300 HC SARSCOV02 VAC 30MCG/0.3ML IM: CPT | Performed by: INTERNAL MEDICINE

## 2021-10-13 ENCOUNTER — OFFICE VISIT (OUTPATIENT)
Dept: BARIATRICS/WEIGHT MGMT | Facility: CLINIC | Age: 51
End: 2021-10-13

## 2021-10-13 ENCOUNTER — LAB (OUTPATIENT)
Dept: LAB | Facility: HOSPITAL | Age: 51
End: 2021-10-13

## 2021-10-13 VITALS
DIASTOLIC BLOOD PRESSURE: 76 MMHG | HEIGHT: 65 IN | TEMPERATURE: 98 F | RESPIRATION RATE: 18 BRPM | WEIGHT: 188 LBS | BODY MASS INDEX: 31.32 KG/M2 | HEART RATE: 73 BPM | SYSTOLIC BLOOD PRESSURE: 127 MMHG

## 2021-10-13 DIAGNOSIS — Z98.84 S/P LAPAROSCOPIC SLEEVE GASTRECTOMY: ICD-10-CM

## 2021-10-13 DIAGNOSIS — G89.29 CHRONIC KNEE PAIN, UNSPECIFIED LATERALITY: ICD-10-CM

## 2021-10-13 DIAGNOSIS — E66.9 OBESITY, CLASS I, BMI 30-34.9: ICD-10-CM

## 2021-10-13 DIAGNOSIS — F32.A DEPRESSION, UNSPECIFIED DEPRESSION TYPE: ICD-10-CM

## 2021-10-13 DIAGNOSIS — E66.9 OBESITY, CLASS I, BMI 30-34.9: Primary | ICD-10-CM

## 2021-10-13 DIAGNOSIS — M25.569 CHRONIC KNEE PAIN, UNSPECIFIED LATERALITY: ICD-10-CM

## 2021-10-13 DIAGNOSIS — F32.A DEPRESSION, UNSPECIFIED DEPRESSION TYPE: Chronic | ICD-10-CM

## 2021-10-13 PROBLEM — E78.2 ELEVATED TRIGLYCERIDES WITH HIGH CHOLESTEROL: Chronic | Status: RESOLVED | Noted: 2020-03-27 | Resolved: 2021-10-13

## 2021-10-13 PROBLEM — K21.9 GERD (GASTROESOPHAGEAL REFLUX DISEASE): Chronic | Status: RESOLVED | Noted: 2020-05-14 | Resolved: 2021-10-13

## 2021-10-13 LAB
25(OH)D3 SERPL-MCNC: 38.6 NG/ML (ref 30–100)
ALBUMIN SERPL-MCNC: 4.3 G/DL (ref 3.5–5.2)
ALBUMIN/GLOB SERPL: 1.7 G/DL
ALP SERPL-CCNC: 116 U/L (ref 39–117)
ALT SERPL W P-5'-P-CCNC: 27 U/L (ref 1–33)
ANION GAP SERPL CALCULATED.3IONS-SCNC: 10.8 MMOL/L (ref 5–15)
AST SERPL-CCNC: 18 U/L (ref 1–32)
BASOPHILS # BLD AUTO: 0.05 10*3/MM3 (ref 0–0.2)
BASOPHILS NFR BLD AUTO: 1 % (ref 0–1.5)
BILIRUB SERPL-MCNC: 0.3 MG/DL (ref 0–1.2)
BUN SERPL-MCNC: 23 MG/DL (ref 6–20)
BUN/CREAT SERPL: 31.1 (ref 7–25)
CALCIUM SPEC-SCNC: 9.6 MG/DL (ref 8.6–10.5)
CHLORIDE SERPL-SCNC: 104 MMOL/L (ref 98–107)
CO2 SERPL-SCNC: 25.2 MMOL/L (ref 22–29)
CREAT SERPL-MCNC: 0.74 MG/DL (ref 0.57–1)
DEPRECATED RDW RBC AUTO: 48.3 FL (ref 37–54)
EOSINOPHIL # BLD AUTO: 0.12 10*3/MM3 (ref 0–0.4)
EOSINOPHIL NFR BLD AUTO: 2.5 % (ref 0.3–6.2)
ERYTHROCYTE [DISTWIDTH] IN BLOOD BY AUTOMATED COUNT: 16.4 % (ref 12.3–15.4)
FERRITIN SERPL-MCNC: 7.15 NG/ML (ref 13–150)
FOLATE SERPL-MCNC: >20 NG/ML (ref 4.78–24.2)
GFR SERPL CREATININE-BSD FRML MDRD: 83 ML/MIN/1.73
GLOBULIN UR ELPH-MCNC: 2.5 GM/DL
GLUCOSE SERPL-MCNC: 85 MG/DL (ref 65–99)
HCT VFR BLD AUTO: 33.4 % (ref 34–46.6)
HGB BLD-MCNC: 10.6 G/DL (ref 12–15.9)
IMM GRANULOCYTES # BLD AUTO: 0.02 10*3/MM3 (ref 0–0.05)
IMM GRANULOCYTES NFR BLD AUTO: 0.4 % (ref 0–0.5)
IRON 24H UR-MRATE: 31 MCG/DL (ref 37–145)
LYMPHOCYTES # BLD AUTO: 1.54 10*3/MM3 (ref 0.7–3.1)
LYMPHOCYTES NFR BLD AUTO: 32.1 % (ref 19.6–45.3)
MCH RBC QN AUTO: 25.9 PG (ref 26.6–33)
MCHC RBC AUTO-ENTMCNC: 31.7 G/DL (ref 31.5–35.7)
MCV RBC AUTO: 81.7 FL (ref 79–97)
MONOCYTES # BLD AUTO: 0.36 10*3/MM3 (ref 0.1–0.9)
MONOCYTES NFR BLD AUTO: 7.5 % (ref 5–12)
NEUTROPHILS NFR BLD AUTO: 2.71 10*3/MM3 (ref 1.7–7)
NEUTROPHILS NFR BLD AUTO: 56.5 % (ref 42.7–76)
NRBC BLD AUTO-RTO: 0 /100 WBC (ref 0–0.2)
PLATELET # BLD AUTO: 314 10*3/MM3 (ref 140–450)
PMV BLD AUTO: 9.5 FL (ref 6–12)
POTASSIUM SERPL-SCNC: 4.3 MMOL/L (ref 3.5–5.2)
PREALB SERPL-MCNC: 26.3 MG/DL (ref 20–40)
PROT SERPL-MCNC: 6.8 G/DL (ref 6–8.5)
RBC # BLD AUTO: 4.09 10*6/MM3 (ref 3.77–5.28)
SODIUM SERPL-SCNC: 140 MMOL/L (ref 136–145)
WBC # BLD AUTO: 4.8 10*3/MM3 (ref 3.4–10.8)

## 2021-10-13 PROCEDURE — 80053 COMPREHEN METABOLIC PANEL: CPT

## 2021-10-13 PROCEDURE — 83921 ORGANIC ACID SINGLE QUANT: CPT

## 2021-10-13 PROCEDURE — 83540 ASSAY OF IRON: CPT

## 2021-10-13 PROCEDURE — 84425 ASSAY OF VITAMIN B-1: CPT

## 2021-10-13 PROCEDURE — 36415 COLL VENOUS BLD VENIPUNCTURE: CPT

## 2021-10-13 PROCEDURE — 82306 VITAMIN D 25 HYDROXY: CPT

## 2021-10-13 PROCEDURE — 99213 OFFICE O/P EST LOW 20 MIN: CPT | Performed by: NURSE PRACTITIONER

## 2021-10-13 PROCEDURE — 82746 ASSAY OF FOLIC ACID SERUM: CPT

## 2021-10-13 PROCEDURE — 85025 COMPLETE CBC W/AUTO DIFF WBC: CPT

## 2021-10-13 PROCEDURE — 84134 ASSAY OF PREALBUMIN: CPT

## 2021-10-13 PROCEDURE — 82728 ASSAY OF FERRITIN: CPT

## 2021-10-13 NOTE — PROGRESS NOTES
[facility] BARIATRIC SURGERY  [address] [facility] [address]  [facility] [address]  [phone]  [address] [address]  [phone]  Dept: [phone]  10/13/2021      Savannah Valenzuela.  [id number]  [id number]  1970  female      Chief Complaint   Patient presents with   • Follow-up     1 year sleeve follow up       BH Post-Op Bariatric Surgery:   Savannah Valenzuela is status post Laparoscopic Sleeve/PEH procedure, performed on 10/5/20.    HPI:   Today's weight is 85.3 kg (188 lb) pounds, today's BMI is Body mass index is 31.71 kg/m².,@ has a  gain of 4 pounds since the last visit and@ weight loss since surgery is 42 pounds. The patient reports a decreased portion size and loss of appetite.      Savannah Valenzuela denies nausea, vomiting, reflux and reports that she has been doing well. She reports that her intake of fruit went up a bit with work being a lot busier. She has always loved to walk but as things are starting to cool down she would like to get back to that. She does feel like her protein intake dropped a bit but has picked that back up. She has added shakes back in to help with protein.      Diet and Exercise: Diet history reviewed and discussed with the patient. Weight loss/gains to date discussed with the patient. The patient states they are eating 60 grams of protein per day. She reports eating 3 meals per day, a typical portion size of 1/2 cup, eating 0-1 snacks per day, drinking 3-4 or more 8-oz. glasses of water per day, no carbonated beverage consumption and exercising regularly- she is getting back into an exercise routine. She has a gym membership and a winter plan    Supplements: celebrate 45 with calcium     Review of Systems   Constitutional: Positive for appetite change. Negative for fatigue and unexpected weight change.   HENT: Negative.    Eyes: Negative.    Respiratory: Negative.    Cardiovascular: Negative.  Negative for  leg swelling.   Gastrointestinal: Negative for abdominal distention, abdominal pain, constipation, diarrhea, nausea and vomiting.   Genitourinary: Negative for difficulty urinating, frequency and urgency.   Musculoskeletal: Negative for back pain.   Skin: Negative.    Psychiatric/Behavioral: Negative.    All other systems reviewed and are negative.      Patient Active Problem List   Diagnosis   • Migraine   • Allergic rhinitis   • Depression   • Chronic knee pain   • S/P laparoscopic sleeve gastrectomy   • Obesity, Class I, BMI 30-34.9       Past Medical History:   Diagnosis Date   • Allergic rhinitis    • Chronic fatigue 3/27/2020   • Depression    • Encounter for screening for malignant neoplasm of colon 5/19/2021    Added automatically from request for surgery [id number]   • Fatigue    • GERD (gastroesophageal reflux disease)    • Hematuria, microscopic 8/24/2016   • Hiatal hernia    • Hyperlipidemia    • Hypertension    • Medication management    • Migraine    • Multiple food allergies    • Physical exam, annual        The following portions of the patient's history were reviewed and updated as appropriate: allergies, current medications, past family history, past medical history, past social history, past surgical history and problem list.    Vitals:    10/13/21 0906   BP: 127/76   Pulse: 73   Resp: 18   Temp: 98 °F (36.7 °C)       Physical Exam  Vitals and nursing note reviewed.   Constitutional:       Appearance: She is well-developed.   Neck:      Thyroid: No thyromegaly.   Cardiovascular:      Rate and Rhythm: Normal rate and regular rhythm.      Heart sounds: Normal heart sounds.   Pulmonary:      Effort: Pulmonary effort is normal. No respiratory distress.      Breath sounds: Normal breath sounds. No wheezing.   Abdominal:      General: Bowel sounds are normal. There is no distension.      Palpations: Abdomen is soft.      Tenderness: There is no abdominal tenderness. There is no guarding.      Hernia: No  hernia is present.   Musculoskeletal:         General: No tenderness.   Skin:     General: Skin is warm and dry.      Findings: No erythema or rash.   Neurological:      Mental Status: She is alert.   Psychiatric:         Behavior: Behavior normal.         Assessment:   Post-op, the patient is doing well. I did replace her iron in April and she has completed that dosing.     Encounter Diagnoses   Name Primary?   • Obesity, Class I, BMI 30-34.9 Yes   • S/P laparoscopic sleeve gastrectomy    • Depression, unspecified depression type    • Chronic knee pain, unspecified laterality        Plan:   I will trend her annual vitamin level set today, as well as a CBC, CMP, and prealbumin. She was advised to keep up the great work getting her protein in and supplementing with a shake even if she isn't hungry throughout the work day or getting a small lean protein based meal or snack.   Encouraged patient to be sure to get plenty of lean protein per day through small frequent meals all with a protein source.   Activity restrictions: none.   Recommended patient be sure to get at least 70 grams of protein per day by eating small, frequent meals all with high lean protein choices. Be sure to limit/cut back on daily carbohydrate intake. Discussed with the patient the recommended amount of water per day to intake- half of body weight in ounces. Reviewed vitamin requirements. Be sure to do routine exercise, 150 minutes per week minimum, including both cardio and strength training.     Instructions / Recommendations: dietary counseling recommended, recommended a daily protein intake of  grams, vitamin supplement(s) recommended, recommended exercising at least 150 minutes per week, behavior modifications recommended and instructed to call the office for concerns, questions, or problems.     The patient was instructed to follow up in 6 months .      Total time spent during this encounter today was 25 minutes

## 2021-10-15 RX ORDER — FERROUS SULFATE 325(65) MG
325 TABLET ORAL
Qty: 30 TABLET | Refills: 2 | Status: SHIPPED | OUTPATIENT
Start: 2021-10-15 | End: 2022-01-13

## 2021-10-19 LAB
Lab: NORMAL
METHYLMALONATE SERPL-SCNC: 176 NMOL/L (ref 0–378)
VIT B1 BLD-SCNC: 146.5 NMOL/L (ref 66.5–200)

## 2021-10-20 ENCOUNTER — TELEPHONE (OUTPATIENT)
Dept: BARIATRICS/WEIGHT MGMT | Facility: CLINIC | Age: 51
End: 2021-10-20

## 2021-10-20 NOTE — TELEPHONE ENCOUNTER
Leave message Inform about results  ----- Message from CHAN Forman sent at 10/15/2021  3:03 PM EDT -----  B1 and MMA are still pending but patient appears to have developed iron deficiency anemia. I will send ferrous sulfate, but her ferritin level is low enough to warrant iron infusion. I can work on setting this up for her when I am back Monday if she would be willing.

## 2021-10-21 ENCOUNTER — FLU SHOT (OUTPATIENT)
Dept: INTERNAL MEDICINE | Facility: CLINIC | Age: 51
End: 2021-10-21

## 2021-10-21 DIAGNOSIS — Z23 NEED FOR VACCINATION: ICD-10-CM

## 2021-10-21 PROCEDURE — 90686 IIV4 VACC NO PRSV 0.5 ML IM: CPT | Performed by: INTERNAL MEDICINE

## 2021-10-21 PROCEDURE — 90471 IMMUNIZATION ADMIN: CPT | Performed by: INTERNAL MEDICINE

## 2021-10-21 NOTE — PATIENT INSTRUCTIONS
Influenza (Flu) Vaccine (Inactivated or Recombinant): What You Need to Know  1. Why get vaccinated?  Influenza vaccine can prevent influenza (flu).  Flu is a contagious disease that spreads around the [place] every year, usually between October and May. Anyone can get the flu, but it is more dangerous for some people. Infants and young children, people 65 years of age and older, pregnant women, and people with certain health conditions or a weakened immune system are at greatest risk of flu complications.  Pneumonia, bronchitis, sinus infections and ear infections are examples of flu-related complications. If you have a medical condition, such as heart disease, cancer or diabetes, flu can make it worse.  Flu can cause fever and chills, sore throat, muscle aches, fatigue, cough, headache, and runny or stuffy nose. Some people may have vomiting and diarrhea, though this is more common in children than adults.  Each year thousands of people in the [place] die from flu, and many more are hospitalized. Flu vaccine prevents millions of illnesses and flu-related visits to the doctor each year.  2. Influenza vaccine  CDC recommends everyone 6 months of age and older get vaccinated every flu season. Children 6 months through 8 years of age may need 2 doses during a single flu season. Everyone else needs only 1 dose each flu season.  It takes about 2 weeks for protection to develop after vaccination.  There are many flu viruses, and they are always changing. Each year a new flu vaccine is made to protect against three or four viruses that are likely to cause disease in the upcoming flu season. Even when the vaccine doesn't exactly match these viruses, it may still provide some protection.  Influenza vaccine does not cause flu.  Influenza vaccine may be given at the same time as other vaccines.  3. Talk with your health care provider  Tell your vaccine provider if the person getting the vaccine:  · Has had an  allergic reaction after a previous dose of influenza vaccine, or has any severe, life-threatening allergies.  · Has ever had Guillain-Barré Syndrome (also called GBS).  In some cases, your health care provider may decide to postpone influenza vaccination to a future visit.  People with minor illnesses, such as a cold, may be vaccinated. People who are moderately or severely ill should usually wait until they recover before getting influenza vaccine.  Your health care provider can give you more information.  4. Risks of a vaccine reaction  · Soreness, redness, and swelling where shot is given, fever, muscle aches, and headache can happen after influenza vaccine.  · There may be a very small increased risk of Guillain-Barré Syndrome (GBS) after inactivated influenza vaccine (the flu shot).  Young children who get the flu shot along with pneumococcal vaccine (PCV13), and/or DTaP vaccine at the same time might be slightly more likely to have a seizure caused by fever. Tell your health care provider if a child who is getting flu vaccine has ever had a seizure.  People sometimes faint after medical procedures, including vaccination. Tell your provider if you feel dizzy or have vision changes or ringing in the ears.  As with any medicine, there is a very remote chance of a vaccine causing a severe allergic reaction, other serious injury, or death.  5. What if there is a serious problem?  An allergic reaction could occur after the vaccinated person leaves the clinic. If you see signs of a severe allergic reaction (hives, swelling of the face and throat, difficulty breathing, a fast heartbeat, dizziness, or weakness), call 9-1-1 and get the person to the nearest hospital.  For other signs that concern you, call your health care provider.  Adverse reactions should be reported to the Vaccine Adverse Event Reporting System (VAERS). Your health care provider will usually file this report, or you can do it yourself. Visit the  VAERS website at www.vaers.[facility].gov or call 1-324.274.3959. VAERS is only for reporting reactions, and VAERS staff do not give medical advice.  6. The National Vaccine Injury Compensation Program  The National Vaccine Injury Compensation Program (VICP) is a federal program that was created to compensate people who may have been injured by certain vaccines. Visit the VICP website at www.[facility].gov/vaccinecompensation or call 1-950.120.1196 to learn about the program and about filing a claim. There is a time limit to file a claim for compensation.  7. How can I learn more?  · Ask your healthcare provider.  · Call your local or state health department.  · Contact the Centers for Disease Control and Prevention (CDC):  ? Call 1-910.199.2963 (4-937-JBH-INFO) or  ? Visit CDC's www.cdc.gov/flu  Vaccine Information Statement (Interim) Inactivated Influenza Vaccine (8/15/2019)  This information is not intended to replace advice given to you by your health care provider. Make sure you discuss any questions you have with your health care provider.  Document Revised: 12/09/2020 Document Reviewed: 12/09/2020  Elsevier Patient Education © 2021 [employer].

## 2021-10-22 NOTE — PROGRESS NOTES
Patient will like to get set up of the iron infusion. Monday I will call sadie from scheduling to set up for appointment. Just need orders. Thanks !!!!

## 2021-10-25 DIAGNOSIS — D50.9 IRON DEFICIENCY ANEMIA, UNSPECIFIED IRON DEFICIENCY ANEMIA TYPE: Primary | ICD-10-CM

## 2021-10-25 RX ORDER — ACETAMINOPHEN 325 MG/1
650 TABLET ORAL ONCE
Status: CANCELLED | OUTPATIENT
Start: 2021-10-25 | End: 2021-10-25

## 2021-10-25 RX ORDER — DIPHENHYDRAMINE HYDROCHLORIDE 50 MG/ML
50 INJECTION INTRAMUSCULAR; INTRAVENOUS AS NEEDED
Status: CANCELLED | OUTPATIENT
Start: 2021-10-25

## 2021-10-25 RX ORDER — SODIUM CHLORIDE 9 MG/ML
250 INJECTION, SOLUTION INTRAVENOUS ONCE
Status: CANCELLED | OUTPATIENT
Start: 2021-10-25

## 2021-10-26 RX ORDER — FAMOTIDINE 20 MG/1
20 TABLET, FILM COATED ORAL ONCE
Status: CANCELLED | OUTPATIENT
Start: 2021-10-27

## 2021-10-26 RX ORDER — SODIUM CHLORIDE 9 MG/ML
250 INJECTION, SOLUTION INTRAVENOUS ONCE
Status: CANCELLED | OUTPATIENT
Start: 2021-10-27

## 2021-10-27 RX ORDER — FAMOTIDINE 20 MG/1
20 TABLET, FILM COATED ORAL ONCE
Status: CANCELLED | OUTPATIENT
Start: 2021-10-27

## 2021-10-27 RX ORDER — SODIUM CHLORIDE 9 MG/ML
250 INJECTION, SOLUTION INTRAVENOUS ONCE
Status: CANCELLED | OUTPATIENT
Start: 2021-10-27

## 2021-10-29 ENCOUNTER — HOSPITAL ENCOUNTER (OUTPATIENT)
Dept: INFUSION THERAPY | Facility: HOSPITAL | Age: 51
Discharge: HOME | End: 2021-10-29
Admitting: NURSE PRACTITIONER

## 2021-10-29 VITALS
DIASTOLIC BLOOD PRESSURE: 67 MMHG | TEMPERATURE: 97.1 F | HEART RATE: 62 BPM | SYSTOLIC BLOOD PRESSURE: 121 MMHG | OXYGEN SATURATION: 99 % | RESPIRATION RATE: 18 BRPM

## 2021-10-29 DIAGNOSIS — D64.9 ANEMIA, UNSPECIFIED TYPE: Primary | ICD-10-CM

## 2021-10-29 DIAGNOSIS — D50.9 IRON DEFICIENCY ANEMIA, UNSPECIFIED IRON DEFICIENCY ANEMIA TYPE: ICD-10-CM

## 2021-10-29 PROCEDURE — 96365 THER/PROPH/DIAG IV INF INIT: CPT

## 2021-10-29 PROCEDURE — 25010000002 IRON SUCROSE PER 1 MG: Performed by: NURSE PRACTITIONER

## 2021-10-29 RX ORDER — FAMOTIDINE 20 MG/1
20 TABLET, FILM COATED ORAL ONCE
Status: CANCELLED | OUTPATIENT
Start: 2021-10-29

## 2021-10-29 RX ORDER — FAMOTIDINE 20 MG/1
20 TABLET, FILM COATED ORAL ONCE
Status: COMPLETED | OUTPATIENT
Start: 2021-10-29 | End: 2021-10-29

## 2021-10-29 RX ORDER — SODIUM CHLORIDE 9 MG/ML
250 INJECTION, SOLUTION INTRAVENOUS ONCE
Status: CANCELLED | OUTPATIENT
Start: 2021-10-29

## 2021-10-29 RX ORDER — SODIUM CHLORIDE 9 MG/ML
250 INJECTION, SOLUTION INTRAVENOUS ONCE
Status: DISCONTINUED | OUTPATIENT
Start: 2021-10-29 | End: 2021-10-31 | Stop reason: HOSPADM

## 2021-10-29 RX ADMIN — FAMOTIDINE 20 MG: 20 TABLET, FILM COATED ORAL at 09:03

## 2021-10-29 RX ADMIN — IRON SUCROSE 200 MG: 20 INJECTION, SOLUTION INTRAVENOUS at 09:13

## 2021-11-02 ENCOUNTER — HOSPITAL ENCOUNTER (OUTPATIENT)
Dept: INFUSION THERAPY | Facility: HOSPITAL | Age: 51
Discharge: HOME | End: 2021-11-02
Admitting: NURSE PRACTITIONER

## 2021-11-02 VITALS
SYSTOLIC BLOOD PRESSURE: 137 MMHG | RESPIRATION RATE: 20 BRPM | TEMPERATURE: 97.3 F | OXYGEN SATURATION: 99 % | HEART RATE: 63 BPM | DIASTOLIC BLOOD PRESSURE: 77 MMHG

## 2021-11-02 DIAGNOSIS — D50.9 IRON DEFICIENCY ANEMIA, UNSPECIFIED IRON DEFICIENCY ANEMIA TYPE: Primary | ICD-10-CM

## 2021-11-02 PROCEDURE — 96365 THER/PROPH/DIAG IV INF INIT: CPT

## 2021-11-02 PROCEDURE — 25010000002 IRON SUCROSE PER 1 MG: Performed by: NURSE PRACTITIONER

## 2021-11-02 RX ORDER — FAMOTIDINE 20 MG/1
20 TABLET, FILM COATED ORAL ONCE
Status: CANCELLED | OUTPATIENT
Start: 2021-11-02

## 2021-11-02 RX ORDER — SODIUM CHLORIDE 9 MG/ML
250 INJECTION, SOLUTION INTRAVENOUS ONCE
Status: DISCONTINUED | OUTPATIENT
Start: 2021-11-02 | End: 2021-11-04 | Stop reason: HOSPADM

## 2021-11-02 RX ORDER — SODIUM CHLORIDE 9 MG/ML
250 INJECTION, SOLUTION INTRAVENOUS ONCE
Status: CANCELLED | OUTPATIENT
Start: 2021-11-02

## 2021-11-02 RX ORDER — FAMOTIDINE 20 MG/1
20 TABLET, FILM COATED ORAL ONCE
Status: COMPLETED | OUTPATIENT
Start: 2021-11-02 | End: 2021-11-02

## 2021-11-02 RX ADMIN — FAMOTIDINE 20 MG: 20 TABLET, FILM COATED ORAL at 15:54

## 2021-11-02 RX ADMIN — IRON SUCROSE 200 MG: 20 INJECTION, SOLUTION INTRAVENOUS at 15:58

## 2021-11-04 ENCOUNTER — OFFICE VISIT (OUTPATIENT)
Dept: INTERNAL MEDICINE | Facility: CLINIC | Age: 51
End: 2021-11-04

## 2021-11-04 ENCOUNTER — HOSPITAL ENCOUNTER (OUTPATIENT)
Dept: PHYSICAL THERAPY | Facility: HOSPITAL | Age: 51
Setting detail: THERAPIES SERIES
Discharge: HOME | End: 2021-11-04

## 2021-11-04 VITALS
RESPIRATION RATE: 16 BRPM | HEIGHT: 65 IN | TEMPERATURE: 97.8 F | SYSTOLIC BLOOD PRESSURE: 122 MMHG | WEIGHT: 196 LBS | HEART RATE: 80 BPM | DIASTOLIC BLOOD PRESSURE: 74 MMHG | BODY MASS INDEX: 32.65 KG/M2 | OXYGEN SATURATION: 99 %

## 2021-11-04 DIAGNOSIS — M25.551 RIGHT HIP PAIN: Primary | ICD-10-CM

## 2021-11-04 DIAGNOSIS — M62.89 MUSCLE TIGHTNESS: ICD-10-CM

## 2021-11-04 DIAGNOSIS — M79.18 PIRIFORMIS MUSCLE PAIN: ICD-10-CM

## 2021-11-04 DIAGNOSIS — M76.31 ILIOTIBIAL BAND SYNDROME OF RIGHT SIDE: ICD-10-CM

## 2021-11-04 DIAGNOSIS — Z00.00 ENCOUNTER FOR PREVENTIVE HEALTH EXAMINATION: Primary | ICD-10-CM

## 2021-11-04 LAB
CLARITY, POC: CLEAR
COLOR UR: YELLOW
EXPIRATION DATE: ABNORMAL
Lab: ABNORMAL
PH UR: 5.5 [PH] (ref 5–8)
PROT UR STRIP-MCNC: ABNORMAL MG/DL
RBC # UR STRIP: NEGATIVE /UL
SP GR UR: 1.03 (ref 1–1.03)

## 2021-11-04 PROCEDURE — 99396 PREV VISIT EST AGE 40-64: CPT | Performed by: INTERNAL MEDICINE

## 2021-11-04 PROCEDURE — 97161 PT EVAL LOW COMPLEX 20 MIN: CPT

## 2021-11-04 PROCEDURE — 97110 THERAPEUTIC EXERCISES: CPT

## 2021-11-04 PROCEDURE — 81003 URINALYSIS AUTO W/O SCOPE: CPT | Performed by: INTERNAL MEDICINE

## 2021-11-04 NOTE — THERAPY EVALUATION
Outpatient Physical Therapy Ortho Initial Evaluation  [facility]     Patient Name: Savannah Valenzuela  : 1970  MRN: [id number]  Today's Date: 2021      Visit Date: 2021    Patient Active Problem List   Diagnosis   • Migraine   • Allergic rhinitis   • Depression   • Chronic knee pain   • S/P laparoscopic sleeve gastrectomy   • Obesity, Class I, BMI 30-34.9   • Iron deficiency anemia        Past Medical History:   Diagnosis Date   • Allergic rhinitis    • Anemia    • Chronic fatigue 3/27/2020   • Depression    • Encounter for screening for malignant neoplasm of colon 2021    Added automatically from request for surgery [id number]   • Fatigue    • GERD (gastroesophageal reflux disease)    • Hematuria, microscopic 2016   • Hiatal hernia    • Hyperlipidemia    • Hypertension    • Medication management    • Migraine    • Multiple food allergies    • Physical exam, annual         Past Surgical History:   Procedure Laterality Date   •  SECTION     • COLONOSCOPY N/A 2021    Procedure: COLONOSCOPY INTO CECUM AND T.I. WITH COLD BIOPSY POLYPECTOMIES;  Surgeon: Ursula Torres MD;  Location: [facility] ENDOSCOPY;  Service: Gastroenterology;  Laterality: N/A;  PRE- SCREENING  POST- DIVERTICULOSIS, POLYPS, HEMORRHOIDS   • DILATATION AND CURETTAGE     • ENDOSCOPY N/A 2020    Procedure: ESOPHAGOGASTRODUODENOSCOPY WITH BIOPSY;  Surgeon: Cayetano Khan Jr., MD;  Location: [facility] ENDOSCOPY;  Service: General;  Laterality: N/A;  PREOP/ DYSPEPSIA  POSTOP/ GASTRITIS, ESOPHAGITIS, HIATAL HERNIA   • GASTRIC SLEEVE LAPAROSCOPIC N/A 10/5/2020    Procedure: GASTRIC SLEEVE LAPAROSCOPIC WITH paraesophageal hernia repair, lysis of adhesions;  Surgeon: Cayetano Khan Jr., MD;  [place] [facility] [place] [facility] [place]  Service: Bariatric;  Laterality: N/A;   • WISDOM TOOTH EXTRACTION         Visit Dx:     ICD-10-CM ICD-9-CM   1. Right hip pain  M25.551 719.45   2. Piriformis muscle pain  M79.18 729.1    3. Muscle tightness  M62.89 728.9          Patient History     Row Name 11/04/21 1400 11/04/21 1045          History    Chief Complaint Pain  -DIONE Other 1 (comment)  -DIONE (r) patient (t)     Hx Chief Complaint Comment 1  -- Rt hip pain  -DIONE (r) patient (t)     Type of Pain Hip pain  -DIONE Hip pain  -DIONE (r) patient (t)     Date Current Problem(s) Began 10/01/21  -DIONE 10/01/21  -DIONE (r) patient (t)     Brief Description of Current Complaint Savannah Valenzuela is a 51 y.o. female who presents to physical therapy today with primary compliant of R hip pain that began approximately 1 month ago. Patient expresses she has a history of intermittent centralized LBP that began approximately 30 years ago. Patient reports she participated in weight training/walking program (1-1.5 miles) 3x/week prior to Aug/september 2021. Since increased sedentary lifestyle, her symptoms have increased.  Savannah Valenzuela reports difficulty/increased pain with when laying in bed with R hip in ER while sleeping and standing > 30 minutes. Pain relieving factors include straightening and IR R hip to return to resting position.  Savannah Valenzuela primary goal for attending skilled physical therapy is to improve R hip function, stand > 2 hours without R hip pain, sleep through the night without pain or without requiring frequent repositioning.  -DIONE Pain occurs when externally rotating my hip and bring it back straight.  -DIONE (r) patient (t)     Patient/Caregiver Goals Improve mobility; Improve strength; Know what to do to help the symptoms  -DIONE Improve mobility; Improve strength; Know what to do to help the symptoms  -DIONE (r) patient (t)     Hand Dominance right-handed  -DIONE right-handed  -DIONE (r) patient (t)     Occupation/sports/leisure activities Walking and weights  -DIONE Walking and weights  -DIONE (r) patient (t)     Patient seeing anyone else for problem(s)? No  -DOINE No  -DIONE (r) patient (t)     Are you or can you be pregnant No  -DIONE No  -DIONE (r) patient (t)             Pain     Pain Location Hip  -DIONE --     Pain at Present 0  -DIONE --     Pain at Best 0  -DIONE --     Pain at Worst 6  -DIONE --     Pain Frequency Intermittent  -DIONE --     Pain Description Sharp; Shooting  -DIONE --     What Performance Factors Make the Current Problem(s) WORSE? laying in bed with R hip in ER while sleeping and standing > 30 minutes  -DIONE --     What Performance Factors Make the Current Problem(s) BETTER? straightening and IR R hip  -DIONE --     Tolerance Time- Standing 30 mins  -DIONE --     Is your sleep disturbed? Yes  -DIONE --            Fall Risk Assessment    Any falls in the past year: Yes  -DIONE Yes  -DIONE (r) patient (t)     Factors that contributed to the fall: Lost balance  -DIONE Lost balance  -DIONE (r) patient (t)            Services    Prior Rehab/Home Health Experiences No  -DIONE No  -DIONE (r) patient (t)     Are you currently receiving Home Health services No  -DIONE No  -DIONE (r) patient (t)     Do you plan to receive Home Health services in the near future No  -DIONE No  -DIONE (r) patient (t)            Daily Activities    Primary Language English  -DIONE English  -DIONE (r) patient (t)     Are you able to read Yes  -DIONE Yes  -DOINE (r) patient (t)     Are you able to write Yes  -DIONE Yes  -DIONE (r) patient (t)     How does patient learn best? Listening; Reading; Demonstration  -DIONE Listening; Reading; Demonstration  -DIONE (r) patient (t)     Pt Participated in POC and Goals Yes  -DIONE --            Safety    Are you being hurt, hit, or frightened by anyone at home or in your life? No  -DIONE No  -DIONE (r) patient (t)     Are you being neglected by a caregiver No  -DIONE No  -DIONE (r) patient (t)     Have you had any of the following issues with Depression  -DIONE Depression  -DIONE (r) patient (t)           User Key  (r) = Recorded By, (t) = Taken By, (c) = Cosigned By    Initials Name Provider Type    Ann Marie Blackburn, PT Physical Therapist    patient Savannah Valenzuela --                 PT Ortho     Row Name 11/04/21 1400       Subjective  Pain    Able to rate subjective pain? yes  -DIONE    Pre-Treatment Pain Level 0  -DIONE       Posture/Observations    Posture- WNL Posture is WNL  -DIONE       Special Tests/Palpation    Special Tests/Palpation Hip; Knee  -DIONE       Hip/Thigh Palpation    Hip/Thigh Palpation? Yes  -DIONE    Gluteus Medius Right:; Tender; Guarded/taut  -DIONE    Piriformis Right:; Tender; Guarded/taut  -DIONE       Hip Accessory Motions    Hip Accessory Motions Tested? Yes  -DIONE    Anterior glide WNL  -DIONE       Hip Special Tests    Trendelenberg sign (gluteus medius weakness) Bilateral:; Negative  -DIONE    ANDERS (hip vs SI pathology) Right:; Positive  -DIONE    Allen’s test (tightness of ITB) Right:; Positive  -DIONE    Ely’s test (rectus femoris tightness) Bilateral:; Positive  -DIONE    Hip scour test (labral vs hip pathology) Negative  -DIONE       Knee Special Tests    Noble compression test (distal ITB syndrome) Bilateral:; Negative  -DIONE       General ROM    RT Lower Ext Rt Hip External Rotation; Rt Hip Internal Rotation  -DIONE    LT Lower Ext Lt Hip External Rotation; Lt Hip Internal Rotation  -DIONE       Right Lower Ext    Rt Hip External Rotation AROM 35  -DIONE    Rt Hip Internal Rotation AROM 30  -DIONE    RT Lower Extremity Comments R hip ROM WNL in all cardinal planes  -DIONE       Left Lower Ext    Lt Hip External Rotation AROM 35  -DIONE    Lt Hip Internal Rotation AROM 30  -DIONE       MMT (Manual Muscle Testing)    Rt Lower Ext Rt Hip Flexion; Rt Hip Extension; Rt Hip ABduction; Rt Hip Internal (Medial) Rotation; Rt Hip External (Lateral) Rotation; Rt Knee Extension; Rt Knee Flexion; Rt Ankle Dorsiflexion  -IDONE    Lt Lower Ext Lt Hip Flexion; Lt Hip Extension; Lt Hip ABduction; Lt Hip Internal (Medial) Rotation; Lt Hip External (Lateral) Rotation; Lt Knee Extension; Lt Knee Flexion; Lt Ankle Dorsiflexion  -DIONE       MMT Right Lower Ext    Rt Hip Flexion MMT, Gross Movement (4+/5) good plus  -DIONE    Rt Hip Extension MMT, Gross Movement (4-/5) good minus  -DIONE    Rt Hip  ABduction MMT, Gross Movement (4/5) good  -DIONE    Rt Hip Internal (Medial) Rotation MMT, Gross Movement (4/5) good  -DIONE    Rt Hip External (Lateral) Rotation MMT, Gross Movement (4/5) good  -DIONE    Rt Knee Extension MMT, Gross Movement (5/5) normal  -DIONE    Rt Knee Flexion MMT, Gross Movement (5/5) normal  -DIONE    Rt Ankle Dorsiflexion MMT, Gross Movement (5/5) normal  -DIONE       MMT Left Lower Ext    Lt Hip Flexion MMT, Gross Movement (5/5) normal  -DIONE    Lt Hip Extension MMT, Gross Movement (4/5) good  -DIONE    Lt Hip ABduction MMT, Gross Movement (4/5) good  -DIONE    Lt Hip Internal (Medial) Rotation MMT, Gross Movement (4+/5) good plus  -DIONE    Lt Hip External (Lateral) Rotation MMT, Gross Movement (4+/5) good plus  -DIONE    Lt Knee Extension MMT, Gross Movement (5/5) normal  -DIONE    Lt Knee Flexion MMT, Gross Movement (5/5) normal  -DIONE    Lt Ankle Dorsiflexion MMT, Gross Movement (5/5) normal  -DIONE       Sensation    Sensation WNL? WNL  -DIONE       Flexibility    Flexibility Tested? Lower Extremity  -DIONE       Lower Extremity Flexibility    Hamstrings Bilateral:; Moderately limited  -DIONE    Quadriceps Bilateral:; Mildly limited; Moderately limited  -DIONE    Hip External Rotators Right:; Mildly limited; Moderately limited  -DIONE       Balance Skills Training    SLS B < 5 sec  -DIONE          User Key  (r) = Recorded By, (t) = Taken By, (c) = Cosigned By    Initials Name Provider Type    Ann Marie Blackburn, PT Physical Therapist                            Therapy Education  Education Details: Educated on anatomy/pathology, muscular tightness related to specific muscles and relationship to hip weakness, therapy expectations, evaluation findings, POC and HEP  Given: HEP, Symptoms/condition management, Pain management  Program: New  How Provided: Verbal, Demonstration, Written  Provided to: Patient  Level of Understanding: Teach back education performed, Verbalized  98755 - PT Self Care/Mgmt Minutes: 5      PT OP Goals     Row Name  11/04/21 1400          PT Short Term Goals    STG Date to Achieve 12/04/21  -DIONE     STG 1 Pt will be independent with initial HEP to improve strength/muscular tightness and decrease R hip pain.  -DIONE     STG 1 Progress New  -     STG 2 Patient will improve standing tolerance from 30 min to >45 min without R hip pain to improve participation in community activities.  -     STG 2 Progress New  -     STG 3 Patient will report 50% reduction in sleep disturbances related to R hip pain to improve overall sleep quality of life.  -     STG 3 Progress New  [facility]            Long Term Goals    LTG Date to Achieve 01/03/22  -DIONE     LTG 1 Pt will be independent with advanced HEP to improve strength/muscular tightness and decrease R hip pain.  -     LTG 1 Progress New  -     LTG 2 Patient will improve ability to sleep through the night without sleep disturbances related to R hip pain to improve overall sleep quality and quality of life  -     LTG 2 Progress New  -     LTG 3 Patient will report walking 1 mile without R hip pain/stiffness to return to walking program.  -     LTG 3 Progress New  -     LTG 4 Patient will report returning to home workout routine for 1 week without R hip pain to return to PLOF.  -     LTG 4 Progress New  -     LTG 5 Patient will improve standing tolerance from 30 min to >2 hours without R hip pain to improve participation in community activities.  -     LTG 5 Progress New  [facility]            Time Calculation    PT Goal Re-Cert Due Date 02/02/22  -           User Key  (r) = Recorded By, (t) = Taken By, (c) = Cosigned By    Initials Name Provider Type    Ann Marie Blackburn, PT Physical Therapist                 PT Assessment/Plan     Row Name 11/04/21 1400          PT Assessment    Functional Limitations Impaired locomotion; Limitations in functional capacity and performance; Performance in leisure activities; Limitation in home management  -DIONE     Impairments Balance;  Impaired flexibility; Locomotion; Motor function; Muscle strength; Pain  -DIONE     Assessment Comments Savannah Valenzuela is a 51 y.o. female referred to physical therapy for R sided IT band syndrome. She presents with a stable clinical presentation, along with  comorbidities of chronic LBP and personal factors of sedentary lifestyle that may impact her progress in the plan of care. Pt presents today with generalized B hip weakness (R>L), TTP over R glute med/piriformis, R hip ROM WNL, and impaired flexibility of R hamstring/piriformis/quadriceps. Special test indicate + ANDERS, + Allen's, + Ely's all on R. Her signs and symptoms are consistent with a physical therapy diagnosis of R hip pain due to R hip weakness and muscular tightness. The previous impairments limit her ability to sleep through the night and stand > 30 minutes without R hip pain/stiffness. Pt will benefit from skilled PT to address the previous impairments and return to PLOF.  -DIONE     Please refer to paper survey for additional self-reported information Yes  -DIONE     Rehab Potential Good  -DIONE     Patient/caregiver participated in establishment of treatment plan and goals Yes  -DIONE     Patient would benefit from skilled therapy intervention Yes  -DIONE            PT Plan    PT Frequency 2x/week  -DIONE     Predicted Duration of Therapy Intervention (PT) 4 weeks  -DIONE     Planned CPT's? PT EVAL LOW COMPLEXITY: 40677; PT RE-EVAL: 17236; PT THER PROC EA 15 MIN: 62818; PT THER ACT EA 15 MIN: 58126; PT MANUAL THERAPY EA 15 MIN: 65058; PT NEUROMUSC RE-EDUCATION EA 15 MIN: 07948; PT GAIT TRAINING EA 15 MIN: 54887; PT SELF CARE/HOME MGMT/TRAIN EA 15: 26969; PT HOT OR COLD PACK TREAT MCARE  -DIONE     PT Plan Comments review response to HEP, add NuStep to warm up, perform uni BKFO, SL clam shell, lateral stepping, hip ext/abd, STM to R hip girdle  -DIONE           User Key  (r) = Recorded By, (t) = Taken By, (c) = Cosigned By    Initials Name Provider Type    DIONE Rodriguez  Ann Marie Breaux, PT Physical Therapist                   OP Exercises     Row Name 11/04/21 1400             Subjective Pain    Able to rate subjective pain? yes  -DIONE      Pre-Treatment Pain Level 0  -DIONE              Total Minutes    72507 - PT Therapeutic Exercise Minutes 25  -DIONE              Exercise 1    Exercise Name 1 NuStep  -DIONE      Additional Comments next visit  -DIONE              Exercise 2    Exercise Name 2 LTR  -DIONE      Cueing 2 Verbal  -DIONE      Sets 2 1  -DIONE      Reps 2 10  -DIONE      Time 2 5s  -DIONE              Exercise 3    Exercise Name 3 supine piriformis stretch  -DIONE      Cueing 3 Verbal; Demo  -DIONE      Reps 3 3  -DIONE      Time 3 20s  -DIONE              Exercise 4    Exercise Name 4 figure 4 piriformis stretch  -DIONE      Cueing 4 Verbal; Tactile  -DIONE      Reps 4 3  -DIONE      Time 4 20s  -DIONE              Exercise 5    Exercise Name 5 PPT  -DIONE      Cueing 5 Verbal; Tactile  -DIONE      Sets 5 1  -DIONE      Reps 5 10  -DIONE      Time 5 5s  -DIONE              Exercise 6    Exercise Name 6 hamstring stretch  -DIONE      Cueing 6 Verbal; Demo  -DIONE      Reps 6 3  -DIONE      Time 6 20s  -DIONE      Additional Comments EOB  -DIONE              Exercise 7    Exercise Name 7 glute bridge  -DIONE      Cueing 7 Verbal  -DIONE      Sets 7 1  -DIONE      Reps 7 15  -DIONE      Additional Comments with PPT  -DIONE              Exercise 8    Exercise Name 8 BKFO  -DIOEN      Cueing 8 Verbal; Tactile  -DIONE      Sets 8 1  -DIONE      Reps 8 15  -DIONE            User Key  (r) = Recorded By, (t) = Taken By, (c) = Cosigned By    Initials Name Provider Type    Ann Marie Blackburn, PT Physical Therapist                              Outcome Measure Options: Lower Extremity Functional Scale (LEFS)  Lower Extremity Functional Index  Any of your usual work, housework or school activities: No difficulty  Your usual hobbies, recreational or sporting activities: No difficulty  Getting into or out of the bath: No difficulty  Walking between rooms: No difficulty  Putting on  your shoes or socks: A little bit of difficulty  Squatting: No difficulty  Lifting an object, like a bag of groceries from the floor: No difficulty  Performing light activities around your home: No difficulty  Performing heavy activities around your home: No difficulty  Getting into or out of a car: No difficulty  Walking 2 blocks: No difficulty  Walking a mile: No difficulty  Going up or down 10 stairs (about 1 flight of stairs): No difficulty  Standing for 1 hour: A little bit of difficulty  Sitting for 1 hour: No difficulty  Running on even ground: No difficulty  Running on uneven ground: No difficulty  Making sharp turns while running fast: No difficulty  Hopping: A little bit of difficulty  Rolling over in bed: A little bit of difficulty  Total: 76      Time Calculation:     Start Time: 1425  Stop Time: 1510  Time Calculation (min): 45 min  Timed Charges  26845 - PT Therapeutic Exercise Minutes: 25  63852 - PT Self Care/Mgmt Minutes: 5  Untimed Charges  PT Eval/Re-eval Minutes: 15  Total Minutes  Timed Charges Total Minutes: 30  Untimed Charges Total Minutes: 15   Total Minutes: 45     Therapy Charges for Today     Code Description Service Date Service Provider Modifiers Qty    07396344718 HC PT THER PROC EA 15 MIN 11/4/2021 Ann Marie Rodriguez, PT GP 2    [id number] HC PT EVAL LOW COMPLEXITY 1 11/4/2021 Ann Marie Rodriguez, PT GP 1          PT G-Codes  Outcome Measure Options: Lower Extremity Functional Scale (LEFS)  Total: 76         Ann Marie Rodriguez, PT  11/4/2021

## 2021-11-04 NOTE — PATIENT INSTRUCTIONS
Exercising to Stay Healthy  To become healthy and stay healthy, it is recommended that you do moderate-intensity and vigorous-intensity exercise. You can tell that you are exercising at a moderate intensity if your heart starts beating faster and you start breathing faster but can still hold a conversation. You can tell that you are exercising at a vigorous intensity if you are breathing much harder and faster and cannot hold a conversation while exercising.  Exercising regularly is important. It has many health benefits, such as:  · Improving overall fitness, flexibility, and endurance.  · Increasing bone density.  · Helping with weight control.  · Decreasing body fat.  · Increasing muscle strength.  · Reducing stress and tension.  · Improving overall health.  How often should I exercise?  Choose an activity that you enjoy, and set realistic goals. Your health care provider can help you make an activity plan that works for you.  Exercise regularly as told by your health care provider. This may include:  · Doing strength training two times a week, such as:  ? Lifting weights.  ? Using resistance bands.  ? Push-ups.  ? Sit-ups.  ? Yoga.  · Doing a certain intensity of exercise for a given amount of time. Choose from these options:  ? A total of 150 minutes of moderate-intensity exercise every week.  ? A total of 75 minutes of vigorous-intensity exercise every week.  ? A mix of moderate-intensity and vigorous-intensity exercise every week.  Children, pregnant women, people who have not exercised regularly, people who are overweight, and older adults may need to talk with a health care provider about what activities are safe to do. If you have a medical condition, be sure to talk with your health care provider before you start a new exercise program.  What are some exercise ideas?  Moderate-intensity exercise ideas include:  · Walking 1 mile (1.6 km) in about 15  minutes.  · Biking.  · Hiking.  · Golfing.  · Dancing.  · Water aerobics.  Vigorous-intensity exercise ideas include:  · Walking 4.5 miles (7.2 km) or more in about 1 hour.  · Jogging or running 5 miles (8 km) in about 1 hour.  · Biking 10 miles (16.1 km) or more in about 1 hour.  · Lap swimming.  · Roller-skating or in-line skating.  · Cross-country skiing.  · Vigorous competitive sports, such as football, basketball, and soccer.  · Jumping rope.  · Aerobic dancing.  What are some everyday activities that can help me to get exercise?  · Yard work, such as:  ? Pushing a .  ? Raking and bagging leaves.  · Washing your car.  · Pushing a stroller.  · Shoveling snow.  · Gardening.  · Washing windows or floors.  How can I be more active in my day-to-day activities?  · Use stairs instead of an elevator.  · Take a walk during your lunch break.  · If you drive, park your car farther away from your work or school.  · If you take public transportation, get off one stop early and walk the rest of the way.  · Stand up or walk around during all of your indoor phone calls.  · Get up, stretch, and walk around every 30 minutes throughout the day.  · Enjoy exercise with a friend. Support to continue exercising will help you keep a regular routine of activity.  What guidelines can I follow while exercising?  · Before you start a new exercise program, talk with your health care provider.  · Do not exercise so much that you hurt yourself, feel dizzy, or get very short of breath.  · Wear comfortable clothes and wear shoes with good support.  · Drink plenty of water while you exercise to prevent dehydration or heat stroke.  · Work out until your breathing and your heartbeat get faster.  Where to find more information  · [place] Department of Health and Human Services: www.hhs.gov  · Centers for Disease Control and Prevention (CDC): www.cdc.gov  Summary  · Exercising regularly is important. It will improve your overall fitness,  flexibility, and endurance.  · Regular exercise also will improve your overall health. It can help you control your weight, reduce stress, and improve your bone density.  · Do not exercise so much that you hurt yourself, feel dizzy, or get very short of breath.  · Before you start a new exercise program, talk with your health care provider.  This information is not intended to replace advice given to you by your health care provider. Make sure you discuss any questions you have with your health care provider.  Document Revised: 11/30/2018 Document Reviewed: 11/08/2018  Elsevier Patient Education © 2021 [employer]  Calorie Counting for Weight Loss  Calories are units of energy. Your body needs a certain number of calories from food to keep going throughout the day. When you eat or drink more calories than your body needs, your body stores the extra calories mostly as fat. When you eat or drink fewer calories than your body needs, your body burns fat to get the energy it needs.  Calorie counting means keeping track of how many calories you eat and drink each day. Calorie counting can be helpful if you need to lose weight. If you eat fewer calories than your body needs, you should lose weight. Ask your health care provider what a healthy weight is for you.  For calorie counting to work, you will need to eat the right number of calories each day to lose a healthy amount of weight per week. A dietitian can help you figure out how many calories you need in a day and will suggest ways to reach your calorie goal.  · A healthy amount of weight to lose each week is usually 1-2 lb (0.5-0.9 kg). This usually means that your daily calorie intake should be reduced by 500-750 calories.  · Eating 1,200-1,500 calories a day can help most women lose weight.  · Eating 1,500-1,800 calories a day can help most men lose weight.  What do I need to know about calorie counting?  Work with your health care provider or dietitian to  determine how many calories you should get each day. To meet your daily calorie goal, you will need to:  · Find out how many calories are in each food that you would like to eat. Try to do this before you eat.  · Decide how much of the food you plan to eat.  · Keep a food log. Do this by writing down what you ate and how many calories it had.  To successfully lose weight, it is important to balance calorie counting with a healthy lifestyle that includes regular activity.  Where do I find calorie information?    The number of calories in a food can be found on a Nutrition Facts label. If a food does not have a Nutrition Facts label, try to look up the calories online or ask your dietitian for help.  Remember that calories are listed per serving. If you choose to have more than one serving of a food, you will have to multiply the calories per serving by the number of servings you plan to eat. For example, the label on a package of bread might say that a serving size is 1 slice and that there are 90 calories in a serving. If you eat 1 slice, you will have eaten 90 calories. If you eat 2 slices, you will have eaten 180 calories.  How do I keep a food log?  After each time that you eat, record the following in your food log as soon as possible:  · What you ate. Be sure to include toppings, sauces, and other extras on the food.  · How much you ate. This can be measured in cups, ounces, or number of items.  · How many calories were in each food and drink.  · The total number of calories in the food you ate.  Keep your food log near you, such as in a pocket-sized notebook or on an laura or website on your mobile phone. Some programs will calculate calories for you and show you how many calories you have left to meet your daily goal.  What are some portion-control tips?  · Know how many calories are in a serving. This will help you know how many servings you can have of a certain food.  · Use a measuring cup to measure serving  sizes. You could also try weighing out portions on a kitchen scale. With time, you will be able to estimate serving sizes for some foods.  · Take time to put servings of different foods on your favorite plates or in your favorite bowls and cups so you know what a serving looks like.  · Try not to eat straight from a food's packaging, such as from a bag or box. Eating straight from the package makes it hard to see how much you are eating and can lead to overeating. Put the amount you would like to eat in a cup or on a plate to make sure you are eating the right portion.  · Use smaller plates, glasses, and bowls for smaller portions and to prevent overeating.  · Try not to multitask. For example, avoid watching TV or using your computer while eating. If it is time to eat, sit down at a table and enjoy your food. This will help you recognize when you are full. It will also help you be more mindful of what and how much you are eating.  What are tips for following this plan?  Reading food labels  · Check the calorie count compared with the serving size. The serving size may be smaller than what you are used to eating.  · Check the source of the calories. Try to choose foods that are high in protein, fiber, and vitamins, and low in saturated fat, trans fat, and sodium.  Shopping  · Read nutrition labels while you shop. This will help you make healthy decisions about which foods to buy.  · Pay attention to nutrition labels for low-fat or fat-free foods. These foods sometimes have the same number of calories or more calories than the full-fat versions. They also often have added sugar, starch, or salt to make up for flavor that was removed with the fat.  · Make a grocery list of lower-calorie foods and stick to it.  Cooking  · Try to cook your favorite foods in a healthier way. For example, try baking instead of frying.  · Use low-fat dairy products.  Meal planning  · Use more fruits and vegetables. One-half of your plate  should be fruits and vegetables.  · Include lean proteins, such as chicken, turkey, and fish.  Lifestyle  Each week, aim to do one of the following:  · 150 minutes of moderate exercise, such as walking.  · 75 minutes of vigorous exercise, such as running.  General information  · Know how many calories are in the foods you eat most often. This will help you calculate calorie counts faster.  · Find a way of tracking calories that works for you. Get creative. Try different apps or programs if writing down calories does not work for you.  What foods should I eat?    · Eat nutritious foods. It is better to have a nutritious, high-calorie food, such as an avocado, than a food with few nutrients, such as a bag of potato chips.  · Use your calories on foods and drinks that will fill you up and will not leave you hungry soon after eating.  ? Examples of foods that fill you up are nuts and nut butters, vegetables, lean proteins, and high-fiber foods such as whole grains. High-fiber foods are foods with more than 5 g of fiber per serving.  · Pay attention to calories in drinks. Low-calorie drinks include water and unsweetened drinks.  The items listed above may not be a complete list of foods and beverages you can eat. Contact a dietitian for more information.  What foods should I limit?  Limit foods or drinks that are not good sources of vitamins, minerals, or protein or that are high in unhealthy fats. These include:  · Candy.  · Other sweets.  · Sodas, specialty coffee drinks, alcohol, and juice.  The items listed above may not be a complete list of foods and beverages you should avoid. Contact a dietitian for more information.  How do I count calories when eating out?  · Pay attention to portions. Often, portions are much larger when eating out. Try these tips to keep portions smaller:  ? Consider sharing a meal instead of getting your own.  ? If you get your own meal, eat only half of it. Before you start eating, ask for  a container and put half of your meal into it.  ? When available, consider ordering smaller portions from the menu instead of full portions.  · Pay attention to your food and drink choices. Knowing the way food is cooked and what is included with the meal can help you eat fewer calories.  ? If calories are listed on the menu, choose the lower-calorie options.  ? Choose dishes that include vegetables, fruits, whole grains, low-fat dairy products, and lean proteins.  ? Choose items that are boiled, broiled, grilled, or steamed. Avoid items that are buttered, battered, fried, or served with cream sauce. Items labeled as crispy are usually fried, unless stated otherwise.  ? Choose water, low-fat milk, unsweetened iced tea, or other drinks without added sugar. If you want an alcoholic beverage, choose a lower-calorie option, such as a glass of wine or light beer.  ? Ask for dressings, sauces, and syrups on the side. These are usually high in calories, so you should limit the amount you eat.  ? If you want a salad, choose a garden salad and ask for grilled meats. Avoid extra toppings such as larsen, cheese, or fried items. Ask for the dressing on the side, or ask for olive oil and vinegar or lemon to use as dressing.  · Estimate how many servings of a food you are given. Knowing serving sizes will help you be aware of how much food you are eating at restaurants.  Where to find more information  · Centers for Disease Control and Prevention: www.cdc.gov  · [place] Department of Agriculture: myplate.gov  Summary  · Calorie counting means keeping track of how many calories you eat and drink each day. If you eat fewer calories than your body needs, you should lose weight.  · A healthy amount of weight to lose per week is usually 1-2 lb (0.5-0.9 kg). This usually means reducing your daily calorie intake by 500-750 calories.  · The number of calories in a food can be found on a Nutrition Facts label. If a food does not have a  Nutrition Facts label, try to look up the calories online or ask your dietitian for help.  · Use smaller plates, glasses, and bowls for smaller portions and to prevent overeating.  · Use your calories on foods and drinks that will fill you up and not leave you hungry shortly after a meal.  This information is not intended to replace advice given to you by your health care provider. Make sure you discuss any questions you have with your health care provider.  Document Revised: 01/28/2021 Document Reviewed: 01/28/2021  [email or web address] Patient Education © 2021 [employer]  BMI for Adults  What is BMI?  Body mass index (BMI) is a number that is calculated from a person's weight and height. BMI can help estimate how much of a person's weight is composed of fat. BMI does not measure body fat directly. Rather, it is an alternative to procedures that directly measure body fat, which can be difficult and expensive.  BMI can help identify people who may be at higher risk for certain medical problems.  What are BMI measurements used for?  BMI is used as a screening tool to identify possible weight problems. It helps determine whether a person is obese, overweight, a healthy weight, or underweight.  BMI is useful for:  · Identifying a weight problem that may be related to a medical condition or may increase the risk for medical problems.  · Promoting changes, such as changes in diet and exercise, to help reach a healthy weight. BMI screening can be repeated to see if these changes are working.  How is BMI calculated?  BMI involves measuring your weight in relation to your height. Both height and weight are measured, and the BMI is calculated from those numbers. This can be done either in English ([place] or metric measurements. Note that charts and online BMI calculators are available to help you find your BMI quickly and easily without having to do these calculations yourself.  To calculate your BMI in English ([place]  "measurements:    1. Measure your weight in pounds (lb).  2. Multiply the number of pounds by 703.  ? For example, for a person who weighs 180 lb, multiply that number by 703, which equals 126,540.  3. Measure your height in inches. Then multiply that number by itself to get a measurement called \"inches squared.\"  ? For example, for a person who is 70 inches tall, the \"inches squared\" measurement is 70 inches x 70 inches, which equals 4,900 inches squared.  4. Divide the total from step 2 (number of lb x 703) by the total from step 3 (inches squared): 126,540 ÷ 4,900 = 25.8. This is your BMI.    To calculate your BMI in metric measurements:  1. Measure your weight in kilograms (kg).  2. Measure your height in meters (m). Then multiply that number by itself to get a measurement called \"meters squared.\"  ? For example, for a person who is 1.75 m tall, the \"meters squared\" measurement is 1.75 m x 1.75 m, which is equal to 3.1 meters squared.  3. Divide the number of kilograms (your weight) by the meters squared number. In this example: 70 ÷ 3.1 = 22.6. This is your BMI.  What do the results mean?  BMI charts are used to identify whether you are underweight, normal weight, overweight, or obese. The following guidelines will be used:  · Underweight: BMI less than 18.5.  · Normal weight: BMI between 18.5 and 24.9.  · Overweight: BMI between 25 and 29.9.  · Obese: BMI of 30 or above.  Keep these notes in mind:  · Weight includes both fat and muscle, so someone with a muscular build, such as an athlete, may have a BMI that is higher than 24.9. In cases like these, BMI is not an accurate measure of body fat.  · To determine if excess body fat is the cause of a BMI of 25 or higher, further assessments may need to be done by a health care provider.  · BMI is usually interpreted in the same way for men and women.  Where to find more information  For more information about BMI, including tools to quickly calculate your BMI, go to " these websites:  · Centers for Disease Control and Prevention: www.cdc.gov  · American Heart Association: www.heart.org  · National Heart, Lung, and Blood [place]: www.nhlbi.nih.gov  Summary  · Body mass index (BMI) is a number that is calculated from a person's weight and height.  · BMI may help estimate how much of a person's weight is composed of fat. BMI can help identify those who may be at higher risk for certain medical problems.  · BMI can be measured using English measurements or metric measurements.  · BMI charts are used to identify whether you are underweight, normal weight, overweight, or obese.  This information is not intended to replace advice given to you by your health care provider. Make sure you discuss any questions you have with your health care provider.  Document Revised: 09/09/2020 Document Reviewed: 07/17/2020  Elsevier Patient Education © 2021 [employer]

## 2021-11-04 NOTE — PROGRESS NOTES
Subjective   Savannah Valenzuela is a 51 y.o. female.     Chief Complaint   Patient presents with   • Annual Exam   • Hip Pain     hip pain, locked         In for annual preventative exam.  Sleeps about 6 hours per night.  No formal exercise.  Energy is OK.  Diet is poor.    Hip Pain   The incident occurred more than 1 week ago. There was no injury mechanism. The pain is present in the right hip.        The following portions of the patient's history were reviewed and updated as appropriate: allergies, current medications, past social history and problem list.    HISTORY  Outpatient Medications Marked as Taking for the 11/4/21 encounter (Office Visit) with Mathew Lemos MD   Medication Sig Dispense Refill   • buPROPion XL (WELLBUTRIN XL) 150 MG 24 hr tablet Take 1 tablet by mouth Daily. 90 tablet 1   • Calcium 250 MG capsule Take 500 mg by mouth 3 (Three) Times a Day.     • Diclofenac Epolamine (FLECTOR) 1.3 % patch patch Apply 1 patch topically to the appropriate area as directed 2 (Two) Times a Day. 60 patch 0   • ferrous sulfate 325 (65 FE) MG tablet Take 1 tablet by mouth Daily With Breakfast for 90 days. Can take with food to help prevent nausea. 30 tablet 2   • FLUoxetine (PROzac) 40 MG capsule Take 1 capsule by mouth Daily. 90 capsule 1   • multivitamin with minerals (MULTIVITAMIN ADULT PO) Take 1 tablet by mouth Daily.       Social History     Socioeconomic History   • Marital status:    Tobacco Use   • Smoking status: Never Smoker   • Smokeless tobacco: Never Used   Vaping Use   • Vaping Use: Never used   Substance and Sexual Activity   • Alcohol use: No     Comment: Kay: rare   • Drug use: No   • Sexual activity: Not Currently     Partners: Male     Family History   Problem Relation Age of Onset   • Heart disease Father    • Hyperlipidemia Father    • Malig Hyperthermia Neg Hx      Past Medical History:   Diagnosis Date   • Allergic rhinitis    • Anemia    • Chronic fatigue 3/27/2020   •  Depression    • Encounter for screening for malignant neoplasm of colon 2021    Added automatically from request for surgery [id number]   • Fatigue    • GERD (gastroesophageal reflux disease)    • Hematuria, microscopic 2016   • Hiatal hernia    • Hyperlipidemia    • Hypertension    • Medication management    • Migraine    • Multiple food allergies    • Physical exam, annual      Past Surgical History:   Procedure Laterality Date   •  SECTION     • COLONOSCOPY N/A 2021    Procedure: COLONOSCOPY INTO CECUM AND T.I. WITH COLD BIOPSY POLYPECTOMIES;  Surgeon: Ursula Torres MD;  Location: [facility] ENDOSCOPY;  Service: Gastroenterology;  Laterality: N/A;  PRE- SCREENING  POST- DIVERTICULOSIS, POLYPS, HEMORRHOIDS   • DILATATION AND CURETTAGE     • ENDOSCOPY N/A 2020    Procedure: ESOPHAGOGASTRODUODENOSCOPY WITH BIOPSY;  Surgeon: Cayetano Khan Jr., MD;  Location: [facility] ENDOSCOPY;  Service: General;  Laterality: N/A;  PREOP/ DYSPEPSIA  POSTOP/ GASTRITIS, ESOPHAGITIS, HIATAL HERNIA   • GASTRIC SLEEVE LAPAROSCOPIC N/A 10/5/2020    Procedure: GASTRIC SLEEVE LAPAROSCOPIC WITH paraesophageal hernia repair, lysis of adhesions;  Surgeon: Cayetano Khan Jr., MD;  Location: [facility] OR [facility];  Service: Bariatric;  Laterality: N/A;   • WISDOM TOOTH EXTRACTION         Review of Systems   Constitutional: Negative for appetite change, chills, diaphoresis, fatigue, fever and unexpected weight change.   Respiratory: Negative for cough, chest tightness and wheezing.    Cardiovascular: Negative for leg swelling.   Gastrointestinal: Negative for abdominal pain, anal bleeding, blood in stool, constipation, diarrhea, nausea, rectal pain and vomiting.   Endocrine: Negative for cold intolerance, heat intolerance and polyuria.   Genitourinary: Negative for difficulty urinating, dysuria, flank pain, frequency, hematuria and urgency.   Musculoskeletal: Negative for arthralgias, back pain and myalgias.    Allergic/Immunologic: Positive for environmental allergies.   Neurological: Negative for dizziness, syncope, speech difficulty, weakness and light-headedness.   Hematological: Does not bruise/bleed easily.   Psychiatric/Behavioral: Positive for dysphoric mood. Negative for agitation, confusion and sleep disturbance. The patient is not nervous/anxious.        Objective   Vitals:    11/04/21 0832   BP: 122/74   Pulse: 80   Resp: 16   Temp: 97.8 °F (36.6 °C)   SpO2: 99%          11/04/21 0832   Weight: 88.9 kg (196 lb)    [unfilled]  Body mass index is 33.06 kg/m².      Physical Exam   Constitutional: She is oriented to person, place, and time. She appears well-developed.   HENT:   Head: Normocephalic and atraumatic.   Right Ear: External ear normal.   Left Ear: External ear normal.   Nose: Nose normal.   Eyes: Pupils are equal, round, and reactive to light. Conjunctivae are normal.   Neck: No JVD present. No thyromegaly present.   Cardiovascular: Normal rate, regular rhythm and normal heart sounds. Exam reveals no gallop.   No murmur heard.  Pulmonary/Chest: Effort normal and breath sounds normal. No respiratory distress. She has no wheezes. She has no rales.   Abdominal: Soft. Normal appearance and bowel sounds are normal. She exhibits no distension and no mass. There is no abdominal tenderness. There is no guarding. No hernia.   Musculoskeletal: Normal range of motion.   Lymphadenopathy:     She has no cervical adenopathy.   Neurological: She is alert and oriented to person, place, and time. She displays normal reflexes. No cranial nerve deficit. Coordination normal.   Skin: Skin is warm and dry.   Psychiatric: Her behavior is normal. Mood, judgment and thought content normal.   Nursing note and vitals reviewed.        Problems Addressed this Visit     None      Visit Diagnoses     Encounter for preventive health examination    -  Primary    Relevant Orders    POCT urinalysis dipstick, automated (Completed)       Diagnoses       Codes Comments    Encounter for preventive health examination    -  Primary ICD-10-CM: Z00.00  ICD-9-CM: V70.0         Assessment/Plan   In today for annual preventative exam and depression.  Blood pressure is excellent.  Depression is doing very well.  Lab work today October 2021 including CBC, CMP, UA, iron, ferritin, folic acid looks great other than mild developing anemia and low iron levels.  That is likely related to her gastric surgery.  Getting ready to have an iron infusion.  She does take Feosol once daily.  Recent colonoscopy was normal.  She has no reflux since her gastric surgery.  She is developing some menopausal symptoms of hot flashes.  In the past few months she has developed some pain in her right hip.  Mainly in bed.  Not with ambulation.  Mainly when she externally rotates her hip.  Her exam is unremarkable today.  Motion is good.  This may be ITB syndrome.  We will start with some physical therapy.  Recheck annually.    Prevention counseling was performed today. The counseling performed was routine health maintenance topics including BMI and exercise.    The above information was reviewed again today 11/04/21.  It continues to be accurate as reflected above and is unchanged.  History, physical and review of systems all reviewed and are unchanged.  Medications were reviewed today and continue the current dosing.    PPE today includes face mask and eye shield.         Dragon disclaimer:   Much of this encounter note is an electronic transcription/translation of spoken language to printed text. The electronic translation of spoken language may permit erroneous, or at times, nonsensical words or phrases to be inadvertently transcribed; Although I have reviewed the note for such errors, some may still exist.

## 2021-11-05 ENCOUNTER — HOSPITAL ENCOUNTER (OUTPATIENT)
Dept: INFUSION THERAPY | Facility: HOSPITAL | Age: 51
Discharge: HOME | End: 2021-11-05
Admitting: NURSE PRACTITIONER

## 2021-11-05 VITALS
RESPIRATION RATE: 20 BRPM | SYSTOLIC BLOOD PRESSURE: 120 MMHG | OXYGEN SATURATION: 100 % | TEMPERATURE: 97.1 F | DIASTOLIC BLOOD PRESSURE: 77 MMHG | HEART RATE: 66 BPM

## 2021-11-05 DIAGNOSIS — D50.9 IRON DEFICIENCY ANEMIA, UNSPECIFIED IRON DEFICIENCY ANEMIA TYPE: Primary | ICD-10-CM

## 2021-11-05 PROCEDURE — 25010000002 IRON SUCROSE PER 1 MG: Performed by: NURSE PRACTITIONER

## 2021-11-05 PROCEDURE — 96365 THER/PROPH/DIAG IV INF INIT: CPT

## 2021-11-05 RX ORDER — FAMOTIDINE 20 MG/1
20 TABLET, FILM COATED ORAL ONCE
Status: CANCELLED | OUTPATIENT
Start: 2021-11-05

## 2021-11-05 RX ORDER — SODIUM CHLORIDE 9 MG/ML
250 INJECTION, SOLUTION INTRAVENOUS ONCE
Status: CANCELLED | OUTPATIENT
Start: 2021-11-05

## 2021-11-05 RX ORDER — SODIUM CHLORIDE 9 MG/ML
250 INJECTION, SOLUTION INTRAVENOUS ONCE
Status: DISCONTINUED | OUTPATIENT
Start: 2021-11-05 | End: 2021-11-07 | Stop reason: HOSPADM

## 2021-11-05 RX ORDER — FAMOTIDINE 20 MG/1
20 TABLET, FILM COATED ORAL ONCE
Status: COMPLETED | OUTPATIENT
Start: 2021-11-05 | End: 2021-11-05

## 2021-11-05 RX ADMIN — FAMOTIDINE 20 MG: 20 TABLET, FILM COATED ORAL at 11:38

## 2021-11-05 RX ADMIN — IRON SUCROSE 200 MG: 20 INJECTION, SOLUTION INTRAVENOUS at 12:23

## 2021-11-08 ENCOUNTER — HOSPITAL ENCOUNTER (OUTPATIENT)
Dept: INFUSION THERAPY | Facility: HOSPITAL | Age: 51
Discharge: HOME | End: 2021-11-08
Admitting: NURSE PRACTITIONER

## 2021-11-08 VITALS
HEART RATE: 62 BPM | DIASTOLIC BLOOD PRESSURE: 72 MMHG | OXYGEN SATURATION: 99 % | SYSTOLIC BLOOD PRESSURE: 123 MMHG | TEMPERATURE: 96.9 F | RESPIRATION RATE: 20 BRPM

## 2021-11-08 DIAGNOSIS — D50.9 IRON DEFICIENCY ANEMIA, UNSPECIFIED IRON DEFICIENCY ANEMIA TYPE: Primary | ICD-10-CM

## 2021-11-08 PROCEDURE — 96365 THER/PROPH/DIAG IV INF INIT: CPT

## 2021-11-08 PROCEDURE — 25010000002 IRON SUCROSE PER 1 MG: Performed by: NURSE PRACTITIONER

## 2021-11-08 RX ORDER — SODIUM CHLORIDE 9 MG/ML
250 INJECTION, SOLUTION INTRAVENOUS ONCE
OUTPATIENT
Start: 2021-11-08

## 2021-11-08 RX ORDER — SODIUM CHLORIDE 9 MG/ML
250 INJECTION, SOLUTION INTRAVENOUS ONCE
Status: DISCONTINUED | OUTPATIENT
Start: 2021-11-08 | End: 2021-11-10 | Stop reason: HOSPADM

## 2021-11-08 RX ORDER — FAMOTIDINE 20 MG/1
20 TABLET, FILM COATED ORAL ONCE
Status: COMPLETED | OUTPATIENT
Start: 2021-11-08 | End: 2021-11-08

## 2021-11-08 RX ORDER — FAMOTIDINE 20 MG/1
20 TABLET, FILM COATED ORAL ONCE
Status: CANCELLED | OUTPATIENT
Start: 2021-11-08

## 2021-11-08 RX ADMIN — IRON SUCROSE 200 MG: 20 INJECTION, SOLUTION INTRAVENOUS at 08:29

## 2021-11-08 RX ADMIN — FAMOTIDINE 20 MG: 20 TABLET, FILM COATED ORAL at 08:01

## 2021-11-09 ENCOUNTER — HOSPITAL ENCOUNTER (OUTPATIENT)
Dept: PHYSICAL THERAPY | Facility: HOSPITAL | Age: 51
Setting detail: THERAPIES SERIES
Discharge: HOME | End: 2021-11-09

## 2021-11-09 DIAGNOSIS — M62.89 MUSCLE TIGHTNESS: ICD-10-CM

## 2021-11-09 DIAGNOSIS — M79.18 PIRIFORMIS MUSCLE PAIN: ICD-10-CM

## 2021-11-09 DIAGNOSIS — M25.551 RIGHT HIP PAIN: Primary | ICD-10-CM

## 2021-11-09 PROCEDURE — 97110 THERAPEUTIC EXERCISES: CPT

## 2021-11-09 RX ORDER — FLUOXETINE HYDROCHLORIDE 40 MG/1
40 CAPSULE ORAL DAILY
Qty: 90 CAPSULE | Refills: 1 | Status: SHIPPED | OUTPATIENT
Start: 2021-11-09 | End: 2022-11-07 | Stop reason: SDUPTHER

## 2021-11-09 NOTE — THERAPY TREATMENT NOTE
Outpatient Physical Therapy Ortho Treatment Note  [facility]     Patient Name: Savannah Valenzuela  : 1970  MRN: [id number]  Today's Date: 2021      Visit Date: 2021    Visit Dx:    ICD-10-CM ICD-9-CM   1. Right hip pain  M25.551 719.45   2. Piriformis muscle pain  M79.18 729.1   3. Muscle tightness  M62.89 728.9       Patient Active Problem List   Diagnosis   • Migraine   • Allergic rhinitis   • Depression   • Chronic knee pain   • S/P laparoscopic sleeve gastrectomy   • Obesity, Class I, BMI 30-34.9   • Iron deficiency anemia        Past Medical History:   Diagnosis Date   • Allergic rhinitis    • Anemia    • Chronic fatigue 3/27/2020   • Depression    • Encounter for screening for malignant neoplasm of colon 2021    Added automatically from request for surgery [id number]   • Fatigue    • GERD (gastroesophageal reflux disease)    • Hematuria, microscopic 2016   • Hiatal hernia    • Hyperlipidemia    • Hypertension    • Medication management    • Migraine    • Multiple food allergies    • Physical exam, annual         Past Surgical History:   Procedure Laterality Date   •  SECTION     • COLONOSCOPY N/A 2021    Procedure: COLONOSCOPY INTO CECUM AND T.I. WITH COLD BIOPSY POLYPECTOMIES;  Surgeon: Ursula Torres MD;  Location: [facility] ENDOSCOPY;  Service: Gastroenterology;  Laterality: N/A;  PRE- SCREENING  POST- DIVERTICULOSIS, POLYPS, HEMORRHOIDS   • DILATATION AND CURETTAGE     • ENDOSCOPY N/A 2020    Procedure: ESOPHAGOGASTRODUODENOSCOPY WITH BIOPSY;  Surgeon: Cayetano Khan Jr., MD;  Location: [facility] ENDOSCOPY;  Service: General;  Laterality: N/A;  PREOP/ DYSPEPSIA  POSTOP/ GASTRITIS, ESOPHAGITIS, HIATAL HERNIA   • GASTRIC SLEEVE LAPAROSCOPIC N/A 10/5/2020    Procedure: GASTRIC SLEEVE LAPAROSCOPIC WITH paraesophageal hernia repair, lysis of adhesions;  Surgeon: Cayetano Khan Jr., MD;  Location: [facility] OR [facility];  Service: Bariatric;  Laterality: N/A;    • WISDOM TOOTH EXTRACTION                          PT Assessment/Plan     Row Name 11/09/21 1700          PT Assessment    Assessment Comments Ms. Valenzuela returns today for first f/u visit since initial eval. Continues report certain positional pain (such as sitting with hip in ER) that increased R hip pain, but does feel exercises have helped somewhat. Continued gentle progression of strengthening today and updated HEP  -CC            PT Plan    PT Plan Comments R hip STM; s/l hip abd, s/l bridge, side steps; try to avoid positions of impingment/pain  -CC           User Key  (r) = Recorded By, (t) = Taken By, (c) = Cosigned By    Initials Name Provider Type    CC Anabel Mishra, PT Physical Therapist                   OP Exercises     Row Name 11/09/21 1600             Subjective Comments    Subjective Comments ONly hurts at certain times, like at night, and when I sit cross legged and then stretch my leg out after a long time. The exercises have helped some  -CC              Subjective Pain    Able to rate subjective pain? yes  -CC      Pre-Treatment Pain Level 0  -CC              Total Minutes    40587 - PT Therapeutic Exercise Minutes 39  -CC              Exercise 1    Exercise Name 1 NuStep  -CC      Time 1 5 min, lvl 5  -CC              Exercise 2    Exercise Name 2 LTR  -CC      Cueing 2 Verbal  -CC      Sets 2 1  -CC      Reps 2 10  -CC      Time 2 5s  -CC              Exercise 3    Exercise Name 3 supine piriformis stretch  -CC      Cueing 3 Verbal; Demo  -CC      Reps 3 3  -CC      Time 3 20s  -CC      Additional Comments cues for form (pulling knee to shoulder)  -CC              Exercise 4    Exercise Name 4 figure 4 piriformis stretch  -CC      Cueing 4 Verbal; Tactile  -CC      Reps 4 3  -CC      Time 4 20s  -CC      Additional Comments cues for form  -CC              Exercise 5    Exercise Name 5 PPT  -CC      Cueing 5 Verbal; Tactile  -CC      Sets 5 1  -CC      Reps 5 10  -CC      Time 5 5s   -CC              Exercise 7    Exercise Name 7 glute bridge  -CC      Cueing 7 Verbal  -CC      Sets 7 2  -CC      Reps 7 10  -CC      Additional Comments with PPT  -CC              Exercise 8    Exercise Name 8 HL abd  -CC      Cueing 8 Verbal  -CC      Sets 8 2  -CC      Reps 8 10  -CC      Time 8 RTB  -CC              Exercise 9    Exercise Name 9 HL add  -CC      Cueing 9 Verbal  -CC      Sets 9 1  -CC      Reps 9 10  -CC      Time 9 5 sec  -CC      Additional Comments ball  -CC              Exercise 10    Exercise Name 10 HL hip abd  -CC      Cueing 10 Verbal  -CC      Sets 10 2  -CC      Reps 10 10  -CC      Time 10 RTB  -CC            User Key  (r) = Recorded By, (t) = Taken By, (c) = Cosigned By    Initials Name Provider Type    Anabel Benavides, PT Physical Therapist                              PT OP Goals     Row Name 11/09/21 1700          PT Short Term Goals    STG Date to Achieve 12/04/21  -CC     STG 1 Pt will be independent with initial HEP to improve strength/muscular tightness and decrease R hip pain.  -CC     STG 1 Progress Ongoing  -CC     STG 2 Patient will improve standing tolerance from 30 min to >45 min without R hip pain to improve participation in community activities.  -CC     STG 2 Progress Ongoing  -CC     STG 3 Patient will report 50% reduction in sleep disturbances related to R hip pain to improve overall sleep quality of life.  -     STG 3 Progress Ongoing  -            Long Term Goals    LTG Date to Achieve 01/03/22  -CC     LTG 1 Pt will be independent with advanced HEP to improve strength/muscular tightness and decrease R hip pain.  -CC     LTG 1 Progress Ongoing  -CC     LTG 2 Patient will improve ability to sleep through the night without sleep disturbances related to R hip pain to improve overall sleep quality and quality of life  -CC     LTG 2 Progress Ongoing  -CC     LTG 3 Patient will report walking 1 mile without R hip pain/stiffness to return to walking program.   -CC     LTG 3 Progress Ongoing  -CC     LTG 4 Patient will report returning to home workout routine for 1 week without R hip pain to return to PLOF.  -     LTG 4 Progress Ongoing  -     LTG 5 Patient will improve standing tolerance from 30 min to >2 hours without R hip pain to improve participation in community activities.  -     LTG 5 Progress Ongoing  -           User Key  (r) = Recorded By, (t) = Taken By, (c) = Cosigned By    Initials Name Provider Type    CC Anabel Mishra, PT Physical Therapist                Therapy Education  Education Details: Access Code [email or web address]  Given: HEP, Symptoms/condition management, Pain management  Program: Reinforced, Progressed  How Provided: Verbal, Demonstration, Written  Provided to: Patient  Level of Understanding: Teach back education performed, Verbalized              Time Calculation:   Start Time: 1650  Stop Time: 1729  Time Calculation (min): 39 min  Total Timed Code Minutes- PT: 39 minute(s)  Timed Charges  31687 - PT Therapeutic Exercise Minutes: 39  Total Minutes  Timed Charges Total Minutes: 39   Total Minutes: 39  Therapy Charges for Today     Code Description Service Date Service Provider Modifiers Qty    39638970499 HC PT THER PROC EA 15 MIN 11/9/2021 Anabel Mishra, PT GP 3                    Anabel Mishra, PT  11/9/2021

## 2021-11-10 ENCOUNTER — HOSPITAL ENCOUNTER (OUTPATIENT)
Dept: INFUSION THERAPY | Facility: HOSPITAL | Age: 51
Discharge: HOME | End: 2021-11-10
Admitting: NURSE PRACTITIONER

## 2021-11-10 VITALS
SYSTOLIC BLOOD PRESSURE: 136 MMHG | TEMPERATURE: 97.5 F | DIASTOLIC BLOOD PRESSURE: 79 MMHG | HEART RATE: 65 BPM | RESPIRATION RATE: 20 BRPM | OXYGEN SATURATION: 98 %

## 2021-11-10 DIAGNOSIS — D50.9 IRON DEFICIENCY ANEMIA, UNSPECIFIED IRON DEFICIENCY ANEMIA TYPE: Primary | ICD-10-CM

## 2021-11-10 PROCEDURE — 96365 THER/PROPH/DIAG IV INF INIT: CPT

## 2021-11-10 PROCEDURE — 25010000002 IRON SUCROSE PER 1 MG: Performed by: NURSE PRACTITIONER

## 2021-11-10 RX ORDER — FAMOTIDINE 20 MG/1
20 TABLET, FILM COATED ORAL ONCE
Status: CANCELLED | OUTPATIENT
Start: 2021-11-10

## 2021-11-10 RX ORDER — FAMOTIDINE 20 MG/1
20 TABLET, FILM COATED ORAL ONCE
Status: COMPLETED | OUTPATIENT
Start: 2021-11-10 | End: 2021-11-10

## 2021-11-10 RX ORDER — SODIUM CHLORIDE 9 MG/ML
250 INJECTION, SOLUTION INTRAVENOUS ONCE
OUTPATIENT
Start: 2021-11-10

## 2021-11-10 RX ADMIN — IRON SUCROSE 200 MG: 20 INJECTION, SOLUTION INTRAVENOUS at 11:28

## 2021-11-10 RX ADMIN — FAMOTIDINE 20 MG: 20 TABLET, FILM COATED ORAL at 11:09

## 2021-11-17 ENCOUNTER — HOSPITAL ENCOUNTER (OUTPATIENT)
Dept: PHYSICAL THERAPY | Facility: HOSPITAL | Age: 51
Setting detail: THERAPIES SERIES
Discharge: HOME | End: 2021-11-17

## 2021-11-17 DIAGNOSIS — M62.89 MUSCLE TIGHTNESS: ICD-10-CM

## 2021-11-17 DIAGNOSIS — M79.18 PIRIFORMIS MUSCLE PAIN: ICD-10-CM

## 2021-11-17 DIAGNOSIS — M25.551 RIGHT HIP PAIN: Primary | ICD-10-CM

## 2021-11-17 PROCEDURE — 97110 THERAPEUTIC EXERCISES: CPT

## 2021-11-17 NOTE — THERAPY TREATMENT NOTE
Outpatient Physical Therapy Ortho Treatment Note  [facility]     Patient Name: Savannah Valenzuela  : 1970  MRN: [id number]  Today's Date: 2021      Visit Date: 2021    Visit Dx:    ICD-10-CM ICD-9-CM   1. Right hip pain  M25.551 719.45   2. Piriformis muscle pain  M79.18 729.1   3. Muscle tightness  M62.89 728.9       Patient Active Problem List   Diagnosis   • Migraine   • Allergic rhinitis   • Depression   • Chronic knee pain   • S/P laparoscopic sleeve gastrectomy   • Obesity, Class I, BMI 30-34.9   • Iron deficiency anemia        Past Medical History:   Diagnosis Date   • Allergic rhinitis    • Anemia    • Chronic fatigue 3/27/2020   • Depression    • Encounter for screening for malignant neoplasm of colon 2021    Added automatically from request for surgery [id number]   • Fatigue    • GERD (gastroesophageal reflux disease)    • Hematuria, microscopic 2016   • Hiatal hernia    • Hyperlipidemia    • Hypertension    • Medication management    • Migraine    • Multiple food allergies    • Physical exam, annual         Past Surgical History:   Procedure Laterality Date   •  SECTION     • COLONOSCOPY N/A 2021    Procedure: COLONOSCOPY INTO CECUM AND T.I. WITH COLD BIOPSY POLYPECTOMIES;  Surgeon: Ursula Torres MD;  Location: [facility] ENDOSCOPY;  Service: Gastroenterology;  Laterality: N/A;  PRE- SCREENING  POST- DIVERTICULOSIS, POLYPS, HEMORRHOIDS   • DILATATION AND CURETTAGE     • ENDOSCOPY N/A 2020    Procedure: ESOPHAGOGASTRODUODENOSCOPY WITH BIOPSY;  Surgeon: Cayetano Khan Jr., MD;  Location: [facility] ENDOSCOPY;  Service: General;  Laterality: N/A;  PREOP/ DYSPEPSIA  POSTOP/ GASTRITIS, ESOPHAGITIS, HIATAL HERNIA   • GASTRIC SLEEVE LAPAROSCOPIC N/A 10/5/2020    Procedure: GASTRIC SLEEVE LAPAROSCOPIC WITH paraesophageal hernia repair, lysis of adhesions;  Surgeon: Cayetano Khan Jr., MD;  Location: [facility] OR [facility];  Service: Bariatric;  Laterality: N/A;    • WISDOM TOOTH EXTRACTION                          PT Assessment/Plan     Row Name 11/17/21 1600          PT Assessment    Assessment Comments Ms. Valenzuela returns today reporting increased hip soreness over past several days, although unsure of cause. Pt does continue to indicate pain in posterolateral hip when sititng with hip ER for long periods of time or legs crossed. Pain is briefly severe and then goes away after she starts moving. Continue progression of hip girdle strengthening, continues to require cueing for form due to weakness and associated substitutions. Remains good candidate for skilled PT.  -CC            PT Plan    PT Plan Comments Add hip adductor/groin str, hip flexor str, monster walk; ask about clicking/popping?  -CC           User Key  (r) = Recorded By, (t) = Taken By, (c) = Cosigned By    Initials Name Provider Type    CC Anabel Mishra, PT Physical Therapist                   OP Exercises     Row Name 11/17/21 1500             Subjective Comments    Subjective Comments Hip has been more sore over past several days, unsure of why  -CC              Subjective Pain    Able to rate subjective pain? yes  -CC      Pre-Treatment Pain Level 3  -CC      Subjective Pain Comment was sitting with legs crossed in waiting roomm and pain when standing up, that then went away  -CC              Total Minutes    89204 - PT Therapeutic Exercise Minutes 39  -CC              Exercise 1    Exercise Name 1 NuStep  -CC      Time 1 5 min, lvl 5  -CC              Exercise 2    Exercise Name 2 LTR  -CC      Cueing 2 Verbal  -CC      Sets 2 1  -CC      Reps 2 10  -CC      Time 2 5s  -CC              Exercise 5    Exercise Name 5 qped hip ext  -CC      Cueing 5 Verbal  -CC      Reps 5 20 total, alternating  -CC      Time 5 cues for TA/flat back  -CC              Exercise 7    Exercise Name 7 glute bridge  -CC      Cueing 7 Verbal  -CC      Sets 7 2  -CC      Reps 7 10  -CC      Additional Comments with PPT  -CC               Exercise 8    Exercise Name 8 HL abd  -CC      Cueing 8 Verbal  -CC      Sets 8 2  -CC      Reps 8 10  -CC      Time 8 GTB  -CC              Exercise 9    Exercise Name 9 HL add  -CC      Cueing 9 Verbal  -CC      Sets 9 1  -CC      Reps 9 10  -CC      Time 9 5 sec  -CC      Additional Comments ball  -CC              Exercise 10    Exercise Name 10 s/l clam  -CC      Cueing 10 Verbal  -CC      Sets 10 2 b  -CC      Reps 10 10  -CC      Time 10 GTB  -CC              Exercise 11    Exercise Name 11 mini squat at barre  -CC      Cueing 11 Verbal  -CC      Sets 11 2  -CC      Reps 11 10  -CC      Time 11 cues for glute at top  -CC              Exercise 12    Exercise Name 12 banded side steps  -CC      Cueing 12 Verbal; Demo  -CC      Reps 12 4 laps  -CC      Time 12 GTB just under knees  -CC            User Key  (r) = Recorded By, (t) = Taken By, (c) = Cosigned By    Initials Name Provider Type    CC Anabel Mishra, PT Physical Therapist                                 Therapy Education  Education Details: Access Code [email or web address]; discussed avoiding positions of pain/impingement such as sitting with legs crossed her hip ER  Given: HEP, Symptoms/condition management, Posture/body mechanics, Pain management  Program: Reinforced, Progressed  How Provided: Verbal, Demonstration, Written  Provided to: Patient  Level of Understanding: Teach back education performed, Verbalized              Time Calculation:   Start Time: 1532  Stop Time: 1611  Time Calculation (min): 39 min  Total Timed Code Minutes- PT: 39 minute(s)  Timed Charges  60089 - PT Therapeutic Exercise Minutes: 39  Total Minutes  Timed Charges Total Minutes: 39   Total Minutes: 39  Therapy Charges for Today     Code Description Service Date Service Provider Modifiers Qty    41306694032 HC PT THER PROC EA 15 MIN 11/17/2021 Anabel Mishra, PT GP 3                    Anabel Mishra, PT  11/17/2021

## 2021-11-23 ENCOUNTER — HOSPITAL ENCOUNTER (OUTPATIENT)
Dept: PHYSICAL THERAPY | Facility: HOSPITAL | Age: 51
Setting detail: THERAPIES SERIES
Discharge: HOME | End: 2021-11-23

## 2021-11-23 DIAGNOSIS — M25.551 RIGHT HIP PAIN: Primary | ICD-10-CM

## 2021-11-23 DIAGNOSIS — M62.89 MUSCLE TIGHTNESS: ICD-10-CM

## 2021-11-23 DIAGNOSIS — M79.18 PIRIFORMIS MUSCLE PAIN: ICD-10-CM

## 2021-11-23 PROCEDURE — 97110 THERAPEUTIC EXERCISES: CPT

## 2021-11-23 NOTE — THERAPY TREATMENT NOTE
Outpatient Physical Therapy Ortho Treatment Note  [facility]     Patient Name: Savannah Valenzuela  : 1970  MRN: [id number]  Today's Date: 2021      Visit Date: 2021    Visit Dx:    ICD-10-CM ICD-9-CM   1. Right hip pain  M25.551 719.45   2. Piriformis muscle pain  M79.18 729.1   3. Muscle tightness  M62.89 728.9       Patient Active Problem List   Diagnosis   • Migraine   • Allergic rhinitis   • Depression   • Chronic knee pain   • S/P laparoscopic sleeve gastrectomy   • Obesity, Class I, BMI 30-34.9   • Iron deficiency anemia        Past Medical History:   Diagnosis Date   • Allergic rhinitis    • Anemia    • Chronic fatigue 3/27/2020   • Depression    • Encounter for screening for malignant neoplasm of colon 2021    Added automatically from request for surgery [id number]   • Fatigue    • GERD (gastroesophageal reflux disease)    • Hematuria, microscopic 2016   • Hiatal hernia    • Hyperlipidemia    • Hypertension    • Medication management    • Migraine    • Multiple food allergies    • Physical exam, annual         Past Surgical History:   Procedure Laterality Date   •  SECTION     • COLONOSCOPY N/A 2021    Procedure: COLONOSCOPY INTO CECUM AND T.I. WITH COLD BIOPSY POLYPECTOMIES;  Surgeon: Ursula Torres MD;  Location: [facility] ENDOSCOPY;  Service: Gastroenterology;  Laterality: N/A;  PRE- SCREENING  POST- DIVERTICULOSIS, POLYPS, HEMORRHOIDS   • DILATATION AND CURETTAGE     • ENDOSCOPY N/A 2020    Procedure: ESOPHAGOGASTRODUODENOSCOPY WITH BIOPSY;  Surgeon: Cayetano Khan Jr., MD;  Location: [facility] ENDOSCOPY;  Service: General;  Laterality: N/A;  PREOP/ DYSPEPSIA  POSTOP/ GASTRITIS, ESOPHAGITIS, HIATAL HERNIA   • GASTRIC SLEEVE LAPAROSCOPIC N/A 10/5/2020    Procedure: GASTRIC SLEEVE LAPAROSCOPIC WITH paraesophageal hernia repair, lysis of adhesions;  Surgeon: Cayetano Khan Jr., MD;  Location: [facility] OR [facility];  Service: Bariatric;  Laterality: N/A;    • WISDOM TOOTH EXTRACTION                          PT Assessment/Plan     Row Name 11/23/21 1700          PT Assessment    Assessment Comments Ms. Valenzuela returns reporting moderate pain levels this AM after waking, but overall does feel some improvements in pain since starting therapy. Progressed core/hip strengthening today, and discussed sleeping position as pt primariliy feels pain and night and in AM after waking.  -CC            PT Plan    PT Plan Comments F/u on response to sleep position changes, add hip flexor str and AR press  -CC           User Key  (r) = Recorded By, (t) = Taken By, (c) = Cosigned By    Initials Name Provider Type    CC Anabel Mishra, PT Physical Therapist                   OP Exercises     Row Name 11/23/21 1600             Subjective Comments    Subjective Comments Reports overall has noticed some improvement since start of therapy, although flare up of pain this AM upon waking  -CC              Subjective Pain    Able to rate subjective pain? yes  -CC      Pre-Treatment Pain Level 0  -CC      Subjective Pain Comment 4/10 this AM when waking  -CC              Total Minutes    52010 - PT Therapeutic Exercise Minutes 41  -CC              Exercise 1    Exercise Name 1 NuStep  -CC      Time 1 5 min, lvl 5  -CC              Exercise 2    Exercise Name 2 LTR  -CC      Cueing 2 Verbal  -CC      Sets 2 1  -CC      Reps 2 10  -CC      Time 2 5s  -CC              Exercise 3    Exercise Name 3 SLS  -CC      Cueing 3 Verbal; Demo  -CC      Reps 3 5 b  -CC      Time 3 10 sec  -CC      Additional Comments intermittent finger tip support  -CC              Exercise 4    Exercise Name 4 qped rock back at diagonal for piri str - one foot crossed over other  -CC      Cueing 4 Verbal; Demo  -CC      Reps 4 10 b  -CC      Time 4 5 sec  -CC              Exercise 5    Exercise Name 5 qped hip ext  -CC      Cueing 5 Verbal  -CC      Reps 5 20 total, alternating  -CC      Time 5 cues for TA/flat back   -CC              Exercise 7    Exercise Name 7 glute bridge  -CC      Cueing 7 Verbal  -CC      Sets 7 2  -CC      Reps 7 10  -CC              Exercise 8    Exercise Name 8 HL abd  -CC      Cueing 8 Verbal  -CC      Sets 8 2  -CC      Reps 8 10  -CC      Time 8 GTB  -CC              Exercise 9    Exercise Name 9 dead bug: iso small ball squeeze with opp extremities moving  -CC      Cueing 9 Verbal  -CC      Sets 9 2 b  -CC      Reps 9 10  -CC              Exercise 10    Exercise Name 10 s/l clam  -CC      Cueing 10 Verbal  -CC      Sets 10 2 b  -CC      Reps 10 10  -CC      Time 10 GTB  -CC              Exercise 11    Exercise Name 11 mini squat at barre  -CC      Cueing 11 Verbal  -CC      Sets 11 2  -CC      Reps 11 10  -CC      Time 11 cues for glute at top  -CC              Exercise 12    Exercise Name 12 banded side steps  -CC      Cueing 12 Verbal; Demo  -CC      Reps 12 4 laps  -CC      Time 12 GTB just under knees  -CC              Exercise 13    Exercise Name 13 monster walk fwd/bkwd  -CC      Cueing 13 Verbal  -CC      Reps 13 4 laps  -CC      Time 13 GTB knees  -CC              Exercise 14    Exercise Name 14 discussed/demoed sleeping position, bottom leg straight, top leg bent with pillows under  -CC      Cueing 14 Verbal  -CC            User Key  (r) = Recorded By, (t) = Taken By, (c) = Cosigned By    Initials Name Provider Type    CC Anabel Mishra, PT Physical Therapist                                                Time Calculation:   Start Time: 1643  Stop Time: 1724  Time Calculation (min): 41 min  Total Timed Code Minutes- PT: 41 minute(s)  Timed Charges  60912 - PT Therapeutic Exercise Minutes: 41  Total Minutes  Timed Charges Total Minutes: 41   Total Minutes: 41  Therapy Charges for Today     Code Description Service Date Service Provider Modifiers Qty    05679966893 HC PT THER PROC EA 15 MIN 11/23/2021 Anabel Mishra, PT GP 3                    Anabel Mishra  PT  11/23/2021

## 2021-11-24 ENCOUNTER — LAB (OUTPATIENT)
Dept: LAB | Facility: HOSPITAL | Age: 51
End: 2021-11-24

## 2021-11-24 DIAGNOSIS — D50.9 IRON DEFICIENCY ANEMIA, UNSPECIFIED IRON DEFICIENCY ANEMIA TYPE: ICD-10-CM

## 2021-11-24 LAB
FERRITIN SERPL-MCNC: 122 NG/ML (ref 13–150)
IRON 24H UR-MRATE: 86 MCG/DL (ref 37–145)

## 2021-11-24 PROCEDURE — 83540 ASSAY OF IRON: CPT

## 2021-11-24 PROCEDURE — 82728 ASSAY OF FERRITIN: CPT

## 2021-11-24 PROCEDURE — 36415 COLL VENOUS BLD VENIPUNCTURE: CPT

## 2021-12-02 ENCOUNTER — HOSPITAL ENCOUNTER (OUTPATIENT)
Dept: PHYSICAL THERAPY | Facility: HOSPITAL | Age: 51
Setting detail: THERAPIES SERIES
Discharge: HOME | End: 2021-12-02

## 2021-12-02 DIAGNOSIS — M25.551 RIGHT HIP PAIN: Primary | ICD-10-CM

## 2021-12-02 DIAGNOSIS — M79.18 PIRIFORMIS MUSCLE PAIN: ICD-10-CM

## 2021-12-02 DIAGNOSIS — M62.89 MUSCLE TIGHTNESS: ICD-10-CM

## 2021-12-02 PROCEDURE — 97110 THERAPEUTIC EXERCISES: CPT

## 2021-12-02 NOTE — THERAPY TREATMENT NOTE
Outpatient Physical Therapy Ortho Treatment Note  [facility]     Patient Name: Savannah Valenzuela  : 1970  MRN: [id number]  Today's Date: 2021      Visit Date: 2021    Visit Dx:    ICD-10-CM ICD-9-CM   1. Right hip pain  M25.551 719.45   2. Piriformis muscle pain  M79.18 729.1   3. Muscle tightness  M62.89 728.9       Patient Active Problem List   Diagnosis   • Migraine   • Allergic rhinitis   • Depression   • Chronic knee pain   • S/P laparoscopic sleeve gastrectomy   • Obesity, Class I, BMI 30-34.9   • Iron deficiency anemia        Past Medical History:   Diagnosis Date   • Allergic rhinitis    • Anemia    • Chronic fatigue 3/27/2020   • Depression    • Encounter for screening for malignant neoplasm of colon 2021    Added automatically from request for surgery [id number]   • Fatigue    • GERD (gastroesophageal reflux disease)    • Hematuria, microscopic 2016   • Hiatal hernia    • Hyperlipidemia    • Hypertension    • Medication management    • Migraine    • Multiple food allergies    • Physical exam, annual         Past Surgical History:   Procedure Laterality Date   •  SECTION     • COLONOSCOPY N/A 2021    Procedure: COLONOSCOPY INTO CECUM AND T.I. WITH COLD BIOPSY POLYPECTOMIES;  Surgeon: Ursula Torres MD;  Location: [facility] ENDOSCOPY;  Service: Gastroenterology;  Laterality: N/A;  PRE- SCREENING  POST- DIVERTICULOSIS, POLYPS, HEMORRHOIDS   • DILATATION AND CURETTAGE     • ENDOSCOPY N/A 2020    Procedure: ESOPHAGOGASTRODUODENOSCOPY WITH BIOPSY;  Surgeon: Cayetano Khan Jr., MD;  Location: [facility] ENDOSCOPY;  Service: General;  Laterality: N/A;  PREOP/ DYSPEPSIA  POSTOP/ GASTRITIS, ESOPHAGITIS, HIATAL HERNIA   • GASTRIC SLEEVE LAPAROSCOPIC N/A 10/5/2020    Procedure: GASTRIC SLEEVE LAPAROSCOPIC WITH paraesophageal hernia repair, lysis of adhesions;  Surgeon: Cayetano Khan Jr., MD;  Location: [facility] OR [facility] [place];  Service: Bariatric;  Laterality: N/A;    • WISDOM TOOTH EXTRACTION                          PT Assessment/Plan     Row Name 12/02/21 1500          PT Assessment    Assessment Comments Ms. Valenzuela returns today reporting no changes in pain, sleeping position did not helps as she moves around too much in her sleep. Reports she focused on what increases pain over last week, and only notices it when sitting with her legs crossed or with her hip in ER while lying in supine at night. Discussed trying to avoid position of rotation/pain and leaving note on night stand as a reminder to avoid this position and see if any improvement overall in hip.  -CC            PT Plan    PT Plan Comments F/u on response to avoiding lying supine with hip in ER and with legs crossed. Cont hip girdle/core strengthening.  -CC           User Key  (r) = Recorded By, (t) = Taken By, (c) = Cosigned By    Initials Name Provider Type    CC Anabel Mishra, PT Physical Therapist                   OP Exercises     Row Name 12/02/21 1500             Subjective Comments    Subjective Comments Continues to have pain when sitting for long periods  -CC              Subjective Pain    Able to rate subjective pain? yes  -CC      Pre-Treatment Pain Level 0  -CC              Total Minutes    15032 - PT Therapeutic Exercise Minutes 39  -CC              Exercise 1    Exercise Name 1 NuStep  -CC      Time 1 5 min, lvl 5  -CC              Exercise 4    Exercise Name 4 qped rock back at diagonal for piri str - one foot crossed over other  -CC      Cueing 4 Verbal; Demo  -CC      Reps 4 10 b  -CC      Time 4 5 sec  -CC              Exercise 5    Exercise Name 5 qped hip ext  -CC      Cueing 5 Verbal  -CC      Reps 5 20 total, alternating  -CC      Time 5 cues for TA/flat back  -CC              Exercise 6    Exercise Name 6 prone HF/ quad str with strap  -CC      Cueing 6 Verbal  -CC      Reps 6 5  -CC      Time 6 20 sec  -CC              Exercise 7    Exercise Name 7 glute bridge with march  -CC       Cueing 7 Verbal  -CC      Sets 7 3  -CC      Reps 7 10  -CC              Exercise 8    Exercise Name 8 s/l hip abd  -CC      Cueing 8 Verbal  -CC      Sets 8 2  -CC      Reps 8 10  -CC      Additional Comments slight IR at hip  -CC              Exercise 11    Exercise Name 11 mini squat at barre  -CC      Cueing 11 Verbal  -CC      Sets 11 2  -CC      Reps 11 10  -CC      Time 11 cues for glute at top  -CC              Exercise 12    Exercise Name 12 banded side steps  -CC      Cueing 12 Verbal; Demo  -CC      Reps 12 4 laps  -CC      Time 12 GTB just under knees  -CC              Exercise 13    Exercise Name 13 monster walk fwd/bkwd  -CC      Cueing 13 Verbal  -CC      Reps 13 4 laps  -CC      Time 13 GTB knees  -CC              Exercise 14    Exercise Name 14 AR press  -CC      Cueing 14 Verbal  -CC      Sets 14 2 b  -CC      Reps 14 10  -CC      Time 14 GTB  -CC              Exercise 15    Exercise Name 15 tband shldr ext  -CC      Cueing 15 Verbal  -CC      Sets 15 1 b  -CC      Reps 15 10  -CC      Time 15 uni, with sustained hold on opposite side  -CC      Additional Comments GTB  -CC            User Key  (r) = Recorded By, (t) = Taken By, (c) = Cosigned By    Initials Name Provider Type    CC Anabel Mishra PT Physical Therapist                                 Therapy Education  Education Details: Access Code [email or web address]  Given: HEP, Pain management  Program: Reinforced, Progressed  How Provided: Verbal, Demonstration, Written  Provided to: Patient  Level of Understanding: Teach back education performed, Verbalized              Time Calculation:   Start Time: 1518  Stop Time: 1557  Time Calculation (min): 39 min  Total Timed Code Minutes- PT: 39 minute(s)  Timed Charges  42118 - PT Therapeutic Exercise Minutes: 39  Total Minutes  Timed Charges Total Minutes: 39   Total Minutes: 39  Therapy Charges for Today     Code Description Service Date Service Provider Modifiers Qty    [id number]  PT THER PROC EA  15 MIN 12/2/2021 Anabel Mishra, PT GP 3                    Anabel Mishra, PT  12/2/2021

## 2021-12-06 ENCOUNTER — TELEPHONE (OUTPATIENT)
Dept: INTERNAL MEDICINE | Facility: CLINIC | Age: 51
End: 2021-12-06

## 2021-12-06 ENCOUNTER — PATIENT MESSAGE (OUTPATIENT)
Dept: INTERNAL MEDICINE | Facility: CLINIC | Age: 51
End: 2021-12-06

## 2021-12-06 DIAGNOSIS — M25.551 RIGHT HIP PAIN: Primary | ICD-10-CM

## 2021-12-09 ENCOUNTER — HOSPITAL ENCOUNTER (OUTPATIENT)
Dept: PHYSICAL THERAPY | Facility: HOSPITAL | Age: 51
Setting detail: THERAPIES SERIES
Discharge: HOME | End: 2021-12-09

## 2021-12-09 DIAGNOSIS — M62.89 MUSCLE TIGHTNESS: ICD-10-CM

## 2021-12-09 DIAGNOSIS — M25.551 RIGHT HIP PAIN: Primary | ICD-10-CM

## 2021-12-09 DIAGNOSIS — M79.18 PIRIFORMIS MUSCLE PAIN: ICD-10-CM

## 2021-12-09 PROCEDURE — 97110 THERAPEUTIC EXERCISES: CPT

## 2021-12-09 NOTE — THERAPY PROGRESS REPORT/RE-CERT
Outpatient Physical Therapy Ortho Progress Note  [facility]     Patient Name: Savannah Valenzuela  : 1970  MRN: [id number]  Today's Date: 2021      Visit Date: 2021    Visit Dx:    ICD-10-CM ICD-9-CM   1. Right hip pain  M25.551 719.45   2. Piriformis muscle pain  M79.18 729.1   3. Muscle tightness  M62.89 728.9       Patient Active Problem List   Diagnosis   • Migraine   • Allergic rhinitis   • Depression   • Chronic knee pain   • S/P laparoscopic sleeve gastrectomy   • Obesity, Class I, BMI 30-34.9   • Iron deficiency anemia        Past Medical History:   Diagnosis Date   • Allergic rhinitis    • Anemia    • Chronic fatigue 3/27/2020   • Depression    • Encounter for screening for malignant neoplasm of colon 2021    Added automatically from request for surgery [id number]   • Fatigue    • GERD (gastroesophageal reflux disease)    • Hematuria, microscopic 2016   • Hiatal hernia    • Hyperlipidemia    • Hypertension    • Medication management    • Migraine    • Multiple food allergies    • Physical exam, annual         Past Surgical History:   Procedure Laterality Date   •  SECTION     • COLONOSCOPY N/A 2021    Procedure: COLONOSCOPY INTO CECUM AND T.I. WITH COLD BIOPSY POLYPECTOMIES;  Surgeon: Ursula Torres MD;  Location: [facility] ENDOSCOPY;  Service: Gastroenterology;  Laterality: N/A;  PRE- SCREENING  POST- DIVERTICULOSIS, POLYPS, HEMORRHOIDS   • DILATATION AND CURETTAGE     • ENDOSCOPY N/A 2020    Procedure: ESOPHAGOGASTRODUODENOSCOPY WITH BIOPSY;  Surgeon: Cayetano Khan Jr., MD;  Location: [facility] ENDOSCOPY;  Service: General;  Laterality: N/A;  PREOP/ DYSPEPSIA  POSTOP/ GASTRITIS, ESOPHAGITIS, HIATAL HERNIA   • GASTRIC SLEEVE LAPAROSCOPIC N/A 10/5/2020    Procedure: GASTRIC SLEEVE LAPAROSCOPIC WITH paraesophageal hernia repair, lysis of adhesions;  Surgeon: Cayetano Khan Jr., MD;  Location: [facility] OR [facility] [place];  Service: Bariatric;  Laterality: N/A;    • WISDOM TOOTH EXTRACTION                          PT Assessment/Plan     Row Name 12/09/21 1600          PT Assessment    Functional Limitations Impaired locomotion; Limitations in functional capacity and performance; Performance in leisure activities; Limitation in home management  -CC     Impairments Balance; Impaired flexibility; Locomotion; Motor function; Muscle strength; Pain  -CC     Assessment Comments Savannah Valenzuela has been seen for 6 physical therapy sessions for R hip pain.  Treatment has included therapeutic exercise, manual therapy, neuro-muscular retraining  and patient education with home exercise program . Progress to physical therapy goals is unsatisfactory . Pt has met 1/3 STG and 1/5 LTG. Pt reports minimal to no changes in pain levels since start of therapy despite consistent HEP performance, continues to have somewhat unpredictable pain, but most significantly associated with positions that place her hip in an externally rotated positions for long periods of time. She plans to see ortho MD, for imaging and further workup. Discussed at length today POC, and pt agreeable to placing PT on hold until f/u with MD for further workup.  -CC     Please refer to paper survey for additional self-reported information No  -CC     Rehab Potential Fair  -CC            PT Plan    Predicted Duration of Therapy Intervention (PT) on hold until f/u with MD  -CC     PT Plan Comments PT on hold until f/u with MD [facility]           User Key  (r) = Recorded By, (t) = Taken By, (c) = Cosigned By    Initials Name Provider Type    Anabel Benavides, PT Physical Therapist                   OP Exercises     Row Name 12/09/21 1600             Subjective Comments    Subjective Comments Pt reports no changes in pain since start of therapy, frustrated with lack of progress, plannin to see ortho MD, has appt schedule\d.d.  -CC              Total Minutes    20349 - PT Therapeutic Exercise Minutes 18  -CC               Exercise 1    Exercise Name 1 POC discussion, plan moving fwd, HEP performance, recommendations for pain management, plan for f/u with MD  -CC      Cueing 1 Verbal  -CC      Time 1 18 min  -CC            User Key  (r) = Recorded By, (t) = Taken By, (c) = Cosigned By    Initials Name Provider Type    CC Anabel Mishra, PT Physical Therapist                              PT OP Goals     Row Name 12/09/21 1500          PT Short Term Goals    STG Date to Achieve 12/04/21  -CC     STG 1 Pt will be independent with initial HEP to improve strength/muscular tightness and decrease R hip pain.  -CC     STG 1 Progress Met  -CC     STG 2 Patient will improve standing tolerance from 30 min to >45 min without R hip pain to improve participation in community activities.  -CC     STG 2 Progress Ongoing  -CC     STG 3 Patient will report 50% reduction in sleep disturbances related to R hip pain to improve overall sleep quality of life.  -CC     STG 3 Progress Ongoing; Progressing  -CC     STG 3 Progress Comments 40% improved  -CC            Long Term Goals    LTG Date to Achieve 01/03/22  -CC     LTG 1 Pt will be independent with advanced HEP to improve strength/muscular tightness and decrease R hip pain.  -CC     LTG 1 Progress Met  -CC     LTG 2 Patient will improve ability to sleep through the night without sleep disturbances related to R hip pain to improve overall sleep quality and quality of life  -CC     LTG 2 Progress Ongoing; Progressing  -CC     LTG 3 Patient will report walking 1 mile without R hip pain/stiffness to return to walking program.  -CC     LTG 3 Progress Ongoing  -CC     LTG 4 Patient will report returning to home workout routine for 1 week without R hip pain to return to PLOF.  -CC     LTG 4 Progress Ongoing  -CC     LTG 5 Patient will improve standing tolerance from 30 min to >2 hours without R hip pain to improve participation in community activities.  -CC     LTG 5 Progress Ongoing  -CC           User  Key  (r) = Recorded By, (t) = Taken By, (c) = Cosigned By    Initials Name Provider Type    CC Anabel Mishra, PT Physical Therapist                               Time Calculation:   Start Time: 1520  Stop Time: 1538  Time Calculation (min): 18 min  Total Timed Code Minutes- PT: 18 minute(s)  Timed Charges  44000 - PT Therapeutic Exercise Minutes: 18  Total Minutes  Timed Charges Total Minutes: 18   Total Minutes: 18  Therapy Charges for Today     Code Description Service Date Service Provider Modifiers Qty    [id number]  PT THER PROC EA 15 MIN 12/9/2021 Anabel Mishra, PT GP 1                    Anabel Mishra, PT  12/9/2021

## 2021-12-23 ENCOUNTER — TELEMEDICINE (OUTPATIENT)
Dept: FAMILY MEDICINE CLINIC | Facility: TELEHEALTH | Age: 51
End: 2021-12-23

## 2021-12-23 DIAGNOSIS — Z20.822 EXPOSURE TO COVID-19 VIRUS: Primary | ICD-10-CM

## 2021-12-23 PROCEDURE — BHEMPVIDEOVISIT: Performed by: NURSE PRACTITIONER

## 2021-12-23 PROCEDURE — 99999 PR OFFICE/OUTPT VISIT,PROCEDURE ONLY: CPT | Performed by: NURSE PRACTITIONER

## 2021-12-23 NOTE — PATIENT INSTRUCTIONS
10 Things You Can Do to Manage Your COVID-19 Symptoms at Home  If you have possible or confirmed COVID-19:  1. Stay home except to get medical care.  2. Monitor your symptoms carefully. If your symptoms get worse, call your healthcare provider immediately.  3. Get rest and stay hydrated.  4. If you have a medical appointment, call the healthcare provider ahead of time and tell them that you have or may have COVID-19.  5. For medical emergencies, call 911 and notify the dispatch personnel that you have or may have COVID-19.  6. Cover your cough and sneezes with a tissue or use the inside of your elbow.  7. Wash your hands often with soap and water for at least 20 seconds or clean your hands with an alcohol-based hand  that contains at least 60% alcohol.  8. As much as possible, stay in a specific room and away from other people in your home. Also, you should use a separate bathroom, if available. If you need to be around other people in or outside of the home, wear a mask.  9. Avoid sharing personal items with other people in your household, like dishes, towels, and bedding.  10. Clean all surfaces that are touched often, like counters, tabletops, and doorknobs. Use household cleaning sprays or wipes according to the label instructions.  cdc.gov/coronavirus  07/16/2021  This information is not intended to replace advice given to you by your health care provider. Make sure you discuss any questions you have with your health care provider.  Document Revised: 08/04/2021 Document Reviewed: 08/04/2021  [employer] Patient Education © 2021 [employer]    3 Key Steps to Take While Waiting for Your COVID-19 Test Result  To help stop the spread of COVID-19, take these 3 key steps NOW while waiting for your test results:  1. Stay home and monitor your health.  Stay home and monitor your health to help protect your friends, family, and others from possibly getting COVID-19 from you.  Stay home and away from  others:  · If possible, stay away from others, especially people who are at higher risk for getting very sick from COVID-19, such as older adults and people with other medical conditions.  · If you have been in contact with someone with COVID-19, stay home and away from others for 14 days after your last contact with that person. Follow the recommendations of your local public health department if you need to quarantine.  · If you have a fever, cough or other symptoms of COVID-19, stay home and away from others (except to get medical care).  Monitor your health:  · Watch for fever, cough, shortness of breath, or other symptoms of COVID-19. Remember, symptoms may appear 2-14 days after exposure to COVID-19 and can include:  ? Fever or chills  ? Cough  ? Shortness of breath or difficulty breathing  ? Tiredness  ? Muscle or body aches  ? Headache  ? New loss of taste or smell  ? Sore throat  ? Congestion or runny nose  ? Nausea or vomiting  ? Diarrhea  2. Think about the people you have recently been around.  If you are diagnosed with COVID-19, a public health worker may call you to check on your health, discuss who you have been around, and ask where you spent time while you may have been able to spread COVID-19 to others. While you wait for your COVID-19 test result, think about everyone you have been around recently. This will be important information to give health workers if your test is positive.   Complete the information on the back of this page to help you remember everyone you have been around.   3. Answer the phone call from the health department.  If a public health worker calls you, answer the call to help slow the spread of COVID-19 in your community.  · Discussions with health department staff are confidential. This means that your personal and medical information will be kept private and only shared with those who may need to know, like your health care provider.  · Your name will not be shared with  those you came in contact with. The health department will only notify people you were in close contact with (within 6 feet for more than 15 minutes) that they might have been exposed to COVID-19.  Think about the people you have recently been around  If you test positive and are diagnosed with COVID-19, someone from the health department may call to check-in on your health, discuss who you have been around, and ask where you spent time while you may have been able to spread COVID-19 to others. This form can help you think about people you have recently been around so you will be ready if a public health worker calls you.  Things to think about. Have you:  · Gone to work or school?  · Gotten together with others (eaten out at a restaurant, gone out for drinks, exercised with others or gone to a gym, had friends or family over to your house, volunteered, gone to a party, pool, or park)?  · Gone to a store in person (e.g., grocery store, mall)?  · Gone to in-person appointments (e.g., salon, gastelum, doctor's or dentist's office)?  · Ridden in a car with others (e.g., rideshare) or taken public transportation?  · Been inside a Roman [employer], Scientologist, Amish or other places of Adventist?  Who lives with you?  · ______________________________________________________________________  · ______________________________________________________________________  · ______________________________________________________________________  · ______________________________________________________________________  Who have you been around (less than 6 feet for a total of 15 minutes or more) in the last 10 days? (You may have more people to list than the space provided. If so, write on the front of this sheet or a separate piece of paper.)  Name ______________________________________________  · Phone number ____________________________________  · Date you last saw them _____________________________  · Where you last saw them  ________________________________________________  Name ______________________________________________  · Phone number ____________________________________  · Date you last saw them _____________________________  · Where you last saw them ________________________________________________  Name ______________________________________________  · Phone number ____________________________________  · Date you last saw them _____________________________  · Where you last saw them ________________________________________________  Name ______________________________________________  · Phone number ____________________________________  · Date you last saw them _____________________________  · Where you last saw them ________________________________________________  Name ______________________________________________  · Phone number ____________________________________  · Date you last saw them _____________________________  · Where you last saw them ________________________________________________  What have you done in the last 10 days with other people?  Activity _____________________________________________  · Location _________________________________________  · Date ____________________________________________  Activity _____________________________________________  · Location _________________________________________  · Date ____________________________________________  Activity _____________________________________________  · Location _________________________________________  · Date ____________________________________________  Activity _____________________________________________  · Location _________________________________________  · Date ____________________________________________  Activity _____________________________________________  · Location _________________________________________  · Date ____________________________________________  Centers for Disease Control and  Prevention  cdc.gov/coronavirus  07/09/2021  This information is not intended to replace advice given to you by your health care provider. Make sure you discuss any questions you have with your health care provider.  Document Revised: 07/14/2021 Document Reviewed: 07/14/2021  [email or web address] Patient Education © 2021 [email or web address]  How to Quarantine at Home  Information for Patients and Families    These instructions are for people with confirmed or suspected COVID-19 who do not need to be hospitalized and those with confirmed COVID-19 who were hospitalized and discharged to care for themselves at home.    If you were tested through the Health Department  The Health Department will monitor your wellbeing.  If it is determined that you do not need to be hospitalized and can be isolated at home, you will be monitored by staff from your local or state health department.     If you were tested through a Commercial Lab  You will need to monitor yourself and report changes in your symptoms to your doctor.  See the section below called Monitor Your Symptoms.    Follow these steps until a healthcare provider or local or state health department says you can return to your normal activities.    Stay home except to get medical care  • Restrict activities outside your home, except for getting medical care.   • Do not go to work, school, or public areas.   • Avoid using public transportation, ride-sharing, or taxis.    Separate yourself from other people and animals in your home  People  As much as possible, you should stay in a specific room and away from other people in your home. Also, you should use a separate bathroom, if available.    Animals  You should restrict contact with pets and other animals while you are sick with COVID-19, just like you would around other people. When possible, have another member of your household care for your animals while you are sick. If you are sick with COVID-19, avoid contact with your pet,  including petting, snuggling, being kissed or licked, and sharing food. If you must care for your pet or be around animals while you are sick, wash your hands before and after you interact with pets and wear a facemask. See COVID-19 and Animals for more information.    Call ahead before visiting your doctor  If you have a medical appointment, call the healthcare provider and tell them that you have or may have COVID-19. This information will help the healthcare provider’s office take steps to keep other people from getting infected or exposed.    Wear a facemask  You should wear a facemask when you are around other people (e.g., sharing a room or vehicle) or pets and before you enter a healthcare provider’s office.     If you are not able to wear a facemask (for example, because it causes trouble breathing), then people who live with you should not stay in the same room with you, or they should wear a facemask if they enter your room.    Cover your coughs and sneezes  • Cover your mouth and nose with a tissue when you cough or sneeze.   • Throw used tissues in a lined trash can.   • Immediately wash your hands with soap and water for at least 20 seconds or, if soap and water are not available, clean your hands with an alcohol-based hand  that contains at least 60% alcohol.    Clean your hands often  • Wash your hands often with soap and water for at least 20 seconds, especially after blowing your nose, coughing, or sneezing; going to the bathroom; and before eating or preparing food.     • If soap and water are not readily available, use an alcohol-based hand  with at least 60% alcohol, covering all surfaces of your hands and rubbing them together until they feel dry.    • Soap and water are the best option if hands are visibly dirty. Avoid touching your eyes, nose, and mouth with unwashed hands.    Avoid sharing personal household items  • You should not share dishes, drinking glasses, cups,  eating utensils, towels, or bedding with other people or pets in your home.   • After using these items, they should be washed thoroughly with soap and water.    Clean all “high-touch” surfaces everyday  • High touch surfaces include counters, tabletops, doorknobs, bathroom fixtures, toilets, phones, keyboards, tablets, and bedside tables.   • Also, clean any surfaces that may have blood, stool, or body fluids on them.   • Use a household cleaning spray or wipe, according to the label instructions. Labels contain instructions for safe and effective use of the cleaning product, including precautions you should take when applying the product, such as wearing gloves and making sure you have good ventilation during use of the product.    Monitor your symptoms  • Seek prompt medical attention if your illness is worsening (e.g., difficulty breathing).   • Before seeking care, call your healthcare provider and tell them that you have, or are being evaluated for, COVID-19.   • Put on a facemask before you enter the facility.     • These steps will help the healthcare provider’s office to keep other people in the office or waiting room from getting infected or exposed.   • Persons who are placed under active monitoring or facilitated self-monitoring should follow instructions provided by their local health department or occupational health professionals, as appropriate.  • If you have a medical emergency and need to call 911, notify the dispatch personnel that you have, or are being evaluated for COVID-19. If possible, put on a facemask before emergency medical services arrive.    Discontinuing home isolation  Patients with confirmed COVID-19 should remain under home isolation precautions until the risk of secondary transmission to others is thought to be low. The decision to discontinue home isolation precautions should be made on a case-by-case basis, in consultation with healthcare providers and state and local health  departments.    The below content are for household members, intimate partners, and caregivers of a patient with symptomatic laboratory-confirmed COVID-19 or a patient under investigation:    Household members, intimate partners, and caregivers may have close contact with a person with symptomatic, laboratory-confirmed COVID-19 or a person under investigation.     Close contacts should monitor their health; they should call their healthcare provider right away if they develop symptoms suggestive of COVID-19 (e.g., fever, cough, shortness of breath)     Close contacts should also follow these recommendations:  • Make sure that you understand and can help the patient follow their healthcare provider’s instructions for medication(s) and care. You should help the patient with basic needs in the home and provide support for getting groceries, prescriptions, and other personal needs.  • Monitor the patient’s symptoms. If the patient is getting sicker, call his or her healthcare provider and tell them that the patient has laboratory-confirmed COVID-19. This will help the healthcare provider’s office take steps to keep other people in the office or waiting room from getting infected. Ask the healthcare provider to call the local or [facility] health department for additional guidance. If the patient has a medical emergency and you need to call 911, notify the dispatch personnel that the patient has, or is being evaluated for COVID-19.  • Household members should stay in another room or be  from the patient as much as possible. Household members should use a separate bedroom and bathroom, if available.  • Prohibit visitors who do not have an essential need to be in the home.  • Household members should care for any pets in the home. Do not handle pets or other animals while sick.  For more information, see COVID-19 and Animals.  • Make sure that shared spaces in the home have good air flow, such as by an air conditioner  or an opened window, weather permitting.  • Perform hand hygiene frequently. Wash your hands often with soap and water for at least 20 seconds or use an alcohol-based hand  that contains 60 to 95% alcohol, covering all surfaces of your hands and rubbing them together until they feel dry. Soap and water should be used preferentially if hands are visibly dirty.  • Avoid touching your eyes, nose, and mouth with unwashed hands.  • The patient should wear a facemask when you are around other people. If the patient is not able to wear a facemask (for example, because it causes trouble breathing), you, as the caregiver, should wear a mask when you are in the same room as the patient.  • Wear a disposable facemask and gloves when you touch or have contact with the patient’s blood, stool, or body fluids, such as saliva, sputum, nasal mucus, vomit, or urine.   o Throw out disposable facemasks and gloves after using them. Do not reuse.  o When removing personal protective equipment, first remove and dispose of gloves. Then, immediately clean your hands with soap and water or alcohol-based hand . Next, remove and dispose of facemask, and immediately clean your hands again with soap and water or alcohol-based hand .  • Avoid sharing household items with the patient. You should not share dishes, drinking glasses, cups, eating utensils, towels, bedding, or other items. After the patient uses these items, you should wash them thoroughly (see below “Wash laundry thoroughly”).  • Clean all “high-touch” surfaces, such as counters, tabletops, doorknobs, bathroom fixtures, toilets, phones, keyboards, tablets, and bedside tables, every day. Also, clean any surfaces that may have blood, stool, or body fluids on them.   o Use a household cleaning spray or wipe, according to the label instructions. Labels contain instructions for safe and effective use of the cleaning product including precautions you should take  when applying the product, such as wearing gloves and making sure you have good ventilation during use of the product.  • Wash laundry thoroughly.   o Immediately remove and wash clothes or bedding that have blood, stool, or body fluids on them.  o Wear disposable gloves while handling soiled items and keep soiled items away from your body. Clean your hands (with soap and water or an alcohol-based hand ) immediately after removing your gloves.  o Read and follow directions on labels of laundry or clothing items and detergent. In general, using a normal laundry detergent according to washing machine instructions and dry thoroughly using the warmest temperatures recommended on the clothing label.  • Place all used disposable gloves, facemasks, and other contaminated items in a lined container before disposing of them with other household waste. Clean your hands (with soap and water or an alcohol-based hand ) immediately after handling these items. Soap and water should be used preferentially if hands are visibly dirty.  • Discuss any additional questions with your state or local health department or healthcare provider.    Adapted from information provided by the Centers for Disease Control and Prevention.  For more information, visit https://www.cdc.gov/coronavirus/2019-ncov/hcp/guidance-prevent-spread.html

## 2021-12-23 NOTE — PROGRESS NOTES
You have chosen to receive care through a telehealth visit.  Do you consent to use a video/audio connection for your medical care today? Yes     CHIEF COMPLAINT  Chief Complaint   Patient presents with   • Exposure To Known Illness         HPI  Savannah Valenzuela is a 51 y.o. female  presents with complaint of exposed to boss who has tested positive for COVID.  Currently asymptomatic.  Needs COVID-19 test to rule out infection    [facility] empolyee    Review of Systems   All other systems reviewed and are negative.      Past Medical History:   Diagnosis Date   • Allergic rhinitis    • Anemia    • Chronic fatigue 3/27/2020   • Depression    • Encounter for screening for malignant neoplasm of colon 5/19/2021    Added automatically from request for surgery [id number]   • Fatigue    • GERD (gastroesophageal reflux disease)    • Hematuria, microscopic 8/24/2016   • Hiatal hernia    • Hyperlipidemia    • Hypertension    • Medication management    • Migraine    • Multiple food allergies    • Physical exam, annual        Family History   Problem Relation Age of Onset   • Heart disease Father    • Hyperlipidemia Father    • Malig Hyperthermia Neg Hx        Social History     Socioeconomic History   • Marital status:    Tobacco Use   • Smoking status: Never Smoker   • Smokeless tobacco: Never Used   Vaping Use   • Vaping Use: Never used   Substance and Sexual Activity   • Alcohol use: No     Comment: Kay: rare   • Drug use: No   • Sexual activity: Not Currently     Partners: Male         There were no vitals taken for this visit.    PHYSICAL EXAM  Physical Exam   Constitutional: She is oriented to person, place, and time. She appears well-developed and well-nourished. She does not have a sickly appearance. She does not appear ill.   HENT:   Head: Normocephalic and atraumatic.   Pulmonary/Chest: Effort normal.  No respiratory distress.  Neurological: She is alert and oriented to person, place, and time.         Diagnoses and  all orders for this visit:    1. Exposure to COVID-19 virus (Primary)  -     COVID-19,[facility] ROUTINE, NP/OP SWAB IN TRANSPORT MEDIA OR ESWAB 72 HR TAT - Swab, Nasopharynx; Future    --COVID-19 test to rule out infection  --quarantine until test result is back  -- will determine RTW      FOLLOW-UP  As discussed during visit with PCP/[facility] if no improvement or Urgent Care/Emergency Department if worsening of symptoms    Patient verbalizes understanding of medication dosage, comfort measures, instructions for treatment and follow-up.    Mague Negrete, CHAN  12/23/2021  09:04 EST    This visit was performed via Telehealth.  This patient has been instructed to follow-up with their primary care provider if their symptoms worsen or the treatment provided does not resolve their illness.

## 2022-01-03 ENCOUNTER — TELEMEDICINE (OUTPATIENT)
Dept: FAMILY MEDICINE CLINIC | Facility: TELEHEALTH | Age: 52
End: 2022-01-03

## 2022-01-03 DIAGNOSIS — J06.9 VIRAL UPPER RESPIRATORY TRACT INFECTION: Primary | ICD-10-CM

## 2022-01-03 PROCEDURE — BHEMPVIDEOVISIT: Performed by: NURSE PRACTITIONER

## 2022-01-03 NOTE — PROGRESS NOTES
"Subjective   Chief Complaint   Patient presents with   • URI       Savannah Valenzuela is a 51 y.o. female.     Pt is a  employee and needs COVID testing before returning to work. She reports recent travel out of town by plane. She returned today and with sudden onset of body aches, sore throat, ear fullness, diarrhea. She feels \"like she has been ran over by a truck\". She has been fully vax against COVID plus booster.     Pt is also requesting Flu testing.     URI   This is a new problem. The current episode started today. The problem has been unchanged. There has been no fever. Associated symptoms include coughing, diarrhea, a plugged ear sensation and a sore throat. Pertinent negatives include no abdominal pain, chest pain, congestion, dysuria, ear pain, headaches, joint pain, joint swelling, nausea, neck pain, rash, rhinorrhea, sinus pain, sneezing, swollen glands, vomiting or wheezing.        Allergies   Allergen Reactions   • Latex Urinary Retention and Irritability     Painful urination with latex cath       Past Medical History:   Diagnosis Date   • Allergic rhinitis    • Anemia    • Chronic fatigue 3/27/2020   • Depression    • Encounter for screening for malignant neoplasm of colon 2021    Added automatically from request for surgery [id number]   • Fatigue    • GERD (gastroesophageal reflux disease)    • Hematuria, microscopic 2016   • Hiatal hernia    • Hyperlipidemia    • Hypertension    • Medication management    • Migraine    • Multiple food allergies    • Physical exam, annual        Past Surgical History:   Procedure Laterality Date   •  SECTION     • COLONOSCOPY N/A 2021    Procedure: COLONOSCOPY INTO CECUM AND T.I. WITH COLD BIOPSY POLYPECTOMIES;  Surgeon: Ursula Torres MD;  Location: [facility] ENDOSCOPY;  Service: Gastroenterology;  Laterality: N/A;  PRE- SCREENING  POST- DIVERTICULOSIS, POLYPS, HEMORRHOIDS   • DILATATION AND CURETTAGE     • ENDOSCOPY N/A 2020    " Per mom, infant latched and nursing well. Breastfeeding basics discussed. Will page for feeds. 1300 Attempted feeding, but infant too sleepy-was just circumcised. Few sucks taken and mom can easily express breastmilk into infant's mouth. Encouraged to attempt in 1 hour. Procedure: ESOPHAGOGASTRODUODENOSCOPY WITH BIOPSY;  Surgeon: Cayetano Khan Jr., MD;  Location: [facility] ENDOSCOPY;  Service: General;  Laterality: N/A;  PREOP/ DYSPEPSIA  POSTOP/ GASTRITIS, ESOPHAGITIS, HIATAL HERNIA   • GASTRIC SLEEVE LAPAROSCOPIC N/A 10/5/2020    Procedure: GASTRIC SLEEVE LAPAROSCOPIC WITH paraesophageal hernia repair, lysis of adhesions;  Surgeon: Cayetano Khan Jr., MD;  [place]  [facility] [place]  Service: Bariatric;  Laterality: N/A;   • WISDOM TOOTH EXTRACTION         Social History     Socioeconomic History   • Marital status:    Tobacco Use   • Smoking status: Never Smoker   • Smokeless tobacco: Never Used   Vaping Use   • Vaping Use: Never used   Substance and Sexual Activity   • Alcohol use: No     Comment: Kay: rare   • Drug use: No   • Sexual activity: Not Currently     Partners: Male       Family History   Problem Relation Age of Onset   • Heart disease Father    • Hyperlipidemia Father    • Malig Hyperthermia Neg Hx          Current Outpatient Medications:   •  buPROPion XL (WELLBUTRIN XL) 150 MG 24 hr tablet, Take 1 tablet by mouth Daily., Disp: 90 tablet, Rfl: 1  •  Calcium 250 MG capsule, Take 500 mg by mouth 3 (Three) Times a Day., Disp: , Rfl:   •  Diclofenac Epolamine (FLECTOR) 1.3 % patch patch, Apply 1 patch topically to the appropriate area as directed 2 (Two) Times a Day., Disp: 60 patch, Rfl: 0  •  ferrous sulfate 325 (65 FE) MG tablet, Take 1 tablet by mouth Daily With Breakfast for 90 days. Can take with food to help prevent nausea., Disp: 30 tablet, Rfl: 2  •  FLUoxetine (PROzac) 40 MG capsule, Take 1 capsule by mouth Daily., Disp: 90 capsule, Rfl: 1  •  multivitamin with minerals (MULTIVITAMIN ADULT PO), Take 1 tablet by mouth Daily., Disp: , Rfl:       Review of Systems   Constitutional: Positive for fatigue. Negative for chills, diaphoresis and fever.   HENT: Positive for sore throat. Negative for congestion, ear pain, rhinorrhea, sneezing and  swollen glands.    Respiratory: Positive for cough. Negative for chest tightness, shortness of breath and wheezing.    Cardiovascular: Negative for chest pain.   Gastrointestinal: Positive for diarrhea. Negative for abdominal pain, nausea and vomiting.   Genitourinary: Negative for dysuria.   Musculoskeletal: Positive for myalgias. Negative for joint pain and neck pain.   Skin: Negative for rash.   Neurological: Negative for headache.        There were no vitals filed for this visit.    Objective   Physical Exam  Constitutional:       General: She is not in acute distress.     Appearance: Normal appearance. She is not ill-appearing, toxic-appearing or diaphoretic.   HENT:      Head: Normocephalic.      Nose: No congestion or rhinorrhea.      Comments: Per pt     Mouth/Throat:      Lips: Pink.      Mouth: Mucous membranes are moist.   Pulmonary:      Effort: Pulmonary effort is normal.   Neurological:      Mental Status: She is alert and oriented to person, place, and time.   Psychiatric:         Mood and Affect: Mood normal.         Behavior: Behavior normal.          Procedures     Assessment/Plan   Diagnoses and all orders for this visit:    1. Viral upper respiratory tract infection (Primary)  -     COVID-19,[facility] ROUTINE, NP/OP SWAB IN TRANSPORT MEDIA OR ESWAB 72 HR TAT - Swab, Nasopharynx; Future  -     POC Influenza A / B    Due to your symptoms I have ordered a COVID-19 test for you to rule this out. Please go to your nearest [facility] for COVID-19 testing. When you arrive, let them know you have an order for testing. We will call you with the results from a BLOCKED NUMBER, usually within 1-3 days.     For [facility], [place], operated by [employer] Employee's undergoing COVID-19 testing: Have your COVID test done within 24-48 hours of failing the screening tool. Contact [employer] at [phone] or [phone]. Leave a VM with your full name, employee ID, , exact details of symptoms or exposure. You will receive a  "call back within 24-72 hours with further info.     While you are waiting for your results, you should Self-Quarantine.     If your test is Negative: Complete the \"Return to Work Survey\" from employee health (found on STEW) to return to work.    If your test is Positive: Call Employee Health immediately to inform of the positive result. Self-Quarantine until you are deemed no longer contagious (10 days from symptom onset). Complete the \"Return to Work Survey\" found on STEW to report your Positive test and return to the \"Return to Work Survey\" on STWE within 24 hours of your anticipated return date in order to seek official clearance to return to work.    Treat symptoms as you would with any cold or viral illness.   Alternate tylenol and motrin for pain and/or fever, stay hydrated and rest.   Warning signs for COVID-19: trouble breathing, increasing shortness of breath, persistent chest pain or pressure, new confusion, inability to stay awake, pale, gray or blue-colored skin, lips or nail beds. If you show any of these signs seek Emergency Care.   If symptoms worsen or do not improve follow up with your PCP or visit your nearest Urgent Care Center or ER.      Results for orders placed or performed during the hospital encounter of 12/23/21   COVID-19,[facility] ROUTINE, NP/OP SWAB IN TRANSPORT MEDIA OR ESWAB 72 HR TAT - Swab, Anterior nasal    Specimen: Anterior nasal; Swab   Result Value Ref Range    SARS-CoV-2, [place] Not Detected Not Detected   SARS-CoV-2, [place]ERRIE 2 DAY TAT - Swab, Anterior nasal    Specimen: Anterior nasal; Swab   Result Value Ref Range    [facility] SARS-COV-2, [place]E 2 DAY TAT Performed    POCT SARS-CoV-2 Antigen [place]   ([facility])    Specimen: Swab   Result Value Ref Range    SARS Antigen Not Detected Not Detected    Internal Control Passed Passed    Lot Number [id number]     Expiration Date 3/2,023        PLAN: Discussed dosing, side effects, recommended other symptomatic care.  Patient should " follow up with primary care provider if symptoms worsen, fail to resolve or other symptoms need attention. Patient/family agree to the above.         CHAN Ramos     This visit was performed via Telehealth.  This patient has been instructed to follow-up with their primary care provider if their symptoms worsen or the treatment provided does not resolve their illness.

## 2022-01-03 NOTE — PATIENT INSTRUCTIONS
"Due to your symptoms I have ordered a COVID-19 test for you to rule this out. Please go to your nearest [facility] for COVID-19 testing. When you arrive, let them know you have an order for testing. We will call you with the results from a BLOCKED NUMBER, usually within 1-3 days.     For [facility], [place], operated by [employer] Employee's undergoing COVID-19 testing: Have your COVID test done within 24-48 hours of failing the screening tool. Contact [facility] at [phone] or [phone]. Leave a VM with your full name, employee ID, , exact details of symptoms or exposure. You will receive a call back within 24-72 hours with further info.     While you are waiting for your results, you should Self-Quarantine.     If your test is Negative: Complete the \"Return to Work Survey\" from [facility] (found on STEW) to return to work.    If your test is Positive: Call [facility] immediately to inform of the positive result. Self-Quarantine until you are deemed no longer contagious (10 days from symptom onset). Complete the \"Return to Work Survey\" found on STEW to report your Positive test and return to the \"Return to Work Survey\" on STEW within 24 hours of your anticipated return date in order to seek official clearance to return to work.    Treat symptoms as you would with any cold or viral illness.   Alternate tylenol and motrin for pain and/or fever, stay hydrated and rest.   Warning signs for COVID-19: trouble breathing, increasing shortness of breath, persistent chest pain or pressure, new confusion, inability to stay awake, pale, gray or blue-colored skin, lips or nail beds. If you show any of these signs seek Emergency Care.   If symptoms worsen or do not improve follow up with your PCP or visit your nearest Urgent Care Center or ER.        Viral Respiratory Infection  A respiratory infection is an illness that affects part of the respiratory system, such as the lungs, nose, or throat. A respiratory infection that is " caused by a virus is called a viral respiratory infection.  Common types of viral respiratory infections include:  · A cold.  · The flu (influenza).  · A respiratory syncytial virus (RSV) infection.  What are the causes?  This condition is caused by a virus.  What are the signs or symptoms?  Symptoms of this condition include:  · A stuffy or runny nose.  · Yellow or green nasal discharge.  · A cough.  · Sneezing.  · Fatigue.  · Achy muscles.  · A sore throat.  · Sweating or chills.  · A fever.  · A headache.  How is this diagnosed?  This condition may be diagnosed based on:  · Your symptoms.  · A physical exam.  · Testing of nasal swabs.  How is this treated?  This condition may be treated with medicines, such as:  · Antiviral medicine. This may shorten the length of time a person has symptoms.  · Expectorants. These make it easier to cough up mucus.  · Decongestant nasal sprays.  · Acetaminophen or NSAIDs to relieve fever and pain.  Antibiotic medicines are not prescribed for viral infections. This is because antibiotics are designed to kill bacteria. They are not effective against viruses.  Follow these instructions at home:    Managing pain and congestion  · Take over-the-counter and prescription medicines only as told by your health care provider.  · If you have a sore throat, gargle with a salt-water mixture 3-4 times a day or as needed. To make a salt-water mixture, completely dissolve ½-1 tsp of salt in 1 cup of warm water.  · Use nose drops made from salt water to ease congestion and soften raw skin around your nose.  · Drink enough fluid to keep your urine pale yellow. This helps prevent dehydration and helps loosen up mucus.  General instructions  · Rest as much as possible.  · Do not drink alcohol.  · Do not use any products that contain nicotine or tobacco, such as cigarettes and e-cigarettes. If you need help quitting, ask your health care provider.  · Keep all follow-up visits as told by your health  care provider. This is important.  How is this prevented?    · Get an annual flu shot. You may get the flu shot in late summer, fall, or winter. Ask your health care provider when you should get your flu shot.  · Avoid exposing others to your respiratory infection.  ? Stay home from work or school as told by your health care provider.  ? Wash your hands with soap and water often, especially after you cough or sneeze. If soap and water are not available, use alcohol-based hand .  · Avoid contact with people who are sick during cold and flu season. This is generally fall and winter.  Contact a health care provider if:  · Your symptoms last for 10 days or longer.  · Your symptoms get worse over time.  · You have a fever.  · You have severe sinus pain in your face or forehead.  · The glands in your jaw or neck become very swollen.  Get help right away if you:  · Feel pain or pressure in your chest.  · Have shortness of breath.  · Faint or feel like you will faint.  · Have severe and persistent vomiting.  · Feel confused or disoriented.  Summary  · A respiratory infection is an illness that affects part of the respiratory system, such as the lungs, nose, or throat. A respiratory infection that is caused by a virus is called a viral respiratory infection.  · Common types of viral respiratory infections are a cold, influenza, and respiratory syncytial virus (RSV) infection.  · Symptoms of this condition include a stuffy or runny nose, cough, sneezing, fatigue, achy muscles, sore throat, and fevers or chills.  · Antibiotic medicines are not prescribed for viral infections. This is because antibiotics are designed to kill bacteria. They are not effective against viruses.  This information is not intended to replace advice given to you by your health care provider. Make sure you discuss any questions you have with your health care provider.  Document Revised: 12/26/2019 Document Reviewed: 01/28/2019  Elsevier Patient  Education © 2021 [employer]    10 Things You Can Do to Manage Your COVID-19 Symptoms at Home  If you have possible or confirmed COVID-19:  1. Stay home except to get medical care.  2. Monitor your symptoms carefully. If your symptoms get worse, call your healthcare provider immediately.  3. Get rest and stay hydrated.  4. If you have a medical appointment, call the healthcare provider ahead of time and tell them that you have or may have COVID-19.  5. For medical emergencies, call 911 and notify the dispatch personnel that you have or may have COVID-19.  6. Cover your cough and sneezes with a tissue or use the inside of your elbow.  7. Wash your hands often with soap and water for at least 20 seconds or clean your hands with an alcohol-based hand  that contains at least 60% alcohol.  8. As much as possible, stay in a specific room and away from other people in your home. Also, you should use a separate bathroom, if available. If you need to be around other people in or outside of the home, wear a mask.  9. Avoid sharing personal items with other people in your household, like dishes, towels, and bedding.  10. Clean all surfaces that are touched often, like counters, tabletops, and doorknobs. Use household cleaning sprays or wipes according to the label instructions.  cdc.gov/coronavirus  07/16/2021  This information is not intended to replace advice given to you by your health care provider. Make sure you discuss any questions you have with your health care provider.  Document Revised: 08/04/2021 Document Reviewed: 08/04/2021  Elsevier Patient Education © 2021 [employer]    How to Quarantine at Home  Information for Patients and Families    These instructions are for people with confirmed or suspected COVID-19 who do not need to be hospitalized and those with confirmed COVID-19 who were hospitalized and discharged to care for themselves at home.    If you were tested through the Health Department  The  Health Department will monitor your wellbeing.  If it is determined that you do not need to be hospitalized and can be isolated at home, you will be monitored by staff from your local or state health department.     If you were tested through a Commercial Lab  You will need to monitor yourself and report changes in your symptoms to your doctor.  See the section below called Monitor Your Symptoms.    Follow these steps until a healthcare provider or local or state health department says you can return to your normal activities.    Stay home except to get medical care  • Restrict activities outside your home, except for getting medical care.   • Do not go to work, school, or public areas.   • Avoid using public transportation, ride-sharing, or taxis.    Separate yourself from other people and animals in your home  People  As much as possible, you should stay in a specific room and away from other people in your home. Also, you should use a separate bathroom, if available.    Animals  You should restrict contact with pets and other animals while you are sick with COVID-19, just like you would around other people. When possible, have another member of your household care for your animals while you are sick. If you are sick with COVID-19, avoid contact with your pet, including petting, snuggling, being kissed or licked, and sharing food. If you must care for your pet or be around animals while you are sick, wash your hands before and after you interact with pets and wear a facemask. See COVID-19 and Animals for more information.    Call ahead before visiting your doctor  If you have a medical appointment, call the healthcare provider and tell them that you have or may have COVID-19. This information will help the healthcare provider’s office take steps to keep other people from getting infected or exposed.    Wear a facemask  You should wear a facemask when you are around other people (e.g., sharing a room or vehicle) or  pets and before you enter a healthcare provider’s office.     If you are not able to wear a facemask (for example, because it causes trouble breathing), then people who live with you should not stay in the same room with you, or they should wear a facemask if they enter your room.    Cover your coughs and sneezes  • Cover your mouth and nose with a tissue when you cough or sneeze.   • Throw used tissues in a lined trash can.   • Immediately wash your hands with soap and water for at least 20 seconds or, if soap and water are not available, clean your hands with an alcohol-based hand  that contains at least 60% alcohol.    Clean your hands often  • Wash your hands often with soap and water for at least 20 seconds, especially after blowing your nose, coughing, or sneezing; going to the bathroom; and before eating or preparing food.     • If soap and water are not readily available, use an alcohol-based hand  with at least 60% alcohol, covering all surfaces of your hands and rubbing them together until they feel dry.    • Soap and water are the best option if hands are visibly dirty. Avoid touching your eyes, nose, and mouth with unwashed hands.    Avoid sharing personal household items  • You should not share dishes, drinking glasses, cups, eating utensils, towels, or bedding with other people or pets in your home.   • After using these items, they should be washed thoroughly with soap and water.    Clean all “high-touch” surfaces everyday  • High touch surfaces include counters, tabletops, doorknobs, bathroom fixtures, toilets, phones, keyboards, tablets, and bedside tables.   • Also, clean any surfaces that may have blood, stool, or body fluids on them.   • Use a household cleaning spray or wipe, according to the label instructions. Labels contain instructions for safe and effective use of the cleaning product, including precautions you should take when applying the product, such as wearing gloves  and making sure you have good ventilation during use of the product.    Monitor your symptoms  • Seek prompt medical attention if your illness is worsening (e.g., difficulty breathing).   • Before seeking care, call your healthcare provider and tell them that you have, or are being evaluated for, COVID-19.   • Put on a facemask before you enter the facility.     • These steps will help the healthcare provider’s office to keep other people in the office or waiting room from getting infected or exposed.   • Persons who are placed under active monitoring or facilitated self-monitoring should follow instructions provided by their local health department or occupational health professionals, as appropriate.  • If you have a medical emergency and need to call 911, notify the dispatch personnel that you have, or are being evaluated for COVID-19. If possible, put on a facemask before emergency medical services arrive.    Discontinuing home isolation  Patients with confirmed COVID-19 should remain under home isolation precautions until the risk of secondary transmission to others is thought to be low. The decision to discontinue home isolation precautions should be made on a case-by-case basis, in consultation with healthcare providers and state and local health departments.    The below content are for household members, intimate partners, and caregivers of a patient with symptomatic laboratory-confirmed COVID-19 or a patient under investigation:    Household members, intimate partners, and caregivers may have close contact with a person with symptomatic, laboratory-confirmed COVID-19 or a person under investigation.     Close contacts should monitor their health; they should call their healthcare provider right away if they develop symptoms suggestive of COVID-19 (e.g., fever, cough, shortness of breath)     Close contacts should also follow these recommendations:  • Make sure that you understand and can help the patient  follow their healthcare provider’s instructions for medication(s) and care. You should help the patient with basic needs in the home and provide support for getting groceries, prescriptions, and other personal needs.  • Monitor the patient’s symptoms. If the patient is getting sicker, call his or her healthcare provider and tell them that the patient has laboratory-confirmed COVID-19. This will help the healthcare provider’s office take steps to keep other people in the office or waiting room from getting infected. Ask the healthcare provider to call the local or [facility] health department for additional guidance. If the patient has a medical emergency and you need to call 911, notify the dispatch personnel that the patient has, or is being evaluated for COVID-19.  • Household members should stay in another room or be  from the patient as much as possible. Household members should use a separate bedroom and bathroom, if available.  • Prohibit visitors who do not have an essential need to be in the home.  • Household members should care for any pets in the home. Do not handle pets or other animals while sick.  For more information, see COVID-19 and Animals.  • Make sure that shared spaces in the home have good air flow, such as by an air conditioner or an opened window, weather permitting.  • Perform hand hygiene frequently. Wash your hands often with soap and water for at least 20 seconds or use an alcohol-based hand  that contains 60 to 95% alcohol, covering all surfaces of your hands and rubbing them together until they feel dry. Soap and water should be used preferentially if hands are visibly dirty.  • Avoid touching your eyes, nose, and mouth with unwashed hands.  • The patient should wear a facemask when you are around other people. If the patient is not able to wear a facemask (for example, because it causes trouble breathing), you, as the caregiver, should wear a mask when you are in the same  room as the patient.  • Wear a disposable facemask and gloves when you touch or have contact with the patient’s blood, stool, or body fluids, such as saliva, sputum, nasal mucus, vomit, or urine.   o Throw out disposable facemasks and gloves after using them. Do not reuse.  o When removing personal protective equipment, first remove and dispose of gloves. Then, immediately clean your hands with soap and water or alcohol-based hand . Next, remove and dispose of facemask, and immediately clean your hands again with soap and water or alcohol-based hand .  • Avoid sharing household items with the patient. You should not share dishes, drinking glasses, cups, eating utensils, towels, bedding, or other items. After the patient uses these items, you should wash them thoroughly (see below “Wash laundry thoroughly”).  • Clean all “high-touch” surfaces, such as counters, tabletops, doorknobs, bathroom fixtures, toilets, phones, keyboards, tablets, and bedside tables, every day. Also, clean any surfaces that may have blood, stool, or body fluids on them.   o Use a household cleaning spray or wipe, according to the label instructions. Labels contain instructions for safe and effective use of the cleaning product including precautions you should take when applying the product, such as wearing gloves and making sure you have good ventilation during use of the product.  • Wash laundry thoroughly.   o Immediately remove and wash clothes or bedding that have blood, stool, or body fluids on them.  o Wear disposable gloves while handling soiled items and keep soiled items away from your body. Clean your hands (with soap and water or an alcohol-based hand ) immediately after removing your gloves.  o Read and follow directions on labels of laundry or clothing items and detergent. In general, using a normal laundry detergent according to washing machine instructions and dry thoroughly using the warmest  temperatures recommended on the clothing label.  • Place all used disposable gloves, facemasks, and other contaminated items in a lined container before disposing of them with other household waste. Clean your hands (with soap and water or an alcohol-based hand ) immediately after handling these items. Soap and water should be used preferentially if hands are visibly dirty.  • Discuss any additional questions with your state or local health department or healthcare provider.    Adapted from information provided by the Centers for Disease Control and Prevention.  For more information, visit https://www.cdc.gov/coronavirus/2019-ncov/hcp/guidance-prevent-spread.html

## 2022-01-13 ENCOUNTER — OFFICE VISIT (OUTPATIENT)
Dept: ORTHOPEDIC SURGERY | Facility: CLINIC | Age: 52
End: 2022-01-13

## 2022-01-13 VITALS — WEIGHT: 198 LBS | TEMPERATURE: 97.3 F | HEIGHT: 64 IN | BODY MASS INDEX: 33.8 KG/M2

## 2022-01-13 DIAGNOSIS — M70.61 TROCHANTERIC BURSITIS OF RIGHT HIP: ICD-10-CM

## 2022-01-13 DIAGNOSIS — R52 PAIN: Primary | ICD-10-CM

## 2022-01-13 PROCEDURE — 20610 DRAIN/INJ JOINT/BURSA W/O US: CPT | Performed by: NURSE PRACTITIONER

## 2022-01-13 PROCEDURE — 73502 X-RAY EXAM HIP UNI 2-3 VIEWS: CPT | Performed by: ORTHOPAEDIC SURGERY

## 2022-01-13 PROCEDURE — 99213 OFFICE O/P EST LOW 20 MIN: CPT | Performed by: NURSE PRACTITIONER

## 2022-01-13 RX ORDER — MELOXICAM 15 MG/1
15 TABLET ORAL DAILY
Qty: 15 TABLET | Refills: 1 | Status: SHIPPED | OUTPATIENT
Start: 2022-01-13 | End: 2022-11-07

## 2022-01-13 RX ORDER — LIDOCAINE HYDROCHLORIDE 20 MG/ML
2 INJECTION, SOLUTION EPIDURAL; INFILTRATION; INTRACAUDAL; PERINEURAL
Status: COMPLETED | OUTPATIENT
Start: 2022-01-13 | End: 2022-01-13

## 2022-01-13 RX ORDER — TRIAMCINOLONE ACETONIDE 40 MG/ML
80 INJECTION, SUSPENSION INTRA-ARTICULAR; INTRAMUSCULAR
Status: COMPLETED | OUTPATIENT
Start: 2022-01-13 | End: 2022-01-13

## 2022-01-13 RX ADMIN — TRIAMCINOLONE ACETONIDE 80 MG: 40 INJECTION, SUSPENSION INTRA-ARTICULAR; INTRAMUSCULAR at 16:03

## 2022-01-13 RX ADMIN — LIDOCAINE HYDROCHLORIDE 2 ML: 20 INJECTION, SOLUTION EPIDURAL; INFILTRATION; INTRACAUDAL; PERINEURAL at 16:03

## 2022-01-13 NOTE — PROGRESS NOTES
"Patient: Savannah Valenzuela  YOB: 1970 51 y.o. female  Medical Record Number: [id number]    Chief Complaints:   Chief Complaint   Patient presents with   • Right Hip - Follow-up       History of Present Illness:Savannah Valenzuela is a 51 y.o. female who presents as a new patient both myself as well as to the practice with complaints of right lateral hip pain.  Patient reports it started about 3 to 4 months ago, denies any injury.  She has tried over-the-counter anti-inflammatories with minimal improvement.  Patient reports that the hip pain is worse with certain positions and is tender to touch.  She denies any groin pain.  She does have chronic low back issues but reports\" this is different\"    Allergies:   Allergies   Allergen Reactions   • Latex Urinary Retention and Irritability     Painful urination with latex cath       Medications:   Current Outpatient Medications   Medication Sig Dispense Refill   • buPROPion XL (WELLBUTRIN XL) 150 MG 24 hr tablet Take 1 tablet by mouth Daily. 90 tablet 1   • Calcium 250 MG capsule Take 500 mg by mouth 3 (Three) Times a Day.     • Diclofenac Epolamine (FLECTOR) 1.3 % patch patch Apply 1 patch topically to the appropriate area as directed 2 (Two) Times a Day. 60 patch 0   • ferrous sulfate 325 (65 FE) MG tablet Take 1 tablet by mouth Daily With Breakfast for 90 days. Can take with food to help prevent nausea. 30 tablet 2   • FLUoxetine (PROzac) 40 MG capsule Take 1 capsule by mouth Daily. 90 capsule 1   • fluticasone (FLONASE) 50 MCG/ACT nasal spray 2 sprays into the nostril(s) as directed by provider Daily for 10 days. 11.1 mL 0   • multivitamin with minerals (MULTIVITAMIN ADULT PO) Take 1 tablet by mouth Daily.       No current facility-administered medications for this visit.         The following portions of the patient's history were reviewed and updated as appropriate: allergies, current medications, past family history, past medical history, past social " "history, past surgical history and problem list.    Review of Systems:   A 14 point review of systems was performed. All systems negative except pertinent positives/negative listed in HPI above    Physical Exam:   Vitals:    01/13/22 1530   Temp: 97.3 °F (36.3 °C)   Weight: 89.8 kg (198 lb)   Height: 162.6 cm (64\")       General: A and O x 3, ASA, NAD    SCLERA:    Normal    Skin clear no unusual lesions noted  Right hip patient is very tender over right hip greater trochanteric bursa she otherwise has normal internal and external rotation with a negative Stinchfield negative logroll calf is soft and nontender       Radiology:  Xrays 2 views of right hip ordered and reviewed today secondary to pain show minimal if any arthritic changes.  No compared to views available    Assessment/Plan: Right hip greater trochanteric bursitis    Patient I discussed options, she would like to proceed with right hip bursa injection, physical therapy, meloxicam scheduled for the next 2 weeks then as needed, and I will see her back as needed    Large Joint Arthrocentesis: R greater trochanteric bursa  Date/Time: 1/13/2022 4:03 PM  Consent given by: patient  Site marked: site marked  Timeout: Immediately prior to procedure a time out was called to verify the correct patient, procedure, equipment, support staff and site/side marked as required   Supporting Documentation  Indications: pain   Procedure Details  Location: hip - R greater trochanteric bursa  Preparation: Patient was prepped and draped in the usual sterile fashion  Needle size: 18 G  Approach: lateral  Medications administered: 2 mL lidocaine PF 2% 2 %; 80 mg triamcinolone acetonide 40 MG/ML              Kim Alejandre, APRN  1/13/2022  "

## 2022-01-14 ENCOUNTER — PATIENT MESSAGE (OUTPATIENT)
Dept: INTERNAL MEDICINE | Facility: CLINIC | Age: 52
End: 2022-01-14

## 2022-01-14 NOTE — TELEPHONE ENCOUNTER
Lets try some Hycodan and Benadryl 1-1 . 1 teaspoon every 4 hours as needed cough.  8 ounces.      ----- Message from Annette Fernandez MA sent at 1/14/2022  7:44 AM EST -----  Regarding: FW: Covid Cough  Please advise on cough syrup.  ----- Message -----  From: Savannah Valenzuela  Sent: 1/14/2022   7:00 AM EST  To: [facility]  Subject: Covid Cough                                      Good morning. I am just ending a quarantine following a Covid diagnosis. I am overall feeling better but the cough is terrible at night and preventing me from getting any sleep. Is there a cough syrup that could be called in to help me get through the evening hours? Thank you!

## 2022-01-17 NOTE — TELEPHONE ENCOUNTER
[facility] called and stated they are unable to fill the Hydrocodone sent in for patient due to medication back order. The pharmacy is asking patient to find another pharmacy to fill this medication.    Called patient to verify other pharmacy, patient stated she would like medication to be sent to [facility] pharmacy instead. Patient was very happy that we where able to help.

## 2022-01-17 NOTE — TELEPHONE ENCOUNTER
"Per Dr. Lemos: \"Lets try on Mucinex with codeine.  1 teaspoon every 4 hours as needed cough.  8 ounces.  If they do not . that, see if they carry another product with codeine.\"    Dr. Lemos did approve refill to be sent to [facility].  "

## 2022-01-18 RX ORDER — GUAIFENESIN AND CODEINE PHOSPHATE 100; 10 MG/5ML; MG/5ML
5 SOLUTION ORAL EVERY 4 HOURS PRN
Qty: 240 ML | Refills: 0 | Status: SHIPPED | OUTPATIENT
Start: 2022-01-18 | End: 2022-05-11

## 2022-01-18 NOTE — TELEPHONE ENCOUNTER
Pt has issues filling hycodan. Insurance requiring PA. Wanting to try Mucinex with codeine instead.

## 2022-01-18 NOTE — TELEPHONE ENCOUNTER
Per Dr. Lemos earlier this week, we could try mucinex with codeine. I have that script pending for approval from Dr Lemos. Hopefully that will take care of the insurance issue. Would still need to let patient know of change in drug if this works.

## 2022-01-19 ENCOUNTER — TELEPHONE (OUTPATIENT)
Dept: INTERNAL MEDICINE | Facility: CLINIC | Age: 52
End: 2022-01-19

## 2022-01-19 NOTE — TELEPHONE ENCOUNTER
KARON AT [facility] CALLED IN REGARDS TO MEDICATION PA  FOR     HYDROcodone-homatropine (HYCODAN) 5-1.5 MG/5ML syrup    THE PLAN DOES NOT ALLOW THE QUANTITY FOR 8 DAYS  IT NEEDS TO BE FOR A 7 DAY PRESCRIPTION.     NEEDS A NEW PRESCRIPTION FOR 7 DAYS 210 ML    CALL BACK NUMBER [phone]

## 2022-01-20 NOTE — TELEPHONE ENCOUNTER
Called patient, LM to verify if alternative cough syrup has been picked up from [facility] pharmacy  Guaitensin-codeine 100-10 mg, 5 mL every 4 hours prn for cough

## 2022-02-02 ENCOUNTER — HOSPITAL ENCOUNTER (OUTPATIENT)
Dept: PHYSICAL THERAPY | Facility: HOSPITAL | Age: 52
Setting detail: THERAPIES SERIES
Discharge: HOME | End: 2022-02-02

## 2022-02-02 DIAGNOSIS — M25.551 RIGHT HIP PAIN: Primary | ICD-10-CM

## 2022-02-02 PROCEDURE — 97110 THERAPEUTIC EXERCISES: CPT

## 2022-02-02 PROCEDURE — 97161 PT EVAL LOW COMPLEX 20 MIN: CPT

## 2022-02-02 NOTE — THERAPY EVALUATION
Outpatient Physical Therapy Ortho Initial Evaluation  [facility]     Patient Name: Savannah Valenzuela  : 1970  MRN: [id number]  Today's Date: 2022      Visit Date: 2022    Patient Active Problem List   Diagnosis   • Migraine   • Allergic rhinitis   • Depression   • Chronic knee pain   • S/P laparoscopic sleeve gastrectomy   • Obesity, Class I, BMI 30-34.9   • Iron deficiency anemia        Past Medical History:   Diagnosis Date   • Allergic rhinitis    • Anemia    • Chronic fatigue 3/27/2020   • Depression    • Encounter for screening for malignant neoplasm of colon 2021    Added automatically from request for surgery [id number]   • Fatigue    • GERD (gastroesophageal reflux disease)    • Hematuria, microscopic 2016   • Hiatal hernia    • Hyperlipidemia    • Hypertension    • Low back strain     Fell on my back on aluminum bleachers   • Medication management    • Migraine    • Multiple food allergies    • Physical exam, annual         Past Surgical History:   Procedure Laterality Date   •  SECTION     • COLONOSCOPY N/A 2021    Procedure: COLONOSCOPY INTO CECUM AND T.I. WITH COLD BIOPSY POLYPECTOMIES;  Surgeon: Ursula Torres MD;  Location: [facility] ENDOSCOPY;  Service: Gastroenterology;  Laterality: N/A;  PRE- SCREENING  POST- DIVERTICULOSIS, POLYPS, HEMORRHOIDS   • DILATATION AND CURETTAGE     • ENDOSCOPY N/A 2020    Procedure: ESOPHAGOGASTRODUODENOSCOPY WITH BIOPSY;  Surgeon: Cayetano Khan Jr., MD;  Location: [facility] ENDOSCOPY;  Service: General;  Laterality: N/A;  PREOP/ DYSPEPSIA  POSTOP/ GASTRITIS, ESOPHAGITIS, HIATAL HERNIA   • GASTRIC SLEEVE LAPAROSCOPIC N/A 10/5/2020    Procedure: GASTRIC SLEEVE LAPAROSCOPIC WITH paraesophageal hernia repair, lysis of adhesions;  Surgeon: Cayetano Khan Jr., MD;  Location: [facility] OR [facility];  Service: Bariatric;  Laterality: N/A;   • WISDOM TOOTH EXTRACTION         Visit Dx:     ICD-10-CM ICD-9-CM   1. Right hip  "pain  M25.551 719.45          Patient History     Row Name 02/02/22 1500 01/31/22 0913          History    Chief Complaint Pain  -CC --     Type of Pain Hip pain  -CC Hip pain (P)    -patient     Date Current Problem(s) Began 10/01/21  -CC 10/01/21 (P)    -patient     Brief Description of Current Complaint Savannah Valenzuela is a 51 y.o. female who presents today with R hip pain. Pt was seen this past fall for the same issue with no pain relief with PT. She was compliant with HEP, but no relief. Saw ortho MD, and received R hip injection (3 weeks ago). XR unremarkable. Reports partial pain relief with injection, but still having ongoing issues and \"feels like it cramps up from time to time\". Denies radicular s/s and n/t. Reports some low back pain issues, but very mild and only if standing for a long time develops some stiffness. Has no resumed any of her previous HEP from prior to injection as wanted to start in here to make sure she is doing the right things. Savannah Valenzulea reports difficulty/increased pain with crossing right leg over left, putting shoe on R foot, putting on pants (any motions involving ER), intermittent pain with lying on R side, but not like previously. Pain relieving factors include avoiding R hip ER/MARIANA. Only hurts in that position, but when out of Mariana position no pain. PMH includes LBP, chronicity of R hip pain.  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 -- No (P)    -patient     What clinical tests have you had for this problem? X-ray  -CC X-ray (P)    -patient     Results of Clinical Tests grossly unremarkable  -CC --     Are you or can you be pregnant " -- No (P)    -patient            Pain     Pain Location Hip  -CC --     Pain at Present 0  -CC --     Pain at Best 0  -CC --     Pain at Worst 4  -CC --            Fall Risk Assessment    Any falls in the past year: No  -CC No (P)    -patient            Services    Prior Rehab/Home Health Experiences -- No (P)    -patient     Are you currently receiving Home Health services -- No (P)    -patient     Do you plan to receive Home Health services in the near future -- No (P)    -patient            Daily Activities    Primary Language -- English (P)    -patient     Are you able to read -- Yes (P)    -patient     Are you able to write -- Yes (P)    -patient     How does patient learn best? -- Listening; Reading; Demonstration; Pictures/Video (P)    -patient            Safety    Are you being hurt, hit, or frightened by anyone at home or in your life? -- No (P)    -patient     Are you being neglected by a caregiver -- No (P)    -patient     Have you had any of the following issues with -- Depression (P)    -patient           User Key  (r) = Recorded By, (t) = Taken By, (c) = Cosigned By    Initials Name Provider Type    CC Anabel Mishra, PT Physical Therapist    patient Savannah Valenzuela --                 PT Ortho     Row Name 02/02/22 1500       Neural Tension Signs- Lower Quarter Clearing    Slump Bilateral:; Negative  posterior chain tightness  -CC       Lumbar ROM Screen- Lower Quarter Clearing    Lumbar Flexion Normal  -CC    Lumbar Extension Normal  -CC    Lumbar Rotation Normal  -CC       Hip/Thigh Palpation    Gluteus Medius Right:; Tender; Guarded/taut  -CC    Piriformis Right:; Tender; Guarded/taut  -CC       Hip Special Tests    ANDERS (hip vs SI pathology) Right:; Positive  -CC    Ely’s test (rectus femoris tightness) Bilateral:; Positive  -CC       Right Lower Ext    RT Lower Extremity Comments R hip PROM WNL all cardinal planes  -CC       Left Lower Ext    LT Lower Extremity Comments L hip PROM WNL all  cardinal planes  -CC       MMT Right Lower Ext    Rt Hip Flexion MMT, Gross Movement (5/5) normal  -CC    Rt Hip Extension MMT, Gross Movement (4-/5) good minus  -CC    Rt Hip ABduction MMT, Gross Movement (3+/5) fair plus  -CC    Rt Hip Internal (Medial) Rotation MMT, Gross Movement (4/5) good  -CC    Rt Hip External (Lateral) Rotation MMT, Gross Movement (4/5) good  -CC    Rt Knee Extension MMT, Gross Movement (5/5) normal  -CC    Rt Knee Flexion MMT, Gross Movement (5/5) normal  -CC    Rt Ankle Dorsiflexion MMT, Gross Movement (5/5) normal  -CC       MMT Left Lower Ext    Lt Hip Flexion MMT, Gross Movement (5/5) normal  -CC    Lt Hip Extension MMT, Gross Movement (4/5) good  -CC    Lt Hip ABduction MMT, Gross Movement (5/5) normal  -CC    Lt Hip Internal (Medial) Rotation MMT, Gross Movement (4+/5) good plus  -CC    Lt Hip External (Lateral) Rotation MMT, Gross Movement (4+/5) good plus  -CC    Lt Knee Extension MMT, Gross Movement (5/5) normal  -CC    Lt Knee Flexion MMT, Gross Movement (5/5) normal  -CC    Lt Ankle Dorsiflexion MMT, Gross Movement (5/5) normal  -CC       Sensation    Sensation WNL? WNL  -CC       Lower Extremity Flexibility    Hamstrings Bilateral:; Moderately limited  -CC    Quadriceps Bilateral:; Mildly limited  -CC    Hip External Rotators Bilateral:; WNL  -CC          User Key  (r) = Recorded By, (t) = Taken By, (c) = Cosigned By    Initials Name Provider Type    Anabel Benavides PT Physical Therapist                            Therapy Education  Education Details: Access Code [email or web address]  Given: HEP, Symptoms/condition management  Program: New, Reinforced, Modified  How Provided: Verbal, Demonstration, Written  Provided to: Patient  Level of Understanding: Teach back education performed, Verbalized      PT OP Goals     Row Name 02/02/22 1600          PT Short Term Goals    STG Date to Achieve 03/04/22  -     STG 1 Pt will be independent with initial HEP to improve strength/muscular  tightness and decrease R hip pain.  -CC     STG 1 Progress New  -CC     STG 2 Pt will report no greater than 2/10 R hip pain to improve participation in ADLs.  -CC     STG 2 Progress New  -CC     STG 3 Pt will report at least 50% improvement in R hip s/s with dressing activities.  -CC     STG 3 Progress New  -            Long Term Goals    LTG Date to Achieve 04/03/22  -CC     LTG 1 Pt will be independent with advanced HEP to improve strength/muscular tightness and decrease R hip pain.  -CC     LTG 1 Progress New  -CC     LTG 2 Pt will improve R hip abd and ext strength to at least 4/5.  -CC     LTG 2 Progress New  -CC     LTG 3 Patient will report walking 1 mile without R hip pain/stiffness to return to walking program.  -CC     LTG 3 Progress New  -CC     LTG 4 Patient will report returning to home workout routine for 1 week without R hip pain to return to PLOF.  -CC     LTG 4 Progress New  -CC     LTG 5 Pt will report ability to don pants with 0/10 R  hip pain.  -CC     LTG 5 Progress New  -CC     LTG 6 Pt will report ability to don shoes with 0/10 R hip pain.  -CC     LTG 6 Progress New  -            Time Calculation    PT Goal Re-Cert Due Date 05/03/22  -CC           User Key  (r) = Recorded By, (t) = Taken By, (c) = Cosigned By    Initials Name Provider Type    Anabel Benavides, PT Physical Therapist                 PT Assessment/Plan     Row Name 02/02/22 1600          PT Assessment    Functional Limitations Impaired locomotion; Limitations in functional capacity and performance; Performance in leisure activities; Limitation in home management  -CC     Impairments Balance; Impaired flexibility; Locomotion; Motor function; Muscle strength; Pain  -CC     Assessment Comments Savannah Valenzuela is a 51 y.o. female referred to physical therapy for R hip pain. XR unremarkable. Was seen about 2 months ago for the same issues, and has since received workup from ortho MD and R hip cortisone injection She  presents with an evolving clinical presentation, along with no remarkable comorbidities and personal factors of LBP and previously failed PT management for same issues that may impact her progress in the plan of care. Pt presents today with decreased core and R hip girdle strength, decreased HS flexibility bilaterally, TTP R piriformis and glute med, (+) ANDERS, and pain with hip in ER position . Her signs and symptoms are consistent with referring diagnosis. The previous impairments limit her ability to don pants, don shoes, sit with legs crossed, and assume positions that require hip to be ER. Pt will benefit from skilled PT to address the previous impairments and return to PLOF.  -CC     Please refer to paper survey for additional self-reported information Yes  -CC     Rehab Potential Good  -CC     Patient/caregiver participated in establishment of treatment plan and goals Yes  -CC     Patient would benefit from skilled therapy intervention Yes  -CC            PT Plan    PT Frequency 2x/week  -CC     Predicted Duration of Therapy Intervention (PT) 10-14 visits  -CC     PT Plan Comments next visit: assess response to HEP, nu step warm up, HS str step, gastroc str step, s/l clam (if no pain), s/l rev clam, bridge, dead bug, STM/foam rolling R hip girdle.  -CC           User Key  (r) = Recorded By, (t) = Taken By, (c) = Cosigned By    Initials Name Provider Type    Anabel Benavides, PT Physical Therapist                   OP Exercises     Row Name 02/02/22 1600             Subjective Comments    Subjective Comments EVALUATION  -CC              Total Minutes    77251 - PT Therapeutic Exercise Minutes 23  -CC              Exercise 2    Exercise Name 2 LTR  -CC      Cueing 2 Verbal  -CC      Sets 2 1  -CC      Reps 2 10  -CC      Time 2 5s  -CC              Exercise 3    Exercise Name 3 HL hip abd  -CC      Cueing 3 Verbal  -CC      Sets 3 2  -CC      Reps 3 10  -CC      Time 3 RTB  -CC              Exercise 4     Exercise Name 4 HL hip add  -CC      Cueing 4 Verbal  -CC      Sets 4 2  -CC      Reps 4 10  -CC      Time 4 3 sec  -CC              Exercise 5    Exercise Name 5 PPT  -CC      Cueing 5 Verbal; Tactile  -CC      Sets 5 1  -CC      Reps 5 10  -CC      Time 5 5s  -CC              Exercise 6    Exercise Name 6 seated HS str  -CC      Cueing 6 Verbal  -CC      Reps 6 3 b  -CC      Time 6 20 sec  -CC            User Key  (r) = Recorded By, (t) = Taken By, (c) = Cosigned By    Initials Name Provider Type    Anabel Benavides, PT Physical Therapist                              Outcome Measure Options: Lower Extremity Functional Scale (LEFS)  Lower Extremity Functional Index  Any of your usual work, housework or school activities: A little bit of difficulty  Your usual hobbies, recreational or sporting activities: A little bit of difficulty  Getting into or out of the bath: A little bit of difficulty  Walking between rooms: No difficulty  Putting on your shoes or socks: A little bit of difficulty  Squatting: No difficulty  Lifting an object, like a bag of groceries from the floor: No difficulty  Performing light activities around your home: No difficulty  Performing heavy activities around your home: No difficulty  Getting into or out of a car: A little bit of difficulty  Walking 2 blocks: No difficulty  Walking a mile: No difficulty  Going up or down 10 stairs (about 1 flight of stairs): No difficulty  Standing for 1 hour: A little bit of difficulty  Sitting for 1 hour: No difficulty  Running on even ground: No difficulty  Running on uneven ground: A little bit of difficulty  Making sharp turns while running fast: No difficulty  Hopping: No difficulty  Rolling over in bed: A little bit of difficulty  Total: 72      Time Calculation:     Start Time: 1535  Stop Time: 1611  Time Calculation (min): 36 min  Total Timed Code Minutes- PT: 23 minute(s)  Timed Charges  90392 - PT Therapeutic Exercise Minutes: 23  Untimed  Charges  PT Eval/Re-eval Minutes: 13  Total Minutes  Timed Charges Total Minutes: 23  Untimed Charges Total Minutes: 13   Total Minutes: 36     Therapy Charges for Today     Code Description Service Date Service Provider Modifiers Qty    44764766201 HC PT THER PROC EA 15 MIN 2/2/2022 Anabel Mishra, PT GP 2    25641071273 HC PT EVAL LOW COMPLEXITY 1 2/2/2022 Anabel Mishra, PT GP 1          PT G-Codes  Outcome Measure Options: Lower Extremity Functional Scale (LEFS)  Total: 72         Anabel Mishra, PT  2/2/2022

## 2022-02-16 ENCOUNTER — HOSPITAL ENCOUNTER (OUTPATIENT)
Dept: PHYSICAL THERAPY | Facility: HOSPITAL | Age: 52
Setting detail: THERAPIES SERIES
Discharge: HOME | End: 2022-02-16

## 2022-02-16 DIAGNOSIS — M25.551 RIGHT HIP PAIN: Primary | ICD-10-CM

## 2022-02-16 DIAGNOSIS — M62.89 MUSCLE TIGHTNESS: ICD-10-CM

## 2022-02-16 DIAGNOSIS — M79.18 PIRIFORMIS MUSCLE PAIN: ICD-10-CM

## 2022-02-16 PROCEDURE — 97140 MANUAL THERAPY 1/> REGIONS: CPT

## 2022-02-16 PROCEDURE — 97110 THERAPEUTIC EXERCISES: CPT

## 2022-02-16 NOTE — THERAPY TREATMENT NOTE
Outpatient Physical Therapy Ortho Treatment Note  [facility]     Patient Name: Savannah Valenzuela  : 1970  MRN: [id number]  Today's Date: 2022      Visit Date: 2022    Visit Dx:    ICD-10-CM ICD-9-CM   1. Right hip pain  M25.551 719.45   2. Piriformis muscle pain  M79.18 729.1   3. Muscle tightness  M62.89 728.9       Patient Active Problem List   Diagnosis   • Migraine   • Allergic rhinitis   • Depression   • Chronic knee pain   • S/P laparoscopic sleeve gastrectomy   • Obesity, Class I, BMI 30-34.9   • Iron deficiency anemia        Past Medical History:   Diagnosis Date   • Allergic rhinitis    • Anemia    • Chronic fatigue 3/27/2020   • Depression    • Encounter for screening for malignant neoplasm of colon 2021    Added automatically from request for surgery [id number]   • Fatigue    • GERD (gastroesophageal reflux disease)    • Hematuria, microscopic 2016   • Hiatal hernia    • Hyperlipidemia    • Hypertension    • Low back strain     Fell on my back on aluminum bleachers   • Medication management    • Migraine    • Multiple food allergies    • Physical exam, annual         Past Surgical History:   Procedure Laterality Date   •  SECTION     • COLONOSCOPY N/A 2021    Procedure: COLONOSCOPY INTO CECUM AND T.I. WITH COLD BIOPSY POLYPECTOMIES;  Surgeon: Ursula Torres MD;  Location: [facility] ENDOSCOPY;  Service: Gastroenterology;  Laterality: N/A;  PRE- SCREENING  POST- DIVERTICULOSIS, POLYPS, HEMORRHOIDS   • DILATATION AND CURETTAGE     • ENDOSCOPY N/A 2020    Procedure: ESOPHAGOGASTRODUODENOSCOPY WITH BIOPSY;  Surgeon: Cayetano Khan Jr., MD;  Location: [facility] ENDOSCOPY;  Service: General;  Laterality: N/A;  PREOP/ DYSPEPSIA  POSTOP/ GASTRITIS, ESOPHAGITIS, HIATAL HERNIA   • GASTRIC SLEEVE LAPAROSCOPIC N/A 10/5/2020    Procedure: GASTRIC SLEEVE LAPAROSCOPIC WITH paraesophageal hernia repair, lysis of adhesions;  Surgeon: Cayetano Khan Jr., MD;   Location: [facility];  Service: Bariatric;  Laterality: N/A;   • WISDOM TOOTH EXTRACTION                          PT Assessment/Plan     Row Name 02/16/22 1600          PT Assessment    Assessment Comments Savannah returns continuing to report improvements in pain. No longer has issues with sleeping, and pain comes primarily only when donning pants, but not every time. Continues to report pain with ANDERS associated motions. Progress glute strengthening in abduction and extension planes, she continues to demo weakness in the core and hip girdle.  -CC            PT Plan    PT Plan Comments Cont manual if beneficial, add BOSU to bridge, side steps, retro monster, dead bug  -CC           User Key  (r) = Recorded By, (t) = Taken By, (c) = Cosigned By    Initials Name Provider Type    CC Anabel Mishra, PT Physical Therapist                   OP Exercises     Row Name 02/16/22 1500             Subjective Comments    Subjective Comments Reports pain cont to improve. No pain when sleeping/in morning, mainly only pain with ANDERS when donning pants, but intermittently and not every time  -CC              Subjective Pain    Able to rate subjective pain? yes  -CC      Pre-Treatment Pain Level 0  -CC              Total Minutes    33729 - PT Therapeutic Exercise Minutes 28  -CC      82438 - PT Manual Therapy Minutes 10  -CC              Exercise 1    Exercise Name 1 recumbent bike  -CC      Time 1 5 min, seat 4  -CC              Exercise 2    Exercise Name 2 LTR  -CC      Cueing 2 Verbal  -CC      Sets 2 1  -CC      Reps 2 10  -CC      Time 2 5s  -CC              Exercise 3    Exercise Name 3 HL hip abd  -CC      Cueing 3 Verbal  -CC      Sets 3 2  -CC      Reps 3 10  -CC      Time 3 GTB  -CC              Exercise 4    Exercise Name 4 HL hip add  -CC      Cueing 4 Verbal  -CC      Sets 4 2  -CC      Reps 4 10  -CC      Time 4 3 sec  -CC      Additional Comments ball  -CC              Exercise 5    Exercise Name 5 PPT   -CC      Cueing 5 Verbal; Tactile  -CC      Sets 5 1  -CC      Reps 5 10  -CC      Time 5 5s  -CC              Exercise 6    Exercise Name 6 seated HS str  -CC      Cueing 6 Verbal  -CC      Reps 6 3 b  -CC      Time 6 20 sec  -CC      Additional Comments cues for chest up  -CC              Exercise 7    Exercise Name 7 bridge w/ TB  -CC      Cueing 7 Verbal  -CC      Sets 7 2  -CC      Reps 7 10  -CC      Time 7 GTB  -CC              Exercise 8    Exercise Name 8 prone quad/HF str  -CC      Cueing 8 Verbal  -CC      Reps 8 3 b  -CC      Time 8 20 sec  -CC      Additional Comments strap  -CC              Exercise 9    Exercise Name 9 s/l hip abd + IR  -CC      Cueing 9 Verbal  -CC      Sets 9 2b  -CC      Reps 9 10  -CC              Exercise 10    Exercise Name 10 rev clam  -CC      Cueing 10 Verbal  -CC      Sets 10 2b  -CC      Reps 10 10  -CC      Time 10 YTB  -CC              Exercise 11    Exercise Name 11 hip ext standing  -CC      Cueing 11 Verbal  -CC      Sets 11 2 b  -CC      Reps 11 10  -CC      Time 11 RTB at ankles  -CC              Exercise 12    Exercise Name 12 hip abd standing  -CC      Cueing 12 Verbal  -CC      Reps 12 2x10 b  -CC      Time 12 RTB at ankles  -CC            User Key  (r) = Recorded By, (t) = Taken By, (c) = Cosigned By    Initials Name Provider Type    Anabel Benavides, RATNA Physical Therapist                         Manual Rx (last 36 hours)     Manual Treatments     Row Name 02/16/22 1500             Total Minutes    57732 - PT Manual Therapy Minutes 10  -CC              Manual Rx 1    Manual Rx 1 Location R hip girdle/distal glute/prox HS  -CC      Manual Rx 1 Type foam rolling with noodle and STM with hands and elbows  -CC            User Key  (r) = Recorded By, (t) = Taken By, (c) = Cosigned By    Initials Name Provider Type    Anabel Benavides PT Physical Therapist                    Therapy Education  Education Details: Access Code [email or web address]  Given: HEP,  Symptoms/condition management  Program: Reinforced, Progressed  How Provided: Verbal, Demonstration, Written  Provided to: Patient  Level of Understanding: Teach back education performed, Verbalized              Time Calculation:   Start Time: 1530  Stop Time: 1608  Time Calculation (min): 38 min  Total Timed Code Minutes- PT: 38 minute(s)  Timed Charges  96124 - PT Therapeutic Exercise Minutes: 28  52742 - PT Manual Therapy Minutes: 10  Total Minutes  Timed Charges Total Minutes: 38   Total Minutes: 38  Therapy Charges for Today     Code Description Service Date Service Provider Modifiers Qty    74475353997 HC PT THER PROC EA 15 MIN 2/16/2022 Anabel Mishra, PT GP 2    60391007021 HC PT MANUAL THERAPY EA 15 MIN 2/16/2022 Anabel Mishra, PT GP 1                    Anabel Mishra PT  2/16/2022

## 2022-02-21 ENCOUNTER — HOSPITAL ENCOUNTER (OUTPATIENT)
Dept: PHYSICAL THERAPY | Facility: HOSPITAL | Age: 52
Setting detail: THERAPIES SERIES
Discharge: HOME | End: 2022-02-21

## 2022-02-21 DIAGNOSIS — M25.551 RIGHT HIP PAIN: Primary | ICD-10-CM

## 2022-02-21 DIAGNOSIS — M62.89 MUSCLE TIGHTNESS: ICD-10-CM

## 2022-02-21 DIAGNOSIS — M79.18 PIRIFORMIS MUSCLE PAIN: ICD-10-CM

## 2022-02-21 PROCEDURE — 97110 THERAPEUTIC EXERCISES: CPT

## 2022-02-21 PROCEDURE — 97140 MANUAL THERAPY 1/> REGIONS: CPT

## 2022-02-21 NOTE — THERAPY TREATMENT NOTE
Outpatient Physical Therapy Ortho Treatment Note  [facility]     Patient Name: aSvannah Valenzuela  : 1970  MRN: [id number]  Today's Date: 2022      Visit Date: 2022    Visit Dx:    ICD-10-CM ICD-9-CM   1. Right hip pain  M25.551 719.45   2. Piriformis muscle pain  M79.18 729.1   3. Muscle tightness  M62.89 728.9       Patient Active Problem List   Diagnosis   • Migraine   • Allergic rhinitis   • Depression   • Chronic knee pain   • S/P laparoscopic sleeve gastrectomy   • Obesity, Class I, BMI 30-34.9   • Iron deficiency anemia        Past Medical History:   Diagnosis Date   • Allergic rhinitis    • Anemia    • Chronic fatigue 3/27/2020   • Depression    • Encounter for screening for malignant neoplasm of colon 2021    Added automatically from request for surgery [id number]   • Fatigue    • GERD (gastroesophageal reflux disease)    • Hematuria, microscopic 2016   • Hiatal hernia    • Hyperlipidemia    • Hypertension    • Low back strain     Fell on my back on aluminum bleachers   • Medication management    • Migraine    • Multiple food allergies    • Physical exam, annual         Past Surgical History:   Procedure Laterality Date   •  SECTION     • COLONOSCOPY N/A 2021    Procedure: COLONOSCOPY INTO CECUM AND T.I. WITH COLD BIOPSY POLYPECTOMIES;  Surgeon: Ursula Torres MD;  Location: [facility] ENDOSCOPY;  Service: Gastroenterology;  Laterality: N/A;  PRE- SCREENING  POST- DIVERTICULOSIS, POLYPS, HEMORRHOIDS   • DILATATION AND CURETTAGE     • ENDOSCOPY N/A 2020    Procedure: ESOPHAGOGASTRODUODENOSCOPY WITH BIOPSY;  Surgeon: Cayetano Khan Jr., MD;  Location: [facility] ENDOSCOPY;  Service: General;  Laterality: N/A;  PREOP/ DYSPEPSIA  POSTOP/ GASTRITIS, ESOPHAGITIS, HIATAL HERNIA   • GASTRIC SLEEVE LAPAROSCOPIC N/A 10/5/2020    Procedure: GASTRIC SLEEVE LAPAROSCOPIC WITH paraesophageal hernia repair, lysis of adhesions;  Surgeon: Cayetano Khan Jr., MD;   Location: [facility] OR [facility] [place];  Service: Bariatric;  Laterality: N/A;   • WISDOM TOOTH EXTRACTION                          PT Assessment/Plan     Row Name 02/21/22 1600          PT Assessment    Assessment Comments Savannah continues to report gradual improvements in pain, still primarly limited with ANDERS position/hip ER and donning pants. Progressed strengthening challenges today and added manual lateral gapping and distraction.  -CC            PT Plan    PT Plan Comments Cont manual, add side steps, squat  -CC           User Key  (r) = Recorded By, (t) = Taken By, (c) = Cosigned By    Initials Name Provider Type    CC Anabel Mishra, PT Physical Therapist                   OP Exercises     Row Name 02/21/22 1600             Subjective Comments    Subjective Comments Reports pain continue to improve, no instances of pain over weekend  -CC              Subjective Pain    Able to rate subjective pain? yes  -CC      Pre-Treatment Pain Level 0  -CC      Subjective Pain Comment mild pain this AM when donning pants  -CC              Total Minutes    20154 - PT Therapeutic Exercise Minutes 30  -CC      28889 - PT Manual Therapy Minutes 10  -CC              Exercise 1    Exercise Name 1 recumbent bike  -CC      Time 1 5 min, seat 4  -CC              Exercise 3    Exercise Name 3 HL hip abd  -CC      Cueing 3 Verbal  -CC      Sets 3 2  -CC      Reps 3 10  -CC      Time 3 GTB  -CC              Exercise 6    Exercise Name 6 HS str step  -CC      Cueing 6 Verbal  -CC      Reps 6 3 b  -CC      Time 6 20 sec  -CC              Exercise 7    Exercise Name 7 bridge w/ TB  -CC      Cueing 7 Verbal  -CC      Sets 7 2  -CC      Reps 7 10  -CC      Time 7 GTB  -CC      Additional Comments on BOSU  -CC              Exercise 8    Exercise Name 8 prone quad/HF str  -CC      Cueing 8 Verbal  -CC      Reps 8 3 b  -CC      Time 8 20 sec  -CC      Additional Comments strap  -CC              Exercise 9    Exercise Name 9 s/l hip abd + IR   -CC      Cueing 9 Verbal  -CC      Sets 9 2b  -CC      Reps 9 10  -CC              Exercise 10    Exercise Name 10 rev clam  -CC      Cueing 10 Verbal  -CC      Sets 10 2b  -CC      Reps 10 10  -CC      Time 10 RTB  -CC              Exercise 11    Exercise Name 11 hip ext standing  -CC      Cueing 11 Verbal  -CC      Sets 11 2 b  -CC      Reps 11 10  -CC      Time 11 RTB at ankles  -CC              Exercise 12    Exercise Name 12 hip abd standing  -CC      Cueing 12 Verbal  -CC      Reps 12 2x10 b  -CC      Time 12 RTB at ankles  -CC              Exercise 13    Exercise Name 13 retro monster walk  -CC      Cueing 13 Verbal  -CC      Reps 13 3 laps  -CC      Time 13 RTB knees  -CC            User Key  (r) = Recorded By, (t) = Taken By, (c) = Cosigned By    Initials Name Provider Type    Anabel Benavides, PT Physical Therapist                         Manual Rx (last 36 hours)     Manual Treatments     Row Name 02/21/22 1600             Total Minutes    36093 - PT Manual Therapy Minutes 10  -CC              Manual Rx 2    Manual Rx 2 Location R hip  -CC      Manual Rx 2 Type lateral gapping with mob belt and LAD w/ slight ER/flex  -CC      Manual Rx 2 Grade attempted fig 4 mob with mob belt but painful  -CC            User Key  (r) = Recorded By, (t) = Taken By, (c) = Cosigned By    Initials Name Provider Type    CC Anabel Mishra, PT Physical Therapist                 PT OP Goals     Row Name 02/21/22 1600          PT Short Term Goals    STG Date to Achieve 03/04/22  -CC     STG 1 Pt will be independent with initial HEP to improve strength/muscular tightness and decrease R hip pain.  -CC     STG 1 Progress Met  -CC     STG 2 Pt will report no greater than 2/10 R hip pain to improve participation in ADLs.  -CC     STG 2 Progress Ongoing  -CC     STG 3 Pt will report at least 50% improvement in R hip s/s with dressing activities.  -CC     STG 3 Progress Ongoing  -CC            Long Term Goals    LTG Date to  Achieve 04/03/22  -CC     LTG 1 Pt will be independent with advanced HEP to improve strength/muscular tightness and decrease R hip pain.  -CC     LTG 1 Progress Ongoing  -CC     LTG 2 Pt will improve R hip abd and ext strength to at least 4/5.  -CC     LTG 2 Progress Ongoing  -CC     LTG 3 Patient will report walking 1 mile without R hip pain/stiffness to return to walking program.  -CC     LTG 3 Progress Ongoing  -CC     LTG 4 Patient will report returning to home workout routine for 1 week without R hip pain to return to PLOF.  -CC     LTG 4 Progress Ongoing  -CC     LTG 5 Pt will report ability to don pants with 0/10 R  hip pain.  -CC     LTG 5 Progress Ongoing  -CC     LTG 6 Pt will report ability to don shoes with 0/10 R hip pain.  -     LTG 6 Progress Ongoing  -CC           User Key  (r) = Recorded By, (t) = Taken By, (c) = Cosigned By    Initials Name Provider Type    CC Anabel Mishra, PT Physical Therapist                               Time Calculation:   Start Time: 1613  Stop Time: 1653  Time Calculation (min): 40 min  Total Timed Code Minutes- PT: 40 minute(s)  Timed Charges  91164 - PT Therapeutic Exercise Minutes: 30  31499 - PT Manual Therapy Minutes: 10  Total Minutes  Timed Charges Total Minutes: 40   Total Minutes: 40  Therapy Charges for Today     Code Description Service Date Service Provider Modifiers Qty    [id number]  PT THER PROC EA 15 MIN 2/21/2022 Anabel Mishra, PT GP 2    [id number]  PT MANUAL THERAPY EA 15 MIN 2/21/2022 Anabel Mishra, PT GP 1                    Anabel Mishra PT  2/21/2022

## 2022-02-23 ENCOUNTER — HOSPITAL ENCOUNTER (OUTPATIENT)
Dept: PHYSICAL THERAPY | Facility: HOSPITAL | Age: 52
Setting detail: THERAPIES SERIES
Discharge: HOME | End: 2022-02-23

## 2022-02-23 DIAGNOSIS — M25.551 RIGHT HIP PAIN: Primary | ICD-10-CM

## 2022-02-23 DIAGNOSIS — M79.18 PIRIFORMIS MUSCLE PAIN: ICD-10-CM

## 2022-02-23 DIAGNOSIS — M62.89 MUSCLE TIGHTNESS: ICD-10-CM

## 2022-02-23 PROCEDURE — 97110 THERAPEUTIC EXERCISES: CPT

## 2022-02-23 PROCEDURE — 97140 MANUAL THERAPY 1/> REGIONS: CPT

## 2022-02-23 NOTE — THERAPY TREATMENT NOTE
Outpatient Physical Therapy Ortho Treatment Note  [facility]     Patient Name: Savannah Valenzuela  : 1970  MRN: [id number]  Today's Date: 2022      Visit Date: 2022    Visit Dx:    ICD-10-CM ICD-9-CM   1. Right hip pain  M25.551 719.45   2. Piriformis muscle pain  M79.18 729.1   3. Muscle tightness  M62.89 728.9       Patient Active Problem List   Diagnosis   • Migraine   • Allergic rhinitis   • Depression   • Chronic knee pain   • S/P laparoscopic sleeve gastrectomy   • Obesity, Class I, BMI 30-34.9   • Iron deficiency anemia        Past Medical History:   Diagnosis Date   • Allergic rhinitis    • Anemia    • Chronic fatigue 3/27/2020   • Depression    • Encounter for screening for malignant neoplasm of colon 2021    Added automatically from request for surgery [id number]   • Fatigue    • GERD (gastroesophageal reflux disease)    • Hematuria, microscopic 2016   • Hiatal hernia    • Hyperlipidemia    • Hypertension    • Low back strain     Fell on my back on aluminum bleachers   • Medication management    • Migraine    • Multiple food allergies    • Physical exam, annual         Past Surgical History:   Procedure Laterality Date   •  SECTION     • COLONOSCOPY N/A 2021    Procedure: COLONOSCOPY INTO CECUM AND T.I. WITH COLD BIOPSY POLYPECTOMIES;  Surgeon: Ursula Torres MD;  Location: [facility] ENDOSCOPY;  Service: Gastroenterology;  Laterality: N/A;  PRE- SCREENING  POST- DIVERTICULOSIS, POLYPS, HEMORRHOIDS   • DILATATION AND CURETTAGE     • ENDOSCOPY N/A 2020    Procedure: ESOPHAGOGASTRODUODENOSCOPY WITH BIOPSY;  Surgeon: Cayetano Khan Jr., MD;  Location: [facility] ENDOSCOPY;  Service: General;  Laterality: N/A;  PREOP/ DYSPEPSIA  POSTOP/ GASTRITIS, ESOPHAGITIS, HIATAL HERNIA   • GASTRIC SLEEVE LAPAROSCOPIC N/A 10/5/2020    Procedure: GASTRIC SLEEVE LAPAROSCOPIC WITH paraesophageal hernia repair, lysis of adhesions;  Surgeon: Cayetano Khan Jr., MD;   [place] [facility] [place] [facility] [place]  Service: Bariatric;  Laterality: N/A;   • WISDOM TOOTH EXTRACTION                          PT Assessment/Plan     Row Name 02/23/22 1600          PT Assessment    Assessment Comments Ms. Valenzuela returns to PT with reports of continuous improvement in pain levels and minimal pain when donning pants and performing R hip flexion/ER. Patient reports reproduction in pain during R hip lateral distraction coupled with posterior hip glide. Following manual, patient experienced improved tolerance with figure 4 position and able to complete Sl clam shell. She also tolerated addition of lateral stepping and mild progression in resistance with B hip strengthening interventions. She would benefit from continued skilled PT.  -DIONE            PT Plan    PT Plan Comments Any change after new mobilization. Update HEP, consider frogger and squat  -DIONE           User Key  (r) = Recorded By, (t) = Taken By, (c) = Cosigned By    Initials Name Provider Type    Ann Marie Blackburn, PT Physical Therapist                   OP Exercises     Row Name 02/23/22 1500             Subjective Comments    Subjective Comments I am only having pain over outside of my R hip when I am putting on my pants causing 4/10 pain at worst. my pain is overall significantlly improved following the injection  -DIONE              Subjective Pain    Able to rate subjective pain? yes  -DIONE      Pre-Treatment Pain Level 0  -DIONE              Total Minutes    59643 - PT Therapeutic Exercise Minutes 25  -DIONE      61144 - PT Manual Therapy Minutes 20  -DIONE              Exercise 1    Exercise Name 1 recumbent bike  -DIONE      Time 1 5 min, seat 4  -DIONE              Exercise 3    Exercise Name 3 HL hip abd  -DIONE      Cueing 3 Verbal  -DIONE      Sets 3 2  -DIONE      Reps 3 10  -DIONE      Time 3 GTB  -DIONE              Exercise 6    Exercise Name 6 HS str step  -DIONE      Cueing 6 Verbal  -DIONE      Reps 6 3 b  -DIONE      Time 6 20 sec  -DIONE              Exercise 7     Exercise Name 7 bridge w/ TB  -DIONE      Cueing 7 Verbal  -DIONE      Sets 7 2  -DIONE      Reps 7 10  -DIONE      Time 7 GTB  -DIONE      Additional Comments on BOSU  -DIONE              Exercise 8    Exercise Name 8 prone quad/HF str  -DIONE      Cueing 8 Verbal  -DIONE      Reps 8 3 b  -DIONE      Time 8 20 sec  -DIONE      Additional Comments strap  -DIONE              Exercise 9    Exercise Name 9 s/l hip abd + IR  -DIONE      Cueing 9 Verbal  -DIONE      Sets 9 2b  -DIONE      Reps 9 10  -DIONE              Exercise 10    Exercise Name 10 rev clam  -DIONE      Cueing 10 Verbal  -DIONE      Sets 10 2b  -DIONE      Reps 10 10  -DIONE      Time 10 RTB  -DIONE              Exercise 11    Exercise Name 11 hip ext standing  -DIONE      Cueing 11 Verbal  -DIONE      Sets 11 2 b  -DIONE      Reps 11 10  -DIONE      Time 11 RTB at ankles  -DIONE              Exercise 12    Exercise Name 12 hip abd standing  -DIONE      Cueing 12 Verbal  -DIONE      Reps 12 2x10 b  -DIONE      Time 12 RTB at ankles  -DIONE              Exercise 13    Exercise Name 13 retro monster walk  -DIONE      Cueing 13 Verbal  -DIONE      Reps 13 3 laps  -DIONE      Time 13 RTB knees  -DIONE              Exercise 14    Exercise Name 14 SL clam shell  -DIONE      Cueing 14 Verbal; Tactile  -DIONE      Sets 14 1  -DIONE      Reps 14 15  -DIONE      Time 14 R only  -DIONE              Exercise 15    Exercise Name 15 lateral stepping  -DIONE      Cueing 15 Verbal; Demo  -DIONE      Reps 15 3 laps  -DIONE      Time 15 RTB knees  -DIONE            User Key  (r) = Recorded By, (t) = Taken By, (c) = Cosigned By    Initials Name Provider Type    Ann Marie Blackburn, PT Physical Therapist                         Manual Rx (last 36 hours)     Manual Treatments     Row Name 02/23/22 1500             Total Minutes    63918 - PT Manual Therapy Minutes 20  -DIONE              Manual Rx 2    Manual Rx 2 Location R hip  -DIONE      Manual Rx 2 Type lateral gapping with mob belt and LAD w/ slight ER/flex  -DIONE      Manual Rx 2 Grade attempted fig 4 mob with mob belt but painful  -DIONE               Manual Rx 3    Manual Rx 3 Location R hip  -DIONE      Manual Rx 3 Type lateral distraction with posterior glide using belt  -      Manual Rx 3 Grade grade III-IV  -DIONE      Manual Rx 3 Duration two person mob  -DIONE            User Key  (r) = Recorded By, (t) = Taken By, (c) = Cosigned By    Initials Name Provider Type    Ann Marie Blackburn, PT Physical Therapist                 PT OP Goals     Row Name 02/23/22 1500          PT Short Term Goals    STG Date to Achieve 03/04/22  -DIONE     STG 1 Pt will be independent with initial HEP to improve strength/muscular tightness and decrease R hip pain.  -DIONE     STG 1 Progress Met  -DIONE     STG 2 Pt will report no greater than 2/10 R hip pain to improve participation in ADLs.  -DIONE     STG 2 Progress Ongoing  -DIONE     STG 3 Pt will report at least 50% improvement in R hip s/s with dressing activities.  -     STG 3 Progress Ongoing  -DIONE            Long Term Goals    LTG Date to Achieve 04/03/22  -DIONE     LTG 1 Pt will be independent with advanced HEP to improve strength/muscular tightness and decrease R hip pain.  -DIONE     LTG 1 Progress Ongoing  -DIONE     LTG 2 Pt will improve R hip abd and ext strength to at least 4/5.  -DIONE     LTG 2 Progress Ongoing  -DIONE     LTG 3 Patient will report walking 1 mile without R hip pain/stiffness to return to walking program.  -     LTG 3 Progress Ongoing  -DIONE     LTG 4 Patient will report returning to home workout routine for 1 week without R hip pain to return to PLOF.  -     LTG 4 Progress Ongoing  -DIONE     LTG 5 Pt will report ability to don pants with 0/10 R  hip pain.  -     LTG 5 Progress Ongoing  -DIONE     LTG 6 Pt will report ability to don shoes with 0/10 R hip pain.  -     LTG 6 Progress Ongoing  -DIONE           User Key  (r) = Recorded By, (t) = Taken By, (c) = Cosigned By    Initials Name Provider Type    Ann Marie Blackburn PT Physical Therapist                               Time Calculation:   Start Time:  1530  Stop Time: 1615  Time Calculation (min): 45 min  Timed Charges  32846 - PT Therapeutic Exercise Minutes: 25  41197 - PT Manual Therapy Minutes: 20  Total Minutes  Timed Charges Total Minutes: 45   Total Minutes: 45  Therapy Charges for Today     Code Description Service Date Service Provider Modifiers Qty    [id number]  PT THER PROC EA 15 MIN 2/23/2022 Ann Marie Rodriguez, PT GP 2    [id number] HC PT MANUAL THERAPY EA 15 MIN 2/23/2022 Ann Marie Rodriguez, PT GP 1                    Ann Marie Rodriguez, PT  2/23/2022

## 2022-03-01 ENCOUNTER — HOSPITAL ENCOUNTER (OUTPATIENT)
Dept: PHYSICAL THERAPY | Facility: HOSPITAL | Age: 52
Setting detail: THERAPIES SERIES
Discharge: HOME | End: 2022-03-01

## 2022-03-01 DIAGNOSIS — M79.18 PIRIFORMIS MUSCLE PAIN: ICD-10-CM

## 2022-03-01 DIAGNOSIS — M62.89 MUSCLE TIGHTNESS: ICD-10-CM

## 2022-03-01 DIAGNOSIS — M25.551 RIGHT HIP PAIN: Primary | ICD-10-CM

## 2022-03-01 PROCEDURE — 97110 THERAPEUTIC EXERCISES: CPT

## 2022-03-01 PROCEDURE — 97140 MANUAL THERAPY 1/> REGIONS: CPT

## 2022-03-01 NOTE — THERAPY TREATMENT NOTE
Outpatient Physical Therapy Ortho Treatment Note  [facility]     Patient Name: Savannah Valenzuela  : 1970  MRN: [id number]  Today's Date: 3/1/2022      Visit Date: 2022    Visit Dx:    ICD-10-CM ICD-9-CM   1. Right hip pain  M25.551 719.45   2. Piriformis muscle pain  M79.18 729.1   3. Muscle tightness  M62.89 728.9       Patient Active Problem List   Diagnosis   • Migraine   • Allergic rhinitis   • Depression   • Chronic knee pain   • S/P laparoscopic sleeve gastrectomy   • Obesity, Class I, BMI 30-34.9   • Iron deficiency anemia        Past Medical History:   Diagnosis Date   • Allergic rhinitis    • Anemia    • Chronic fatigue 3/27/2020   • Depression    • Encounter for screening for malignant neoplasm of colon 2021    Added automatically from request for surgery [id number]   • Fatigue    • GERD (gastroesophageal reflux disease)    • Hematuria, microscopic 2016   • Hiatal hernia    • Hyperlipidemia    • Hypertension    • Low back strain     Fell on my back on aluminum bleachers   • Medication management    • Migraine    • Multiple food allergies    • Physical exam, annual         Past Surgical History:   Procedure Laterality Date   •  SECTION     • COLONOSCOPY N/A 2021    Procedure: COLONOSCOPY INTO CECUM AND T.I. WITH COLD BIOPSY POLYPECTOMIES;  Surgeon: Ursula Torres MD;  Location: [facility] ENDOSCOPY;  Service: Gastroenterology;  Laterality: N/A;  PRE- SCREENING  POST- DIVERTICULOSIS, POLYPS, HEMORRHOIDS   • DILATATION AND CURETTAGE     • ENDOSCOPY N/A 2020    Procedure: ESOPHAGOGASTRODUODENOSCOPY WITH BIOPSY;  Surgeon: Cayetano Khan Jr., MD;  Location: [facility] ENDOSCOPY;  Service: General;  Laterality: N/A;  PREOP/ DYSPEPSIA  POSTOP/ GASTRITIS, ESOPHAGITIS, HIATAL HERNIA   • GASTRIC SLEEVE LAPAROSCOPIC N/A 10/5/2020    Procedure: GASTRIC SLEEVE LAPAROSCOPIC WITH paraesophageal hernia repair, lysis of adhesions;  Surgeon: Cayetano Khan Jr., MD;   [facility] [place] [facility];  Service: Bariatric;  Laterality: N/A;   • WISDOM TOOTH EXTRACTION                          PT Assessment/Plan     Row Name 03/01/22 1600          PT Assessment    Assessment Comments Ms. Valenzuela returns to PT with reports of 50% reduction in R lateral hip pain intensity when donning pants. She continues to receive symptom reproduction with R hip lateral gapping paired with posterior hip glide. She tolerated resistance progressions with several interventions and addition of R active hip mobility intervention and mini squats without increased R hip pain. She would benefit from continued skilled PT to address lingering deficits.  -DIONE            PT Plan    PT Plan Comments consider standing flexion with resistance when pulling up  -DIONE           User Key  (r) = Recorded By, (t) = Taken By, (c) = Cosigned By    Initials Name Provider Type    Ann Marie Blackburn, PT Physical Therapist                   OP Exercises     Row Name 03/01/22 1500             Subjective Comments    Subjective Comments My pain has reduced by 50% when I am putting my pants on. I really feel like the manual therapy from last session helped  -DIONE              Subjective Pain    Able to rate subjective pain? yes  -DIONE      Pre-Treatment Pain Level 0  -DIONE              Total Minutes    42430 - PT Therapeutic Exercise Minutes 25  -DIONE      52273 - PT Manual Therapy Minutes 15  -DIONE              Exercise 1    Exercise Name 1 recumbent bike  -DIONE      Time 1 5 min, seat 4  -DIONE              Exercise 2    Exercise Name 2 frogger mobilization  -DIONE      Cueing 2 Verbal; Demo  -DIONE      Sets 2 1  -DIONE      Reps 2 10e  -DIONE              Exercise 4    Exercise Name 4 SLS  -DIONE      Cueing 4 Verbal; Demo  -DIONE      Reps 4 2  -DIONE      Time 4 30s  -DIONE      Additional Comments on foam  -DIONE              Exercise 6    Exercise Name 6 HS str step  -DIONE      Cueing 6 Verbal  -DIONE      Reps 6 3 b  -DIONE      Time 6 20 sec  -DIONE              Exercise 7     Exercise Name 7 bridge w/ TB  -DIONE      Cueing 7 Verbal  -DIONE      Sets 7 2  -DIONE      Reps 7 10  -DIONE      Time 7 GTB  -DIONE      Additional Comments on BOSU  -DIONE              Exercise 8    Exercise Name 8 prone quad/HF str  -DIONE      Cueing 8 Verbal  -DIONE      Reps 8 3 b  -DIONE      Time 8 20 sec  -DIONE      Additional Comments strap  -DIONE              Exercise 10    Exercise Name 10 rev clam  -DIONE      Cueing 10 Verbal  -DIONE      Sets 10 2b  -DIONE      Reps 10 10  -DIONE      Time 10 RTB  -DIONE              Exercise 11    Exercise Name 11 hip ext standing  -DIONE      Cueing 11 Verbal  -DIONE      Sets 11 2 b  -DIONE      Reps 11 10  -DIONE      Time 11 GTB at ankles  -DIONE              Exercise 12    Exercise Name 12 hip abd standing  -DIONE      Cueing 12 Verbal  -DIONE      Reps 12 2x10 b  -DIONE      Time 12 GTB at ankles  -DIONE              Exercise 13    Exercise Name 13 retro monster walk  -DIONE      Cueing 13 Verbal  -DIONE      Reps 13 3 laps  -DIONE      Time 13 GTB knees  -DIONE              Exercise 14    Exercise Name 14 SL clam shell  -DIONE      Cueing 14 Verbal; Tactile  -DIONE      Sets 14 2  -DIONE      Reps 14 10b  -DIONE      Time 14 R only  -DIONE              Exercise 15    Exercise Name 15 lateral stepping  -DIONE      Cueing 15 Verbal; Demo  -DIONE      Reps 15 3 laps  -DIONE      Time 15 GTB knees  -DIONE            User Key  (r) = Recorded By, (t) = Taken By, (c) = Cosigned By    Initials Name Provider Type    Ann Marie Blackburn, PT Physical Therapist                         Manual Rx (last 36 hours)     Manual Treatments     Row Name 03/01/22 1500             Total Minutes    10615 - PT Manual Therapy Minutes 15  -DIONE              Manual Rx 2    Manual Rx 2 Location R hip  -DIONE      Manual Rx 2 Type lateral gapping with mob belt and LAD w/ slight ER/flex  -DIONE      Manual Rx 2 Grade --  -DIONE              Manual Rx 3    Manual Rx 3 Location R hip  -DIONE      Manual Rx 3 Type lateral distraction with posterior glide using belt  -DIONE      Manual Rx 3 Grade grade III-IV   -DIONE      Manual Rx 3 Duration two person mob  -DIONE            User Key  (r) = Recorded By, (t) = Taken By, (c) = Cosigned By    Initials Name Provider Type    Ann Marie Blackburn, PT Physical Therapist                 PT OP Goals     Row Name 03/01/22 1500          PT Short Term Goals    STG Date to Achieve 03/04/22  -DIONE     STG 1 Pt will be independent with initial HEP to improve strength/muscular tightness and decrease R hip pain.  -DIONE     STG 1 Progress Met  -DIONE     STG 2 Pt will report no greater than 2/10 R hip pain to improve participation in ADLs.  -DIONE     STG 2 Progress Ongoing  -DIONE     STG 3 Pt will report at least 50% improvement in R hip s/s with dressing activities.  -DIONE     STG 3 Progress Met  -DIONE            Long Term Goals    LTG Date to Achieve 04/03/22  -DIONE     LTG 1 Pt will be independent with advanced HEP to improve strength/muscular tightness and decrease R hip pain.  -DIONE     LTG 1 Progress Ongoing  -DIONE     LTG 2 Pt will improve R hip abd and ext strength to at least 4/5.  -DIONE     LTG 2 Progress Ongoing  -DIONE     LTG 3 Patient will report walking 1 mile without R hip pain/stiffness to return to walking program.  -DIONE     LTG 3 Progress Ongoing  -DIONE     LTG 4 Patient will report returning to home workout routine for 1 week without R hip pain to return to PLOF.  -DIONE     LTG 4 Progress Ongoing  -DIONE     LTG 5 Pt will report ability to don pants with 0/10 R  hip pain.  -DIONE     LTG 5 Progress Ongoing  -DIONE     LTG 6 Pt will report ability to don shoes with 0/10 R hip pain.  -DIONE     LTG 6 Progress Ongoing  -DIONE           User Key  (r) = Recorded By, (t) = Taken By, (c) = Cosigned By    Initials Name Provider Type    Ann Marie Blackburn, PT Physical Therapist                               Time Calculation:   Start Time: 1530  Stop Time: 1610  Time Calculation (min): 40 min  Timed Charges  85152 - PT Therapeutic Exercise Minutes: 25  85377 - PT Manual Therapy Minutes: 15  Total Minutes  Timed  Charges Total Minutes: 40   Total Minutes: 40  Therapy Charges for Today     Code Description Service Date Service Provider Modifiers Qty    92087588059  PT THER PROC EA 15 MIN 3/1/2022 Ann Marie Rodriguez, PT GP 2    [id number]  PT MANUAL THERAPY EA 15 MIN 3/1/2022 Ann Marie Rodriguez, PT GP 1                    Ann Marie Rodriguez, PT  3/1/2022

## 2022-03-04 ENCOUNTER — HOSPITAL ENCOUNTER (OUTPATIENT)
Dept: PHYSICAL THERAPY | Facility: HOSPITAL | Age: 52
Setting detail: THERAPIES SERIES
Discharge: HOME | End: 2022-03-04

## 2022-03-04 DIAGNOSIS — M79.18 PIRIFORMIS MUSCLE PAIN: ICD-10-CM

## 2022-03-04 DIAGNOSIS — M62.89 MUSCLE TIGHTNESS: ICD-10-CM

## 2022-03-04 DIAGNOSIS — M25.551 RIGHT HIP PAIN: Primary | ICD-10-CM

## 2022-03-04 PROCEDURE — 97140 MANUAL THERAPY 1/> REGIONS: CPT

## 2022-03-04 PROCEDURE — 97110 THERAPEUTIC EXERCISES: CPT

## 2022-03-04 NOTE — THERAPY PROGRESS REPORT/RE-CERT
Outpatient Physical Therapy Ortho Progress Note  [facility]     Patient Name: Savannah Valenzuela  : 1970  MRN: [id number]  Today's Date: 3/4/2022      Visit Date: 2022    Visit Dx:    ICD-10-CM ICD-9-CM   1. Right hip pain  M25.551 719.45   2. Piriformis muscle pain  M79.18 729.1   3. Muscle tightness  M62.89 728.9       Patient Active Problem List   Diagnosis   • Migraine   • Allergic rhinitis   • Depression   • Chronic knee pain   • S/P laparoscopic sleeve gastrectomy   • Obesity, Class I, BMI 30-34.9   • Iron deficiency anemia        Past Medical History:   Diagnosis Date   • Allergic rhinitis    • Anemia    • Chronic fatigue 3/27/2020   • Depression    • Encounter for screening for malignant neoplasm of colon 2021    Added automatically from request for surgery [id number]   • Fatigue    • GERD (gastroesophageal reflux disease)    • Hematuria, microscopic 2016   • Hiatal hernia    • Hyperlipidemia    • Hypertension    • Low back strain     Fell on my back on aluminum bleachers   • Medication management    • Migraine    • Multiple food allergies    • Physical exam, annual         Past Surgical History:   Procedure Laterality Date   •  SECTION     • COLONOSCOPY N/A 2021    Procedure: COLONOSCOPY INTO CECUM AND T.I. WITH COLD BIOPSY POLYPECTOMIES;  Surgeon: Ursula Torres MD;  Location: [facility] ENDOSCOPY;  Service: Gastroenterology;  Laterality: N/A;  PRE- SCREENING  POST- DIVERTICULOSIS, POLYPS, HEMORRHOIDS   • DILATATION AND CURETTAGE     • ENDOSCOPY N/A 2020    Procedure: ESOPHAGOGASTRODUODENOSCOPY WITH BIOPSY;  Surgeon: Cayetano Khan Jr., MD;  Location: [facility] ENDOSCOPY;  Service: General;  Laterality: N/A;  PREOP/ DYSPEPSIA  POSTOP/ GASTRITIS, ESOPHAGITIS, HIATAL HERNIA   • GASTRIC SLEEVE LAPAROSCOPIC N/A 10/5/2020    Procedure: GASTRIC SLEEVE LAPAROSCOPIC WITH paraesophageal hernia repair, lysis of adhesions;  Surgeon: Cayetano Khan Jr., MD;   [place] [facility] [place] [facility]  Service: Bariatric;  Laterality: N/A;   • WISDOM TOOTH EXTRACTION          PT Ortho     Row Name 03/04/22 0700       MMT Right Lower Ext    Rt Hip Extension MMT, Gross Movement (4/5) good  -DIONE    Rt Hip ABduction MMT, Gross Movement (4/5) good  -DIONE          User Key  (r) = Recorded By, (t) = Taken By, (c) = Cosigned By    Initials Name Provider Type    Ann Marie Blackburn, PT Physical Therapist                             PT Assessment/Plan     Row Name 03/04/22 0700          PT Assessment    Functional Limitations Impaired locomotion; Limitations in functional capacity and performance; Performance in leisure activities; Limitation in home management  -DIONE     Impairments Balance; Impaired flexibility; Locomotion; Motor function; Muscle strength; Pain  -DIONE     Assessment Comments Savannah Valenzuela has been seen for 6 physical therapy sessions for R hip pain.  Treatment has included therapeutic exercise, manual therapy and patient education with home exercise program . Progress to physical therapy goals is good. Pt has met 3/3 STG and 3/6 LTG and progressing towards all remaining goals. Ms. Valenzuela expresses 90% functional ability of R hip in comparison to contralateral side with donning pants as primary compliant. Patient expresses 75% improvement in functional task in comparison to initial evaluation with 2/10 pain at worst when transitioning from hip flexion to hip ER. She will benefit from continued skilled physical therapy to address remaining impairments and functional limitations.  -DIONE     Please refer to paper survey for additional self-reported information Yes  -DIONE     Rehab Potential Excellent  -DIONE     Patient/caregiver participated in establishment of treatment plan and goals Yes  -DIONE     Patient would benefit from skilled therapy intervention Yes  -DIONE            PT Plan    PT Frequency 1x/week  -DIONE     Predicted Duration of Therapy Intervention (PT) 2-3 visits  -DIONE     Planned  CPT's? PT RE-EVAL: 84997; PT THER PROC EA 15 MIN: 77424; PT THER ACT EA 15 MIN: 62691; PT MANUAL THERAPY EA 15 MIN: 73139; PT NEUROMUSC RE-EDUCATION EA 15 MIN: 63678; PT GAIT TRAINING EA 15 MIN: 07611; PT SELF CARE/HOME MGMT/TRAIN EA 15: 42295; PT HOT OR COLD PACK TREAT MCARE  -DIONE     PT Plan Comments how did gym workout go? and exercises do we need to go over? reduce manual? teach self mob  -DIONE           User Key  (r) = Recorded By, (t) = Taken By, (c) = Cosigned By    Initials Name Provider Type    Ann Marie Blackburn, PT Physical Therapist                   OP Exercises     Row Name 03/04/22 0700             Subjective Comments    Subjective Comments I was muscle sore after last time. I also feel I am 90% improved and only have pain when putting on pants at 2/10 and 0/10 when putting on shoes  -DIONE              Subjective Pain    Able to rate subjective pain? yes  -DIONE      Pre-Treatment Pain Level 0  -DIONE              Total Minutes    90751 - PT Therapeutic Exercise Minutes 35  -DIONE      37193 - PT Manual Therapy Minutes 10  -DIONE              Exercise 1    Exercise Name 1 recumbent bike  -DIONE      Time 1 5 min, seat 4  -DIONE              Exercise 2    Exercise Name 2 frogger mobilization  -DIONE      Cueing 2 Verbal; Demo  -DIONE      Sets 2 1  -DIONE      Reps 2 10e  -DIONE              Exercise 4    Exercise Name 4 SLS  -DIONE      Cueing 4 Verbal; Demo  -DIONE      Reps 4 2  -DIONE      Time 4 30s  -DIONE      Additional Comments on foam  -DIONE              Exercise 6    Exercise Name 6 HS str step  -DIONE      Cueing 6 Verbal  -DIONE      Reps 6 3 b  -DIONE      Time 6 20 sec  -DIONE              Exercise 7    Exercise Name 7 bridge w/ TB  -DIONE      Cueing 7 Verbal  -DIONE      Sets 7 3  -DIONE      Reps 7 10  -DIONE      Time 7 GTB  -DIONE      Additional Comments on BOSU  -DIONE              Exercise 8    Exercise Name 8 prone quad/HF str  -DIONE      Cueing 8 Verbal  -DIONE      Reps 8 3 b  -DIONE      Time 8 20 sec  -DIONE      Additional Comments strap  -DIONE               Exercise 10    Exercise Name 10 rev clam  -DIONE      Cueing 10 Verbal  -DIONE      Sets 10 2b  -DIONE      Reps 10 10  -DIONE      Time 10 GTB  -DIONE              Exercise 11    Exercise Name 11 hip ext standing  -DIONE      Cueing 11 Verbal  -DIONE      Sets 11 2 b  -DIONE      Reps 11 10  -DIONE      Time 11 GTB at ankles  -DIONE              Exercise 12    Exercise Name 12 hip abd standing  -DIONE      Cueing 12 Verbal  -DIONE      Reps 12 2x10 b  -DIONE      Time 12 GTB at ankles  -DIONE              Exercise 13    Exercise Name 13 retro monster walk  -DIONE      Cueing 13 Verbal  -DIONE      Reps 13 3 laps  -DIONE      Time 13 GTB knees  -DIONE              Exercise 14    Exercise Name 14 SL clam shell  -DIONE      Cueing 14 Verbal; Tactile  -DIONE      Sets 14 2  -DIONE      Reps 14 10b  -DIONE      Time 14 R only  -DIONE      Additional Comments RTB  -DIONE              Exercise 15    Exercise Name 15 lateral stepping  -DIONE      Cueing 15 Verbal; Demo  -DIONE      Reps 15 3 laps  -DIONE      Time 15 GTB knees  -DIONE              Exercise 16    Exercise Name 16 standing hip flexion in march  -DIONE      Cueing 16 Verbal; Demo  -DIONE      Sets 16 2  -DIONE      Reps 16 15  -DIONE      Time 16 GTB  -DIONE            User Key  (r) = Recorded By, (t) = Taken By, (c) = Cosigned By    Initials Name Provider Type    Ann Marie Blackburn, PT Physical Therapist                         Manual Rx (last 36 hours)     Manual Treatments     Row Name 03/04/22 0700             Total Minutes    87292 - PT Manual Therapy Minutes 10  -DIONE              Manual Rx 2    Manual Rx 2 Location R hip  -DIONE      Manual Rx 2 Type lateral gapping with mob belt and LAD w/ slight ER/flex  -DIONE      Manual Rx 2 Grade with posterior mob  -DIONE            User Key  (r) = Recorded By, (t) = Taken By, (c) = Cosigned By    Initials Name Provider Type    Ann Marie Blackburn, PT Physical Therapist                 PT OP Goals     Row Name 03/04/22 0700          PT Short Term Goals    STG Date to Achieve 03/04/22  -DIONE     STG 1 Pt  will be independent with initial HEP to improve strength/muscular tightness and decrease R hip pain.  -DIONE     STG 1 Progress Met  -DIONE     STG 2 Pt will report no greater than 2/10 R hip pain to improve participation in ADLs.  -DIONE     STG 2 Progress Met  -DIONE     STG 2 Progress Comments 2/10  -DIONE     STG 3 Pt will report at least 50% improvement in R hip s/s with dressing activities.  -DIONE     STG 3 Progress Met  -DIONE            Long Term Goals    LTG Date to Achieve 04/03/22  -DIONE     LTG 1 Pt will be independent with advanced HEP to improve strength/muscular tightness and decrease R hip pain.  -DIONE     LTG 1 Progress Ongoing  -DIONE     LTG 2 Pt will improve R hip abd and ext strength to at least 4/5.  -DIONE     LTG 2 Progress Met  -DIONE     LTG 3 Patient will report walking 1 mile without R hip pain/stiffness to return to walking program.  -DIONE     LTG 3 Progress Met  -DIONE     LTG 4 Patient will report returning to home workout routine for 1 week without R hip pain to return to PLOF.  -DIONE     LTG 4 Progress Ongoing  -DIONE     LTG 5 Pt will report ability to don pants with 0/10 R  hip pain.  -DIONE     LTG 5 Progress Ongoing  -DIONE     LTG 5 Progress Comments 2/10  -DIONE     LTG 6 Pt will report ability to don shoes with 0/10 R hip pain.  -DIONE     LTG 6 Progress Met  -DIONE     LTG 6 Progress Comments 0/10  -DIONE           User Key  (r) = Recorded By, (t) = Taken By, (c) = Cosigned By    Initials Name Provider Type    Ann Marie Blackburn, PT Physical Therapist                     Outcome Measure Options: Lower Extremity Functional Scale (LEFS)  Lower Extremity Functional Index  Any of your usual work, housework or school activities: No difficulty  Your usual hobbies, recreational or sporting activities: No difficulty  Getting into or out of the bath: No difficulty  Walking between rooms: No difficulty  Putting on your shoes or socks: A little bit of difficulty  Squatting: No difficulty  Lifting an object, like a bag of groceries from  the floor: No difficulty  Performing light activities around your home: No difficulty  Performing heavy activities around your home: No difficulty  Getting into or out of a car: No difficulty  Walking 2 blocks: No difficulty  Walking a mile: No difficulty  Going up or down 10 stairs (about 1 flight of stairs): No difficulty  Standing for 1 hour: No difficulty  Sitting for 1 hour: No difficulty  Running on even ground: No difficulty  Running on uneven ground: No difficulty  Making sharp turns while running fast: No difficulty  Hopping: No difficulty  Rolling over in bed: No difficulty  Total: 79      Time Calculation:   Start Time: 0700  Stop Time: 0745  Time Calculation (min): 45 min  Timed Charges  34596 - PT Therapeutic Exercise Minutes: 35  96384 - PT Manual Therapy Minutes: 10  Total Minutes  Timed Charges Total Minutes: 45   Total Minutes: 45  Therapy Charges for Today     Code Description Service Date Service Provider Modifiers Qty    94917094750 HC PT THER PROC EA 15 MIN 3/4/2022 Ann Marie Rodriguez, PT GP 2    [id number] HC PT MANUAL THERAPY EA 15 MIN 3/4/2022 Ann Marie Rodriguez, PT GP 1          PT G-Codes  Outcome Measure Options: Lower Extremity Functional Scale (LEFS)  Total: 79         Ann Marie Rodriguez, PT  3/4/2022

## 2022-03-09 ENCOUNTER — HOSPITAL ENCOUNTER (OUTPATIENT)
Dept: PHYSICAL THERAPY | Facility: HOSPITAL | Age: 52
Setting detail: THERAPIES SERIES
Discharge: HOME | End: 2022-03-09

## 2022-03-09 DIAGNOSIS — M79.18 PIRIFORMIS MUSCLE PAIN: ICD-10-CM

## 2022-03-09 DIAGNOSIS — M62.89 MUSCLE TIGHTNESS: ICD-10-CM

## 2022-03-09 DIAGNOSIS — M25.551 RIGHT HIP PAIN: Primary | ICD-10-CM

## 2022-03-09 PROCEDURE — 97110 THERAPEUTIC EXERCISES: CPT

## 2022-03-09 NOTE — THERAPY TREATMENT NOTE
Outpatient Physical Therapy Ortho Treatment Note  [facility]     Patient Name: Savannah Valenzuela  : 1970  MRN: [id number]  Today's Date: 3/9/2022      Visit Date: 2022    Visit Dx:    ICD-10-CM ICD-9-CM   1. Right hip pain  M25.551 719.45   2. Piriformis muscle pain  M79.18 729.1   3. Muscle tightness  M62.89 728.9       Patient Active Problem List   Diagnosis   • Migraine   • Allergic rhinitis   • Depression   • Chronic knee pain   • S/P laparoscopic sleeve gastrectomy   • Obesity, Class I, BMI 30-34.9   • Iron deficiency anemia        Past Medical History:   Diagnosis Date   • Allergic rhinitis    • Anemia    • Chronic fatigue 3/27/2020   • Depression    • Encounter for screening for malignant neoplasm of colon 2021    Added automatically from request for surgery [id number]   • Fatigue    • GERD (gastroesophageal reflux disease)    • Hematuria, microscopic 2016   • Hiatal hernia    • Hyperlipidemia    • Hypertension    • Low back strain     Fell on my back on aluminum bleachers   • Medication management    • Migraine    • Multiple food allergies    • Physical exam, annual         Past Surgical History:   Procedure Laterality Date   •  SECTION     • COLONOSCOPY N/A 2021    Procedure: COLONOSCOPY INTO CECUM AND T.I. WITH COLD BIOPSY POLYPECTOMIES;  Surgeon: Ursula Torres MD;  Location: [facility] ENDOSCOPY;  Service: Gastroenterology;  Laterality: N/A;  PRE- SCREENING  POST- DIVERTICULOSIS, POLYPS, HEMORRHOIDS   • DILATATION AND CURETTAGE     • ENDOSCOPY N/A 2020    Procedure: ESOPHAGOGASTRODUODENOSCOPY WITH BIOPSY;  Surgeon: Cayetano Khan Jr., MD;  Location: [facility] ENDOSCOPY;  Service: General;  Laterality: N/A;  PREOP/ DYSPEPSIA  POSTOP/ GASTRITIS, ESOPHAGITIS, HIATAL HERNIA   • GASTRIC SLEEVE LAPAROSCOPIC N/A 10/5/2020    Procedure: GASTRIC SLEEVE LAPAROSCOPIC WITH paraesophageal hernia repair, lysis of adhesions;  Surgeon: Cayetano Khan Jr., MD;   [place] [facility] [place] [facility] [place]  Service: Bariatric;  Laterality: N/A;   • WISDOM TOOTH EXTRACTION                          PT Assessment/Plan     Row Name 03/09/22 0700          PT Assessment    Assessment Comments Ms. Valenzuela continues to present with low pain levels and expresses significant reduction in pain frequency/intensity when donning pants to 2-3x/wk. Time spent reviewing all previous interventions in preparation to transition to independent HEP following next session. Added lateral stepping + AR press and DL leg press with good tolerance. She would benefit from one additional session to address lingering deficits and answer any questions/concerns before discharging.  -DIONE            PT Plan    PT Plan Comments anticipate d/c  -DIONE           User Key  (r) = Recorded By, (t) = Taken By, (c) = Cosigned By    Initials Name Provider Type    Ann Marie Blackburn, PT Physical Therapist                   OP Exercises     Row Name 03/09/22 0700             Subjective Comments    Subjective Comments I was unable to do my HEP due to a death in the family. I am only having the sensation in my hip when I put my pants on 2-3 x/wk with significant reduction in intensity  -DIONE              Subjective Pain    Able to rate subjective pain? yes  -DIONE      Pre-Treatment Pain Level 0  -DIONE              Total Minutes    16120 - PT Therapeutic Exercise Minutes 38  -DIONE      31501 - PT Manual Therapy Minutes 5  -DIONE              Exercise 1    Exercise Name 1 recumbent bike  -DIONE      Time 1 5 min, seat 4  -DIONE              Exercise 2    Exercise Name 2 frogger mobilization  -DIONE      Cueing 2 Verbal;Demo  -DIONE      Sets 2 1  -DIONE      Reps 2 10e  -DIONE              Exercise 6    Exercise Name 6 HS str step  -DIONE      Cueing 6 Verbal  -DIONE      Reps 6 3 b  -DIONE      Time 6 20 sec  -DIONE              Exercise 8    Exercise Name 8 prone quad/HF str  -DIONE      Cueing 8 Verbal  -DIONE      Reps 8 3 b  -DIONE      Time 8 20 sec  -DIONE      Additional Comments  strap  -DIONE              Exercise 11    Exercise Name 11 hip ext standing  -DIONE      Cueing 11 Verbal  -DIONE      Sets 11 2 b  -DIONE      Reps 11 10  -DIONE      Time 11 GTB at ankles  -DIONE              Exercise 12    Exercise Name 12 hip abd standing  -DIONE      Cueing 12 Verbal  -DIONE      Reps 12 2x10 b  -DIONE      Time 12 GTB at ankles  -DIONE              Exercise 13    Exercise Name 13 retro monster walk  -DIONE      Cueing 13 Verbal  -DIONE      Reps 13 3 laps  -DIONE      Time 13 GTB knees  -DIONE              Exercise 14    Exercise Name 14 SL clam shell  -DIONE      Cueing 14 Verbal;Tactile  -DIONE      Sets 14 2  -DIONE      Reps 14 10b  -DIONE      Time 14 R only  -DIONE      Additional Comments RTB  -DIONE              Exercise 15    Exercise Name 15 lateral stepping + AR press  -DIONE      Cueing 15 Verbal;Demo  -DIONE      Sets 15 2  -DIONE      Reps 15 5 laps  -DIONE      Time 15 3 steps  -DIONE      Additional Comments GTB knees  -DIONE              Exercise 16    Exercise Name 16 standing hip flexion in march  -DIONE      Cueing 16 Verbal;Demo  -DIONE      Sets 16 2  -DIONE      Reps 16 15  -DIONE      Time 16 GTB  -DIONE              Exercise 17    Exercise Name 17 DL leg press  -DIONE            User Key  (r) = Recorded By, (t) = Taken By, (c) = Cosigned By    Initials Name Provider Type    Ann Marie Blackburn, PT Physical Therapist                         Manual Rx (last 36 hours)     Manual Treatments     Row Name 03/09/22 0700             Total Minutes    15657 - PT Manual Therapy Minutes 5  -DIONE              Manual Rx 2    Manual Rx 2 Location R hip  -DIONE      Manual Rx 2 Type lateral gapping with mob belt and LAD w/ slight ER/flex  -DIONE      Manual Rx 2 Grade with posterior mob  -DIONE            User Key  (r) = Recorded By, (t) = Taken By, (c) = Cosigned By    Initials Name Provider Type    Ann Marie Blackburn, PT Physical Therapist                 PT OP Goals     Row Name 03/09/22 0700          PT Short Term Goals    STG Date to Achieve 03/04/22  -DIONE     STG 1 Pt  will be independent with initial HEP to improve strength/muscular tightness and decrease R hip pain.  -     STG 1 Progress Met  -DIONE     STG 2 Pt will report no greater than 2/10 R hip pain to improve participation in ADLs.  -     STG 2 Progress Met  -DIONE     STG 3 Pt will report at least 50% improvement in R hip s/s with dressing activities.  -     STG 3 Progress Met  -DIONE            Long Term Goals    LTG Date to Achieve 04/03/22  -DIONE     LTG 1 Pt will be independent with advanced HEP to improve strength/muscular tightness and decrease R hip pain.  -DIONE     LTG 1 Progress Ongoing  -DIONE     LTG 2 Pt will improve R hip abd and ext strength to at least 4/5.  -     LTG 2 Progress Met  -DIONE     LTG 3 Patient will report walking 1 mile without R hip pain/stiffness to return to walking program.  -     LTG 3 Progress Met  -DIONE     LTG 4 Patient will report returning to home workout routine for 1 week without R hip pain to return to PLOF.  -     LTG 4 Progress Ongoing  -DIONE     LTG 5 Pt will report ability to don pants with 0/10 R  hip pain.  -     LTG 5 Progress Ongoing  -DIONE     LTG 6 Pt will report ability to don shoes with 0/10 R hip pain.  -     LTG 6 Progress Met  -DIONE           User Key  (r) = Recorded By, (t) = Taken By, (c) = Cosigned By    Initials Name Provider Type    Ann Marie Blackburn, PT Physical Therapist                               Time Calculation:   Start Time: 0700  Stop Time: 0743  Time Calculation (min): 43 min  Timed Charges  55265 - PT Therapeutic Exercise Minutes: 38  64085 - PT Manual Therapy Minutes: 5  Total Minutes  Timed Charges Total Minutes: 43   Total Minutes: 43  Therapy Charges for Today     Code Description Service Date Service Provider Modifiers Qty    15174582163 HC PT THER PROC EA 15 MIN 3/9/2022 Ann Marie Rodriguez, PT GP 3                    Ann Marie Rodriguez PT  3/9/2022

## 2022-03-23 ENCOUNTER — HOSPITAL ENCOUNTER (OUTPATIENT)
Dept: PHYSICAL THERAPY | Facility: HOSPITAL | Age: 52
Setting detail: THERAPIES SERIES
Discharge: HOME | End: 2022-03-23

## 2022-03-23 DIAGNOSIS — M25.551 RIGHT HIP PAIN: Primary | ICD-10-CM

## 2022-03-23 DIAGNOSIS — M62.89 MUSCLE TIGHTNESS: ICD-10-CM

## 2022-03-23 DIAGNOSIS — M79.18 PIRIFORMIS MUSCLE PAIN: ICD-10-CM

## 2022-03-23 PROCEDURE — 97110 THERAPEUTIC EXERCISES: CPT

## 2022-03-23 NOTE — THERAPY DISCHARGE NOTE
Outpatient Physical Therapy Ortho Treatment Note/Discharge Summary  [facility]     Patient Name: Savannah Valenzuela  : 1970  MRN: [id number]  Today's Date: 3/23/2022      Visit Date: 2022    Visit Dx:    ICD-10-CM ICD-9-CM   1. Right hip pain  M25.551 719.45   2. Piriformis muscle pain  M79.18 729.1   3. Muscle tightness  M62.89 728.9       Patient Active Problem List   Diagnosis   • Migraine   • Allergic rhinitis   • Depression   • Chronic knee pain   • S/P laparoscopic sleeve gastrectomy   • Obesity, Class I, BMI 30-34.9   • Iron deficiency anemia        Past Medical History:   Diagnosis Date   • Allergic rhinitis    • Anemia    • Chronic fatigue 3/27/2020   • Depression    • Encounter for screening for malignant neoplasm of colon 2021    Added automatically from request for surgery [id number]   • Fatigue    • GERD (gastroesophageal reflux disease)    • Hematuria, microscopic 2016   • Hiatal hernia    • Hyperlipidemia    • Hypertension    • Low back strain     Fell on my back on aluminum bleachers   • Medication management    • Migraine    • Multiple food allergies    • Physical exam, annual         Past Surgical History:   Procedure Laterality Date   •  SECTION     • COLONOSCOPY N/A 2021    Procedure: COLONOSCOPY INTO CECUM AND T.I. WITH COLD BIOPSY POLYPECTOMIES;  Surgeon: Ursula Torres MD;  Location: [facility] ENDOSCOPY;  Service: Gastroenterology;  Laterality: N/A;  PRE- SCREENING  POST- DIVERTICULOSIS, POLYPS, HEMORRHOIDS   • DILATATION AND CURETTAGE     • ENDOSCOPY N/A 2020    Procedure: ESOPHAGOGASTRODUODENOSCOPY WITH BIOPSY;  Surgeon: Cayetano Khan Jr., MD;  Location: [facility] ENDOSCOPY;  Service: General;  Laterality: N/A;  PREOP/ DYSPEPSIA  POSTOP/ GASTRITIS, ESOPHAGITIS, HIATAL HERNIA   • GASTRIC SLEEVE LAPAROSCOPIC N/A 10/5/2020    Procedure: GASTRIC SLEEVE LAPAROSCOPIC WITH paraesophageal hernia repair, lysis of adhesions;  Surgeon: Bill  Cayetano Alfred Jr., MD;  Location: [facility] OR [facility];  Service: Bariatric;  Laterality: N/A;   • WISDOM TOOTH EXTRACTION          PT Ortho     Row Name 03/23/22 0700       MMT Right Lower Ext    Rt Hip Extension MMT, Gross Movement (4/5) good  -DIONE    Rt Hip ABduction MMT, Gross Movement (4+/5) good plus  -DIONE       MMT Left Lower Ext    Lt Hip Extension MMT, Gross Movement (4/5) good  -DIONE    Lt Hip ABduction MMT, Gross Movement (4+/5) good plus  -DIONE          User Key  (r) = Recorded By, (t) = Taken By, (c) = Cosigned By    Initials Name Provider Type    Ann Marie Blackburn, PT Physical Therapist                             PT Assessment/Plan     Row Name 03/23/22 0700          PT Assessment    Assessment Comments Savannah Valenzuela has been seen for 8 physical therapy sessions for R hip pain. Treatment has included therapeutic exercise, manual therapy and patient education with home exercise program . Progress to physical therapy goals is good. Pt has met 3/3 STG and 5/6 LTG with partially meeting remaining LTG. Ms. Valenzuela expresses 90% functional ability of R hip in comparison to contralateral side with occasional pain when donning pants. Patient expresses 80% improvement in functional task in comparison to initial evaluation with 2/10 pain at worst when transitioning from hip flexion to hip ER when donning pants. Patient originally experienced pain with % of the time and has significantly reduced frequency/intensity and now expresses 2/10 pain with 1-2 episodes per week. Patient also reports she has returned to home workout routine without pain and successfully performing R hip joint mobilization at home with assistance from son. Time spent reviewing advanced HEP and discussing progressions/modifications based on symptom response following discharge. She was discharged to an independent HEP and provided patient education to self-manage condition.  -DIONE            PT Plan    PT Plan Comments discharged  -DIONE            User Key  (r) = Recorded By, (t) = Taken By, (c) = Cosigned By    Initials Name Provider Type    Ann Marie Blackburn, PT Physical Therapist                     OP Exercises     Row Name 03/23/22 0700             Subjective Comments    Subjective Comments Things have gone really well and I am feeling so much better  -              Subjective Pain    Able to rate subjective pain? yes  -DIONE      Pre-Treatment Pain Level 0  -DIONE              Total Minutes    40284 - PT Therapeutic Exercise Minutes 19  -DIONE              Exercise 1    Exercise Name 1 Nustep  -      Time 1 5 mins  -DIONE      Additional Comments L5  -DIONE              Exercise 5    Exercise Name 5 Time spent filling out survey, discussing progressing/modifications for various interventions and frequency/duration of advanced HEP and mobilization following discharge  -              Exercise 16    Exercise Name 16 standing hip flexion in march + ER  -      Cueing 16 Verbal;Demo  -DIONE      Sets 16 1  -DIONE      Reps 16 20  -DIONE      Time 16 GTB  -            User Key  (r) = Recorded By, (t) = Taken By, (c) = Cosigned By    Initials Name Provider Type    Ann Marie Blackburn, PT Physical Therapist                                PT OP Goals     Row Name 03/23/22 0700          PT Short Term Goals    STG Date to Achieve 03/04/22  -     STG 1 Pt will be independent with initial HEP to improve strength/muscular tightness and decrease R hip pain.  -     STG 1 Progress Met  -     STG 2 Pt will report no greater than 2/10 R hip pain to improve participation in ADLs.  -     STG 2 Progress Met  -     STG 3 Pt will report at least 50% improvement in R hip s/s with dressing activities.  -     STG 3 Progress Met  -            Long Term Goals    LTG Date to Achieve 04/03/22  -     LTG 1 Pt will be independent with advanced HEP to improve strength/muscular tightness and decrease R hip pain.  -     LTG 1 Progress Met  -     LTG 2 Pt will  improve R hip abd and ext strength to at least 4/5.  -     LTG 2 Progress Met  -     LTG 3 Patient will report walking 1 mile without R hip pain/stiffness to return to walking program.  -     LTG 3 Progress Met  -     LTG 4 Patient will report returning to home workout routine for 1 week without R hip pain to return to PLOF.  -     LTG 4 Progress Met  -DIONE     LTG 5 Pt will report ability to don pants with 0/10 R  hip pain.  -     LTG 5 Progress Partially Met  -DIONE     LTG 5 Progress Comments 2/10 pain with 1-2 episodes for entire week  -     LTG 6 Pt will report ability to don shoes with 0/10 R hip pain.  -     LTG 6 Progress Met  -           User Key  (r) = Recorded By, (t) = Taken By, (c) = Cosigned By    Initials Name Provider Type    Ann Marie Blackburn, PT Physical Therapist                               Time Calculation:   Start Time: 0705  Stop Time: 0724  Time Calculation (min): 19 min  Timed Charges  76135 - PT Therapeutic Exercise Minutes: 19  Total Minutes  Timed Charges Total Minutes: 19   Total Minutes: 19  Therapy Charges for Today     Code Description Service Date Service Provider Modifiers Qty    66825063043 HC PT THER PROC EA 15 MIN 3/23/2022 Ann Marie Rodrigeuz, PT GP 1                OP PT Discharge Summary  Date of Discharge: 03/23/22  Reason for Discharge: Maximum functional potential achieved  Outcomes Achieved: Patient able to partially acheive established goals  Discharge Destination: Home with home program      Ann Marie Rodriguez PT  3/23/2022

## 2022-05-11 ENCOUNTER — OFFICE VISIT (OUTPATIENT)
Dept: BARIATRICS/WEIGHT MGMT | Facility: CLINIC | Age: 52
End: 2022-05-11

## 2022-05-11 VITALS
RESPIRATION RATE: 18 BRPM | BODY MASS INDEX: 32.82 KG/M2 | SYSTOLIC BLOOD PRESSURE: 132 MMHG | HEART RATE: 68 BPM | WEIGHT: 197 LBS | TEMPERATURE: 98 F | HEIGHT: 65 IN | DIASTOLIC BLOOD PRESSURE: 83 MMHG

## 2022-05-11 DIAGNOSIS — M25.569 CHRONIC KNEE PAIN, UNSPECIFIED LATERALITY: ICD-10-CM

## 2022-05-11 DIAGNOSIS — F32.A DEPRESSION, UNSPECIFIED DEPRESSION TYPE: Chronic | ICD-10-CM

## 2022-05-11 DIAGNOSIS — E66.9 OBESITY, CLASS I, BMI 30-34.9: Primary | ICD-10-CM

## 2022-05-11 DIAGNOSIS — Z98.84 S/P LAPAROSCOPIC SLEEVE GASTRECTOMY: ICD-10-CM

## 2022-05-11 DIAGNOSIS — G89.29 CHRONIC KNEE PAIN, UNSPECIFIED LATERALITY: ICD-10-CM

## 2022-05-11 PROCEDURE — 99213 OFFICE O/P EST LOW 20 MIN: CPT | Performed by: NURSE PRACTITIONER

## 2022-05-11 NOTE — PROGRESS NOTES
[facility] BARIATRIC SURGERY  [address] [address]  [phone]  [address] [id number] [phone]  Dept: [phone]  5/11/2022      Savannah Valenzuela.  [id number]  [id number]  1970  female      Chief Complaint   Patient presents with   • Follow-up     SLEEVE FOLLOW UP        BH Post-Op Bariatric Surgery:   Savannah Valenzuela is status post Laparoscopic Sleeve/PEH procedure, performed on 10/5/20     HPI:   Today's weight is 89.4 kg (197 lb) pounds, today's BMI is Body mass index is 33.22 kg/m².,@ has a  gain of 13 pounds since the last visit and@ weight loss since surgery is 33 pounds. The patient reports a decreased portion size and loss of appetite.      Savannah Valenzuela denies nausea, vomiting, reflux and reports that she started going through menopause in January and February and reports labile mood, disordered, sleep. She is back to meal prepping and has gotten back to the gym. She reports that work was really busy and that she was only able to get a couple of meals per day. Her sleep quality has improved over the last three weeks.      Diet and Exercise: Diet history reviewed and discussed with the patient. Weight loss/gains to date discussed with the patient. The patient states they are eating 60 grams of protein per day. She reports eating 2 meals per day, a typical portion size of 1/2 cup, eating 2 snacks per day, drinking 5-6 or more 8-oz. glasses of water per day, no carbonated beverage consumption and exercising regularly.     Supplements: OTC MTV with iron andcalcium    Review of Systems   Constitutional: Positive for appetite change. Negative for fatigue and unexpected weight change.   HENT: Negative.    Eyes: Negative.    Respiratory: Negative.    Cardiovascular: Negative.  Negative for leg swelling.   Gastrointestinal: Negative for abdominal distention, abdominal pain, constipation, diarrhea, nausea  and vomiting.   Genitourinary: Negative for difficulty urinating, frequency and urgency.   Musculoskeletal: Negative for back pain.   Skin: Negative.    Psychiatric/Behavioral: Negative.    All other systems reviewed and are negative.      Patient Active Problem List   Diagnosis   • Migraine   • Allergic rhinitis   • Depression   • Chronic knee pain   • S/P laparoscopic sleeve gastrectomy   • Obesity, Class I, BMI 30-34.9   • Iron deficiency anemia       Past Medical History:   Diagnosis Date   • Allergic rhinitis    • Anemia    • Chronic fatigue 3/27/2020   • Depression    • Encounter for screening for malignant neoplasm of colon 5/19/2021    Added automatically from request for surgery [id number]   • Fatigue    • GERD (gastroesophageal reflux disease)    • Hematuria, microscopic 8/24/2016   • Hiatal hernia    • Hyperlipidemia    • Hypertension    • Low back strain 1989    Fell on my back on aluminum bleachers   • Medication management    • Migraine    • Multiple food allergies    • Physical exam, annual        The following portions of the patient's history were reviewed and updated as appropriate: allergies, current medications, past family history, past medical history, past social history, past surgical history and problem list.    Vitals:    05/11/22 1511   BP: 132/83   Pulse: 68   Resp: 18   Temp: 98 °F (36.7 °C)       Physical Exam  Vitals and nursing note reviewed.   Constitutional:       Appearance: She is well-developed.   Neck:      Thyroid: No thyromegaly.   Cardiovascular:      Rate and Rhythm: Normal rate.   Pulmonary:      Effort: Pulmonary effort is normal. No respiratory distress.   Abdominal:      Palpations: Abdomen is soft.   Musculoskeletal:         General: No tenderness.   Skin:     General: Skin is warm and dry.   Neurological:      Mental Status: She is alert.   Psychiatric:         Behavior: Behavior normal.         Assessment:   Post-op, the patient is doing well.     Encounter Diagnoses    Name Primary?   • Obesity, Class I, BMI 30-34.9 Yes   • S/P laparoscopic sleeve gastrectomy    • Chronic knee pain, unspecified laterality    • Depression, unspecified depression type        Plan:   Patient was advised to stay the course with meal prepping, exercise, sleep hygiene. We did discuss GLP-1 therapy.   Encouraged patient to be sure to get plenty of lean protein per day through small frequent meals all with a protein source.   Activity restrictions: none.   Recommended patient be sure to get at least 70 grams of protein per day by eating small, frequent meals all with high lean protein choices. Be sure to limit/cut back on daily carbohydrate intake. Discussed with the patient the recommended amount of water per day to intake- half of body weight in ounces. Reviewed vitamin requirements. Be sure to do routine exercise, 150 minutes per week minimum, including both cardio and strength training.     Instructions / Recommendations: dietary counseling recommended, recommended a daily protein intake of  grams, vitamin supplement(s) recommended, recommended exercising at least 150 minutes per week, behavior modifications recommended and instructed to call the office for concerns, questions, or problems.     The patient was instructed to follow up in 3 months .     The patient was counseled regarding. Total time spent during this encounter today was 25

## 2022-05-12 ENCOUNTER — LAB (OUTPATIENT)
Dept: LAB | Facility: HOSPITAL | Age: 52
End: 2022-05-12

## 2022-05-12 DIAGNOSIS — F32.A DEPRESSION, UNSPECIFIED DEPRESSION TYPE: ICD-10-CM

## 2022-05-12 DIAGNOSIS — E66.9 OBESITY, CLASS I, BMI 30-34.9: ICD-10-CM

## 2022-05-12 DIAGNOSIS — M25.569 CHRONIC KNEE PAIN, UNSPECIFIED LATERALITY: ICD-10-CM

## 2022-05-12 DIAGNOSIS — Z98.84 S/P LAPAROSCOPIC SLEEVE GASTRECTOMY: ICD-10-CM

## 2022-05-12 DIAGNOSIS — G89.29 CHRONIC KNEE PAIN, UNSPECIFIED LATERALITY: ICD-10-CM

## 2022-05-12 LAB
25(OH)D3 SERPL-MCNC: 30.9 NG/ML (ref 30–100)
ALBUMIN SERPL-MCNC: 4.7 G/DL (ref 3.5–5.2)
ALBUMIN/GLOB SERPL: 1.6 G/DL
ALP SERPL-CCNC: 133 U/L (ref 39–117)
ALT SERPL W P-5'-P-CCNC: 20 U/L (ref 1–33)
ANION GAP SERPL CALCULATED.3IONS-SCNC: 11 MMOL/L (ref 5–15)
AST SERPL-CCNC: 17 U/L (ref 1–32)
BASOPHILS # BLD AUTO: 0.05 10*3/MM3 (ref 0–0.2)
BASOPHILS NFR BLD AUTO: 1 % (ref 0–1.5)
BILIRUB SERPL-MCNC: 0.5 MG/DL (ref 0–1.2)
BUN SERPL-MCNC: 20 MG/DL (ref 6–20)
BUN/CREAT SERPL: 22.7 (ref 7–25)
CALCIUM SPEC-SCNC: 9.7 MG/DL (ref 8.6–10.5)
CHLORIDE SERPL-SCNC: 104 MMOL/L (ref 98–107)
CO2 SERPL-SCNC: 25 MMOL/L (ref 22–29)
CREAT SERPL-MCNC: 0.88 MG/DL (ref 0.57–1)
DEPRECATED RDW RBC AUTO: 39.4 FL (ref 37–54)
EGFRCR SERPLBLD CKD-EPI 2021: 80.7 ML/MIN/1.73
EOSINOPHIL # BLD AUTO: 0.11 10*3/MM3 (ref 0–0.4)
EOSINOPHIL NFR BLD AUTO: 2.2 % (ref 0.3–6.2)
ERYTHROCYTE [DISTWIDTH] IN BLOOD BY AUTOMATED COUNT: 12.2 % (ref 12.3–15.4)
GLOBULIN UR ELPH-MCNC: 3 GM/DL
GLUCOSE SERPL-MCNC: 120 MG/DL (ref 65–99)
HCT VFR BLD AUTO: 40.8 % (ref 34–46.6)
HGB BLD-MCNC: 13.2 G/DL (ref 12–15.9)
IMM GRANULOCYTES # BLD AUTO: 0.01 10*3/MM3 (ref 0–0.05)
IMM GRANULOCYTES NFR BLD AUTO: 0.2 % (ref 0–0.5)
IRON 24H UR-MRATE: 50 MCG/DL (ref 37–145)
LYMPHOCYTES # BLD AUTO: 1.56 10*3/MM3 (ref 0.7–3.1)
LYMPHOCYTES NFR BLD AUTO: 30.8 % (ref 19.6–45.3)
MCH RBC QN AUTO: 28.8 PG (ref 26.6–33)
MCHC RBC AUTO-ENTMCNC: 32.4 G/DL (ref 31.5–35.7)
MCV RBC AUTO: 89.1 FL (ref 79–97)
MONOCYTES # BLD AUTO: 0.37 10*3/MM3 (ref 0.1–0.9)
MONOCYTES NFR BLD AUTO: 7.3 % (ref 5–12)
NEUTROPHILS NFR BLD AUTO: 2.96 10*3/MM3 (ref 1.7–7)
NEUTROPHILS NFR BLD AUTO: 58.5 % (ref 42.7–76)
NRBC BLD AUTO-RTO: 0 /100 WBC (ref 0–0.2)
PLATELET # BLD AUTO: 325 10*3/MM3 (ref 140–450)
PMV BLD AUTO: 9.8 FL (ref 6–12)
POTASSIUM SERPL-SCNC: 4.1 MMOL/L (ref 3.5–5.2)
PROT SERPL-MCNC: 7.7 G/DL (ref 6–8.5)
RBC # BLD AUTO: 4.58 10*6/MM3 (ref 3.77–5.28)
SODIUM SERPL-SCNC: 140 MMOL/L (ref 136–145)
WBC NRBC COR # BLD: 5.06 10*3/MM3 (ref 3.4–10.8)

## 2022-05-12 PROCEDURE — 80053 COMPREHEN METABOLIC PANEL: CPT

## 2022-05-12 PROCEDURE — 82306 VITAMIN D 25 HYDROXY: CPT

## 2022-05-12 PROCEDURE — 85025 COMPLETE CBC W/AUTO DIFF WBC: CPT

## 2022-05-12 PROCEDURE — 83540 ASSAY OF IRON: CPT

## 2022-05-12 PROCEDURE — 36415 COLL VENOUS BLD VENIPUNCTURE: CPT

## 2022-06-09 RX ORDER — BUPROPION HYDROCHLORIDE 150 MG/1
150 TABLET ORAL DAILY
Qty: 90 TABLET | Refills: 1 | Status: SHIPPED | OUTPATIENT
Start: 2022-06-09 | End: 2022-11-07 | Stop reason: SDUPTHER

## 2022-07-11 ENCOUNTER — APPOINTMENT (OUTPATIENT)
Dept: WOMENS IMAGING | Facility: HOSPITAL | Age: 52
End: 2022-07-11

## 2022-07-11 PROCEDURE — 77067 SCR MAMMO BI INCL CAD: CPT | Performed by: RADIOLOGY

## 2022-07-11 PROCEDURE — 77063 BREAST TOMOSYNTHESIS BI: CPT | Performed by: RADIOLOGY

## 2022-09-28 ENCOUNTER — IMMUNIZATION (OUTPATIENT)
Dept: VACCINE CLINIC | Facility: HOSPITAL | Age: 52
End: 2022-09-28

## 2022-09-28 DIAGNOSIS — Z23 NEED FOR VACCINATION: Primary | ICD-10-CM

## 2022-09-28 PROCEDURE — 91312 HC SARSCOV2 VAC 30MCG/0.3ML IM BIVALENT BOOSTER 12 YRS AND OLDER: CPT | Performed by: INTERNAL MEDICINE

## 2022-09-28 PROCEDURE — 0124A: CPT | Performed by: INTERNAL MEDICINE

## 2022-09-30 ENCOUNTER — HOSPITAL ENCOUNTER (EMERGENCY)
Facility: HOSPITAL | Age: 52
Discharge: HOME | End: 2022-09-30
Attending: EMERGENCY MEDICINE | Admitting: EMERGENCY MEDICINE

## 2022-09-30 VITALS
DIASTOLIC BLOOD PRESSURE: 77 MMHG | HEART RATE: 72 BPM | TEMPERATURE: 96.6 F | OXYGEN SATURATION: 100 % | HEIGHT: 64 IN | BODY MASS INDEX: 33.29 KG/M2 | RESPIRATION RATE: 16 BRPM | WEIGHT: 195 LBS | SYSTOLIC BLOOD PRESSURE: 139 MMHG

## 2022-09-30 DIAGNOSIS — R55 VASOVAGAL EPISODE: Primary | ICD-10-CM

## 2022-09-30 LAB
ALBUMIN SERPL-MCNC: 4.7 G/DL (ref 3.5–5.2)
ALBUMIN/GLOB SERPL: 1.8 G/DL
ALP SERPL-CCNC: 126 U/L (ref 39–117)
ALT SERPL W P-5'-P-CCNC: 10 U/L (ref 1–33)
ANION GAP SERPL CALCULATED.3IONS-SCNC: 12.5 MMOL/L (ref 5–15)
AST SERPL-CCNC: 17 U/L (ref 1–32)
BASOPHILS # BLD AUTO: 0.05 10*3/MM3 (ref 0–0.2)
BASOPHILS NFR BLD AUTO: 1 % (ref 0–1.5)
BILIRUB SERPL-MCNC: 0.4 MG/DL (ref 0–1.2)
BUN SERPL-MCNC: 22 MG/DL (ref 6–20)
BUN/CREAT SERPL: 24.4 (ref 7–25)
CALCIUM SPEC-SCNC: 9.3 MG/DL (ref 8.6–10.5)
CHLORIDE SERPL-SCNC: 101 MMOL/L (ref 98–107)
CO2 SERPL-SCNC: 24.5 MMOL/L (ref 22–29)
CREAT SERPL-MCNC: 0.9 MG/DL (ref 0.57–1)
DEPRECATED RDW RBC AUTO: 43.9 FL (ref 37–54)
EGFRCR SERPLBLD CKD-EPI 2021: 78 ML/MIN/1.73
EOSINOPHIL # BLD AUTO: 0.07 10*3/MM3 (ref 0–0.4)
EOSINOPHIL NFR BLD AUTO: 1.5 % (ref 0.3–6.2)
ERYTHROCYTE [DISTWIDTH] IN BLOOD BY AUTOMATED COUNT: 14.4 % (ref 12.3–15.4)
GLOBULIN UR ELPH-MCNC: 2.6 GM/DL
GLUCOSE SERPL-MCNC: 163 MG/DL (ref 65–99)
HCT VFR BLD AUTO: 39.2 % (ref 34–46.6)
HGB BLD-MCNC: 13 G/DL (ref 12–15.9)
IMM GRANULOCYTES # BLD AUTO: 0.01 10*3/MM3 (ref 0–0.05)
IMM GRANULOCYTES NFR BLD AUTO: 0.2 % (ref 0–0.5)
LYMPHOCYTES # BLD AUTO: 1.47 10*3/MM3 (ref 0.7–3.1)
LYMPHOCYTES NFR BLD AUTO: 30.8 % (ref 19.6–45.3)
MCH RBC QN AUTO: 27.7 PG (ref 26.6–33)
MCHC RBC AUTO-ENTMCNC: 33.2 G/DL (ref 31.5–35.7)
MCV RBC AUTO: 83.4 FL (ref 79–97)
MONOCYTES # BLD AUTO: 0.39 10*3/MM3 (ref 0.1–0.9)
MONOCYTES NFR BLD AUTO: 8.2 % (ref 5–12)
NEUTROPHILS NFR BLD AUTO: 2.79 10*3/MM3 (ref 1.7–7)
NEUTROPHILS NFR BLD AUTO: 58.3 % (ref 42.7–76)
NRBC BLD AUTO-RTO: 0 /100 WBC (ref 0–0.2)
PLATELET # BLD AUTO: 279 10*3/MM3 (ref 140–450)
PMV BLD AUTO: 9.9 FL (ref 6–12)
POTASSIUM SERPL-SCNC: 3.7 MMOL/L (ref 3.5–5.2)
PROT SERPL-MCNC: 7.3 G/DL (ref 6–8.5)
QT INTERVAL: 401 MS
RBC # BLD AUTO: 4.7 10*6/MM3 (ref 3.77–5.28)
SODIUM SERPL-SCNC: 138 MMOL/L (ref 136–145)
TROPONIN T SERPL-MCNC: <0.01 NG/ML (ref 0–0.03)
WBC NRBC COR # BLD: 4.78 10*3/MM3 (ref 3.4–10.8)

## 2022-09-30 PROCEDURE — 93010 ELECTROCARDIOGRAM REPORT: CPT | Performed by: INTERNAL MEDICINE

## 2022-09-30 PROCEDURE — 80053 COMPREHEN METABOLIC PANEL: CPT | Performed by: EMERGENCY MEDICINE

## 2022-09-30 PROCEDURE — 85025 COMPLETE CBC W/AUTO DIFF WBC: CPT | Performed by: EMERGENCY MEDICINE

## 2022-09-30 PROCEDURE — 84484 ASSAY OF TROPONIN QUANT: CPT | Performed by: EMERGENCY MEDICINE

## 2022-09-30 PROCEDURE — 93005 ELECTROCARDIOGRAM TRACING: CPT | Performed by: EMERGENCY MEDICINE

## 2022-09-30 PROCEDURE — 99283 EMERGENCY DEPT VISIT LOW MDM: CPT

## 2022-09-30 RX ADMIN — SODIUM CHLORIDE 1000 ML: 9 INJECTION, SOLUTION INTRAVENOUS at 15:19

## 2022-11-07 ENCOUNTER — OFFICE VISIT (OUTPATIENT)
Dept: INTERNAL MEDICINE | Facility: CLINIC | Age: 52
End: 2022-11-07

## 2022-11-07 VITALS
SYSTOLIC BLOOD PRESSURE: 120 MMHG | BODY MASS INDEX: 34.01 KG/M2 | WEIGHT: 199.2 LBS | DIASTOLIC BLOOD PRESSURE: 60 MMHG | RESPIRATION RATE: 16 BRPM | TEMPERATURE: 97.7 F | OXYGEN SATURATION: 98 % | HEIGHT: 64 IN | HEART RATE: 57 BPM

## 2022-11-07 DIAGNOSIS — Z00.00 ENCOUNTER FOR PREVENTIVE HEALTH EXAMINATION: Primary | ICD-10-CM

## 2022-11-07 PROBLEM — D50.9 IRON DEFICIENCY ANEMIA: Chronic | Status: ACTIVE | Noted: 2021-10-25

## 2022-11-07 LAB
CLARITY, POC: CLEAR
COLOR UR: YELLOW
EXPIRATION DATE: ABNORMAL
GLUCOSE UR STRIP-MCNC: NEGATIVE MG/DL
LEUKOCYTE EST, POC: ABNORMAL
Lab: ABNORMAL
PH UR: 6 [PH] (ref 5–8)
PROT UR STRIP-MCNC: ABNORMAL MG/DL
RBC # UR STRIP: NEGATIVE /UL
SP GR UR: 1.02 (ref 1–1.03)

## 2022-11-07 PROCEDURE — 99396 PREV VISIT EST AGE 40-64: CPT | Performed by: INTERNAL MEDICINE

## 2022-11-07 PROCEDURE — 81003 URINALYSIS AUTO W/O SCOPE: CPT | Performed by: INTERNAL MEDICINE

## 2022-11-07 RX ORDER — BUPROPION HYDROCHLORIDE 150 MG/1
150 TABLET ORAL DAILY
Qty: 90 TABLET | Refills: 1 | Status: SHIPPED | OUTPATIENT
Start: 2022-11-07

## 2022-11-07 RX ORDER — FLUOXETINE HYDROCHLORIDE 40 MG/1
40 CAPSULE ORAL DAILY
Qty: 90 CAPSULE | Refills: 1 | Status: SHIPPED | OUTPATIENT
Start: 2022-11-07

## 2022-11-07 NOTE — PROGRESS NOTES
Subjective   Savannah Valenzuela is a 52 y.o. female.     Chief Complaint   Patient presents with   • Annual Exam         History of Present Illness  In for annual preventative exam.  Sleeps about 7 hours per night.  No formal exercise.  Energy is OK.  Diet is poor.       The following portions of the patient's history were reviewed and updated as appropriate: allergies, current medications, past social history and problem list.    HISTORY  Outpatient Medications Marked as Taking for the 11/7/22 encounter (Office Visit) with Mathew Lemos MD   Medication Sig Dispense Refill   • Calcium 250 MG capsule Take 500 mg by mouth 3 (Three) Times a Day.     • multivitamin with minerals tablet tablet Take 1 tablet by mouth Daily.     • [DISCONTINUED] buPROPion XL (WELLBUTRIN XL) 150 MG 24 hr tablet Take 1 tablet by mouth Daily. 90 tablet 1   • [DISCONTINUED] FLUoxetine (PROzac) 40 MG capsule Take 1 capsule by mouth Daily. 90 capsule 1     Social History     Socioeconomic History   • Marital status:    Tobacco Use   • Smoking status: Never   • Smokeless tobacco: Never   Vaping Use   • Vaping Use: Never used   Substance and Sexual Activity   • Alcohol use: No     Comment: Kay: rare   • Drug use: No   • Sexual activity: Not Currently     Partners: Male     Family History   Problem Relation Age of Onset   • Heart disease Father    • Hyperlipidemia Father    • Malig Hyperthermia Neg Hx      Past Medical History:   Diagnosis Date   • Allergic rhinitis    • Anemia    • Chronic fatigue 3/27/2020   • Depression    • Encounter for screening for malignant neoplasm of colon 5/19/2021    Added automatically from request for surgery [id number]   • Fatigue    • GERD (gastroesophageal reflux disease)    • Hematuria, microscopic 8/24/2016   • Hiatal hernia    • Hyperlipidemia    • Hypertension    • Low back strain 1989    Fell on my back on aluminum bleachers   • Medication management    • Migraine    • Multiple food allergies    •  Physical exam, annual      Past Surgical History:   Procedure Laterality Date   •  SECTION     • COLONOSCOPY N/A 2021    Procedure: COLONOSCOPY INTO CECUM AND T.I. WITH COLD BIOPSY POLYPECTOMIES;  Surgeon: Ursula Torres MD;  Location: [facility];  Service: Gastroenterology;  Laterality: N/A;  PRE- SCREENING  POST- DIVERTICULOSIS, POLYPS, HEMORRHOIDS   • DILATATION AND CURETTAGE     • ENDOSCOPY N/A 2020    Procedure: ESOPHAGOGASTRODUODENOSCOPY WITH BIOPSY;  Surgeon: Cayetano Khan Jr., MD;  Location: [facility] END[facility];  Service: General;  Laterality: N/A;  PREOP/ DYSPEPSIA  POSTOP/ GASTRITIS, ESOPHAGITIS, HIATAL HERNIA   • GASTRIC SLEEVE LAPAROSCOPIC N/A 10/5/2020    Procedure: GASTRIC SLEEVE LAPAROSCOPIC WITH paraesophageal hernia repair, lysis of adhesions;  Surgeon: Cayetano Khan Jr., MD;  [place] [facility] [place] [facility] [place]  Service: Bariatric;  Laterality: N/A;   • WISDOM TOOTH EXTRACTION         Review of Systems   Constitutional: Negative for appetite change, chills, diaphoresis, fatigue, fever and unexpected weight change.   Respiratory: Negative for cough, chest tightness, shortness of breath and wheezing.    Cardiovascular: Negative for chest pain, palpitations and leg swelling.   Gastrointestinal: Negative for abdominal pain, anal bleeding, blood in stool, constipation, diarrhea, nausea, rectal pain and vomiting.   Endocrine: Negative for cold intolerance, heat intolerance and polyuria.   Genitourinary: Negative for difficulty urinating, dysuria, flank pain, frequency, hematuria and urgency.   Musculoskeletal: Negative for arthralgias, back pain and myalgias.   Allergic/Immunologic: Negative for environmental allergies.   Neurological: Negative for dizziness, syncope, speech difficulty, weakness and light-headedness.   Hematological: Does not bruise/bleed easily.   Psychiatric/Behavioral: Positive for dysphoric mood. Negative for agitation, confusion and sleep disturbance.  The patient is not nervous/anxious.        Objective   Vitals:    11/07/22 0823   BP: 120/60   Pulse: 57   Resp: 16   Temp: 97.7 °F (36.5 °C)   SpO2: 98%          11/07/22 0823   Weight: 90.4 kg (199 lb 3.2 oz)    [unfilled]  Body mass index is 34.18 kg/m².      Physical Exam   Constitutional: She is oriented to person, place, and time. She appears well-developed.   HENT:   Head: Normocephalic and atraumatic.   Right Ear: External ear normal.   Left Ear: External ear normal.   Nose: Nose normal.   Eyes: Pupils are equal, round, and reactive to light. Conjunctivae are normal.   Neck: No JVD present. No thyromegaly present.   Cardiovascular: Normal rate, regular rhythm and normal heart sounds. Exam reveals no gallop.   No murmur heard.  Pulmonary/Chest: Effort normal and breath sounds normal. No respiratory distress. She has no wheezes. She has no rales.   Abdominal: Soft. Normal appearance and bowel sounds are normal. She exhibits no distension and no mass. There is no abdominal tenderness. There is no guarding. No hernia.   Musculoskeletal: Normal range of motion.   Lymphadenopathy:     She has no cervical adenopathy.   Neurological: She is alert and oriented to person, place, and time. She displays normal reflexes. No cranial nerve deficit. Coordination normal.   Skin: Skin is warm and dry.   Psychiatric: Her behavior is normal. Mood, judgment and thought content normal.   Nursing note and vitals reviewed.        Problems Addressed this Visit    None  Visit Diagnoses     Encounter for preventive health examination    -  Primary      Diagnoses       Codes Comments    Encounter for preventive health examination    -  Primary ICD-10-CM: Z00.00  ICD-9-CM: V70.0         Assessment & Plan   Here today for annual preventive exam and depression November 2022.  Blood pressure is excellent.  Depression is doing very well.  Lab work in the emergency room September 2020 to include a CBC and CMP.  Lipid profile today and  UA.  Required an iron infusion about 1 year ago related to gastric bypass surgery.  She does take Feosol once daily.  Recent colonoscopy was normal.  She has no reflux since her gastric surgery.  Recheck annually.  Due for Shingrix.    Prevention counseling was performed today. The counseling performed was routine health maintenance topics including BMI and exercise.    The above information was reviewed again today 11/07/22.  It continues to be accurate as reflected above and is unchanged.  History, physical and review of systems all reviewed and are unchanged.  Medications were reviewed today and continue the current dosing.    PPE today includes face mask and eye shield.             Dragon disclaimer:   Much of this encounter note is an electronic transcription/translation of spoken language to printed text. The electronic translation of spoken language may permit erroneous, or at times, nonsensical words or phrases to be inadvertently transcribed; Although I have reviewed the note for such errors, some may still exist.

## 2022-11-08 LAB
CHOLEST SERPL-MCNC: 225 MG/DL (ref 0–200)
CHOLEST/HDLC SERPL: 3.69 {RATIO}
HDLC SERPL-MCNC: 61 MG/DL (ref 40–60)
LDLC SERPL CALC-MCNC: 144 MG/DL (ref 0–100)
TRIGL SERPL-MCNC: 114 MG/DL (ref 0–150)
VLDLC SERPL CALC-MCNC: 20 MG/DL (ref 5–40)

## 2023-06-01 ENCOUNTER — LAB (OUTPATIENT)
Dept: LAB | Facility: HOSPITAL | Age: 53
End: 2023-06-01
Payer: COMMERCIAL

## 2023-06-01 ENCOUNTER — OFFICE VISIT (OUTPATIENT)
Dept: BARIATRICS/WEIGHT MGMT | Facility: CLINIC | Age: 53
End: 2023-06-01

## 2023-06-01 VITALS
HEART RATE: 63 BPM | WEIGHT: 217 LBS | TEMPERATURE: 97.8 F | BODY MASS INDEX: 36.15 KG/M2 | SYSTOLIC BLOOD PRESSURE: 153 MMHG | HEIGHT: 65 IN | DIASTOLIC BLOOD PRESSURE: 82 MMHG

## 2023-06-01 DIAGNOSIS — G89.29 CHRONIC KNEE PAIN, UNSPECIFIED LATERALITY: ICD-10-CM

## 2023-06-01 DIAGNOSIS — M25.569 CHRONIC KNEE PAIN, UNSPECIFIED LATERALITY: ICD-10-CM

## 2023-06-01 DIAGNOSIS — Z98.84 S/P LAPAROSCOPIC SLEEVE GASTRECTOMY: ICD-10-CM

## 2023-06-01 DIAGNOSIS — E66.9 OBESITY, CLASS I, BMI 30-34.9: ICD-10-CM

## 2023-06-01 DIAGNOSIS — F32.A DEPRESSION, UNSPECIFIED DEPRESSION TYPE: Chronic | ICD-10-CM

## 2023-06-01 DIAGNOSIS — F32.A DEPRESSION, UNSPECIFIED DEPRESSION TYPE: ICD-10-CM

## 2023-06-01 DIAGNOSIS — E66.9 OBESITY, CLASS I, BMI 30-34.9: Primary | ICD-10-CM

## 2023-06-01 LAB
25(OH)D3 SERPL-MCNC: 42.8 NG/ML (ref 30–100)
ALBUMIN SERPL-MCNC: 4.1 G/DL (ref 3.5–5.2)
ALBUMIN/GLOB SERPL: 1.7 G/DL
ALP SERPL-CCNC: 122 U/L (ref 39–117)
ALT SERPL W P-5'-P-CCNC: 21 U/L (ref 1–33)
ANION GAP SERPL CALCULATED.3IONS-SCNC: 12.8 MMOL/L (ref 5–15)
AST SERPL-CCNC: 20 U/L (ref 1–32)
BASOPHILS # BLD AUTO: 0.06 10*3/MM3 (ref 0–0.2)
BASOPHILS NFR BLD AUTO: 1.3 % (ref 0–1.5)
BILIRUB SERPL-MCNC: 0.3 MG/DL (ref 0–1.2)
BUN SERPL-MCNC: 13 MG/DL (ref 6–20)
BUN/CREAT SERPL: 15.1 (ref 7–25)
CALCIUM SPEC-SCNC: 9.2 MG/DL (ref 8.6–10.5)
CHLORIDE SERPL-SCNC: 104 MMOL/L (ref 98–107)
CO2 SERPL-SCNC: 22.2 MMOL/L (ref 22–29)
CREAT SERPL-MCNC: 0.86 MG/DL (ref 0.57–1)
DEPRECATED RDW RBC AUTO: 45.2 FL (ref 37–54)
EGFRCR SERPLBLD CKD-EPI 2021: 82.4 ML/MIN/1.73
EOSINOPHIL # BLD AUTO: 0.08 10*3/MM3 (ref 0–0.4)
EOSINOPHIL NFR BLD AUTO: 1.7 % (ref 0.3–6.2)
ERYTHROCYTE [DISTWIDTH] IN BLOOD BY AUTOMATED COUNT: 16.1 % (ref 12.3–15.4)
FERRITIN SERPL-MCNC: 7.64 NG/ML (ref 13–150)
FOLATE SERPL-MCNC: >20 NG/ML (ref 4.78–24.2)
GLOBULIN UR ELPH-MCNC: 2.4 GM/DL
GLUCOSE SERPL-MCNC: 121 MG/DL (ref 65–99)
HCT VFR BLD AUTO: 34 % (ref 34–46.6)
HGB BLD-MCNC: 10.7 G/DL (ref 12–15.9)
IMM GRANULOCYTES # BLD AUTO: 0.01 10*3/MM3 (ref 0–0.05)
IMM GRANULOCYTES NFR BLD AUTO: 0.2 % (ref 0–0.5)
IRON 24H UR-MRATE: 25 MCG/DL (ref 37–145)
LYMPHOCYTES # BLD AUTO: 1.81 10*3/MM3 (ref 0.7–3.1)
LYMPHOCYTES NFR BLD AUTO: 38.8 % (ref 19.6–45.3)
MCH RBC QN AUTO: 24.2 PG (ref 26.6–33)
MCHC RBC AUTO-ENTMCNC: 31.5 G/DL (ref 31.5–35.7)
MCV RBC AUTO: 76.9 FL (ref 79–97)
MONOCYTES # BLD AUTO: 0.36 10*3/MM3 (ref 0.1–0.9)
MONOCYTES NFR BLD AUTO: 7.7 % (ref 5–12)
NEUTROPHILS NFR BLD AUTO: 2.34 10*3/MM3 (ref 1.7–7)
NEUTROPHILS NFR BLD AUTO: 50.3 % (ref 42.7–76)
NRBC BLD AUTO-RTO: 0 /100 WBC (ref 0–0.2)
PLATELET # BLD AUTO: 353 10*3/MM3 (ref 140–450)
PMV BLD AUTO: 10.5 FL (ref 6–12)
POTASSIUM SERPL-SCNC: 4.1 MMOL/L (ref 3.5–5.2)
PREALB SERPL-MCNC: 22.8 MG/DL (ref 20–40)
PROT SERPL-MCNC: 6.5 G/DL (ref 6–8.5)
RBC # BLD AUTO: 4.42 10*6/MM3 (ref 3.77–5.28)
SODIUM SERPL-SCNC: 139 MMOL/L (ref 136–145)
WBC NRBC COR # BLD: 4.66 10*3/MM3 (ref 3.4–10.8)

## 2023-06-01 PROCEDURE — 83540 ASSAY OF IRON: CPT

## 2023-06-01 PROCEDURE — 82728 ASSAY OF FERRITIN: CPT

## 2023-06-01 PROCEDURE — 36415 COLL VENOUS BLD VENIPUNCTURE: CPT

## 2023-06-01 PROCEDURE — 84134 ASSAY OF PREALBUMIN: CPT

## 2023-06-01 PROCEDURE — 84425 ASSAY OF VITAMIN B-1: CPT

## 2023-06-01 PROCEDURE — 82306 VITAMIN D 25 HYDROXY: CPT

## 2023-06-01 PROCEDURE — 82746 ASSAY OF FOLIC ACID SERUM: CPT

## 2023-06-01 PROCEDURE — 80053 COMPREHEN METABOLIC PANEL: CPT

## 2023-06-01 PROCEDURE — 85025 COMPLETE CBC W/AUTO DIFF WBC: CPT

## 2023-06-01 PROCEDURE — 83921 ORGANIC ACID SINGLE QUANT: CPT

## 2023-06-01 PROCEDURE — 99214 OFFICE O/P EST MOD 30 MIN: CPT | Performed by: NURSE PRACTITIONER

## 2023-06-01 NOTE — PROGRESS NOTES
[facility]ATRIC SURGERY  [address] [facility] [address]  [facility] [id number] [phone]  [address] [id number] [phone]  Dept: [phone]  6/1/2023      Savannah Valenzuela.  [id number]  [id number]  1970  female      Chief Complaint   Patient presents with   • Follow-up     Sleeve follow up        BH Post-Op Bariatric Surgery:   Savannah Valenzuela is status post Laparoscopic Sleeve/PEH procedure, performed on 10/5/2020     HPI:   Today's weight is 98.4 kg (217 lb) pounds, today's BMI is Body mass index is 36.6 kg/m².,@ has a  gain of 20 pounds since the last visit and@ weight loss since surgery is 13 pounds. The patient reports a decreased portion size and loss of appetite.      Savannah Valenzuela denies n/v, reflux and reports that she is really struggling with work stress, sugar cravings. She reports that the last year has been really hard with work and home stress and she expressed feelings of failure due to weight regain. She reports that she has been grazing more often and has been having sugar cravings like never before and notices emotional and stress eating tendencies.      Diet and Exercise: Diet history reviewed and discussed with the patient. Weight loss/gains to date discussed with the patient. The patient states they are eating 30-40 grams of protein per day. She reports eating 3 meals per day, a typical portion size of 1/2 cup, eating 1-2 snacks per day, drinking 5-6 or more 8-oz. glasses of water per day, no carbonated beverage consumption and exercising regularly- walking a few days per week.    Supplements: OTC MTV with iron and calcium.     Review of Systems   Constitutional: Positive for appetite change, fatigue and unexpected weight change.   HENT: Negative.    Eyes: Negative.    Respiratory: Negative.    Cardiovascular: Negative.  Negative for leg swelling.   Gastrointestinal: Negative for abdominal distention,  abdominal pain, constipation, diarrhea, nausea and vomiting.   Genitourinary: Negative for difficulty urinating, frequency and urgency.   Musculoskeletal: Negative for back pain.   Skin: Negative.    Psychiatric/Behavioral: Negative.    All other systems reviewed and are negative.      Patient Active Problem List   Diagnosis   • Migraine   • Allergic rhinitis   • Depression   • Chronic knee pain   • S/P laparoscopic sleeve gastrectomy   • Obesity, Class I, BMI 30-34.9   • Iron deficiency anemia       Past Medical History:   Diagnosis Date   • Allergic rhinitis    • Anemia    • Chronic fatigue 3/27/2020   • Depression    • Encounter for screening for malignant neoplasm of colon 5/19/2021    Added automatically from request for surgery [id number]   • Fatigue    • GERD (gastroesophageal reflux disease)    • Hematuria, microscopic 8/24/2016   • Hiatal hernia    • Hyperlipidemia    • Hypertension    • Low back strain 1989    Fell on my back on aluminum bleachers   • Medication management    • Migraine    • Multiple food allergies    • Physical exam, annual        The following portions of the patient's history were reviewed and updated as appropriate: allergies, current medications, past family history, past medical history, past social history, past surgical history and problem list.    Vitals:    06/01/23 1203   BP: 153/82   Pulse: 63   Temp: 97.8 °F (36.6 °C)       Physical Exam  Vitals and nursing note reviewed.   Constitutional:       Appearance: She is well-developed.   Neck:      Thyroid: No thyromegaly.   Cardiovascular:      Rate and Rhythm: Normal rate.   Pulmonary:      Effort: Pulmonary effort is normal. No respiratory distress.   Abdominal:      Palpations: Abdomen is soft.   Musculoskeletal:         General: No tenderness.   Skin:     General: Skin is warm and dry.   Neurological:      Mental Status: She is alert.   Psychiatric:         Mood and Affect: Mood is depressed. Affect is tearful.         Behavior:  Behavior normal.         Assessment:   Post-op, the patient has been dealing with a lot of stress at work and home and has been grazing more and has found it really challenging to find time for her own diet, exercise, mental health. She has had a hard time feeling motivated to exercise or prep foods and has been really fatigued over the past several months.      Encounter Diagnoses   Name Primary?   • Obesity, Class I, BMI 30-34.9 Yes   • S/P laparoscopic sleeve gastrectomy    • Depression, unspecified depression type    • Chronic knee pain, unspecified laterality        Plan:   Goal to refocus on lean protein and high fiber whole foods at mealtimes, avoiding grazing. Encouraged to give herself graze and try to avoid dichotomous thinking. Avoid being overly restrictive in the mornings due to guilt about the previous day. Focus on eating well balanced high nutrient density foods at mealtimes and get back into a steady routine. Will trend CBC, CMP, prealbumin, vitamin levels today.  Encouraged patient to be sure to get plenty of lean protein per day through small frequent meals all with a protein source.   Activity restrictions: none.   Recommended patient be sure to get at least 70 grams of protein per day by eating small, frequent meals all with high lean protein choices. Be sure to limit/cut back on daily carbohydrate intake. Discussed with the patient the recommended amount of water per day to intake- half of body weight in ounces. Reviewed vitamin requirements. Be sure to do routine exercise, 150 minutes per week minimum, including both cardio and strength training.     Instructions / Recommendations: dietary counseling recommended, recommended a daily protein intake of  grams, vitamin supplement(s) recommended, recommended exercising at least 150 minutes per week, behavior modifications recommended and instructed to call the office for concerns, questions, or problems.     The patient was instructed to  follow up in 1-2 months .      Total time spent during this encounter today was 35 minutes

## 2023-06-02 RX ORDER — FERROUS SULFATE 325(65) MG
325 TABLET ORAL
Qty: 30 TABLET | Refills: 2 | Status: SHIPPED | OUTPATIENT
Start: 2023-06-02 | End: 2023-08-31

## 2023-06-04 LAB — VIT B1 BLD-SCNC: 114.6 NMOL/L (ref 66.5–200)

## 2023-06-05 DIAGNOSIS — D50.9 IRON DEFICIENCY ANEMIA, UNSPECIFIED IRON DEFICIENCY ANEMIA TYPE: Primary | Chronic | ICD-10-CM

## 2023-06-05 LAB — METHYLMALONATE SERPL-SCNC: 123 NMOL/L (ref 0–378)

## 2023-06-05 RX ORDER — SODIUM CHLORIDE 9 MG/ML
250 INJECTION, SOLUTION INTRAVENOUS ONCE
OUTPATIENT
Start: 2023-06-05

## 2023-08-17 ENCOUNTER — OFFICE VISIT (OUTPATIENT)
Dept: BARIATRICS/WEIGHT MGMT | Facility: CLINIC | Age: 53
End: 2023-08-17
Payer: COMMERCIAL

## 2023-08-17 ENCOUNTER — LAB (OUTPATIENT)
Dept: LAB | Facility: HOSPITAL | Age: 53
End: 2023-08-17
Payer: COMMERCIAL

## 2023-08-17 VITALS
DIASTOLIC BLOOD PRESSURE: 77 MMHG | HEIGHT: 65 IN | HEART RATE: 77 BPM | BODY MASS INDEX: 35.32 KG/M2 | WEIGHT: 212 LBS | TEMPERATURE: 97.7 F | SYSTOLIC BLOOD PRESSURE: 125 MMHG

## 2023-08-17 DIAGNOSIS — G89.29 CHRONIC KNEE PAIN, UNSPECIFIED LATERALITY: ICD-10-CM

## 2023-08-17 DIAGNOSIS — E66.9 OBESITY, CLASS II, BMI 35-39.9: ICD-10-CM

## 2023-08-17 DIAGNOSIS — E66.9 OBESITY, CLASS II, BMI 35-39.9: Primary | ICD-10-CM

## 2023-08-17 DIAGNOSIS — Z98.84 S/P LAPAROSCOPIC SLEEVE GASTRECTOMY: ICD-10-CM

## 2023-08-17 DIAGNOSIS — M25.569 CHRONIC KNEE PAIN, UNSPECIFIED LATERALITY: ICD-10-CM

## 2023-08-17 PROBLEM — E66.811 OBESITY, CLASS I, BMI 30-34.9: Status: RESOLVED | Noted: 2021-01-13 | Resolved: 2023-08-17

## 2023-08-17 LAB
FERRITIN SERPL-MCNC: 152 NG/ML (ref 13–150)
IRON 24H UR-MRATE: 103 MCG/DL (ref 37–145)

## 2023-08-17 PROCEDURE — 99214 OFFICE O/P EST MOD 30 MIN: CPT | Performed by: NURSE PRACTITIONER

## 2023-08-17 PROCEDURE — 36415 COLL VENOUS BLD VENIPUNCTURE: CPT

## 2023-08-17 PROCEDURE — 82728 ASSAY OF FERRITIN: CPT

## 2023-08-17 PROCEDURE — 83540 ASSAY OF IRON: CPT

## 2023-08-17 RX ORDER — TOPIRAMATE 25 MG/1
TABLET ORAL
Qty: 166 TABLET | Refills: 0 | Status: SHIPPED | OUTPATIENT
Start: 2023-08-17 | End: 2023-11-15

## 2023-08-17 NOTE — PROGRESS NOTES
[facility] BARIATRIC SURGERY  [address] [id number] [phone]  [address] [address] [phone]  Dept: [phone]  8/17/2023      Savannah Valenzuela.  [id number]  [id number]  1970  female      Chief Complaint   Patient presents with    Follow-up     Gastric Sleeve follow up        BH Post-Op Bariatric Surgery:   Savannah Valenzuela is status post Laparoscopic Sleeve procedure, performed on 10/5/2020     HPI:     Today's weight is 96.2 kg (212 lb) pounds, today's BMI is Body mass index is 35.75 kg/mý., she has a loss of 5 pounds since the last visit and her weight loss since surgery is 27 pounds. The patient reports a decreased portion size and loss of appetite.    Savannah Valenzuela denies nausea, vomiting, reflux and reports that she is doing well     Diet and Exercise: Diet history reviewed and discussed with the patient. Weight loss/gains to date discussed with the patient. The patient states they are eating 60 grams of protein per day. She reports eating 3 meals per day, a typical portion size of 1/2 cup, eating 1-2 snacks per day, drinking 5-6 or more 8-oz. glasses of water per day, no carbonated beverage consumption and exercising regularly.     She is still having sugar cravings in the evenings and     Supplements: bariatric specific MTV- celebrate [employer]     Review of Systems   Constitutional:  Positive for appetite change and fatigue (improving after iron infusions). Negative for unexpected weight change.        Hot flashes   HENT: Negative.     Eyes: Negative.    Respiratory: Negative.     Cardiovascular: Negative.  Negative for leg swelling.   Gastrointestinal:  Negative for abdominal distention, abdominal pain, constipation, diarrhea, nausea and vomiting.   Genitourinary:  Negative for difficulty urinating, frequency and urgency.   Musculoskeletal:  Negative for back pain.   Skin: Negative.     Psychiatric/Behavioral:  Positive for sleep disturbance.    All other systems reviewed and are negative.    Patient Active Problem List   Diagnosis    Migraine    Allergic rhinitis    Depression    Obesity, Class II, BMI 35-39.9    Chronic knee pain    S/P laparoscopic sleeve gastrectomy    Iron deficiency anemia       Past Medical History:   Diagnosis Date    Allergic rhinitis     Anemia     Chronic fatigue 3/27/2020    Depression     Encounter for screening for malignant neoplasm of colon 5/19/2021    Added automatically from request for surgery [id number]    Fatigue     GERD (gastroesophageal reflux disease)     Hematuria, microscopic 8/24/2016    Hiatal hernia     Hyperlipidemia     Hypertension     Low back strain 1989    Fell on my back on aluminum bleachers    Medication management     Migraine     Multiple food allergies     Physical exam, annual        The following portions of the patient's history were reviewed and updated as appropriate: allergies, current medications, past family history, past medical history, past social history, past surgical history, and problem list.    Vitals:    08/17/23 1045   BP: 125/77   Pulse: 77   Temp: 97.7 øF (36.5 øC)       Physical Exam  Vitals and nursing note reviewed.   Constitutional:       Appearance: She is well-developed.   Neck:      Thyroid: No thyromegaly.   Cardiovascular:      Rate and Rhythm: Normal rate.   Pulmonary:      Effort: Pulmonary effort is normal. No respiratory distress.   Abdominal:      Palpations: Abdomen is soft.   Musculoskeletal:         General: No tenderness.   Skin:     General: Skin is warm and dry.   Neurological:      Mental Status: She is alert.   Psychiatric:         Behavior: Behavior normal.       Assessment:   Post-op, the patient is doing well.     Encounter Diagnoses   Name Primary?    Obesity, Class II, BMI 35-39.9 Yes    Chronic knee pain, unspecified laterality        Plan:   Will start low dose topiramate in late afternoons  or evenings for the next 30 days to help improve cravings. Continue with exercise and leading with protein at all mealtimes.   Encouraged patient to be sure to get plenty of lean protein per day through small frequent meals all with a protein source.   Activity restrictions: none.   Recommended patient be sure to get at least 70 grams of protein per day by eating small, frequent meals all with high lean protein choices. Be sure to limit/cut back on daily carbohydrate intake. Discussed with the patient the recommended amount of water per day to intake- half of body weight in ounces. Reviewed vitamin requirements. Be sure to do routine exercise, 150 minutes per week minimum, including both cardio and strength training.     Instructions / Recommendations: dietary counseling recommended, recommended a daily protein intake of  grams, vitamin supplement(s) recommended, recommended exercising at least 150 minutes per week, behavior modifications recommended and instructed to call the office for concerns, questions, or problems.     The patient was instructed to follow up in 3 months .     Total time spent during this encounter today was 35 minutes

## 2023-09-29 RX ORDER — BUPROPION HYDROCHLORIDE 150 MG/1
150 TABLET ORAL DAILY
Qty: 90 TABLET | Refills: 1 | Status: SHIPPED | OUTPATIENT
Start: 2023-09-29

## 2023-09-29 RX ORDER — FLUOXETINE HYDROCHLORIDE 40 MG/1
40 CAPSULE ORAL DAILY
Qty: 90 CAPSULE | Refills: 1 | Status: SHIPPED | OUTPATIENT
Start: 2023-09-29

## 2023-10-13 DIAGNOSIS — M25.512 LEFT SHOULDER PAIN, UNSPECIFIED CHRONICITY: Primary | ICD-10-CM

## 2023-10-13 DIAGNOSIS — M25.519 SHOULDER PAIN, UNSPECIFIED CHRONICITY, UNSPECIFIED LATERALITY: ICD-10-CM

## 2023-10-17 ENCOUNTER — OFFICE VISIT (OUTPATIENT)
Dept: ORTHOPEDIC SURGERY | Facility: CLINIC | Age: 53
End: 2023-10-17
Payer: COMMERCIAL

## 2023-10-17 VITALS — TEMPERATURE: 98 F | WEIGHT: 206.7 LBS | HEIGHT: 64 IN | BODY MASS INDEX: 35.29 KG/M2

## 2023-10-17 DIAGNOSIS — R52 PAIN: Primary | ICD-10-CM

## 2023-10-17 DIAGNOSIS — M75.42 IMPINGEMENT SYNDROME OF LEFT SHOULDER: ICD-10-CM

## 2023-10-17 NOTE — PROGRESS NOTES
"   New Shoulder      Patient: Savannah Valenzuela        YOB: 1970    Medical Record Number: [id number]        Chief Complaints: Left shoulder pain      History of Present Illness: This is a 53-year-old female who works at [employer] who is right-hand dominant presents with left shoulder pain this been ongoing for 3 to 4 weeks no history injury change in activity she does have night pain no history of similar symptoms she is tried ibuprofen and ice she is tried to change her sleeping position which has helped a little bit.  Her past medical history as listed below and reviewed by me      Allergies:   Allergies   Allergen Reactions    Latex Urinary Retention and Irritability     Painful urination with latex cath; Patient states \"sensitivity\"         Medications:   Home Medications:  Current Outpatient Medications on File Prior to Visit   Medication Sig    buPROPion XL (WELLBUTRIN XL) 150 MG 24 hr tablet Take 1 tablet by mouth Daily.    Calcium 250 MG capsule Take 500 mg by mouth 3 (Three) Times a Day.    FLUoxetine (PROzac) 40 MG capsule Take 1 capsule by mouth Daily.    multivitamin with minerals tablet tablet Take 1 tablet by mouth Daily.    Progesterone (Prometrium) 200 MG capsule Take 1 capsule by mouth Daily in the evening    topiramate (TOPAMAX) 25 MG tablet Take 1 tablet by mouth Every Night for 14 days, THEN 2 tablets Every Night for 76 days.     No current facility-administered medications on file prior to visit.     Current Medications:  Scheduled Meds:  Continuous Infusions:No current facility-administered medications for this visit.    PRN Meds:.    Past Medical History:   Diagnosis Date    Allergic rhinitis     Anemia     Chronic fatigue 3/27/2020    Depression     Encounter for screening for malignant neoplasm of colon 5/19/2021    Added automatically from request for surgery [id number]    Fatigue     GERD (gastroesophageal reflux disease)     Hematuria, microscopic 8/24/2016    Hiatal hernia     " "Hyperlipidemia     Hypertension     Low back strain     Fell on my back on aluminum bleachers    Medication management     Migraine     Multiple food allergies     Physical exam, annual         Past Surgical History:   Procedure Laterality Date     SECTION      COLONOSCOPY N/A 2021    Procedure: COLONOSCOPY INTO CECUM AND T.I. WITH COLD BIOPSY POLYPECTOMIES;  Surgeon: Ursula Torres MD;  Location: [facility] ENDOSCOPY;  Service: Gastroenterology;  Laterality: N/A;  PRE- SCREENING  POST- DIVERTICULOSIS, POLYPS, HEMORRHOIDS    DILATATION AND CURETTAGE      ENDOSCOPY N/A 2020    Procedure: ESOPHAGOGASTRODUODENOSCOPY WITH BIOPSY;  Surgeon: Cayetano Khan Jr., MD;  Location: [facility] ENDOSCOPY;  Service: General;  Laterality: N/A;  PREOP/ DYSPEPSIA  POSTOP/ GASTRITIS, ESOPHAGITIS, HIATAL HERNIA    GASTRIC SLEEVE LAPAROSCOPIC N/A 10/5/2020    Procedure: GASTRIC SLEEVE LAPAROSCOPIC WITH paraesophageal hernia repair, lysis of adhesions;  Surgeon: Cayetano Khan Jr., MD;  Location: [facility] OR [facility];  Service: Bariatric;  Laterality: N/A;    WISDOM TOOTH EXTRACTION          Social History     Occupational History    Not on file   Tobacco Use    Smoking status: Never     Passive exposure: Past    Smokeless tobacco: Never   Vaping Use    Vaping Use: Never used   Substance and Sexual Activity    Alcohol use: No     Comment: Kay: rare    Drug use: No    Sexual activity: Not Currently     Partners: Male      Social History     Social History Narrative    Not on file        Family History   Problem Relation Age of Onset    Heart disease Father     Hyperlipidemia Father     Malig Hyperthermia Neg Hx              Review of Systems:     Review of Systems      Physical Exam: 53 y.o. female  General Appearance:    Alert, cooperative, in no acute distress                   Vitals:    10/17/23 1637   Temp: 98 °F (36.7 °C)   Weight: 93.8 kg (206 lb 11.2 oz)   Height: 162.6 cm (64\")   PainSc:   6    "   Patient is alert and read ×3 no acute distress appears her above-listed at height weight and age.  Affect is normal respiratory rate is normal unlabored. Heart rate regular rate rhythm, sclera, dentition and hearing are normal for the purpose of this exam.    Ortho Exam  Physical exam of the left shoulder reveals no overlying skin changes no lymphedema no lymphadenopathy.  Patient has active flexion 180 with mild symptoms abduction is similar external rotation is to 50 and internal rotation to the upper lumbar spine with mild symptoms.  Patient has good rotator cuff strength 4+ over 5 with isometric strength testing with pain.  Patient has a positive impingement and a positive Aiken sign.  Patient has good cervical range of motion which is full and asymptomatic no radicular symptoms.  Patient has a normal elbow exam.  Good distal pulses are presentPatient has pain with overhead activity and a positive Neer sign and a positive empty can sign  They have a positive drop arm any definitive painful arc   Procedures          Radiology:   AP, Scapular Y and Axillary Lateral of the left shoulder were ordered/reviewed to evauate shoulder pain.  I have no comparative films she has some acromioclavicular arthritis otherwise no acute pathology is appreciated  Imaging Results (Most Recent)       Procedure Component Value Units Date/Time    XR Shoulder 2+ View Left [[id number]] Resulted: 10/17/23 1636     Updated: 10/17/23 1636    Impression:      Ordering physician's impression is located in the Encounter Note dated 10/17/23. X-ray performed in the DR room.            Assessment/Plan: Left shoulder pain I really think this is rotator cuff in some fashion plan is to proceed with an injection however she had her COVID-vaccine today therefore we will wait 1 week I will see her back in 1 week for cortisone injection.  I will go on and put an order in for physical therapy and have them call and set that up for the week after that.   Should she fail to improve with that would consider other means of testing  Cortisone Injection. See procedure note.  Cortisone Injection for DIAGNOSTIC and THERAPUTIC purposes.

## 2023-10-23 NOTE — PROGRESS NOTES
"  Patient: Savannah Valenzuela  YOB: 1970  Date of Service: 10/23/2023    Chief Complaints: Left shoulder pain  Subjective:    History of Present Illness: Pt is seen in the office today with complaints of left shoulder pain I saw her last week and thought it was more impingement she was getting her COVID shot which has now been a week she is here for for the injection        Allergies:   Allergies   Allergen Reactions    Latex Urinary Retention and Irritability     Painful urination with latex cath; Patient states \"sensitivity\"         Medications:   Home Medications:  Current Outpatient Medications on File Prior to Visit   Medication Sig    buPROPion XL (WELLBUTRIN XL) 150 MG 24 hr tablet Take 1 tablet by mouth Daily.    Calcium 250 MG capsule Take 500 mg by mouth 3 (Three) Times a Day.    FLUoxetine (PROzac) 40 MG capsule Take 1 capsule by mouth Daily.    multivitamin with minerals tablet tablet Take 1 tablet by mouth Daily.    Progesterone (Prometrium) 200 MG capsule Take 1 capsule by mouth Daily in the evening    topiramate (TOPAMAX) 25 MG tablet Take 1 tablet by mouth Every Night for 14 days, THEN 2 tablets Every Night for 76 days.     No current facility-administered medications on file prior to visit.     Current Medications:  Scheduled Meds:  Continuous Infusions:No current facility-administered medications for this visit.    PRN Meds:.    I have reviewed the patient's medical history in detail and updated the computerized patient record.  Review and summarization of old records include:    Past Medical History:   Diagnosis Date    Allergic rhinitis     Anemia     Chronic fatigue 3/27/2020    Depression     Encounter for screening for malignant neoplasm of colon 5/19/2021    Added automatically from request for surgery [id number]    Fatigue     GERD (gastroesophageal reflux disease)     Hematuria, microscopic 8/24/2016    Hiatal hernia     Hyperlipidemia     Hypertension     Low back strain 1989    " "Fell on my back on aluminum bleachers    Medication management     Migraine     Multiple food allergies     Physical exam, annual         Past Surgical History:   Procedure Laterality Date     SECTION      COLONOSCOPY N/A 2021    Procedure: COLONOSCOPY INTO CECUM AND T.I. WITH COLD BIOPSY POLYPECTOMIES;  Surgeon: Ursula Torres MD;  Location: [facility] ENDOSCOPY;  Service: Gastroenterology;  Laterality: N/A;  PRE- SCREENING  POST- DIVERTICULOSIS, POLYPS, HEMORRHOIDS    DILATATION AND CURETTAGE      ENDOSCOPY N/A 2020    Procedure: ESOPHAGOGASTRODUODENOSCOPY WITH BIOPSY;  Surgeon: aCyetano Khan Jr., MD;  Location: [facility] ENDOSCOPY;  Service: General;  Laterality: N/A;  PREOP/ DYSPEPSIA  POSTOP/ GASTRITIS, ESOPHAGITIS, HIATAL HERNIA    GASTRIC SLEEVE LAPAROSCOPIC N/A 10/5/2020    Procedure: GASTRIC SLEEVE LAPAROSCOPIC WITH paraesophageal hernia repair, lysis of adhesions;  Surgeon: Cayetano Khan Jr., MD;  Location: [facility] OR [facility];  Service: Bariatric;  Laterality: N/A;    WISDOM TOOTH EXTRACTION          Social History     Occupational History    Not on file   Tobacco Use    Smoking status: Never     Passive exposure: Past    Smokeless tobacco: Never   Vaping Use    Vaping Use: Never used   Substance and Sexual Activity    Alcohol use: No     Comment: Kay: rare    Drug use: No    Sexual activity: Not Currently     Partners: Male      Social History     Social History Narrative    Not on file        Family History   Problem Relation Age of Onset    Heart disease Father     Hyperlipidemia Father     Malig Hyperthermia Neg Hx        ROS: 14 point review of systems was performed and was negative except for documented findings in HPI and today's encounter.     Allergies:   Allergies   Allergen Reactions    Latex Urinary Retention and Irritability     Painful urination with latex cath; Patient states \"sensitivity\"       Constitutional:  Denies fever, shaking or chills   Eyes:  Denies " change in visual acuity   HENT:  Denies nasal congestion or sore throat   Respiratory:  Denies cough or shortness of breath   Cardiovascular:  Denies chest pain or severe LE edema   GI:  Denies abdominal pain, nausea, vomiting, bloody stools or diarrhea   Musculoskeletal:  Numbness, tingling, or loss of motor function only as noted above in history of present illness.  : Denies painful urination or hematuria  Integument:  Denies rash, lesion or ulceration   Neurologic:  Denies headache or focal weakness  Endocrine:  Denies lymphadenopathy  Psych:  Denies confusion or change in mental status   Hem:  Denies active bleeding      Physical Exam: 53 y.o. female  Wt Readings from Last 3 Encounters:   10/17/23 93.8 kg (206 lb 11.2 oz)   09/23/23 97.5 kg (215 lb)   08/17/23 96.2 kg (212 lb)       There is no height or weight on file to calculate BMI.    There were no vitals filed for this visit.  Vital signs reviewed.   General Appearance:    Alert, cooperative, in no acute distress                    Ortho exam    Exam and complaints are unchanged             Assessment: Left shoulder impingement    Plan: Plan is to proceed with an injection she will start physical therapy next week  Follow up as indicated.  Ice, elevate, and rest as needed.  Discussed conservative measures of pain control including ice, bracing.  Also talked about the importance of strengthening   Jasmine Page M.D.    Large Joint Arthrocentesis: L subacromial bursa  Date/Time: 10/24/2023 10:16 AM  Consent given by: patient  Site marked: site marked  Timeout: Immediately prior to procedure a time out was called to verify the correct patient, procedure, equipment, support staff and site/side marked as required   Supporting Documentation  Indications: pain   Procedure Details  Location: shoulder - L subacromial bursa  Preparation: Patient was prepped and draped in the usual sterile fashion  Needle gauge: 21G.  Approach: posterior  Medications administered:  80 mg methylPREDNISolone acetate 80 MG/ML; 2 mL lidocaine PF 1% 1 %  Patient tolerance: patient tolerated the procedure well with no immediate complications

## 2023-10-24 ENCOUNTER — OFFICE VISIT (OUTPATIENT)
Dept: ORTHOPEDIC SURGERY | Facility: CLINIC | Age: 53
End: 2023-10-24
Payer: COMMERCIAL

## 2023-10-24 VITALS — HEIGHT: 64 IN | TEMPERATURE: 98.6 F | WEIGHT: 205.9 LBS | BODY MASS INDEX: 35.15 KG/M2

## 2023-10-24 DIAGNOSIS — M75.42 IMPINGEMENT SYNDROME OF LEFT SHOULDER: Primary | ICD-10-CM

## 2023-10-24 PROCEDURE — 20610 DRAIN/INJ JOINT/BURSA W/O US: CPT | Performed by: ORTHOPAEDIC SURGERY

## 2023-10-24 RX ORDER — METHYLPREDNISOLONE ACETATE 80 MG/ML
80 INJECTION, SUSPENSION INTRA-ARTICULAR; INTRALESIONAL; INTRAMUSCULAR; SOFT TISSUE
Status: COMPLETED | OUTPATIENT
Start: 2023-10-24 | End: 2023-10-24

## 2023-10-24 RX ORDER — LIDOCAINE HYDROCHLORIDE 10 MG/ML
2 INJECTION, SOLUTION EPIDURAL; INFILTRATION; INTRACAUDAL; PERINEURAL
Status: COMPLETED | OUTPATIENT
Start: 2023-10-24 | End: 2023-10-24

## 2023-10-24 RX ADMIN — LIDOCAINE HYDROCHLORIDE 2 ML: 10 INJECTION, SOLUTION EPIDURAL; INFILTRATION; INTRACAUDAL; PERINEURAL at 10:16

## 2023-10-24 RX ADMIN — METHYLPREDNISOLONE ACETATE 80 MG: 80 INJECTION, SUSPENSION INTRA-ARTICULAR; INTRALESIONAL; INTRAMUSCULAR; SOFT TISSUE at 10:16

## 2023-11-02 ENCOUNTER — HOSPITAL ENCOUNTER (OUTPATIENT)
Dept: PHYSICAL THERAPY | Facility: HOSPITAL | Age: 53
Setting detail: THERAPIES SERIES
Discharge: HOME | End: 2023-11-02
Payer: COMMERCIAL

## 2023-11-02 DIAGNOSIS — M25.512 LEFT SHOULDER PAIN, UNSPECIFIED CHRONICITY: Primary | ICD-10-CM

## 2023-11-02 DIAGNOSIS — R29.3 POOR POSTURE: ICD-10-CM

## 2023-11-02 DIAGNOSIS — M62.81 DECREASED MUSCLE STRENGTH: ICD-10-CM

## 2023-11-02 PROCEDURE — 97161 PT EVAL LOW COMPLEX 20 MIN: CPT

## 2023-11-02 PROCEDURE — 97110 THERAPEUTIC EXERCISES: CPT

## 2023-11-02 NOTE — THERAPY EVALUATION
Outpatient Physical Therapy Ortho Initial Evaluation  [facility]     Patient Name: Savannah Valenzuela  : 1970  MRN: [id number]  Today's Date: 2023      Visit Date: 2023    Patient Active Problem List   Diagnosis    Migraine    Allergic rhinitis    Depression    Obesity, Class II, BMI 35-39.9    Chronic knee pain    S/P laparoscopic sleeve gastrectomy    Iron deficiency anemia        Past Medical History:   Diagnosis Date    Allergic rhinitis     Anemia     Chronic fatigue 3/27/2020    Depression     Encounter for screening for malignant neoplasm of colon 2021    Added automatically from request for surgery [id number]    Fatigue     GERD (gastroesophageal reflux disease)     Hematuria, microscopic 2016    Hiatal hernia     Hyperlipidemia     Hypertension     Low back strain     Fell on my back on aluminum bleachers    Medication management     Migraine     Multiple food allergies     Physical exam, annual         Past Surgical History:   Procedure Laterality Date     SECTION      COLONOSCOPY N/A 2021    Procedure: COLONOSCOPY INTO CECUM AND T.I. WITH COLD BIOPSY POLYPECTOMIES;  Surgeon: Ursula Torres MD;  Location: [facility] ENDOSCOPY;  Service: Gastroenterology;  Laterality: N/A;  PRE- SCREENING  POST- DIVERTICULOSIS, POLYPS, HEMORRHOIDS    DILATATION AND CURETTAGE      ENDOSCOPY N/A 2020    Procedure: ESOPHAGOGASTRODUODENOSCOPY WITH BIOPSY;  Surgeon: Cayetano Khan Jr., MD;  Location: [facility] ENDOSCOPY;  Service: General;  Laterality: N/A;  PREOP/ DYSPEPSIA  POSTOP/ GASTRITIS, ESOPHAGITIS, HIATAL HERNIA    GASTRIC SLEEVE LAPAROSCOPIC N/A 10/5/2020    Procedure: GASTRIC SLEEVE LAPAROSCOPIC WITH paraesophageal hernia repair, lysis of adhesions;  Surgeon: Cayetano Khan Jr., MD;  [place] [facility] [place] [facility] [place]  Service: Bariatric;  Laterality: N/A;    WISDOM TOOTH EXTRACTION         Visit Dx:     ICD-10-CM ICD-9-CM   1. Left shoulder pain, unspecified  chronicity  M25.512 719.41   2. Poor posture  R29.3 781.92   3. Decreased muscle strength  M62.81 728.87          Patient History       Row Name 11/02/23 0900             History    Chief Complaint Pain;Difficulty with daily activities (P)   -      Type of Pain Shoulder pain (P)   -      Brief Description of Current Complaint Pt reports no KYUNG, that pain began over a month ago when she slept wrong. Difficulty hooking bra and getting dressed in the morning. Evaluated by MD on 10/17/2023 postponed cortisone shot due to having gotten a covid shot that same day. Got a cortisone shot on 10/24 with good response, reducing max pain with aggravating motions from 6/10 to 4/10. Pt reports initiating gym routine with  2.5 months ago, reports pain with Upper body extension, overhead motions and internal rotation. (P)   -      Patient/Caregiver Goals Relieve pain;Return to prior level of function;Improve mobility (P)   -      Patient/Caregiver Goals Comment get dressed in the morning without pain (P)   -      Occupation/sports/leisure activities patient  at [employer] (P)   -      How has patient tried to help current problem? cortisone shot (P)   -      Related/Recent Hospitalizations No (P)   -         Pain     Pain Location Shoulder (P)   -      Pain at Present 0 (P)   -      Pain at Best 0 (P)   -      Pain at Worst 4 (P)   -      Pain Frequency Intermittent (P)   -      Pain Description Discomfort;Tender (P)   -      What Performance Factors Make the Current Problem(s) WORSE? reaching behind my back or out to the side (P)   -      What Performance Factors Make the Current Problem(s) BETTER? pain goes away shortly after stopping aggravating motions (P)   -      Is your sleep disturbed? Yes (P)   when I reposition onto that shoulder  -      What position do you sleep in? -- (P)   reposition frequently throughout the night  -      Difficulties at work? n/a  (P)   -MH      Difficulties with ADL's? getting dressed (P)   -      Difficulties with recreational activities? stopped to protect (P)   -         Fall Risk Assessment    Any falls in the past year: No (P)   -         Daily Activities    Primary Language English (P)   -MH      Are you able to read Yes (P)   -MH      Are you able to write Yes (P)   -      How does patient learn best? Listening;Reading;Demonstration (P)   -      Barriers to learning None (P)   -      Pt Participated in POC and Goals Yes (P)   -         Safety    Are you being hurt, hit, or frightened by anyone at home or in your life? No (P)   -MH      Are you being neglected by a caregiver No (P)   -                User Key  (r) = Recorded By, (t) = Taken By, (c) = Cosigned By      Initials Name Provider Type     Maxwell Cr, PT Student PT Student                     PT Ortho       Row Name 11/02/23 0900       Posture/Observations    Forward Head Moderate (P)   -    Thoracic Kyphosis Moderate (P)   -    Rounded Shoulders Moderate (P)   -    Scapular winging Normal (P)   -       Shoulder Impingement/Rotator Cuff Special Tests    Aiken-Kenny Test (RC Lesion vs. Bursitis) Negative (P)   -    Neer Impingement Test (RC Lesion vs. Bursitis) Left:;Positive (P)   mild  -    Drop Arm Test (Full Thickness RC Lesion) Negative (P)   -    Belly Press (Subscapularis Lesion) Left:;Positive (P)   -       Shoulder Girdle Palpation    Shoulder Girdle Palpation? -- (P)   no palpable tenderness  -       General ROM    GENERAL ROM COMMENTS painful supine PROM arc decresed with distraction and scapular assistance (P)   -       Right Upper Ext    Rt Shoulder External Rotation AROM KATIE T4, FIR T7 (P)   -       Left Upper Ext    Lt Shoulder Abduction AROM 0-110 (P)   -    Lt Shoulder External Rotation AROM KATIE T4 FIR L3 w/o pain, able to achieve FIR T7 w/ pain (P)   -       MMT Right Upper Ext    Rt Shoulder Flexion MMT,  Gross Movement (5/5) normal (P)   -    Rt Shoulder ABduction MMT, Gross Movement (5/5) normal (P)   -    Rt Shoulder Internal Rotation MMT, Gross Movement (5/5) normal (P)   -    Rt Shoulder External Rotation MMT, Gross Movement (5/5) normal (P)   -       MMT Left Upper Ext    Lt Shoulder Flexion MMT, Gross Movement (4+/5) good plus (P)   -MH    Lt Shoulder ABduction MMT, Gross Movement (4-/5) good minus (P)   -MH    Lt Shoulder Internal Rotation MMT, Gross Movement (4-/5) good minus (P)   painful  -MH    Lt Shoulder External Rotation MMT, Gross Movement (4+/5) good plus (P)   -       Gait/Stairs (Locomotion)    Comment, (Gait/Stairs) no abnormal gait patterns noted (P)   -              User Key  (r) = Recorded By, (t) = Taken By, (c) = Cosigned By      Initials Name Provider Type     Maxwell Cr, PT Student PT Student                                Therapy Education  Education Details: (P) POC, HEP, expectations, gym lifts to avoid, prognosis  Given: (P) HEP, Symptoms/condition management, Posture/body mechanics  Program: (P) New  How Provided: (P) Verbal, Demonstration, Written  Provided to: (P) Patient  Level of Understanding: (P) Teach back education performed, Verbalized, Demonstrated      PT OP Goals       Row Name 11/02/23 1000          PT Short Term Goals    STG Date to Achieve 12/02/23 (P)   -     STG 1 Pt will report pain rated 2/10 at worst in order to demonstrate ability to return to normalized ADLs and functional activities. (P)   -     STG 1 Progress New (P)   -     STG 2 Pt will be independent with initial HEP for symptom management. (P)   -     STG 2 Progress New (P)   -        Long Term Goals    LTG Date to Achieve 01/01/24 (P)   -     LTG 1 Pt will be independent and compliant with advanced HEP for long term management of symptoms and prevention of future occurrence. (P)   -     LTG 1 Progress New (P)   -     LTG 2 Pt will be able to achieve abduction of 0-150  degrees FIR to T7 w/o pain in order to improve functional tasks and ADL performance. (P)   -     LTG 2 Progress -- (P)   -     LTG 3 Pt will report 50% improvement with sleep quality in order to improve QOL (P)   -MH     LTG 3 Progress New (P)   -     LTG 4 Pt will report pain free full return to exercise with  to demonstrate improvement in symptoms and quality of life (P)   -     LTG 4 Progress New (P)   -     LTG 5 -- (P)   -     LTG 5 Progress -- (P)   -        Time Calculation    PT Goal Re-Cert Due Date 01/31/24 (P)   -               User Key  (r) = Recorded By, (t) = Taken By, (c) = Cosigned By      Initials Name Provider Type    Maxwell Hess, PT Student PT Student                     PT Assessment/Plan       Row Name 11/02/23 1000          PT Assessment    Functional Limitations Performance in self-care ADL;Performance in sport activities (P)   -     Impairments Muscle strength;Range of motion;Posture;Poor body mechanics;Pain;Impaired flexibility;Joint mobility  -CN     Assessment Comments Savannah Valenzuela is a 53 y.o. year-old female referred to physical therapy for L shoulder pain. She presents with a stable clinical presentation. She has comorbidities of hyperlipidemia, hypertension, GERD, migraines, fatigue and personal factors of history of depression, and working an office job that may affect her progress in the plan of care. Self scored disability measure of QuickDASH was a 13.6%. where 0% is no disability. She demonstrated decreased strength and AROM into abduction and FIR limited by pain on L shoulder. Pt demonstrated positive belly press test and reduction of symptoms in supine PROM with distraction and scapular stability. Pt educated on prognosis and POC. Pt given HEP during for posture and muscular imbalance. Pt demonstrates understanding of HEP and tolerates interventions well. Pt educated regarding regressions of gym exercises with  during  rehabilitation timeline to protect against exacerbations of symptoms. Signs and symptoms are consistent with referring diagnosis. She is appropriate for skilled therapy services at this time to address deficits and improve ease with ADLs and exercise program with . (P)   -     Please refer to paper survey for additional self-reported information No (P)   -     Rehab Potential Good (P)   -     Patient/caregiver participated in establishment of treatment plan and goals Yes (P)   -     Patient would benefit from skilled therapy intervention Yes (P)   -        PT Plan    PT Frequency 2x/week (P)   -     Predicted Duration of Therapy Intervention (PT) 10 visits (P)   -     Planned CPT's? PT EVAL LOW COMPLEXITY: 86605;PT RE-EVAL: 77632;PT THER PROC EA 15 MIN: 37709;PT THER ACT EA 15 MIN: 67658;PT MANUAL THERAPY EA 15 MIN: 74099;PT NEUROMUSC RE-EDUCATION EA 15 MIN: 47827;PT GAIT TRAINING EA 15 MIN: 61797;PT AQUATIC THERAPY EA 15 MIN: 22226;PT HOT OR COLD PACK TREAT MCARE;PT SELF CARE/HOME MGMT/TRAIN EA 15: 76957;PT ELECTRICAL STIM UNATTEND: ;PT ELECTRICAL STIM ATTD EA 15 MIN: 35825  -     PT Plan Comments Assess response to eval, Consider progression of scap exercises, consider UBE warm up, HA/D2, wall wash. (P)   -               User Key  (r) = Recorded By, (t) = Taken By, (c) = Cosigned By      Initials Name Provider Type    Angelic Washington, PT Physical Therapist     Maxwell Cr, PT Student PT Student                       OP Exercises       Row Name 11/02/23 1000 11/02/23 0900          Subjective Pain    Able to rate subjective pain? yes (P)   - -- (P)   -     Pre-Treatment Pain Level 0 (P)   - -- (P)   -        Total Minutes    77560 - PT Therapeutic Exercise Minutes 10 (P)   - -- (P)   -        Exercise 1    Exercise Name 1 doorway pec stretch (P)   - -- (P)   -     Reps 1 3 (P)   - -- (P)   -     Time 1 20 sec (P)   - -- (P)   -      Additional Comments cues for arm low to avoid painful position (P)   - -- (P)   -        Exercise 2    Exercise Name 2 seated thoracic extend with towel (P)   - -- (P)   -     Reps 2 10 (P)   - -- (P)   -     Time 2 5 sec (P)   - -- (P)   -        Exercise 3    Exercise Name 3 standing row (P)   - -- (P)   -     Sets 3 2 (P)   - -- (P)   -     Reps 3 10 (P)   - -- (P)   -     Time 3 RTB (P)   - -- (P)   -     Additional Comments cues for pulling back and down (P)   - -- (P)   -               User Key  (r) = Recorded By, (t) = Taken By, (c) = Cosigned By      Initials Name Provider Type    Maxwell Hess, PT Student PT Student                                  Outcome Measure Options: (P) Quick DASH  Quick DASH  Do heavy household chores (e.g., wash walls, wash floors): (P) Mild Difficulty  Carry a shopping bag or briefcase: (P) No Difficulty  Wash your back: (P) Mild Difficulty  Use a knife to cut food: (P) No Difficulty  Recreational activities in which you take some force or impact through your arm, should or hand (e.g. golf, hammering, tennis, etc.): (P) Mild Difficulty  During the past week, to what extent has your arm, shoulder, or hand problem interfered with your normal social activites with family, friends, neighbors or groups?: (P) Slightly  During the past week, were you limited in your work or other regular daily activities as a result of your arm, shoulder or hand problem?: (P) Slightly Limited  Arm, Shoulder, or hand pain: (P) Mild  Tingling (pins and needles) in your arm, shoulder, or hand: (P) None  During the past week, how much difficulty have you had sleeping because of the pain in your arm, shoulder or hand?: (P) Mild Difficulty  Number of Questions Answered: 11  Quick DASH Score: 13.64         Time Calculation:     Start Time: (P) 0917  Stop Time: (P) 1000  Time Calculation (min): (P) 43 min  Total Timed Code Minutes- PT: (P) 43 minute(s)  Timed  Charges  23303 - PT Therapeutic Exercise Minutes: (P) 10  Untimed Charges  PT Eval/Re-eval Minutes: (P) 33  Total Minutes  Timed Charges Total Minutes: (P) 10  Untimed Charges Total Minutes: (P) 33   Total Minutes: (P) 43     Therapy Charges for Today       Code Description Service Date Service Provider Modifiers Qty    48930526651 HC PT THER PROC EA 15 MIN 11/2/2023 Maxwell Cr, PT Student GP 1    [id number]  PT EVAL LOW COMPLEXITY 2 11/2/2023 Maxwell Cr, PT Student GP 1            PT G-Codes  Outcome Measure Options: (P) Quick DASH  Quick DASH Score: 13.64         Maxwell Cr, PT Student  11/2/2023

## 2023-11-08 ENCOUNTER — HOSPITAL ENCOUNTER (OUTPATIENT)
Dept: PHYSICAL THERAPY | Facility: HOSPITAL | Age: 53
Setting detail: THERAPIES SERIES
Discharge: HOME | End: 2023-11-08
Payer: COMMERCIAL

## 2023-11-08 DIAGNOSIS — R29.3 POOR POSTURE: ICD-10-CM

## 2023-11-08 DIAGNOSIS — M25.512 LEFT SHOULDER PAIN, UNSPECIFIED CHRONICITY: Primary | ICD-10-CM

## 2023-11-08 DIAGNOSIS — M62.81 DECREASED MUSCLE STRENGTH: ICD-10-CM

## 2023-11-08 PROCEDURE — 97110 THERAPEUTIC EXERCISES: CPT

## 2023-11-09 NOTE — THERAPY TREATMENT NOTE
Outpatient Physical Therapy Ortho Treatment Note  [facility]     Patient Name: Savannah Valenzuela  : 1970  MRN: [id number]  Today's Date: 2023      Visit Date: 2023    Visit Dx:    ICD-10-CM ICD-9-CM   1. Left shoulder pain, unspecified chronicity  M25.512 719.41   2. Poor posture  R29.3 781.92   3. Decreased muscle strength  M62.81 728.87       Patient Active Problem List   Diagnosis    Migraine    Allergic rhinitis    Depression    Obesity, Class II, BMI 35-39.9    Chronic knee pain    S/P laparoscopic sleeve gastrectomy    Iron deficiency anemia        Past Medical History:   Diagnosis Date    Allergic rhinitis     Anemia     Chronic fatigue 3/27/2020    Depression     Encounter for screening for malignant neoplasm of colon 2021    Added automatically from request for surgery [id number]    Fatigue     GERD (gastroesophageal reflux disease)     Hematuria, microscopic 2016    Hiatal hernia     Hyperlipidemia     Hypertension     Low back strain     Fell on my back on aluminum bleachers    Medication management     Migraine     Multiple food allergies     Physical exam, annual         Past Surgical History:   Procedure Laterality Date     SECTION      COLONOSCOPY N/A 2021    Procedure: COLONOSCOPY INTO CECUM AND T.I. WITH COLD BIOPSY POLYPECTOMIES;  Surgeon: Ursula Torres MD;  Location: [facility] ENDOSCOPY;  Service: Gastroenterology;  Laterality: N/A;  PRE- SCREENING  POST- DIVERTICULOSIS, POLYPS, HEMORRHOIDS    DILATATION AND CURETTAGE      ENDOSCOPY N/A 2020    Procedure: ESOPHAGOGASTRODUODENOSCOPY WITH BIOPSY;  Surgeon: Cayetano Khan Jr., MD;  Location: [facility] ENDOSCOPY;  Service: General;  Laterality: N/A;  PREOP/ DYSPEPSIA  POSTOP/ GASTRITIS, ESOPHAGITIS, HIATAL HERNIA    GASTRIC SLEEVE LAPAROSCOPIC N/A 10/5/2020    Procedure: GASTRIC SLEEVE LAPAROSCOPIC WITH paraesophageal hernia repair, lysis of adhesions;  Surgeon: Cayetano Khan Jr., MD;   Location: [facility] [place] [facility] [place];  Service: Bariatric;  Laterality: N/A;    WISDOM TOOTH EXTRACTION                          PT Assessment/Plan       Row Name 11/08/23 1600          PT Assessment    Assessment Comments Pt returns for initial follow up after evaluation reporting continued reduction in symptoms as well as mild improvements in end ROM. Added several scapular strengthening exercises with cues for form throughout including shoulder ext, wall walk, B shoulder flex, supine ex over noodle. Minimal provocation of symptoms and good response overall to intervention.  -CN        PT Plan    PT Plan Comments Assess response to added exercises. Likely update HEP, continue with progressive scapular strengthening with empahsis on form and posture.  -CN               User Key  (r) = Recorded By, (t) = Taken By, (c) = Cosigned By      Initials Name Provider Type    Angelic Washington, PT Physical Therapist                       OP Exercises       Row Name 11/08/23 1500             Subjective    Subjective Comments I am feeling good, I am moving a little better.  -CN         Subjective Pain    Able to rate subjective pain? yes  -CN      Pre-Treatment Pain Level 0  -CN         Total Minutes    28141 - PT Therapeutic Exercise Minutes 40  -CN         Exercise 4    Exercise Name 4 UBE  -CN      Cueing 4 Verbal;Demo  -CN      Time 4 4 min  -CN         Exercise 5    Exercise Name 5 Supine over noodle: HA, ER (50% per pain), X  -CN      Cueing 5 Verbal;Demo  -CN      Reps 5 20 ea  -CN      Time 5 RTB  -CN         Exercise 6    Exercise Name 6 Prone over green ball: Y/T  -CN      Cueing 6 Verbal;Demo  -CN      Reps 6 10 ea  -CN      Additional Comments cues for chin tuck, small ROM, middle/low trap activation  -CN         Exercise 7    Exercise Name 7 Qped alt shoulder flex  -CN      Cueing 7 Verbal;Demo  -CN      Reps 7 10 B  -CN         Exercise 8    Exercise Name 8 SL arcs  -CN      Cueing 8 Verbal;Demo  -CN      Reps 8 10 B   -CN         Exercise 9    Exercise Name 9 Rows/ext  -CN      Cueing 9 Verbal;Demo  -CN      Sets 9 2  -CN      Reps 9 10  -CN      Time 9 RTB  -CN         Exercise 10    Exercise Name 10 B shoulder flex with loop  -CN      Cueing 10 Verbal;Demo  -CN      Sets 10 2  -CN      Reps 10 10  -CN      Time 10 RTB  -CN         Exercise 11    Exercise Name 11 Wall walk with RTB  -CN      Cueing 11 Verbal;Demo  -CN      Reps 11 2 laps  -CN      Additional Comments cues for scap engagement  -CN                User Key  (r) = Recorded By, (t) = Taken By, (c) = Cosigned By      Initials Name Provider Type    Angelic Washington, PT Physical Therapist                                  PT OP Goals       Row Name 11/08/23 1500          PT Short Term Goals    STG Date to Achieve 12/02/23  -CN     STG 1 Pt will report pain rated 2/10 at worst in order to demonstrate ability to return to normalized ADLs and functional activities.  -CN     STG 1 Progress Ongoing  -CN     STG 2 Pt will be independent with initial HEP for symptom management.  -CN     STG 2 Progress Progressing  -CN     STG 2 Progress Comments Compliant with initial HEP, likely update next visit.  -CN        Long Term Goals    LTG Date to Achieve 01/01/24  -CN     LTG 1 Pt will be independent and compliant with advanced HEP for long term management of symptoms and prevention of future occurrence.  -CN     LTG 1 Progress Ongoing  -CN     LTG 2 Pt will be able to achieve abduction of 0-150 degrees FIR to T7 w/o pain in order to improve functional tasks and ADL performance.  -CN     LTG 2 Progress Ongoing  -CN     LTG 3 Pt will report 50% improvement with sleep quality in order to improve QOL  -CN     LTG 3 Progress Ongoing  -CN     LTG 4 Pt will report pain free full return to exercise with  to demonstrate improvement in symptoms and quality of life  -CN     LTG 4 Progress Ongoing  -CN               User Key  (r) = Recorded By, (t) = Taken By, (c) =  Cosigned By      Initials Name Provider Type    Angelic Washington, PT Physical Therapist                    Therapy Education  Given: HEP, Symptoms/condition management  Program: Reinforced, Progressed  How Provided: Verbal, Demonstration  Provided to: Patient  Level of Understanding: Teach back education performed, Verbalized              Time Calculation:   Start Time: 1526  Stop Time: 1606  Time Calculation (min): 40 min  Timed Charges  91470 - PT Therapeutic Exercise Minutes: 40  Total Minutes  Timed Charges Total Minutes: 40   Total Minutes: 40  Therapy Charges for Today       Code Description Service Date Service Provider Modifiers Qty    87611101400 HC PT THER PROC EA 15 MIN 11/8/2023 Angelic Arnold, PT GP 3                      Angelic Arnold PT  11/8/2023

## 2023-11-13 ENCOUNTER — OFFICE VISIT (OUTPATIENT)
Dept: INTERNAL MEDICINE | Facility: CLINIC | Age: 53
End: 2023-11-13
Payer: COMMERCIAL

## 2023-11-13 VITALS
RESPIRATION RATE: 16 BRPM | DIASTOLIC BLOOD PRESSURE: 68 MMHG | SYSTOLIC BLOOD PRESSURE: 102 MMHG | HEIGHT: 64 IN | HEART RATE: 63 BPM | OXYGEN SATURATION: 99 % | WEIGHT: 203 LBS | TEMPERATURE: 97.9 F | BODY MASS INDEX: 34.66 KG/M2

## 2023-11-13 DIAGNOSIS — Z00.00 ENCOUNTER FOR PREVENTIVE HEALTH EXAMINATION: Primary | ICD-10-CM

## 2023-11-13 DIAGNOSIS — R89.9 ABNORMAL LABORATORY TEST: ICD-10-CM

## 2023-11-13 DIAGNOSIS — Z23 NEED FOR VACCINATION: ICD-10-CM

## 2023-11-13 PROBLEM — G89.29 CHRONIC KNEE PAIN: Status: RESOLVED | Noted: 2020-04-20 | Resolved: 2023-11-13

## 2023-11-13 PROBLEM — M25.569 CHRONIC KNEE PAIN: Status: RESOLVED | Noted: 2020-04-20 | Resolved: 2023-11-13

## 2023-11-13 RX ORDER — TOPIRAMATE 50 MG/1
50 TABLET, FILM COATED ORAL DAILY
COMMUNITY
End: 2023-11-17 | Stop reason: SDUPTHER

## 2023-11-13 NOTE — PROGRESS NOTES
Subjective   Savannah Valenzuela is a 53 y.o. female.     Chief Complaint   Patient presents with    Annual Exam         History of Present Illness  In for annual preventative exam.  Sleeps about 7 hours per night.  Exercises about 3 and half days per week.  Energy is OK.  Diet is poor.       The following portions of the patient's history were reviewed and updated as appropriate: allergies, current medications, past social history and problem list.    HISTORY  Outpatient Medications Marked as Taking for the 11/13/23 encounter (Office Visit) with Mathew Lemos MD   Medication Sig Dispense Refill    buPROPion XL (WELLBUTRIN XL) 150 MG 24 hr tablet Take 1 tablet by mouth Daily. 90 tablet 1    Calcium 250 MG capsule Take 500 mg by mouth 3 (Three) Times a Day.      FLUoxetine (PROzac) 40 MG capsule Take 1 capsule by mouth Daily. 90 capsule 1    multivitamin with minerals tablet tablet Take 1 tablet by mouth Daily.      Progesterone (Prometrium) 200 MG capsule Take 1 capsule by mouth Daily in the evening 90 capsule 4    topiramate (TOPAMAX) 50 MG tablet Take 1 tablet by mouth Daily.       Social History     Socioeconomic History    Marital status:    Tobacco Use    Smoking status: Never     Passive exposure: Past    Smokeless tobacco: Never   Vaping Use    Vaping Use: Never used   Substance and Sexual Activity    Alcohol use: No     Comment: Kay: rare    Drug use: No    Sexual activity: Not Currently     Partners: Male     Family History   Problem Relation Age of Onset    Heart disease Father     Hyperlipidemia Father     Malig Hyperthermia Neg Hx      Past Medical History:   Diagnosis Date    Allergic rhinitis     Anemia     Chronic fatigue 3/27/2020    Depression     Encounter for screening for malignant neoplasm of colon 5/19/2021    Added automatically from request for surgery [id number]    Fatigue     GERD (gastroesophageal reflux disease)     Hematuria, microscopic 8/24/2016    Hiatal hernia      Hyperlipidemia     Hypertension     Low back strain     Fell on my back on aluminum bleachers    Medication management     Migraine     Multiple food allergies     Physical exam, annual      Past Surgical History:   Procedure Laterality Date     SECTION      COLONOSCOPY N/A 2021    Procedure: COLONOSCOPY INTO CECUM AND T.I. WITH COLD BIOPSY POLYPECTOMIES;  Surgeon: Ursula Torres MD;  Location: [facility] ENDOSCOPY;  Service: Gastroenterology;  Laterality: N/A;  PRE- SCREENING  POST- DIVERTICULOSIS, POLYPS, HEMORRHOIDS    DILATATION AND CURETTAGE      ENDOSCOPY N/A 2020    Procedure: ESOPHAGOGASTRODUODENOSCOPY WITH BIOPSY;  Surgeon: Cayetano Khan Jr., MD;  Location: [facility] ENDOSCOPY;  Service: General;  Laterality: N/A;  PREOP/ DYSPEPSIA  POSTOP/ GASTRITIS, ESOPHAGITIS, HIATAL HERNIA    GASTRIC SLEEVE LAPAROSCOPIC N/A 10/5/2020    Procedure: GASTRIC SLEEVE LAPAROSCOPIC WITH paraesophageal hernia repair, lysis of adhesions;  Surgeon: Cayetano Khan Jr., MD;  Location: [facility] OR [facility];  Service: Bariatric;  Laterality: N/A;    WISDOM TOOTH EXTRACTION         Review of Systems   Constitutional:  Negative for appetite change, chills, diaphoresis, fatigue, fever and unexpected weight change.   Respiratory:  Negative for cough, chest tightness, shortness of breath and wheezing.    Cardiovascular:  Negative for chest pain, palpitations and leg swelling.   Gastrointestinal:  Negative for abdominal pain, anal bleeding, blood in stool, constipation, diarrhea, nausea, rectal pain and vomiting.   Endocrine: Negative for cold intolerance, heat intolerance and polyuria.   Genitourinary:  Negative for difficulty urinating, dysuria, flank pain, frequency, hematuria and urgency.   Musculoskeletal:  Negative for arthralgias, back pain and myalgias.   Allergic/Immunologic: Positive for environmental allergies.   Neurological:  Positive for headaches (Migraines controlled). Negative for dizziness,  syncope, speech difficulty, weakness and light-headedness.   Hematological:  Does not bruise/bleed easily.   Psychiatric/Behavioral:  Positive for dysphoric mood. Negative for agitation, confusion and sleep disturbance. The patient is not nervous/anxious.        Objective   Vitals:    11/13/23 0922   BP: 102/68   Pulse: 63   Resp: 16   Temp: 97.9 °F (36.6 °C)   SpO2: 99%          11/13/23 0922   Weight: 92.1 kg (203 lb)    [unfilled]  Body mass index is 34.84 kg/m².      Physical Exam   Constitutional: She is oriented to person, place, and time. She appears well-developed.   HENT:   Head: Normocephalic and atraumatic.   Right Ear: Tympanic membrane and external ear normal.   Left Ear: Tympanic membrane and external ear normal.   Nose: Nose normal.   Eyes: Pupils are equal, round, and reactive to light. Conjunctivae are normal.   Neck: No JVD present. No thyromegaly present.   Cardiovascular: Normal rate, regular rhythm and normal heart sounds. Exam reveals no gallop.   No murmur heard.  Pulmonary/Chest: Effort normal and breath sounds normal. No respiratory distress. She has no wheezes. She has no rales.   Abdominal: Soft. Normal appearance and bowel sounds are normal. She exhibits no distension and no mass. There is no abdominal tenderness. There is no guarding. No hernia.   Musculoskeletal: Normal range of motion.   Lymphadenopathy:     She has no cervical adenopathy.   Neurological: She is alert and oriented to person, place, and time. She displays normal reflexes. No cranial nerve deficit. Coordination normal.   Skin: Skin is warm and dry.   Psychiatric: Her behavior is normal. Mood, judgment and thought content normal.   Nursing note and vitals reviewed.        Problems Addressed this Visit    None  Visit Diagnoses       Encounter for preventive health examination    -  Primary          Diagnoses         Codes Comments    Encounter for preventive health examination    -  Primary ICD-10-CM:  Z00.00  ICD-9-CM: V70.0           Assessment & Plan   In today for annual preventive exam and follow-up of depression today November 2023.  Blood pressure is excellent.  Depression is doing very well.  Requires an iron infusion occasionally related to gastric bypass surgery.  Lab work including CBC, CMP, iron, ferritin, folic acid all done June 2023.  Blood sugars have been modestly elevated over the years.  Also alkaline phosphatase.  We will check an A1c, lipids and 5 prime nucleotidase today.  Advised patient she needs to be fasting in the future.  We will check an A1c.  Recheck annually.  TD AP today.    Prevention counseling was performed today. The counseling performed was routine health maintenance topics including BMI and exercise.    The above information was reviewed again today 11/13/23.  It continues to be accurate as reflected above and is unchanged.  History, physical and review of systems all reviewed and are unchanged.  Medications were reviewed today and continue the current dosing.           Daphne disclaimer:   Much of this encounter note is an electronic transcription/translation of spoken language to printed text. The electronic translation of spoken language may permit erroneous, or at times, nonsensical words or phrases to be inadvertently transcribed; Although I have reviewed the note for such errors, some may still exist.

## 2023-11-14 ENCOUNTER — LAB (OUTPATIENT)
Dept: LAB | Facility: HOSPITAL | Age: 53
End: 2023-11-14
Payer: COMMERCIAL

## 2023-11-14 DIAGNOSIS — R89.9 ABNORMAL LABORATORY TEST: ICD-10-CM

## 2023-11-14 LAB
CHOLEST SERPL-MCNC: 229 MG/DL (ref 0–200)
GLUCOSE SERPL-MCNC: 104 MG/DL (ref 65–99)
HBA1C MFR BLD: 5.3 % (ref 4.8–5.6)
HDLC SERPL QL: 4.32
HDLC SERPL-MCNC: 53 MG/DL (ref 40–60)
LDLC SERPL CALC-MCNC: 155 MG/DL (ref 0–100)
TRIGL SERPL-MCNC: 120 MG/DL (ref 0–150)
VLDLC SERPL-MCNC: 21 MG/DL (ref 5–40)

## 2023-11-14 PROCEDURE — 36415 COLL VENOUS BLD VENIPUNCTURE: CPT | Performed by: INTERNAL MEDICINE

## 2023-11-14 PROCEDURE — 80061 LIPID PANEL: CPT | Performed by: INTERNAL MEDICINE

## 2023-11-14 PROCEDURE — 83915 ASSAY OF NUCLEOTIDASE: CPT

## 2023-11-14 PROCEDURE — 83036 HEMOGLOBIN GLYCOSYLATED A1C: CPT | Performed by: INTERNAL MEDICINE

## 2023-11-14 PROCEDURE — 82947 ASSAY GLUCOSE BLOOD QUANT: CPT | Performed by: INTERNAL MEDICINE

## 2023-11-15 ENCOUNTER — HOSPITAL ENCOUNTER (OUTPATIENT)
Dept: PHYSICAL THERAPY | Facility: HOSPITAL | Age: 53
Setting detail: THERAPIES SERIES
Discharge: HOME | End: 2023-11-15
Payer: COMMERCIAL

## 2023-11-15 DIAGNOSIS — M62.81 DECREASED MUSCLE STRENGTH: ICD-10-CM

## 2023-11-15 DIAGNOSIS — R29.3 POOR POSTURE: ICD-10-CM

## 2023-11-15 DIAGNOSIS — M25.512 LEFT SHOULDER PAIN, UNSPECIFIED CHRONICITY: Primary | ICD-10-CM

## 2023-11-15 PROCEDURE — 97110 THERAPEUTIC EXERCISES: CPT | Performed by: PHYSICAL THERAPIST

## 2023-11-15 NOTE — THERAPY TREATMENT NOTE
Outpatient Physical Therapy Ortho Treatment Note  [facility]     Patient Name: Savannah Valenzuela  : 1970  MRN: [id number]  Today's Date: 11/15/2023      Visit Date: 11/15/2023    Visit Dx:    ICD-10-CM ICD-9-CM   1. Left shoulder pain, unspecified chronicity  M25.512 719.41   2. Poor posture  R29.3 781.92   3. Decreased muscle strength  M62.81 728.87       Patient Active Problem List   Diagnosis    Migraine    Allergic rhinitis    Depression    Obesity, Class II, BMI 35-39.9    S/P laparoscopic sleeve gastrectomy    Iron deficiency anemia        Past Medical History:   Diagnosis Date    Allergic rhinitis     Anemia     Chronic fatigue 3/27/2020    Depression     Encounter for screening for malignant neoplasm of colon 2021    Added automatically from request for surgery [id number]    Fatigue     GERD (gastroesophageal reflux disease)     Hematuria, microscopic 2016    Hiatal hernia     Hyperlipidemia     Hypertension     Low back strain     Fell on my back on aluminum bleachers    Medication management     Migraine     Multiple food allergies     Physical exam, annual         Past Surgical History:   Procedure Laterality Date     SECTION      COLONOSCOPY N/A 2021    Procedure: COLONOSCOPY INTO CECUM AND T.I. WITH COLD BIOPSY POLYPECTOMIES;  Surgeon: Ursula Torres MD;  Location: [facility] ENDOSCOPY;  Service: Gastroenterology;  Laterality: N/A;  PRE- SCREENING  POST- DIVERTICULOSIS, POLYPS, HEMORRHOIDS    DILATATION AND CURETTAGE      ENDOSCOPY N/A 2020    Procedure: ESOPHAGOGASTRODUODENOSCOPY WITH BIOPSY;  Surgeon: Cayetano Khan Jr., MD;  Location: [facility] ENDOSCOPY;  Service: General;  Laterality: N/A;  PREOP/ DYSPEPSIA  POSTOP/ GASTRITIS, ESOPHAGITIS, HIATAL HERNIA    GASTRIC SLEEVE LAPAROSCOPIC N/A 10/5/2020    Procedure: GASTRIC SLEEVE LAPAROSCOPIC WITH paraesophageal hernia repair, lysis of adhesions;  Surgeon: Cayetano Khan Jr., MD;  Location: [facility] OR  [facility];  Service: Bariatric;  Laterality: N/A;    WISDOM TOOTH EXTRACTION          PT Ortho       Row Name 11/15/23 1000       General ROM    LT Upper Ext Lt Shoulder Internal Rotation  -GJ       Left Upper Ext    Lt Shoulder Abduction AROM 150 seated  -GJ    Lt Shoulder Extension AROM 155 seated, small grimmace  -GJ    Lt Shoulder Internal Rotation AROM midline T 10  -GJ              User Key  (r) = Recorded By, (t) = Taken By, (c) = Cosigned By      Initials Name Provider Type    Camilo Ponce, PT Physical Therapist                                 PT Assessment/Plan       Row Name 11/15/23 1249          PT Assessment    Assessment Comments Ms. Valenzuela returns to the clinic reporting continued improvement in her L shoudler pain/function.  We progressed her scapular girdle/RC strengthening activities today, updating HEP accourdingly. Strengthening to be once every other day, stretches daily.  Introduced FIR mob at [facility], which pt reported improved ROM with decreased effort following manual techniques. She demonstrates improved AROM of the L shoulder. See ortho section. Ms. Valenzuela continues to be a good candidate for skilled physical therapy.  -GJ        PT Plan    PT Plan Comments Assess response to FIR mobs and progressed strengthening. May consider moving HA to standing, and add D2 flexion in standing.  -GJ               User Key  (r) = Recorded By, (t) = Taken By, (c) = Cosigned By      Initials Name Provider Type    Camilo Ponce, PT Physical Therapist                       OP Exercises       Row Name 11/15/23 0959 11/15/23 0900          Subjective    Subjective Comments -- I'm doing better, sleeping better.  -GJ        Total Minutes    19374 - PT Therapeutic Exercise Minutes 40  -GJ --        Exercise 1    Exercise Name 1 -- doorway pec stretch  -GJ     Reps 1 -- 3  -GJ     Time 1 -- 20 sec  -GJ        Exercise 4    Exercise Name 4 -- UBE  -GJ     Time 4 -- 4 min  -GJ        Exercise 5    Exercise Name 5  -- Supine over noodle: HA, ER (50% per pain), X  -GJ     Cueing 5 -- Verbal;Demo  -GJ     Reps 5 -- 20 ea  -GJ     Time 5 -- RTB  -GJ        Exercise 7    Exercise Name 7 -- Qped alt shoulder flex  -GJ     Cueing 7 -- Verbal;Demo  -GJ     Reps 7 -- 10 B  -GJ     Time 7 -- alternating  -GJ        Exercise 9    Exercise Name 9 -- Rows/ext  -GJ     Cueing 9 -- Verbal;Demo  -GJ     Sets 9 -- 2  -GJ     Reps 9 -- 10  -GJ     Time 9 -- GTB  -GJ        Exercise 10    Exercise Name 10 -- B shoulder flex with loop  -GJ     Cueing 10 -- Verbal;Demo  -GJ     Sets 10 -- 2  -GJ     Reps 10 -- 10  -GJ     Time 10 -- RTB  -GJ     Additional Comments -- in standing, mirror for cues  -GJ        Exercise 11    Exercise Name 11 -- Wall walk  -GJ     Cueing 11 -- Verbal;Demo  -GJ     Reps 11 -- 3 laps  -GJ     Time 11 -- RTB  -GJ        Exercise 12    Exercise Name 12 -- FIR stretch with towel  -GJ     Cueing 12 -- Verbal;Demo  -GJ     Reps 12 -- 10  -GJ     Time 12 -- 10s, with towel  -GJ        Exercise 13    Exercise Name 13 -- SL ER  -GJ     Cueing 13 -- Verbal;Demo  -GJ     Sets 13 -- 2  -GJ     Reps 13 -- 10  -GJ     Time 13 -- 1#  -GJ     Additional Comments -- towel under humerus  -GJ               User Key  (r) = Recorded By, (t) = Taken By, (c) = Cosigned By      Initials Name Provider Type    Camilo Ponce, PT Physical Therapist                             Manual Rx (last 36 hours)       Manual Treatments       Row Name 11/15/23 0900             Manual Rx 1    Manual Rx 1 Location FIR mobs, L GHJ at door frame, pt holding frame with L hand, performes c spine ext, R roation, R lateral flexion, therapist providing posterior rotatoin of L scapula  -GJ                User Key  (r) = Recorded By, (t) = Taken By, (c) = Cosigned By      Initials Name Provider Type    Camilo Ponce, PT Physical Therapist                     PT OP Goals       Row Name 11/15/23 0900          PT Short Term Goals    STG Date to Achieve  12/02/23  -GJ     STG 1 Pt will report pain rated 2/10 at worst in order to demonstrate ability to return to normalized ADLs and functional activities.  -GJ     STG 1 Progress Ongoing  -GJ     STG 2 Pt will be independent with initial HEP for symptom management.  -GJ     STG 2 Progress Met  -GJ        Long Term Goals    LTG Date to Achieve 01/01/24  -GJ     LTG 1 Pt will be independent and compliant with advanced HEP for long term management of symptoms and prevention of future occurrence.  -GJ     LTG 1 Progress Ongoing  -GJ     LTG 2 Pt will be able to achieve abduction of 0-150 degrees FIR to T7 w/o pain in order to improve functional tasks and ADL performance.  -GJ     LTG 2 Progress Ongoing;Partially Met  -GJ     LTG 2 Progress Comments see ortho, small grimace at 155  -GJ     LTG 3 Pt will report 50% improvement with sleep quality in order to improve QOL  -GJ     LTG 3 Progress Ongoing  -GJ     LTG 4 Pt will report pain free full return to exercise with  to demonstrate improvement in symptoms and quality of life  -     LTG 4 Progress Ongoing  -               User Key  (r) = Recorded By, (t) = Taken By, (c) = Cosigned By      Initials Name Provider Type    Camilo Ponce, PT Physical Therapist                    Therapy Education  Education Details: [id number], updated HEP,  reviewed activity modifications, strengthening to be once every other day, ROM/stretches daily  Given: HEP, Symptoms/condition management, Pain management, Posture/body mechanics, Mobility training  Program: Reinforced, New, Progressed  How Provided: Verbal, Demonstration, Written  Provided to: Patient  Level of Understanding: Teach back education performed, Verbalized, Demonstrated              Time Calculation:   Start Time: 1000  Stop Time: 1045  Time Calculation (min): 45 min  Timed Charges  82411 - PT Therapeutic Exercise Minutes: 40  Total Minutes  Timed Charges Total Minutes: 40   Total Minutes: 40  Therapy  Charges for Today       Code Description Service Date Service Provider Modifiers Qty    42993379710 HC PT THER PROC EA 15 MIN 11/15/2023 Camilo Milner, PT GP 3                      Camilo Milner, PT  11/15/2023

## 2023-11-17 ENCOUNTER — OFFICE VISIT (OUTPATIENT)
Dept: BARIATRICS/WEIGHT MGMT | Facility: CLINIC | Age: 53
End: 2023-11-17
Payer: COMMERCIAL

## 2023-11-17 VITALS
BODY MASS INDEX: 32.99 KG/M2 | DIASTOLIC BLOOD PRESSURE: 81 MMHG | WEIGHT: 198 LBS | SYSTOLIC BLOOD PRESSURE: 139 MMHG | HEART RATE: 56 BPM | TEMPERATURE: 98.3 F | HEIGHT: 65 IN

## 2023-11-17 DIAGNOSIS — F32.A DEPRESSION, UNSPECIFIED DEPRESSION TYPE: Chronic | ICD-10-CM

## 2023-11-17 DIAGNOSIS — Z98.84 S/P LAPAROSCOPIC SLEEVE GASTRECTOMY: ICD-10-CM

## 2023-11-17 DIAGNOSIS — E66.9 OBESITY, CLASS II, BMI 35-39.9: Primary | ICD-10-CM

## 2023-11-17 PROBLEM — M88.9 PAGET'S DISEASE OF BONE: Status: ACTIVE | Noted: 2023-11-17

## 2023-11-17 LAB — 5NT SERPL-CCNC: 2 IU/L (ref 0–11)

## 2023-11-17 PROCEDURE — 99213 OFFICE O/P EST LOW 20 MIN: CPT | Performed by: NURSE PRACTITIONER

## 2023-11-17 RX ORDER — TOPIRAMATE 50 MG/1
50 TABLET, FILM COATED ORAL DAILY
Qty: 90 TABLET | Refills: 1 | Status: SHIPPED | OUTPATIENT
Start: 2023-11-17 | End: 2024-05-15

## 2023-11-17 NOTE — PROGRESS NOTES
[facility] BARIATRIC SURGERY  [address] [address]  [phone]  [address] [id number] [phone]  Dept: [phone]  11/17/2023      Savannah Valenzuela.  [id number]  [id number]  1970  female      Chief Complaint   Patient presents with    Follow-up     Gs sleeve follow up        Post-Op Bariatric Surgery:   Savannah Valenzuela is status post Laparoscopic Sleeve/PEH procedure, performed on 10/5/2020     HPI:       Today's weight is 89.8 kg (198 lb) pounds, today's BMI is Body mass index is 33.39 kg/m²., she has a loss of 14 pounds since the last visit and her weight loss since surgery is 41 pounds. The patient reports a decreased portion size and loss of appetite.    Savannah Valenzuela denies nausea, vomiting, reflux and reports that topiramate has really helped with sugar cravings. She has started pellet therapy.      Diet and Exercise: Diet history reviewed and discussed with the patient. Weight loss/gains to date discussed with the patient. The patient states they are eating 60 grams of protein per day. She reports eating 3 meals per day, a typical portion size of 1/2 cup, eating 1 snacks per day, drinking 5-6 or more 8-oz. glasses of water per day, no carbonated beverage consumption and exercising regularly- cardio and strength 3 days per week    Supplements: celebrate [employer] with iron and calcium.     Review of Systems   Constitutional:  Positive for appetite change. Negative for fatigue and unexpected weight change.   HENT: Negative.     Eyes: Negative.    Respiratory: Negative.     Cardiovascular: Negative.  Negative for leg swelling.   Gastrointestinal:  Negative for abdominal distention, abdominal pain, constipation, diarrhea, nausea and vomiting.   Genitourinary:  Negative for difficulty urinating, frequency and urgency.   Musculoskeletal:  Negative for back pain.   Skin: Negative.    Psychiatric/Behavioral:  Negative.     All other systems reviewed and are negative.      Patient Active Problem List   Diagnosis    Migraine    Allergic rhinitis    Depression    Obesity, Class II, BMI 35-39.9    S/P laparoscopic sleeve gastrectomy    Iron deficiency anemia       Past Medical History:   Diagnosis Date    Allergic rhinitis     Anemia     Chronic fatigue 3/27/2020    Depression     Encounter for screening for malignant neoplasm of colon 5/19/2021    Added automatically from request for surgery [id number]    Fatigue     GERD (gastroesophageal reflux disease)     Hematuria, microscopic 8/24/2016    Hiatal hernia     Hyperlipidemia     Hypertension     Low back strain 1989    Fell on my back on aluminum bleachers    Medication management     Migraine     Multiple food allergies     Physical exam, annual        The following portions of the patient's history were reviewed and updated as appropriate: allergies, current medications, past family history, past medical history, past social history, past surgical history, and problem list.    Vitals:    11/17/23 1213   BP: 139/81   Pulse: 56   Temp: 98.3 °F (36.8 °C)       Physical Exam  Vitals and nursing note reviewed.   Constitutional:       Appearance: She is well-developed.   Neck:      Thyroid: No thyromegaly.   Cardiovascular:      Rate and Rhythm: Normal rate.   Pulmonary:      Effort: Pulmonary effort is normal. No respiratory distress.   Abdominal:      Palpations: Abdomen is soft.   Musculoskeletal:         General: No tenderness.   Skin:     General: Skin is warm and dry.   Neurological:      Mental Status: She is alert.   Psychiatric:         Behavior: Behavior normal.         Assessment:   Post-op, the patient is doing well.     Encounter Diagnoses   Name Primary?    Obesity, Class II, BMI 35-39.9 Yes    S/P laparoscopic sleeve gastrectomy     Depression, unspecified depression type        Plan:   Encouraged patient to be sure to get plenty of lean protein per day through  small frequent meals all with a protein source.   Activity restrictions: none.   Recommended patient be sure to get at least 70 grams of protein per day by eating small, frequent meals all with high lean protein choices. Be sure to limit/cut back on daily carbohydrate intake. Discussed with the patient the recommended amount of water per day to intake- half of body weight in ounces. Reviewed vitamin requirements. Be sure to do routine exercise, 150 minutes per week minimum, including both cardio and strength training.     Instructions / Recommendations: dietary counseling recommended, recommended a daily protein intake of  grams, vitamin supplement(s) recommended, recommended exercising at least 150 minutes per week, behavior modifications recommended and instructed to call the office for concerns, questions, or problems.     The patient was instructed to follow up in 3 months .      Total time spent during this encounter today 25 minutes

## 2023-11-22 ENCOUNTER — HOSPITAL ENCOUNTER (OUTPATIENT)
Dept: PHYSICAL THERAPY | Facility: HOSPITAL | Age: 53
Setting detail: THERAPIES SERIES
Discharge: HOME | End: 2023-11-22
Payer: COMMERCIAL

## 2023-11-22 DIAGNOSIS — M25.512 LEFT SHOULDER PAIN, UNSPECIFIED CHRONICITY: Primary | ICD-10-CM

## 2023-11-22 DIAGNOSIS — R29.3 POOR POSTURE: ICD-10-CM

## 2023-11-22 DIAGNOSIS — M62.81 DECREASED MUSCLE STRENGTH: ICD-10-CM

## 2023-11-22 PROCEDURE — 97140 MANUAL THERAPY 1/> REGIONS: CPT | Performed by: PHYSICAL THERAPIST

## 2023-11-22 PROCEDURE — 97110 THERAPEUTIC EXERCISES: CPT | Performed by: PHYSICAL THERAPIST

## 2023-11-22 NOTE — THERAPY TREATMENT NOTE
Outpatient Physical Therapy Ortho Treatment Note  [facility]     Patient Name: Savannah Valenzuela  : 1970  MRN: [id number]  Today's Date: 2023      Visit Date: 2023    Visit Dx:    ICD-10-CM ICD-9-CM   1. Left shoulder pain, unspecified chronicity  M25.512 719.41   2. Poor posture  R29.3 781.92   3. Decreased muscle strength  M62.81 728.87       Patient Active Problem List   Diagnosis    Migraine    Allergic rhinitis    Depression    Obesity, Class II, BMI 35-39.9    S/P laparoscopic sleeve gastrectomy    Iron deficiency anemia    Paget's disease of bone        Past Medical History:   Diagnosis Date    Allergic rhinitis     Anemia     Chronic fatigue 3/27/2020    Depression     Encounter for screening for malignant neoplasm of colon 2021    Added automatically from request for surgery [id number]    Fatigue     GERD (gastroesophageal reflux disease)     Hematuria, microscopic 2016    Hiatal hernia     Hyperlipidemia     Hypertension     Low back strain     Fell on my back on aluminum bleachers    Medication management     Migraine     Multiple food allergies     Physical exam, annual         Past Surgical History:   Procedure Laterality Date     SECTION      COLONOSCOPY N/A 2021    Procedure: COLONOSCOPY INTO CECUM AND T.I. WITH COLD BIOPSY POLYPECTOMIES;  Surgeon: Ursula Torres MD;  Location: [facility] ENDOSCOPY;  Service: Gastroenterology;  Laterality: N/A;  PRE- SCREENING  POST- DIVERTICULOSIS, POLYPS, HEMORRHOIDS    DILATATION AND CURETTAGE      ENDOSCOPY N/A 2020    Procedure: ESOPHAGOGASTRODUODENOSCOPY WITH BIOPSY;  Surgeon: Cayetano Khan Jr., MD;  Location: [facility] ENDOSCOPY;  Service: General;  Laterality: N/A;  PREOP/ DYSPEPSIA  POSTOP/ GASTRITIS, ESOPHAGITIS, HIATAL HERNIA    GASTRIC SLEEVE LAPAROSCOPIC N/A 10/5/2020    Procedure: GASTRIC SLEEVE LAPAROSCOPIC WITH paraesophageal hernia repair, lysis of adhesions;  Surgeon: Cayetano Khan  MD Tessa;  [place] [facility] [place] [facility] [place]  Service: Bariatric;  Laterality: N/A;    WISDOM TOOTH EXTRACTION                          PT Assessment/Plan       Row Name 11/22/23 0815          PT Assessment    Assessment Comments Ms. Flowers returns reporting good response to previous sessoin, however has had increased soreness the past 2 days. She demonstrates increased anterior tilting of her L scapula. We discussed postural awareness and encouraged stretching of her pec  minor. We did progress her HA from supine to standing position.  We worked on rhythmic stabillization for her L GHJ to allow for improved centralization of the humerus in the glenoid and stretched her L pec minor tissues. Did not update HEP secondary to holiday weekend. Plan to do so next session. She reports 50% improvement in sleep quality.  Ms. flowers continues to progress toward all remaining functional goals and remains a good candidate for skilled physical therapy.  -GJ        PT Plan    PT Plan Comments update HEP, consider SL flexion/abd  -GJ               User Key  (r) = Recorded By, (t) = Taken By, (c) = Cosigned By      Initials Name Provider Type    GJ Camilo Milner, PT Physical Therapist                       OP Exercises       Row Name 11/22/23 0800 11/22/23 0759          Subjective    Subjective Comments my shoulder has been a little more sore the last few days  -GJ --        Total Minutes    59035 - PT Therapeutic Exercise Minutes -- 32  -GJ     68440 - PT Manual Therapy Minutes -- 10  -GJ        Exercise 1    Exercise Name 1 doorway pec stretch  -GJ --     Reps 1 3  -GJ --     Time 1 20 sec  -GJ --        Exercise 4    Exercise Name 4 UBE  -GJ --     Time 4 4 min  -GJ --        Exercise 5    Exercise Name 5 standing HA  -GJ --     Cueing 5 Verbal;Demo  -GJ --     Sets 5 2  -GJ --     Reps 5 10  -GJ --     Time 5 RTB  -GJ --        Exercise 7    Exercise Name 7 Qped alt shoulder flex  -GJ --     Additional Comments hold indefinetly  -GJ --         Exercise 9    Exercise Name 9 Rows/ext  -GJ --     Cueing 9 Verbal;Demo  -GJ --     Sets 9 2  -GJ --     Reps 9 10  -GJ --     Time 9 GTB  -GJ --        Exercise 10    Exercise Name 10 B shoulder flex with loop  -GJ --     Cueing 10 Verbal;Demo  -GJ --     Sets 10 2  -GJ --     Reps 10 10  -GJ --     Time 10 RTB  -GJ --     Additional Comments in standing, mirror for cues  -GJ --        Exercise 11    Exercise Name 11 Wall walk  -GJ --     Cueing 11 Verbal;Demo  -GJ --     Reps 11 3 laps  -GJ --     Time 11 RTB  -GJ --     Additional Comments laterally  -GJ --        Exercise 12    Exercise Name 12 FIR stretch with towel  -GJ --     Cueing 12 Verbal;Demo  -GJ --     Reps 12 10  -GJ --     Time 12 10s, with towel  -GJ --        Exercise 13    Exercise Name 13 SL ER  -GJ --     Cueing 13 Verbal;Demo  -GJ --     Sets 13 2  -GJ --     Reps 13 10  -GJ --     Time 13 1#  -GJ --     Additional Comments towel under humerus  -GJ --        Exercise 14    Exercise Name 14 standing bilateral ER  -GJ --     Cueing 14 Verbal;Demo  -GJ --     Sets 14 3  -GJ --     Reps 14 10  -GJ --     Time 14 RTB  -GJ --               User Key  (r) = Recorded By, (t) = Taken By, (c) = Cosigned By      Initials Name Provider Type    Camilo Ponce W, PT Physical Therapist                             Manual Rx (last 36 hours)       Manual Treatments       Row Name 11/22/23 0759 11/22/23 0700          Total Minutes    93503 - PT Manual Therapy Minutes 10  -GJ --        Manual Rx 1    Manual Rx 1 Location -- --  -GJ        Manual Rx 2    Manual Rx 2 Location -- RS ER/IR, 110 scaption  -GJ     Manual Rx 2 Type -- EC, 3 x 45 s each  -GJ        Manual Rx 3    Manual Rx 3 Location -- stretch of bialteral  pec minor tissue, pt supin e  -GJ        Manual Rx 4    Manual Rx 4 Location -- PROOM L shoulder, flexion, ER,  -GJ               User Key  (r) = Recorded By, (t) = Taken By, (c) = Cosigned By      Initials Name Provider Type    OMAR Milner  Camilo LOCKHART, PT Physical Therapist                     PT OP Goals       Row Name 11/22/23 0700          PT Short Term Goals    STG Date to Achieve 12/02/23  -GJ     STG 1 Pt will report pain rated 2/10 at worst in order to demonstrate ability to return to normalized ADLs and functional activities.  -GJ     STG 1 Progress Ongoing  -GJ     STG 2 Pt will be independent with initial HEP for symptom management.  -GJ     STG 2 Progress Met  -GJ        Long Term Goals    LTG Date to Achieve 01/01/24  -GJ     LTG 1 Pt will be independent and compliant with advanced HEP for long term management of symptoms and prevention of future occurrence.  -GJ     LTG 1 Progress Ongoing  -GJ     LTG 2 Pt will be able to achieve abduction of 0-150 degrees FIR to T7 w/o pain in order to improve functional tasks and ADL performance.  -GJ     LTG 2 Progress Ongoing;Partially Met  -GJ     LTG 3 Pt will report 50% improvement with sleep quality in order to improve QOL  -GJ     LTG 3 Progress Met  -GJ     LTG 4 Pt will report pain free full return to exercise with  to demonstrate improvement in symptoms and quality of life  -GJ     LTG 4 Progress Ongoing  -GJ               User Key  (r) = Recorded By, (t) = Taken By, (c) = Cosigned By      Initials Name Provider Type    Camilo Ponce, PT Physical Therapist                    Therapy Education  Education Details: continue with strengthenign once every other day, stretching/ROM daily  Given: HEP, Symptoms/condition management, Pain management, Posture/body mechanics, Mobility training  Program: Reinforced  How Provided: Verbal, Demonstration  Provided to: Patient  Level of Understanding: Teach back education performed, Verbalized, Demonstrated              Time Calculation:   Start Time: 0800  Stop Time: 0843  Time Calculation (min): 43 min  Timed Charges  88092 - PT Therapeutic Exercise Minutes: 32  34869 - PT Manual Therapy Minutes: 10  Total Minutes  Timed Charges Total  Minutes: 42   Total Minutes: 42  Therapy Charges for Today       Code Description Service Date Service Provider Modifiers Qty    94163794305  PT THER PROC EA 15 MIN 11/22/2023 Camilo Milner, PT GP 2    [id number]  PT MANUAL THERAPY EA 15 MIN 11/22/2023 Camilo Milner, PT GP 1                      Camilo Milner, PT  11/22/2023

## 2023-11-30 ENCOUNTER — HOSPITAL ENCOUNTER (OUTPATIENT)
Dept: PHYSICAL THERAPY | Facility: HOSPITAL | Age: 53
Setting detail: THERAPIES SERIES
Discharge: HOME | End: 2023-11-30
Payer: COMMERCIAL

## 2023-11-30 DIAGNOSIS — M62.81 DECREASED MUSCLE STRENGTH: ICD-10-CM

## 2023-11-30 DIAGNOSIS — R29.3 POOR POSTURE: ICD-10-CM

## 2023-11-30 DIAGNOSIS — M25.512 LEFT SHOULDER PAIN, UNSPECIFIED CHRONICITY: Primary | ICD-10-CM

## 2023-11-30 PROCEDURE — 97140 MANUAL THERAPY 1/> REGIONS: CPT

## 2023-11-30 PROCEDURE — 97110 THERAPEUTIC EXERCISES: CPT

## 2023-11-30 NOTE — THERAPY TREATMENT NOTE
Outpatient Physical Therapy Ortho Treatment Note  [facility]     Patient Name: Savannah Valenzuela  : 1970  MRN: [id number]  Today's Date: 2023      Visit Date: 2023    Visit Dx:    ICD-10-CM ICD-9-CM   1. Left shoulder pain, unspecified chronicity  M25.512 719.41   2. Poor posture  R29.3 781.92   3. Decreased muscle strength  M62.81 728.87       Patient Active Problem List   Diagnosis    Migraine    Allergic rhinitis    Depression    Obesity, Class II, BMI 35-39.9    S/P laparoscopic sleeve gastrectomy    Iron deficiency anemia    Paget's disease of bone        Past Medical History:   Diagnosis Date    Allergic rhinitis     Anemia     Chronic fatigue 3/27/2020    Depression     Encounter for screening for malignant neoplasm of colon 2021    Added automatically from request for surgery [id number]    Fatigue     GERD (gastroesophageal reflux disease)     Hematuria, microscopic 2016    Hiatal hernia     Hyperlipidemia     Hypertension     Low back strain     Fell on my back on aluminum bleachers    Medication management     Migraine     Multiple food allergies     Physical exam, annual         Past Surgical History:   Procedure Laterality Date     SECTION      COLONOSCOPY N/A 2021    Procedure: COLONOSCOPY INTO CECUM AND T.I. WITH COLD BIOPSY POLYPECTOMIES;  Surgeon: Ursula Torres MD;  Location: [facility] ENDOSCOPY;  Service: Gastroenterology;  Laterality: N/A;  PRE- SCREENING  POST- DIVERTICULOSIS, POLYPS, HEMORRHOIDS    DILATATION AND CURETTAGE      ENDOSCOPY N/A 2020    Procedure: ESOPHAGOGASTRODUODENOSCOPY WITH BIOPSY;  Surgeon: Cayetano Khan Jr., MD;  Location: [facility] ENDOSCOPY;  Service: General;  Laterality: N/A;  PREOP/ DYSPEPSIA  POSTOP/ GASTRITIS, ESOPHAGITIS, HIATAL HERNIA    GASTRIC SLEEVE LAPAROSCOPIC N/A 10/5/2020    Procedure: GASTRIC SLEEVE LAPAROSCOPIC WITH paraesophageal hernia repair, lysis of adhesions;  Surgeon: Cayetano Khan  MD Tessa;  [facility]: [facility] [place];  Service: Bariatric;  Laterality: N/A;    WISDOM TOOTH EXTRACTION                          PT Assessment/Plan       Row Name 11/30/23 1100          PT Assessment    Assessment Comments Pt returns to clinic reporting an exacerbation of familiar symptoms with dressing and reaching for seat belt this morning. Progressed interventions to include SL shoulder flexion, supine D2, and wall circles above 90. Pt educated on importance of maintaining scapular posture throughout exercises. Pt reports pain with SL abduction at top end range that stays during downward phase. SL abduction held at this time. Pt demonstrates guarding and limited motion with any exercise that involves bringing arm above approximately 120 degrees flexion or 100 degrees abduction. Pt tolerates interventions well. Updated HEP to reflect current symptom presentation and interventions. Pt educated on importance of postural management for pain modulation in her condition. Pt verbalizes understanding. Pt reports 50% improvement in sleep quality compared to start of POC. Pt remains a good candidate for skilled PT.  -CN (r) MH (t) CN (c)        PT Plan    PT Plan Comments Assess respone to last session, consider wall star, single arm row, pulleys for AAROM scaption  -CN (r) MH (t) CN (c)               User Key  (r) = Recorded By, (t) = Taken By, (c) = Cosigned By      Initials Name Provider Type    Angelic Washington, PT Physical Therapist    Maxwell Hess, PT Student PT Student                       OP Exercises       Row Name 11/30/23 1100             Subjective    Subjective Comments today has been a rough day, but im feeling ok right now  -CN (r) MH (t) CN (c)         Total Minutes    42660 - PT Therapeutic Exercise Minutes 35  -CN (r) MH (t) CN (c)      67423 - PT Manual Therapy Minutes 8  -CN (r) MH (t) CN (c)         Exercise 4    Exercise Name 4 UBE  -CN (r) MH (t) CN (c)      Time 4 4 min  -CN (r) MH (t)  CN (c)         Exercise 10    Exercise Name 10 B shoulder flex with loop  -CN (r) MH (t) CN (c)      Cueing 10 Verbal;Demo  -CN (r) MH (t) CN (c)      Sets 10 2  -CN (r) MH (t) CN (c)      Reps 10 10  -CN (r) MH (t) CN (c)      Time 10 RTB  -CN (r) MH (t) CN (c)         Exercise 11    Exercise Name 11 Wall walk  -CN (r) MH (t) CN (c)      Cueing 11 Verbal;Demo  -CN (r) MH (t) CN (c)      Reps 11 3 laps  -CN (r) MH (t) CN (c)      Time 11 RTB  -CN (r) MH (t) CN (c)      Additional Comments laterally  -CN (r) MH (t) CN (c)         Exercise 12    Exercise Name 12 FIR stretch with towel  -CN (r) MH (t) CN (c)      Cueing 12 Verbal;Demo  -CN (r) MH (t) CN (c)      Reps 12 10  -CN (r) MH (t) CN (c)      Time 12 10s, with towel  -CN (r) MH (t) CN (c)         Exercise 13    Exercise Name 13 SL ER  -CN (r) MH (t) CN (c)      Cueing 13 Verbal;Demo  -CN (r) MH (t) CN (c)      Sets 13 2  -CN (r) MH (t) CN (c)      Reps 13 10  -CN (r) MH (t) CN (c)      Time 13 1#  -CN (r) MH (t) CN (c)         Exercise 14    Exercise Name 14 standing bilateral ER  -CN (r) MH (t) CN (c)      Cueing 14 Verbal;Demo  -CN (r) MH (t) CN (c)      Sets 14 2  -CN (r) MH (t) CN (c)      Reps 14 10  -CN (r) MH (t) CN (c)      Time 14 RTB  -CN (r) MH (t) CN (c)         Exercise 15    Exercise Name 15 SL flexion  -CN (r) MH (t) CN (c)      Cueing 15 Verbal;Demo  -CN (r) MH (t) CN (c)      Sets 15 2  -CN (r) MH (t) CN (c)      Reps 15 10  -CN (r) MH (t) CN (c)      Time 15 1#  -CN (r) MH (t) CN (c)      Additional Comments SL abd caused significant pain  -CN (r) MH (t) CN (c)         Exercise 16    Exercise Name 16 D2 flexion  -CN (r) MH (t) CN (c)      Cueing 16 Verbal;Demo  -CN (r) MH (t) CN (c)      Sets 16 2  -CN (r) MH (t) CN (c)      Reps 16 10  -CN (r) MH (t) CN (c)      Time 16 pain free range  -CN (r) MH (t) CN (c)      Additional Comments pain with both end ranges of motion  -CN (r) MH (t) CN (c)         Exercise 17    Exercise Name 17 wall [facility]   -CN (r) MH (t) CN (c)      Cueing 17 Verbal;Demo  -CN (r) MH (t) CN (c)      Sets 17 2  -CN (r) MH (t) CN (c)      Reps 17 20  -CN (r) MH (t) CN (c)      Time 17 L2  -CN (r) MH (t) CN (c)         Exercise 18    Exercise Name 18 --  -CN (r) MH (t) CN (c)      Cueing 18 --  -CN (r) MH (t) CN (c)      Sets 18 --  -CN (r) MH (t) CN (c)      Reps 18 --  -CN (r) MH (t) CN (c)      Time 18 --  -CN (r) MH (t) CN (c)                User Key  (r) = Recorded By, (t) = Taken By, (c) = Cosigned By      Initials Name Provider Type    Angelic Washington, PT Physical Therapist    Maxwell Hess, PT Student PT Student                             Manual Rx (last 36 hours)       Manual Treatments       Row Name 11/30/23 1100             Total Minutes    13486 - PT Manual Therapy Minutes 8  -CN (r) MH (t) CN (c)         Manual Rx 4    Manual Rx 4 Location PROM L shoulder, flexion, ER,  -CN (r) MH (t) CN (c)                User Key  (r) = Recorded By, (t) = Taken By, (c) = Cosigned By      Initials Name Provider Type    Angelic Washington, PT Physical Therapist    Maxwell Hess, PT Student PT Student                     PT OP Goals       Row Name 11/30/23 1100          PT Short Term Goals    STG Date to Achieve 12/02/23  -CN (r) MH (t) CN (c)     STG 1 Pt will report pain rated 2/10 at worst in order to demonstrate ability to return to normalized ADLs and functional activities.  -CN (r) MH (t) CN (c)     STG 1 Progress Ongoing  -CN (r) MH (t) CN (c)     STG 2 Pt will be independent with initial HEP for symptom management.  -CN (r) MH (t) CN (c)     STG 2 Progress Met  -CN (r) MH (t) CN (c)        Long Term Goals    LTG Date to Achieve 01/01/24  -CN (r) MH (t) CN (c)     LTG 1 Pt will be independent and compliant with advanced HEP for long term management of symptoms and prevention of future occurrence.  -CN (r) MH (t) CN (c)     LTG 1 Progress Ongoing  -CN (r) MH (t) CN (c)     LTG 2 Pt will be able to achieve  abduction of 0-150 degrees FIR to T7 w/o pain in order to improve functional tasks and ADL performance.  -CN (r) MH (t) CN (c)     LTG 2 Progress Ongoing;Partially Met  -CN (r) MH (t) CN (c)     LTG 3 Pt will report 50% improvement with sleep quality in order to improve QOL  -CN (r) MH (t) CN (c)     LTG 3 Progress Met  -CN (r) MH (t) CN (c)     LTG 4 Pt will report pain free full return to exercise with  to demonstrate improvement in symptoms and quality of life  -CN (r) MH (t) CN (c)     LTG 4 Progress Ongoing  -CN (r) MH (t) CN (c)               User Key  (r) = Recorded By, (t) = Taken By, (c) = Cosigned By      Initials Name Provider Type    Angelic Washington, PT Physical Therapist    Maxwell Hess, PT Student PT Student                    Therapy Education  Education Details: updated HEP, sleep positioning  Given: HEP, Symptoms/condition management, Pain management, Posture/body mechanics, Mobility training  Program: Reinforced  How Provided: Verbal, Demonstration  Provided to: Patient  Level of Understanding: Teach back education performed, Verbalized, Demonstrated              Time Calculation:   Start Time: 1130  Stop Time: 1213  Time Calculation (min): 43 min  Total Timed Code Minutes- PT: 43 minute(s)  Timed Charges  11495 - PT Therapeutic Exercise Minutes: 35  71688 - PT Manual Therapy Minutes: 8  Total Minutes  Timed Charges Total Minutes: 43   Total Minutes: 43  Therapy Charges for Today       Code Description Service Date Service Provider Modifiers Qty    [id number]  PT THER PROC EA 15 MIN 11/30/2023 Maxwell Cr PT Student GP 2    [id number]  PT MANUAL THERAPY EA 15 MIN 11/30/2023 Maxwell Cr PT Student GP 1                      Maxwell Cr PT Student  11/30/2023

## 2023-12-04 ENCOUNTER — HOSPITAL ENCOUNTER (OUTPATIENT)
Dept: PHYSICAL THERAPY | Facility: HOSPITAL | Age: 53
Setting detail: THERAPIES SERIES
Discharge: HOME | End: 2023-12-04
Payer: COMMERCIAL

## 2023-12-04 DIAGNOSIS — R29.3 POOR POSTURE: ICD-10-CM

## 2023-12-04 DIAGNOSIS — M62.81 DECREASED MUSCLE STRENGTH: ICD-10-CM

## 2023-12-04 DIAGNOSIS — M25.512 LEFT SHOULDER PAIN, UNSPECIFIED CHRONICITY: Primary | ICD-10-CM

## 2023-12-04 PROCEDURE — 97110 THERAPEUTIC EXERCISES: CPT

## 2023-12-04 NOTE — THERAPY PROGRESS REPORT/RE-CERT
Outpatient Physical Therapy Neuro Treatment Note  [facility]     Patient Name: Savannah Valenzuela  : 1970  MRN: [id number]  Today's Date: 2023      Visit Date: 2023    Visit Dx:    ICD-10-CM ICD-9-CM   1. Left shoulder pain, unspecified chronicity  M25.512 719.41   2. Poor posture  R29.3 781.92   3. Decreased muscle strength  M62.81 728.87       Patient Active Problem List   Diagnosis    Migraine    Allergic rhinitis    Depression    Obesity, Class II, BMI 35-39.9    S/P laparoscopic sleeve gastrectomy    Iron deficiency anemia    Paget's disease of bone          PT Ortho       Row Name 23 1100       Left Upper Ext    Lt Shoulder Abduction AROM 0-136  mild pain  -CN (r) MH (t) CN (c)    Lt Shoulder Internal Rotation AROM T11 paoin free  -CN (r) MH (t) CN (c)              User Key  (r) = Recorded By, (t) = Taken By, (c) = Cosigned By      Initials Name Provider Type    CN Angelic Arnold, PT Physical Therapist     Maxwell Cr, PT Student PT Student                               PT Assessment/Plan       Row Name 23 1200          PT Assessment    Functional Limitations Limitations in community activities;Performance in sport activities;Performance in self-care ADL;Limitations in functional capacity and performance  -CN (r) MH (t) CN (c)     Impairments Muscle strength;Pain;Posture;Range of motion;Poor body mechanics  -CN (r) MH (t) CN (c)     Assessment Comments Savannah Valenzuela has been seen for 6 physical therapy sessions for L shoulder pain. Treatment has included manual therapy and therapeutic activity. Progress to physical therapy goals is good as pt. Has met 1/2 STGs, and 1/4 LTGs. She reports improved sleep quality/ duration and reduced frequency of painful motions. Pt reports unchanged intensity of pain during exacerbations/ painful motions compared to beginning of therapy. Pt demonstrates increased L shoulder abduction to 0-136 and increased FIR to pain free up to  T11. Pt reports compliance with HEP and positive response to therapy overall. Pt educated on POC and expected outcomes of PT. Pt verbalizes understanding. Progressed interventions to include wall star, standing HzA w/ theraband, standing D2 w/ theraband, AAROM scaption pulleys and single arm row with thoracic rotation. Pt demonstrates pain with standing D2 and single arm row which completely ceases with tactile/verbal cues for engaging scapular stabilization muscles and proper form. Pt tolerates interventions well. She will benefit from continued skilled physical therapy to address remaining impairments and functional limitations.  -CN (r) MH (t) CN (c)     Please refer to paper survey for additional self-reported information No  -CN (r) MH (t) CN (c)     Rehab Potential Good  -CN (r) MH (t) CN (c)     Patient/caregiver participated in establishment of treatment plan and goals Yes  -CN (r) MH (t) CN (c)     Patient would benefit from skilled therapy intervention Yes  -CN (r) MH (t) CN (c)        PT Plan    PT Frequency 2x/week  -CN (r) MH (t) CN (c)     Predicted Duration of Therapy Intervention (PT) 8 visits  -CN (r) MH (t) CN (c)     PT Plan Comments Assess response to progressed exercises, consider overhead activities with scapular control, consider progressing resistance of D2 and wall star  -CN (r) MH (t) CN (c)               User Key  (r) = Recorded By, (t) = Taken By, (c) = Cosigned By      Initials Name Provider Type    Angelic Washington, PT Physical Therapist    Maxwell Hess, PT Student PT Student                        OP Exercises       Row Name 12/04/23 1100             Subjective    Subjective Comments today is a good day, i get some good days and some bad days  -CN (r) MH (t) CN (c)         Total Minutes    76583 - PT Therapeutic Exercise Minutes 50  -CN (r) CAL (t) CN (c)         Exercise 1    Exercise Name 1 doorway pec stretch  -CN (r) CAL (t) CN (c)      Cueing 1 Verbal  -OMKAR (r) CAL (t)  CN (c)      Reps 1 3  -CN (r) MH (t) CN (c)      Time 1 20 sec  -CN (r) MH (t) CN (c)         Exercise 2    Exercise Name 2 AAROM scaption pulleys  -CN (r) MH (t) CN (c)      Cueing 2 Verbal;Demo  -CN (r) MH (t) CN (c)      Reps 2 20  -CN (r) MH (t) CN (c)      Additional Comments no pain, range 0- approx 160  -CN (r) MH (t) CN (c)         Exercise 3    Exercise Name 3 single arm row  -CN (r) MH (t) CN (c)      Cueing 3 Verbal;Demo  -CN (r) MH (t) CN (c)      Sets 3 2  -CN (r) MH (t) CN (c)      Reps 3 10  -CN (r) MH (t) CN (c)      Time 3 GTB single handle  -CN (r) MH (t) CN (c)      Additional Comments cues for leading rotation with scapula  -CN (r) MH (t) CN (c)         Exercise 4    Exercise Name 4 UBE  -CN (r) MH (t) CN (c)      Time 4 2fwd/2retro  -CN (r) MH (t) CN (c)         Exercise 5    Exercise Name 5 standing HA  -CN (r) MH (t) CN (c)      Cueing 5 Verbal;Demo  -CN (r) MH (t) CN (c)      Sets 5 2  -CN (r) MH (t) CN (c)      Reps 5 10  -CN (r) MH (t) CN (c)      Time 5 RTB  -CN (r) MH (t) CN (c)      Additional Comments cues for chest height and keeping shoulders low  -CN (r) MH (t) CN (c)         Exercise 6    Exercise Name 6 standing D2  -CN (r) MH (t) CN (c)      Cueing 6 Verbal;Demo  -CN (r) MH (t) CN (c)      Sets 6 2  -CN (r) MH (t) CN (c)      Reps 6 10  -CN (r) MH (t) CN (c)      Time 6 YTB  -CN (r) MH (t) CN (c)      Additional Comments no pain with cues for scapular control leading motion  -CN (r) MH (t) CN (c)         Exercise 9    Exercise Name 9 Rows/ext  -CN (r) MH (t) CN (c)      Cueing 9 Verbal;Demo  -CN (r) MH (t) CN (c)      Sets 9 2  -CN (r) MH (t) CN (c)      Reps 9 10  -CN (r) MH (t) CN (c)      Time 9 GTB  -CN (r) MH (t) CN (c)         Exercise 10    Exercise Name 10 B shoulder flex with loop  -CN (r) MH (t) CN (c)      Cueing 10 Verbal;Demo  -CN (r) MH (t) CN (c)      Sets 10 2  -CN (r) MH (t) CN (c)      Reps 10 10  -CN (r) MH (t) CN (c)      Time 10 RTB  -CN (r) MH (t) CN (c)          Exercise 11    Exercise Name 11 Wall walk  -CN (r) MH (t) CN (c)      Cueing 11 Verbal;Demo  -CN (r) MH (t) CN (c)      Reps 11 5 laps  -CN (r) MH (t) CN (c)      Time 11 RTB  -CN (r) MH (t) CN (c)      Additional Comments laterally  -CN (r) MH (t) CN (c)         Exercise 12    Exercise Name 12 FIR stretch with towel  -CN (r) MH (t) CN (c)      Cueing 12 Verbal;Demo  -CN (r) MH (t) CN (c)      Reps 12 10  -CN (r) MH (t) CN (c)      Time 12 10s, with towel  -CN (r) MH (t) CN (c)         Exercise 14    Exercise Name 14 standing bilateral ER  -CN (r) MH (t) CN (c)      Cueing 14 Verbal;Demo  -CN (r) MH (t) CN (c)      Sets 14 2  -CN (r) MH (t) CN (c)      Reps 14 10  -CN (r) MH (t) CN (c)      Time 14 RTB  -CN (r) MH (t) CN (c)         Exercise 16    Exercise Name 16 --  -CN (r) MH (t) CN (c)      Cueing 16 --  -CN (r) MH (t) CN (c)      Sets 16 --  -CN (r) MH (t) CN (c)      Reps 16 --  -CN (r) MH (t) CN (c)      Time 16 --  -CN (r) MH (t) CN (c)      Additional Comments --  -CN (r) MH (t) CN (c)         Exercise 17    Exercise Name 17 wall alphabet  -CN (r) MH (t) CN (c)      Cueing 17 Verbal;Demo  -CN (r) MH (t) CN (c)      Sets 17 chest height  -CN (r) MH (t) CN (c)      Reps 17 A-Z  -CN (r) MH (t) CN (c)      Time 17 L2  -CN (r) MH (t) CN (c)         Exercise 18    Exercise Name 18 wall star  -CN (r) MH (t) CN (c)      Cueing 18 Verbal;Demo  -CN (r) MH (t) CN (c)      Reps 18 10  -CN (r) MH (t) CN (c)      Time 18 YTB  -CN (r) MH (t) CN (c)      Additional Comments RTB increased pain  -CN (r) MH (t) CN (c)                User Key  (r) = Recorded By, (t) = Taken By, (c) = Cosigned By      Initials Name Provider Type    CN Angelic Arnold, PT Physical Therapist    Maxwell Hess, PT Student PT Student                                 PT OP Goals       Row Name 12/04/23 1100          PT Short Term Goals    STG Date to Achieve 12/02/23  -CN (r) MH (t) CN (c)     STG 1 Pt will report pain rated 2/10 at  worst in order to demonstrate ability to return to normalized ADLs and functional activities.  -CN (r) MH (t) CN (c)     STG 1 Progress Ongoing  -CN (r) MH (t) CN (c)     STG 1 Progress Comments still gets to a 4 or 5 at worst  -CN (r) MH (t) CN (c)     STG 2 Pt will be independent with initial HEP for symptom management.  -CN (r) MH (t) CN (c)     STG 2 Progress Met  -CN (r) MH (t) CN (c)        Long Term Goals    LTG Date to Achieve 01/01/24  -CN (r) MH (t) CN (c)     LTG 1 Pt will be independent and compliant with advanced HEP for long term management of symptoms and prevention of future occurrence.  -CN (r) MH (t) CN (c)     LTG 1 Progress Ongoing  -CN (r) MH (t) CN (c)     LTG 2 Pt will be able to achieve abduction of 0-150 degrees FIR to T7 w/o pain in order to improve functional tasks and ADL performance.  -CN (r) MH (t) CN (c)     LTG 2 Progress Ongoing;Partially Met  -CN (r) MH (t) CN (c)     LTG 2 Progress Comments T11 L FIR pain free, 0-136 abduction ROM  -CN (r) MH (t) CN (c)     LTG 3 Pt will report 50% improvement with sleep quality in order to improve QOL  -CN (r) MH (t) CN (c)     LTG 3 Progress Met  -CN (r) MH (t) CN (c)     LTG 4 Pt will report pain free full return to exercise with  to demonstrate improvement in symptoms and quality of life  -CN (r) MH (t) CN (c)     LTG 4 Progress Ongoing  -CN (r) MH (t) CN (c)     LTG 4 Progress Comments have yet to resume gym exercises with UE  -CN (r) MH (t) CN (c)               User Key  (r) = Recorded By, (t) = Taken By, (c) = Cosigned By      Initials Name Provider Type    Angelic Washington, PT Physical Therapist    Maxwell Hess, PT Student PT Student                    Therapy Education  Education Details: scapular control and activity modification for pain modulation  Given: HEP, Symptoms/condition management, Pain management, Posture/body mechanics, Mobility training  Program: Reinforced  How Provided: Verbal,  Demonstration  Provided to: Patient  Level of Understanding: Teach back education performed, Verbalized, Demonstrated              Time Calculation:   Start Time: 1120  Stop Time: 1210  Time Calculation (min): 50 min  Total Timed Code Minutes- PT: 50 minute(s)  Timed Charges  36459 - PT Therapeutic Exercise Minutes: 50  Total Minutes  Timed Charges Total Minutes: 50   Total Minutes: 50   Therapy Charges for Today       Code Description Service Date Service Provider Modifiers Qty    82896268523 HC PT THER PROC EA 15 MIN 12/4/2023 Maxwell Cr, PT Student GP 3                      Maxwell Cr, PT Student  12/4/2023

## 2023-12-04 NOTE — THERAPY PROGRESS REPORT/RE-CERT
Outpatient Physical Therapy Ortho Treatment Note  [facility]     Patient Name: Savannah Valenzuela  : 1970  MRN: [id number]  Today's Date: 2023      Visit Date: 2023    Visit Dx:    ICD-10-CM ICD-9-CM   1. Left shoulder pain, unspecified chronicity  M25.512 719.41   2. Poor posture  R29.3 781.92   3. Decreased muscle strength  M62.81 728.87       Patient Active Problem List   Diagnosis    Migraine    Allergic rhinitis    Depression    Obesity, Class II, BMI 35-39.9    S/P laparoscopic sleeve gastrectomy    Iron deficiency anemia    Paget's disease of bone        Past Medical History:   Diagnosis Date    Allergic rhinitis     Anemia     Chronic fatigue 3/27/2020    Depression     Encounter for screening for malignant neoplasm of colon 2021    Added automatically from request for surgery [id number]    Fatigue     GERD (gastroesophageal reflux disease)     Hematuria, microscopic 2016    Hiatal hernia     Hyperlipidemia     Hypertension     Low back strain     Fell on my back on aluminum bleachers    Medication management     Migraine     Multiple food allergies     Physical exam, annual         Past Surgical History:   Procedure Laterality Date     SECTION      COLONOSCOPY N/A 2021    Procedure: COLONOSCOPY INTO CECUM AND T.I. WITH COLD BIOPSY POLYPECTOMIES;  Surgeon: Ursula Torres MD;  Location: [facility] ENDOSCOPY;  Service: Gastroenterology;  Laterality: N/A;  PRE- SCREENING  POST- DIVERTICULOSIS, POLYPS, HEMORRHOIDS    DILATATION AND CURETTAGE      ENDOSCOPY N/A 2020    Procedure: ESOPHAGOGASTRODUODENOSCOPY WITH BIOPSY;  Surgeon: Cayetano Khan Jr., MD;  Location: [facility] ENDOSCOPY;  Service: General;  Laterality: N/A;  PREOP/ DYSPEPSIA  POSTOP/ GASTRITIS, ESOPHAGITIS, HIATAL HERNIA    GASTRIC SLEEVE LAPAROSCOPIC N/A 10/5/2020    Procedure: GASTRIC SLEEVE LAPAROSCOPIC WITH paraesophageal hernia repair, lysis of adhesions;  Surgeon: Cayetano Khan  MD Tessa;  Location: [facility];  Service: Bariatric;  Laterality: N/A;    WISDOM TOOTH EXTRACTION          PT Ortho       Row Name 12/04/23 1100       Left Upper Ext    Lt Shoulder Abduction AROM 0-136  mild pain  -CN (r) MH (t) CN (c)    Lt Shoulder Internal Rotation AROM T11 paoin free  -CN (r) MH (t) CN (c)              User Key  (r) = Recorded By, (t) = Taken By, (c) = Cosigned By      Initials Name Provider Type    Angelic Washington, PT Physical Therapist    Maxwell Hess, PT Student PT Student                                 PT Assessment/Plan       Row Name 12/04/23 1200          PT Assessment    Functional Limitations Limitations in community activities;Performance in sport activities;Performance in self-care ADL;Limitations in functional capacity and performance  -CN (r) MH (t) CN (c)     Impairments Muscle strength;Pain;Posture;Range of motion;Poor body mechanics  -CN (r) MH (t) CN (c)     Assessment Comments Savannah Valenzuela has been seen for 6 physical therapy sessions for L shoulder pain. Treatment has included manual therapy and therapeutic activity. Progress to physical therapy goals is good as pt. Has met 1/2 STGs, and 1/4 LTGs. She reports improved sleep quality/ duration and reduced frequency of painful motions. Pt reports unchanged intensity of pain during exacerbations/ painful motions compared to beginning of therapy. Pt demonstrates increased L shoulder abduction to 0-136 and increased FIR to pain free up to T11. Pt reports compliance with HEP and positive response to therapy overall. Pt educated on POC and expected outcomes of PT. Pt verbalizes understanding. Progressed interventions to include wall star, standing HzA w/ theraband, standing D2 w/ theraband, AAROM scaption pulleys and single arm row with thoracic rotation. Pt demonstrates pain with standing D2 and single arm row which completely ceases with tactile/verbal cues for engaging scapular stabilization muscles and  proper form. Pt tolerates interventions well. She will benefit from continued skilled physical therapy to address remaining impairments and functional limitations.  -CN (r) MH (t) CN (c)     Please refer to paper survey for additional self-reported information No  -CN (r) MH (t) CN (c)     Rehab Potential Good  -CN (r) MH (t) CN (c)     Patient/caregiver participated in establishment of treatment plan and goals Yes  -CN (r) MH (t) CN (c)     Patient would benefit from skilled therapy intervention Yes  -CN (r) MH (t) CN (c)        PT Plan    PT Frequency 2x/week  -CN (r) MH (t) CN (c)     Predicted Duration of Therapy Intervention (PT) 8 visits  -CN (r) MH (t) CN (c)     PT Plan Comments Assess response to progressed exercises, consider overhead activities with scapular control, consider progressing resistance of D2 and wall star  -CN (r) MH (t) CN (c)               User Key  (r) = Recorded By, (t) = Taken By, (c) = Cosigned By      Initials Name Provider Type    Angelic Washington, PT Physical Therapist     Maxwell Cr, PT Student PT Student                       OP Exercises       Row Name 12/04/23 1100             Subjective    Subjective Comments today is a good day, i get some good days and some bad days  -CN (r) MH (t) CN (c)         Total Minutes    32723 - PT Therapeutic Exercise Minutes 50  -CN (r) MH (t) CN (c)         Exercise 1    Exercise Name 1 doorway pec stretch  -CN (r) MH (t) CN (c)      Cueing 1 Verbal  -CN (r) MH (t) CN (c)      Reps 1 3  -CN (r) MH (t) CN (c)      Time 1 20 sec  -CN (r) MH (t) CN (c)         Exercise 2    Exercise Name 2 AAROM scaption pulleys  -CN (r) MH (t) CN (c)      Cueing 2 Verbal;Demo  -CN (r) MH (t) CN (c)      Reps 2 20  -CN (r) MH (t) CN (c)      Additional Comments no pain, range 0- approx 160  -CN (r) MH (t) CN (c)         Exercise 3    Exercise Name 3 single arm row  -CN (r) MH (t) CN (c)      Cueing 3 Verbal;Demo  -CN (r) MH (t) CN (c)      Sets 3 2   -CN (r) MH (t) CN (c)      Reps 3 10  -CN (r) MH (t) CN (c)      Time 3 GTB single handle  -CN (r) MH (t) CN (c)      Additional Comments cues for leading rotation with scapula  -CN (r) MH (t) CN (c)         Exercise 4    Exercise Name 4 UBE  -CN (r) MH (t) CN (c)      Time 4 2fwd/2retro  -CN (r) MH (t) CN (c)         Exercise 5    Exercise Name 5 standing HA  -CN (r) MH (t) CN (c)      Cueing 5 Verbal;Demo  -CN (r) MH (t) CN (c)      Sets 5 2  -CN (r) MH (t) CN (c)      Reps 5 10  -CN (r) MH (t) CN (c)      Time 5 RTB  -CN (r) MH (t) CN (c)      Additional Comments cues for chest height and keeping shoulders low  -CN (r) MH (t) CN (c)         Exercise 6    Exercise Name 6 standing D2  -CN (r) MH (t) CN (c)      Cueing 6 Verbal;Demo  -CN (r) MH (t) CN (c)      Sets 6 2  -CN (r) MH (t) CN (c)      Reps 6 10  -CN (r) MH (t) CN (c)      Time 6 YTB  -CN (r) MH (t) CN (c)      Additional Comments no pain with cues for scapular control leading motion  -CN (r) MH (t) CN (c)         Exercise 9    Exercise Name 9 Rows/ext  -CN (r) MH (t) CN (c)      Cueing 9 Verbal;Demo  -CN (r) MH (t) CN (c)      Sets 9 2  -CN (r) MH (t) CN (c)      Reps 9 10  -CN (r) MH (t) CN (c)      Time 9 GTB  -CN (r) MH (t) CN (c)         Exercise 10    Exercise Name 10 B shoulder flex with loop  -CN (r) MH (t) CN (c)      Cueing 10 Verbal;Demo  -CN (r) MH (t) CN (c)      Sets 10 2  -CN (r) MH (t) CN (c)      Reps 10 10  -CN (r) MH (t) CN (c)      Time 10 RTB  -CN (r) MH (t) CN (c)         Exercise 11    Exercise Name 11 Wall walk  -CN (r) MH (t) CN (c)      Cueing 11 Verbal;Demo  -CN (r) MH (t) CN (c)      Reps 11 5 laps  -CN (r) MH (t) CN (c)      Time 11 RTB  -CN (r) MH (t) CN (c)      Additional Comments laterally  -CN (r) MH (t) CN (c)         Exercise 12    Exercise Name 12 FIR stretch with towel  -CN (r) MH (t) CN (c)      Cueing 12 Verbal;Demo  -CN (r) MH (t) CN (c)      Reps 12 10  -CN (r) MH (t) CN (c)      Time 12 10s, with towel  -CN (r)  MH (t) CN (c)         Exercise 14    Exercise Name 14 standing bilateral ER  -CN (r) MH (t) CN (c)      Cueing 14 Verbal;Demo  -CN (r) MH (t) CN (c)      Sets 14 2  -CN (r) MH (t) CN (c)      Reps 14 10  -CN (r) MH (t) CN (c)      Time 14 RTB  -CN (r) MH (t) CN (c)         Exercise 16    Exercise Name 16 --  -CN (r) MH (t) CN (c)      Cueing 16 --  -CN (r) MH (t) CN (c)      Sets 16 --  -CN (r) MH (t) CN (c)      Reps 16 --  -CN (r) MH (t) CN (c)      Time 16 --  -CN (r) MH (t) CN (c)      Additional Comments --  -CN (r) MH (t) CN (c)         Exercise 17    Exercise Name 17 wall alphabet  -CN (r) MH (t) CN (c)      Cueing 17 Verbal;Demo  -CN (r) MH (t) CN (c)      Sets 17 chest height  -CN (r) MH (t) CN (c)      Reps 17 A-Z  -CN (r) MH (t) CN (c)      Time 17 L2  -CN (r) MH (t) CN (c)         Exercise 18    Exercise Name 18 wall star  -CN (r) MH (t) CN (c)      Cueing 18 Verbal;Demo  -CN (r) MH (t) CN (c)      Reps 18 10  -CN (r) MH (t) CN (c)      Time 18 YTB  -CN (r) MH (t) CN (c)      Additional Comments RTB increased pain  -CN (r) MH (t) CN (c)                User Key  (r) = Recorded By, (t) = Taken By, (c) = Cosigned By      Initials Name Provider Type    CN Angelic Arnold, PT Physical Therapist    Maxwell Hess, PT Student PT Student                                  PT OP Goals       Row Name 12/04/23 1100          PT Short Term Goals    STG Date to Achieve 12/02/23  -CN (r) MH (t) CN (c)     STG 1 Pt will report pain rated 2/10 at worst in order to demonstrate ability to return to normalized ADLs and functional activities.  -CN (r) MH (t) CN (c)     STG 1 Progress Ongoing  -CN (r) MH (t) CN (c)     STG 1 Progress Comments still gets to a 4 or 5 at worst  -CN (r) MH (t) CN (c)     STG 2 Pt will be independent with initial HEP for symptom management.  -CN (r) MH (t) CN (c)     STG 2 Progress Met  -CN (r) MH (t) CN (c)        Long Term Goals    LTG Date to Achieve 01/01/24  -CN (r) MH (t) CN (c)      LTG 1 Pt will be independent and compliant with advanced HEP for long term management of symptoms and prevention of future occurrence.  -CN (r) MH (t) CN (c)     LTG 1 Progress Ongoing  -CN (r) MH (t) CN (c)     LTG 2 Pt will be able to achieve abduction of 0-150 degrees FIR to T7 w/o pain in order to improve functional tasks and ADL performance.  -CN (r) MH (t) CN (c)     LTG 2 Progress Ongoing;Partially Met  -CN (r) MH (t) CN (c)     LTG 2 Progress Comments T11 L FIR pain free, 0-136 abduction ROM  -CN (r) MH (t) CN (c)     LTG 3 Pt will report 50% improvement with sleep quality in order to improve QOL  -CN (r) MH (t) CN (c)     LTG 3 Progress Met  -CN (r) MH (t) CN (c)     LTG 4 Pt will report pain free full return to exercise with  to demonstrate improvement in symptoms and quality of life  -CN (r) MH (t) CN (c)     LTG 4 Progress Ongoing  -CN (r) MH (t) CN (c)     LTG 4 Progress Comments have yet to resume gym exercises with UE  -CN (r) MH (t) CN (c)               User Key  (r) = Recorded By, (t) = Taken By, (c) = Cosigned By      Initials Name Provider Type    Angelic Washington, PT Physical Therapist    Maxwell Hess, PT Student PT Student                    Therapy Education  Education Details: scapular control and activity modification for pain modulation  Given: HEP, Symptoms/condition management, Pain management, Posture/body mechanics, Mobility training  Program: Reinforced  How Provided: Verbal, Demonstration  Provided to: Patient  Level of Understanding: Teach back education performed, Verbalized, Demonstrated              Time Calculation:   Start Time: 1120  Stop Time: 1210  Time Calculation (min): 50 min  Total Timed Code Minutes- PT: 50 minute(s)  Timed Charges  99080 - PT Therapeutic Exercise Minutes: 50  Total Minutes  Timed Charges Total Minutes: 50   Total Minutes: 50  Therapy Charges for Today       Code Description Service Date Service Provider Modifiers Qty     [id number]  PT THER PROC EA 15 MIN 12/4/2023 Maxwell Cr, PT Student GP 3                      Maxwell Cr, PT Student  12/4/2023

## 2023-12-06 ENCOUNTER — HOSPITAL ENCOUNTER (OUTPATIENT)
Dept: PHYSICAL THERAPY | Facility: HOSPITAL | Age: 53
Setting detail: THERAPIES SERIES
Discharge: HOME | End: 2023-12-06
Payer: COMMERCIAL

## 2023-12-06 DIAGNOSIS — M25.512 LEFT SHOULDER PAIN, UNSPECIFIED CHRONICITY: Primary | ICD-10-CM

## 2023-12-06 DIAGNOSIS — R29.3 POOR POSTURE: ICD-10-CM

## 2023-12-06 DIAGNOSIS — M62.81 DECREASED MUSCLE STRENGTH: ICD-10-CM

## 2023-12-06 PROCEDURE — 97110 THERAPEUTIC EXERCISES: CPT

## 2023-12-07 NOTE — THERAPY TREATMENT NOTE
Outpatient Physical Therapy Ortho Treatment Note  [facility]     Patient Name: Savannah Valenzuela  : 1970  MRN: [id number]  Today's Date: 2023      Visit Date: 2023    Visit Dx:    ICD-10-CM ICD-9-CM   1. Left shoulder pain, unspecified chronicity  M25.512 719.41   2. Poor posture  R29.3 781.92   3. Decreased muscle strength  M62.81 728.87       Patient Active Problem List   Diagnosis    Migraine    Allergic rhinitis    Depression    Obesity, Class II, BMI 35-39.9    S/P laparoscopic sleeve gastrectomy    Iron deficiency anemia    Paget's disease of bone        Past Medical History:   Diagnosis Date    Allergic rhinitis     Anemia     Chronic fatigue 3/27/2020    Depression     Encounter for screening for malignant neoplasm of colon 2021    Added automatically from request for surgery [id number]    Fatigue     GERD (gastroesophageal reflux disease)     Hematuria, microscopic 2016    Hiatal hernia     Hyperlipidemia     Hypertension     Low back strain     Fell on my back on aluminum bleachers    Medication management     Migraine     Multiple food allergies     Physical exam, annual         Past Surgical History:   Procedure Laterality Date     SECTION      COLONOSCOPY N/A 2021    Procedure: COLONOSCOPY INTO CECUM AND T.I. WITH COLD BIOPSY POLYPECTOMIES;  Surgeon: Ursula Torres MD;  Location: [facility] ENDOSCOPY;  Service: Gastroenterology;  Laterality: N/A;  PRE- SCREENING  POST- DIVERTICULOSIS, POLYPS, HEMORRHOIDS    DILATATION AND CURETTAGE      ENDOSCOPY N/A 2020    Procedure: ESOPHAGOGASTRODUODENOSCOPY WITH BIOPSY;  Surgeon: Cayetano Khan Jr., MD;  Location: [facility] ENDOSCOPY;  Service: General;  Laterality: N/A;  PREOP/ DYSPEPSIA  POSTOP/ GASTRITIS, ESOPHAGITIS, HIATAL HERNIA    GASTRIC SLEEVE LAPAROSCOPIC N/A 10/5/2020    Procedure: GASTRIC SLEEVE LAPAROSCOPIC WITH paraesophageal hernia repair, lysis of adhesions;  Surgeon: Cayetano Khan  MD Tessa;  Location: [facility] OR [facility];  Service: Bariatric;  Laterality: N/A;    WISDOM TOOTH EXTRACTION                          PT Assessment/Plan       Row Name 12/06/23 1611          PT Assessment    Assessment Comments Pt returns to PT noting slight improvement since last visit. Discussed scap retraction and recommended pt perform prior to movement with L UE, specifically into painful positions. Continued with scapular strengthening with increased resistance with several exercises this date. No increase in symptoms and decreased incidence of pain throughout.  -CN        PT Plan    PT Plan Comments Continue to work on scapular strengthening, consider prone T/Y over swiss ball next vist.  -CN               User Key  (r) = Recorded By, (t) = Taken By, (c) = Cosigned By      Initials Name Provider Type    Angelic Washington, PT Physical Therapist                       OP Exercises       Row Name 12/06/23 1500             Subjective    Subjective Comments I am doing ok today.  -CN         Total Minutes    15125 - PT Therapeutic Exercise Minutes 40  -CN         Exercise 1    Exercise Name 1 doorway pec stretch  -CN      Cueing 1 Verbal  -CN      Reps 1 3  -CN      Time 1 20 sec  -CN      Additional Comments arms low  -CN         Exercise 3    Exercise Name 3 single arm row  -CN      Cueing 3 Verbal;Demo  -CN      Sets 3 2  -CN      Reps 3 10  -CN      Time 3 GTB single handle  -CN      Additional Comments cues for leading rotation with scapula  -CN         Exercise 4    Exercise Name 4 UBE  -CN      Time 4 2fwd/2retro  -CN         Exercise 5    Exercise Name 5 standing HA  -CN      Cueing 5 Verbal;Demo  -CN      Sets 5 2  -CN      Reps 5 10  -CN      Time 5 RTB  -CN         Exercise 6    Exercise Name 6 standing D2  -CN      Cueing 6 Verbal;Demo  -CN      Sets 6 2  -CN      Reps 6 10  -CN      Time 6 YTB  -CN         Exercise 9    Exercise Name 9 Rows/ext  -CN      Cueing 9 Verbal;Demo  -CN      Sets 9 2  -CN       Reps 9 10  -CN      Time 9 GTB  -CN         Exercise 10    Exercise Name 10 B shoulder flex with loop  -CN      Cueing 10 Verbal;Demo  -CN      Sets 10 2  -CN      Reps 10 10  -CN      Time 10 RTB  -CN         Exercise 11    Exercise Name 11 Wall walk  -CN      Cueing 11 Verbal;Demo  -CN      Reps 11 5 laps  -CN      Time 11 RTB  -CN         Exercise 12    Exercise Name 12 FIR stretch with towel  -CN      Cueing 12 Verbal;Demo  -CN      Reps 12 10  -CN      Time 12 10s, with towel  -CN      Additional Comments cues for scap squeeze with FIR  -CN         Exercise 14    Exercise Name 14 standing bilateral ER  -CN      Cueing 14 Verbal;Demo  -CN      Sets 14 2  -CN      Reps 14 10  -CN      Time 14 RTB  -CN         Exercise 18    Exercise Name 18 wall star  -CN      Cueing 18 Verbal;Demo  -CN      Reps 18 10  -CN      Time 18 RTB  -CN      Additional Comments cues for scapular positioning  -CN                User Key  (r) = Recorded By, (t) = Taken By, (c) = Cosigned By      Initials Name Provider Type    CN Angelic Arnold, PT Physical Therapist                                     Therapy Education  Given: HEP, Symptoms/condition management, Pain management, Posture/body mechanics, Mobility training  Program: Reinforced, Progressed  How Provided: Verbal, Demonstration  Provided to: Patient  Level of Understanding: Teach back education performed, Verbalized, Demonstrated              Time Calculation:   Start Time: 1530  Stop Time: 1612  Time Calculation (min): 42 min  Timed Charges  58909 - PT Therapeutic Exercise Minutes: 40  Total Minutes  Timed Charges Total Minutes: 40   Total Minutes: 40  Therapy Charges for Today       Code Description Service Date Service Provider Modifiers Qty    81286152324  PT THER PROC EA 15 MIN 12/6/2023 Angelic Arnold, PT GP 3                      Angelic Arnold PT  12/6/2023

## 2023-12-11 ENCOUNTER — HOSPITAL ENCOUNTER (OUTPATIENT)
Dept: PHYSICAL THERAPY | Facility: HOSPITAL | Age: 53
Setting detail: THERAPIES SERIES
Discharge: HOME | End: 2023-12-11
Payer: COMMERCIAL

## 2023-12-11 DIAGNOSIS — M62.81 DECREASED MUSCLE STRENGTH: ICD-10-CM

## 2023-12-11 DIAGNOSIS — R29.3 POOR POSTURE: ICD-10-CM

## 2023-12-11 DIAGNOSIS — M25.512 LEFT SHOULDER PAIN, UNSPECIFIED CHRONICITY: Primary | ICD-10-CM

## 2023-12-11 PROCEDURE — 97110 THERAPEUTIC EXERCISES: CPT

## 2023-12-11 NOTE — THERAPY TREATMENT NOTE
Outpatient Physical Therapy Ortho Treatment Note  [facility]     Patient Name: Savannah Valenzuela  : 1970  MRN: [id number]  Today's Date: 2023      Visit Date: 2023    Visit Dx:    ICD-10-CM ICD-9-CM   1. Left shoulder pain, unspecified chronicity  M25.512 719.41   2. Poor posture  R29.3 781.92   3. Decreased muscle strength  M62.81 728.87       Patient Active Problem List   Diagnosis    Migraine    Allergic rhinitis    Depression    Obesity, Class II, BMI 35-39.9    S/P laparoscopic sleeve gastrectomy    Iron deficiency anemia    Paget's disease of bone        Past Medical History:   Diagnosis Date    Allergic rhinitis     Anemia     Chronic fatigue 3/27/2020    Depression     Encounter for screening for malignant neoplasm of colon 2021    Added automatically from request for surgery [id number]    Fatigue     GERD (gastroesophageal reflux disease)     Hematuria, microscopic 2016    Hiatal hernia     Hyperlipidemia     Hypertension     Low back strain     Fell on my back on aluminum bleachers    Medication management     Migraine     Multiple food allergies     Physical exam, annual         Past Surgical History:   Procedure Laterality Date     SECTION      COLONOSCOPY N/A 2021    Procedure: COLONOSCOPY INTO CECUM AND T.I. WITH COLD BIOPSY POLYPECTOMIES;  Surgeon: Ursula Torres MD;  Location: [facility] ENDOSCOPY;  Service: Gastroenterology;  Laterality: N/A;  PRE- SCREENING  POST- DIVERTICULOSIS, POLYPS, HEMORRHOIDS    DILATATION AND CURETTAGE      ENDOSCOPY N/A 2020    Procedure: ESOPHAGOGASTRODUODENOSCOPY WITH BIOPSY;  Surgeon: Cayetano Khan Jr., MD;  Location: [facility] ENDOSCOPY;  Service: General;  Laterality: N/A;  PREOP/ DYSPEPSIA  POSTOP/ GASTRITIS, ESOPHAGITIS, HIATAL HERNIA    GASTRIC SLEEVE LAPAROSCOPIC N/A 10/5/2020    Procedure: GASTRIC SLEEVE LAPAROSCOPIC WITH paraesophageal hernia repair, lysis of adhesions;  Surgeon: Cayetano Khan  MD Tessa;  Location: [facility] OR [facility];  Service: Bariatric;  Laterality: N/A;    WISDOM TOOTH EXTRACTION                          PT Assessment/Plan       Row Name 12/11/23 1611          PT Assessment    Assessment Comments Pt with low pain overall and improving sleeping tolerance. Continued with current plan with progression to prone Y/T and standing resisted ABCs with cues for scap engagement throughout. Pt reporting fatigue and slight discomfort upon release of exercise at times, howevergood tolerance overall. Pt conitnues to be a good candidate for skilled PT.  -CN        PT Plan    PT Plan Comments Assess response to progressed exercises and continue with scapular strengthening as tolerated. May consider shoulder taps on wall next visit.  -CN               User Key  (r) = Recorded By, (t) = Taken By, (c) = Cosigned By      Initials Name Provider Type    Angelic Washington, PT Physical Therapist                       OP Exercises       Row Name 12/11/23 1500             Subjective    Subjective Comments I am feeling ok, I had one time it woke me up sleeping.  -CN         Total Minutes    84947 - PT Therapeutic Exercise Minutes 40  -CN         Exercise 1    Exercise Name 1 doorway pec stretch  -CN      Cueing 1 Verbal  -CN      Reps 1 3  -CN      Time 1 20 sec  -CN      Additional Comments arms low  -CN         Exercise 3    Exercise Name 3 single arm row  -CN      Cueing 3 Verbal;Demo  -CN      Sets 3 2  -CN      Reps 3 10  -CN      Time 3 GTB single handle  -CN      Additional Comments cues for leading rotation with scapula  -CN         Exercise 4    Exercise Name 4 UBE  -CN      Time 4 2fwd/2retro  -CN         Exercise 5    Exercise Name 5 standing HA  -CN      Cueing 5 Verbal;Demo  -CN      Sets 5 2  -CN      Reps 5 10  -CN      Time 5 RTB  -CN         Exercise 6    Exercise Name 6 standing D2  -CN      Cueing 6 Verbal;Demo  -CN      Sets 6 2  -CN      Reps 6 10  -CN      Time 6 YTB  -CN      Additional  Comments attempted RTB, held per pain at end range  -CN         Exercise 7    Exercise Name 7 prone T/Y  -CN      Cueing 7 Verbal;Demo  -CN      Sets 7 2  -CN      Reps 7 10  -CN      Additional Comments cues for scap squeeze  -CN         Exercise 10    Exercise Name 10 B shoulder flex with loop  -CN      Cueing 10 Verbal;Demo  -CN      Sets 10 2  -CN      Reps 10 10  -CN      Time 10 RTB  -CN         Exercise 11    Exercise Name 11 Wall walk  -CN      Cueing 11 Verbal;Demo  -CN      Reps 11 5 laps  -CN      Time 11 RTB  -CN         Exercise 12    Exercise Name 12 FIR stretch with towel  -CN      Cueing 12 Verbal;Demo  -CN      Reps 12 10  -CN      Time 12 10s, with towel  -CN      Additional Comments cues for scap squeeze with FIR  -CN         Exercise 14    Exercise Name 14 standing bilateral ER  -CN      Cueing 14 Verbal;Demo  -CN      Sets 14 2  -CN      Reps 14 10  -CN      Time 14 RTB  -CN         Exercise 17    Exercise Name 17 Standing ABCs with YTB under foot  -CN      Cueing 17 Verbal;Demo  -CN      Reps 17 1  -CN         Exercise 18    Exercise Name 18 wall star  -CN      Cueing 18 Verbal;Demo  -CN      Reps 18 10  -CN      Time 18 RTB  -CN                User Key  (r) = Recorded By, (t) = Taken By, (c) = Cosigned By      Initials Name Provider Type    Angelic Washington, PT Physical Therapist                                  PT OP Goals       Row Name 12/11/23 1600          PT Short Term Goals    STG Date to Achieve 12/02/23  -CN     STG 1 Pt will report pain rated 2/10 at worst in order to demonstrate ability to return to normalized ADLs and functional activities.  -CN     STG 1 Progress Ongoing  -CN     STG 2 Pt will be independent with initial HEP for symptom management.  -CN     STG 2 Progress Met  -CN        Long Term Goals    LTG Date to Achieve 01/01/24  -CN     LTG 1 Pt will be independent and compliant with advanced HEP for long term management of symptoms and prevention of future  occurrence.  -CN     LTG 1 Progress Ongoing  -CN     LTG 2 Pt will be able to achieve abduction of 0-150 degrees FIR to T7 w/o pain in order to improve functional tasks and ADL performance.  -CN     LTG 2 Progress Ongoing;Partially Met  -CN     LTG 3 Pt will report 50% improvement with sleep quality in order to improve QOL  -CN     LTG 3 Progress Met  -CN     LTG 4 Pt will report pain free full return to exercise with  to demonstrate improvement in symptoms and quality of life  -CN     LTG 4 Progress Ongoing  -CN               User Key  (r) = Recorded By, (t) = Taken By, (c) = Cosigned By      Initials Name Provider Type    Angelic Washington, PT Physical Therapist                    Therapy Education  Given: HEP, Symptoms/condition management, Pain management, Posture/body mechanics, Mobility training  Program: Reinforced, Progressed  How Provided: Verbal, Demonstration  Provided to: Patient  Level of Understanding: Teach back education performed, Verbalized, Demonstrated              Time Calculation:   Start Time: 1530  Stop Time: 1610  Time Calculation (min): 40 min  Timed Charges  77622 - PT Therapeutic Exercise Minutes: 40  Total Minutes  Timed Charges Total Minutes: 40   Total Minutes: 40  Therapy Charges for Today       Code Description Service Date Service Provider Modifiers Qty    [id number] HC PT THER PROC EA 15 MIN 12/11/2023 Angelic Arnold, PT GP 3                      Angelic Arnold PT  12/11/2023

## 2023-12-13 ENCOUNTER — HOSPITAL ENCOUNTER (OUTPATIENT)
Dept: PHYSICAL THERAPY | Facility: HOSPITAL | Age: 53
Setting detail: THERAPIES SERIES
Discharge: HOME | End: 2023-12-13
Payer: COMMERCIAL

## 2023-12-13 DIAGNOSIS — M25.512 LEFT SHOULDER PAIN, UNSPECIFIED CHRONICITY: Primary | ICD-10-CM

## 2023-12-13 DIAGNOSIS — R29.3 POOR POSTURE: ICD-10-CM

## 2023-12-13 DIAGNOSIS — M62.81 DECREASED MUSCLE STRENGTH: ICD-10-CM

## 2023-12-13 PROCEDURE — 97110 THERAPEUTIC EXERCISES: CPT

## 2023-12-13 NOTE — THERAPY TREATMENT NOTE
Outpatient Physical Therapy Ortho Treatment Note  [facility]     Patient Name: Savannah Valenzuela  : 1970  MRN: [id number]  Today's Date: 2023      Visit Date: 2023    Visit Dx:    ICD-10-CM ICD-9-CM   1. Left shoulder pain, unspecified chronicity  M25.512 719.41   2. Poor posture  R29.3 781.92   3. Decreased muscle strength  M62.81 728.87       Patient Active Problem List   Diagnosis    Migraine    Allergic rhinitis    Depression    Obesity, Class II, BMI 35-39.9    S/P laparoscopic sleeve gastrectomy    Iron deficiency anemia    Paget's disease of bone        Past Medical History:   Diagnosis Date    Allergic rhinitis     Anemia     Chronic fatigue 3/27/2020    Depression     Encounter for screening for malignant neoplasm of colon 2021    Added automatically from request for surgery [id number]    Fatigue     GERD (gastroesophageal reflux disease)     Hematuria, microscopic 2016    Hiatal hernia     Hyperlipidemia     Hypertension     Low back strain     Fell on my back on aluminum bleachers    Medication management     Migraine     Multiple food allergies     Physical exam, annual         Past Surgical History:   Procedure Laterality Date     SECTION      COLONOSCOPY N/A 2021    Procedure: COLONOSCOPY INTO CECUM AND T.I. WITH COLD BIOPSY POLYPECTOMIES;  Surgeon: Ursula Torres MD;  Location: [facility] ENDOSCOPY;  Service: Gastroenterology;  Laterality: N/A;  PRE- SCREENING  POST- DIVERTICULOSIS, POLYPS, HEMORRHOIDS    DILATATION AND CURETTAGE      ENDOSCOPY N/A 2020    Procedure: ESOPHAGOGASTRODUODENOSCOPY WITH BIOPSY;  Surgeon: Cayetano Khan Jr., MD;  Location: [facility] ENDOSCOPY;  Service: General;  Laterality: N/A;  PREOP/ DYSPEPSIA  POSTOP/ GASTRITIS, ESOPHAGITIS, HIATAL HERNIA    GASTRIC SLEEVE LAPAROSCOPIC N/A 10/5/2020    Procedure: GASTRIC SLEEVE LAPAROSCOPIC WITH paraesophageal hernia repair, lysis of adhesions;  Surgeon: Cayetano Khan  MD Tessa;  [place] [facility] [place] [facility]  Service: Bariatric;  Laterality: N/A;    WISDOM TOOTH EXTRACTION                          PT Assessment/Plan       Row Name 12/13/23 1600          PT Assessment    Assessment Comments Pt returns to PT reporting decreased pain with activity today, however today was a low activity day so far. Progressed interventions to include prone ITY, 's carry, and wall shoulder taps. Pt tolerates interventions well. Pt reports increased pain with FIR stretch returning from stretch position to neutral. Pt cued on maintaining scapular engagement throughout return motion. Pt reports some reduction in pain but still present. Pt educated on POC and continuing scapular positional exercises at home. Pt verbalizes understanding. Pt remains a good candidate for skilled PT.  -CN (r) MH (t) CN (c)        PT Plan    PT Plan Comments Assess response to last visit. consider weight bearing OH activities with emphasis on scap control.  -CN (r) MH (t) CN (c)               User Key  (r) = Recorded By, (t) = Taken By, (c) = Cosigned By      Initials Name Provider Type    Angelic Washington, PT Physical Therapist    Maxwell Hess, PT Student PT Student                       OP Exercises       Row Name 12/13/23 1500             Subjective    Subjective Comments feeling ok, the pain hasn't bothered me today  -CN (r) MH (t) CN (c)         Total Minutes    30618 - PT Therapeutic Exercise Minutes 44  -CN (r) MH (t) CN (c)         Exercise 3    Exercise Name 3 single arm row  -CN (r) MH (t) CN (c)      Cueing 3 Verbal;Demo  -CN (r) MH (t) CN (c)      Sets 3 2  -CN (r) MH (t) CN (c)      Reps 3 10  -CN (r) MH (t) CN (c)      Time 3 GTB single handle  -CN (r) MH (t) CN (c)         Exercise 4    Exercise Name 4 UBE  -CN (r) MH (t) CN (c)      Time 4 2fwd/2retro  -CN (r) MH (t) CN (c)         Exercise 5    Exercise Name 5 standing HA  -CN (r) MH (t) CN (c)      Cueing 5 Verbal;Demo  -CN (r) MH (t) CN (c)       Sets 5 2  -CN (r) MH (t) CN (c)      Reps 5 10  -CN (r) MH (t) CN (c)      Time 5 RTB  -CN (r) MH (t) CN (c)         Exercise 6    Exercise Name 6 standing D2  -CN (r) MH (t) CN (c)      Cueing 6 Verbal;Demo  -CN (r) MH (t) CN (c)      Sets 6 2  -CN (r) MH (t) CN (c)      Reps 6 10  -CN (r) MH (t) CN (c)      Time 6 RTB  -CN (r) MH (t) CN (c)         Exercise 7    Exercise Name 7 prone ITY  -CN (r) MH (t) CN (c)      Cueing 7 Verbal;Demo  -CN (r) MH (t) CN (c)      Sets 7 2  -CN (r) MH (t) CN (c)      Reps 7 10  -CN (r) MH (t) CN (c)         Exercise 8    Exercise Name 8 shoulder taps in wall plank  -CN (r) MH (t) CN (c)      Cueing 8 Verbal;Demo  -CN (r) MH (t) CN (c)      Sets 8 2  -CN (r) MH (t) CN (c)      Reps 8 15  -CN (r) MH (t) CN (c)         Exercise 9    Exercise Name 9 Rows/ext  -CN (r) MH (t) CN (c)      Cueing 9 Verbal;Demo  -CN (r) MH (t) CN (c)      Sets 9 2  -CN (r) MH (t) CN (c)      Reps 9 10  -CN (r) MH (t) CN (c)      Time 9 GTB  -CN (r) MH (t) CN (c)         Exercise 10    Exercise Name 10 B shoulder flex with loop  -CN (r) MH (t) CN (c)      Cueing 10 Verbal;Demo  -CN (r) MH (t) CN (c)      Sets 10 2  -CN (r) MH (t) CN (c)      Reps 10 10  -CN (r) MH (t) CN (c)      Time 10 RTB  -CN (r) MH (t) CN (c)         Exercise 11    Exercise Name 11 Wall walk  -CN (r) MH (t) CN (c)      Cueing 11 Verbal;Demo  -CN (r) MH (t) CN (c)      Reps 11 5 laps  -CN (r) MH (t) CN (c)      Time 11 RTB  -CN (r) MH (t) CN (c)         Exercise 12    Exercise Name 12 FIR stretch with towel  -CN (r) MH (t) CN (c)      Cueing 12 Verbal;Demo  -CN (r) MH (t) CN (c)      Reps 12 10  -CN (r) MH (t) CN (c)      Time 12 10s, with towel  -CN (r) MH (t) CN (c)      Additional Comments cues for scap squeeze with FIR  -CN (r) MH (t) CN (c)         Exercise 13    Exercise Name 13  carry  -CN (r) MH (t) CN (c)      Cueing 13 Verbal;Demo  -CN (r) MH (t) CN (c)      Sets 13 1 lap around clinic  -CN (r) MH (t) CN (c)      Reps  13 5#  -CN (r) MH (t) CN (c)      Time 13 cues for stabilizing scap  -CN (r) MH (t) CN (c)         Exercise 14    Exercise Name 14 standing bilateral ER  -CN (r) MH (t) CN (c)      Cueing 14 Verbal;Demo  -CN (r) MH (t) CN (c)      Sets 14 2  -CN (r) MH (t) CN (c)      Reps 14 10  -CN (r) MH (t) CN (c)      Time 14 RTB  -CN (r) MH (t) CN (c)         Exercise 17    Exercise Name 17 Standing ABCs with YTB under foot  -CN (r) MH (t) CN (c)      Cueing 17 Verbal;Demo  -CN (r) MH (t) CN (c)      Reps 17 1  -CN (r) MH (t) CN (c)         Exercise 18    Exercise Name 18 wall star  -CN (r) MH (t) CN (c)      Cueing 18 Verbal;Demo  -CN (r) MH (t) CN (c)      Reps 18 10  -CN (r) MH (t) CN (c)      Time 18 RTB  -CN (r) MH (t) CN (c)                User Key  (r) = Recorded By, (t) = Taken By, (c) = Cosigned By      Initials Name Provider Type    Angelic Washington, PT Physical Therapist    Maxwell Hess, PT Student PT Student                                  PT OP Goals       Row Name 12/13/23 1600          PT Short Term Goals    STG Date to Achieve 12/02/23  -CN (r) MH (t) CN (c)     STG 1 Pt will report pain rated 2/10 at worst in order to demonstrate ability to return to normalized ADLs and functional activities.  -CN (r) MH (t) CN (c)     STG 1 Progress Ongoing  -CN (r) MH (t) CN (c)     STG 2 Pt will be independent with initial HEP for symptom management.  -CN (r) MH (t) CN (c)     STG 2 Progress Met  -CN (r) MH (t) CN (c)        Long Term Goals    LTG Date to Achieve 01/01/24  -CN (r) MH (t) CN (c)     LTG 1 Pt will be independent and compliant with advanced HEP for long term management of symptoms and prevention of future occurrence.  -CN (r) MH (t) CN (c)     LTG 1 Progress Ongoing  -CN (r) MH (t) CN (c)     LTG 2 Pt will be able to achieve abduction of 0-150 degrees FIR to T7 w/o pain in order to improve functional tasks and ADL performance.  -CN (r) MH (t) CN (c)     LTG 2 Progress Ongoing;Partially Met  -CN  (r) MH (t) CN (c)     LTG 3 Pt will report 50% improvement with sleep quality in order to improve QOL  -CN (r) MH (t) CN (c)     LTG 3 Progress Met  -CN (r) MH (t) CN (c)     LTG 4 Pt will report pain free full return to exercise with  to demonstrate improvement in symptoms and quality of life  -CN (r) MH (t) CN (c)     LTG 4 Progress Ongoing  -CN (r) MH (t) CN (c)               User Key  (r) = Recorded By, (t) = Taken By, (c) = Cosigned By      Initials Name Provider Type    Angelic Washington, PT Physical Therapist    Maxwell Hess, PT Student PT Student                    Therapy Education  Given: HEP, Symptoms/condition management, Pain management, Posture/body mechanics, Mobility training  Program: Reinforced, Progressed  How Provided: Verbal, Demonstration  Provided to: Patient  Level of Understanding: Teach back education performed, Verbalized, Demonstrated              Time Calculation:   Start Time: 1528  Stop Time: 1612  Time Calculation (min): 44 min  Total Timed Code Minutes- PT: 44 minute(s)  Timed Charges  51207 - PT Therapeutic Exercise Minutes: 44  Total Minutes  Timed Charges Total Minutes: 44   Total Minutes: 44  Therapy Charges for Today       Code Description Service Date Service Provider Modifiers Qty    [id number]  PT THER PROC EA 15 MIN 12/13/2023 Maxwell Cr, PT Student GP 3                      Maxwell Cr, PT Student  12/13/2023

## 2023-12-14 ENCOUNTER — PATIENT MESSAGE (OUTPATIENT)
Dept: ORTHOPEDIC SURGERY | Facility: CLINIC | Age: 53
End: 2023-12-14
Payer: COMMERCIAL

## 2023-12-14 DIAGNOSIS — M25.512 CHRONIC LEFT SHOULDER PAIN: ICD-10-CM

## 2023-12-14 DIAGNOSIS — G89.29 CHRONIC LEFT SHOULDER PAIN: ICD-10-CM

## 2023-12-14 DIAGNOSIS — M75.42 IMPINGEMENT SYNDROME OF LEFT SHOULDER: Primary | ICD-10-CM

## 2023-12-14 NOTE — TELEPHONE ENCOUNTER
Will place order for MRI as patient has done more than 6 weeks of conservative care including PT, anti-inflammatories, rest, ice and activity modification. Patient to follow up with Dr. Page once the results of the MRI are available.

## 2023-12-18 ENCOUNTER — HOSPITAL ENCOUNTER (OUTPATIENT)
Dept: MRI IMAGING | Facility: HOSPITAL | Age: 53
Discharge: HOME | End: 2023-12-18
Admitting: NURSE PRACTITIONER
Payer: COMMERCIAL

## 2023-12-18 DIAGNOSIS — M75.42 IMPINGEMENT SYNDROME OF LEFT SHOULDER: ICD-10-CM

## 2023-12-18 DIAGNOSIS — G89.29 CHRONIC LEFT SHOULDER PAIN: ICD-10-CM

## 2023-12-18 DIAGNOSIS — M25.512 CHRONIC LEFT SHOULDER PAIN: ICD-10-CM

## 2023-12-18 PROCEDURE — 73221 MRI JOINT UPR EXTREM W/O DYE: CPT

## 2023-12-19 NOTE — PROGRESS NOTES
"  Patient: Savannah Valenzuela  YOB: 1970  Date of Service: 12/19/2023    Chief Complaints: Left shoulder pain    Subjective:    History of Present Illness: Pt is seen in the office today with complaints of left shoulder pain she is here follow-up of her MRI MRI demonstrates a bursal sided rotator cuff tear approximately 50%.  We injected her shoulder in October she did get some relief she is currently doing physical therapy        Allergies:   Allergies   Allergen Reactions    Latex Urinary Retention and Irritability     Painful urination with latex cath; Patient states \"sensitivity\"         Medications:   Home Medications:  Current Outpatient Medications on File Prior to Visit   Medication Sig    buPROPion XL (WELLBUTRIN XL) 150 MG 24 hr tablet Take 1 tablet by mouth Daily.    Calcium 250 MG capsule Take 500 mg by mouth 3 (Three) Times a Day.    FLUoxetine (PROzac) 40 MG capsule Take 1 capsule by mouth Daily.    multivitamin with minerals tablet tablet Take 1 tablet by mouth Daily.    Progesterone (Prometrium) 200 MG capsule Take 1 capsule by mouth Daily in the evening    topiramate (TOPAMAX) 50 MG tablet Take 1 tablet by mouth Daily for 180 days.     No current facility-administered medications on file prior to visit.     Current Medications:  Scheduled Meds:  Continuous Infusions:No current facility-administered medications for this visit.    PRN Meds:.    I have reviewed the patient's medical history in detail and updated the computerized patient record.  Review and summarization of old records include:    Past Medical History:   Diagnosis Date    Allergic rhinitis     Anemia     Chronic fatigue 3/27/2020    Depression     Encounter for screening for malignant neoplasm of colon 5/19/2021    Added automatically from request for surgery [id number]    Fatigue     GERD (gastroesophageal reflux disease)     Hematuria, microscopic 8/24/2016    Hiatal hernia     Hyperlipidemia     Hypertension     Low back " "strain 1989    Fell on my back on aluminum bleachers    Medication management     Migraine     Multiple food allergies     Physical exam, annual         Past Surgical History:   Procedure Laterality Date     SECTION      COLONOSCOPY N/A 2021    Procedure: COLONOSCOPY INTO CECUM AND T.I. WITH COLD BIOPSY POLYPECTOMIES;  Surgeon: Ursula Torres MD;  Location: [facility] ENDOSCOPY;  Service: Gastroenterology;  Laterality: N/A;  PRE- SCREENING  POST- DIVERTICULOSIS, POLYPS, HEMORRHOIDS    DILATATION AND CURETTAGE      ENDOSCOPY N/A 2020    Procedure: ESOPHAGOGASTRODUODENOSCOPY WITH BIOPSY;  Surgeon: Cayetano Khan Jr., MD;  [facility];  Service: General;  Laterality: N/A;  PREOP/ DYSPEPSIA  POSTOP/ GASTRITIS, ESOPHAGITIS, HIATAL HERNIA    GASTRIC SLEEVE LAPAROSCOPIC N/A 10/5/2020    Procedure: GASTRIC SLEEVE LAPAROSCOPIC WITH paraesophageal hernia repair, lysis of adhesions;  Surgeon: Cayetano Khan Jr., MD;  Location:  [facility];  Service: Bariatric;  Laterality: N/A;    WISDOM TOOTH EXTRACTION          Social History     Occupational History    Not on file   Tobacco Use    Smoking status: Never     Passive exposure: Past    Smokeless tobacco: Never   Vaping Use    Vaping Use: Never used   Substance and Sexual Activity    Alcohol use: No     Comment: Kay: rare    Drug use: No    Sexual activity: Not Currently     Partners: Male      Social History     Social History Narrative    Not on file        Family History   Problem Relation Age of Onset    Heart disease Father     Hyperlipidemia Father     Malig Hyperthermia Neg Hx        ROS: 14 point review of systems was performed and was negative except for documented findings in HPI and today's encounter.     Allergies:   Allergies   Allergen Reactions    Latex Urinary Retention and Irritability     Painful urination with latex cath; Patient states \"sensitivity\"       Constitutional:  Denies fever, shaking or chills "   Eyes:  Denies change in visual acuity   HENT:  Denies nasal congestion or sore throat   Respiratory:  Denies cough or shortness of breath   Cardiovascular:  Denies chest pain or severe LE edema   GI:  Denies abdominal pain, nausea, vomiting, bloody stools or diarrhea   Musculoskeletal:  Numbness, tingling, or loss of motor function only as noted above in history of present illness.  : Denies painful urination or hematuria  Integument:  Denies rash, lesion or ulceration   Neurologic:  Denies headache or focal weakness  Endocrine:  Denies lymphadenopathy  Psych:  Denies confusion or change in mental status   Hem:  Denies active bleeding      Physical Exam: 53 y.o. female  Wt Readings from Last 3 Encounters:   11/17/23 89.8 kg (198 lb)   11/13/23 92.1 kg (203 lb)   10/24/23 93.4 kg (205 lb 14.4 oz)       There is no height or weight on file to calculate BMI.    There were no vitals filed for this visit.  Vital signs reviewed.   General Appearance:    Alert, cooperative, in no acute distress                    Ortho exam      Physical exam of the left shoulder reveals no overlying skin changes no lymphedema no lymphadenopathy.  Patient has active flexion 180 with mild symptoms abduction is similar external rotation is to 50 and internal rotation to the upper lumbar spine with mild symptoms.  Patient has good rotator cuff strength 4+ over 5 with isometric strength testing with pain.  Patient has a positive impingement and a positive Aiken sign.  Patient has good cervical range of motion which is full and asymptomatic no radicular symptoms.  Patient has a normal elbow exam.  Good distal pulses are presentPatient has pain with overhead activity and a positive Neer sign and a positive empty can sign  They have a positive drop arm any definitive painful arc        MRIs as above I have reviewed and agree    Assessment: Left shoulder partial rotator cuff tear I would like to try 1 more injection in 1 month that would be  3 months if she is not appreciably better after that then we would consider arthroscopic management.  At the time of surgery would need to be debridement, decompression or perhaps even a rotator cuff repair    Plan: Is as above  Follow up as indicated.  Ice, elevate, and rest as needed.  Discussed conservative measures of pain control including ice, bracing.  Also talked about the importance of strengthening     Jasmine Page M.D.

## 2023-12-20 ENCOUNTER — HOSPITAL ENCOUNTER (OUTPATIENT)
Dept: PHYSICAL THERAPY | Facility: HOSPITAL | Age: 53
Setting detail: THERAPIES SERIES
Discharge: HOME | End: 2023-12-20
Payer: COMMERCIAL

## 2023-12-20 DIAGNOSIS — M25.512 LEFT SHOULDER PAIN, UNSPECIFIED CHRONICITY: Primary | ICD-10-CM

## 2023-12-20 DIAGNOSIS — R29.3 POOR POSTURE: ICD-10-CM

## 2023-12-20 DIAGNOSIS — M62.81 DECREASED MUSCLE STRENGTH: ICD-10-CM

## 2023-12-20 PROCEDURE — 97110 THERAPEUTIC EXERCISES: CPT

## 2023-12-20 NOTE — THERAPY TREATMENT NOTE
Outpatient Physical Therapy Ortho Treatment Note  [facility]     Patient Name: Savannah Valenzuela  : 1970  MRN: [id number]  Today's Date: 2023      Visit Date: 2023    Visit Dx:    ICD-10-CM ICD-9-CM   1. Left shoulder pain, unspecified chronicity  M25.512 719.41   2. Poor posture  R29.3 781.92   3. Decreased muscle strength  M62.81 728.87       Patient Active Problem List   Diagnosis    Migraine    Allergic rhinitis    Depression    Obesity, Class II, BMI 35-39.9    S/P laparoscopic sleeve gastrectomy    Iron deficiency anemia    Paget's disease of bone        Past Medical History:   Diagnosis Date    Allergic rhinitis     Anemia     Chronic fatigue 3/27/2020    Depression     Encounter for screening for malignant neoplasm of colon 2021    Added automatically from request for surgery [id number]    Fatigue     GERD (gastroesophageal reflux disease)     Hematuria, microscopic 2016    Hiatal hernia     Hyperlipidemia     Hypertension     Low back strain     Fell on my back on aluminum bleachers    Medication management     Migraine     Multiple food allergies     Physical exam, annual         Past Surgical History:   Procedure Laterality Date     SECTION      COLONOSCOPY N/A 2021    Procedure: COLONOSCOPY INTO CECUM AND T.I. WITH COLD BIOPSY POLYPECTOMIES;  Surgeon: Ursula Torres MD;  Location: [facility] ENDOSCOPY;  Service: Gastroenterology;  Laterality: N/A;  PRE- SCREENING  POST- DIVERTICULOSIS, POLYPS, HEMORRHOIDS    DILATATION AND CURETTAGE      ENDOSCOPY N/A 2020    Procedure: ESOPHAGOGASTRODUODENOSCOPY WITH BIOPSY;  Surgeon: Cayetano Khan Jr., MD;  Location: [facility] ENDOSCOPY;  Service: General;  Laterality: N/A;  PREOP/ DYSPEPSIA  POSTOP/ GASTRITIS, ESOPHAGITIS, HIATAL HERNIA    GASTRIC SLEEVE LAPAROSCOPIC N/A 10/5/2020    Procedure: GASTRIC SLEEVE LAPAROSCOPIC WITH paraesophageal hernia repair, lysis of adhesions;  Surgeon: Cayetano Khan  MD Tessa;  [place] [facility] [place] [facility] [place]  Service: Bariatric;  Laterality: N/A;    WISDOM TOOTH EXTRACTION                          PT Assessment/Plan       Row Name 12/20/23 1211          PT Assessment    Assessment Comments Pt returns to PT following MRI showing partial thickness supraspinatus tear and acromial spur. Pt to follow up with MD on Friday regarding results. Pt continues to slowly progress with PT intervention, most pain noted with FIR, horizonal adduction and return to neutral position with exercises, which improves slightly with scap retraction. Pt continues to be a good candidate for skilled PT services at this time.  -CN        PT Plan    PT Plan Comments Follow up regarding MD appointment, continue to progress scapular and RTC strengthening.  -CN               User Key  (r) = Recorded By, (t) = Taken By, (c) = Cosigned By      Initials Name Provider Type    Angelic Washington, PT Physical Therapist                       OP Exercises       Row Name 12/20/23 1100             Subjective    Subjective Comments I am doing ok, I reached a certain way yesterday and it irritated it.  -CN         Total Minutes    11909 - PT Therapeutic Exercise Minutes 40  -CN         Exercise 3    Exercise Name 3 single arm row  -CN      Cueing 3 Verbal;Demo  -CN      Sets 3 2  -CN      Reps 3 10  -CN      Time 3 GTB single handle  -CN         Exercise 4    Exercise Name 4 UBE  -CN      Time 4 2fwd/2retro  -CN         Exercise 5    Exercise Name 5 standing HA  -CN      Cueing 5 Verbal;Demo  -CN      Sets 5 2  -CN      Reps 5 10  -CN      Time 5 RTB  -CN         Exercise 6    Exercise Name 6 standing D2  -CN      Cueing 6 Verbal;Demo  -CN      Sets 6 2  -CN      Reps 6 10  -CN      Time 6 RTB  -CN         Exercise 7    Exercise Name 7 prone ITY  -CN      Cueing 7 Verbal;Demo  -CN      Sets 7 2  -CN      Reps 7 10  -CN         Exercise 8    Exercise Name 8 shoulder taps in wall plank  -CN      Cueing 8 Verbal;Demo  -CN       Sets 8 2  -CN      Reps 8 15  -CN         Exercise 9    Exercise Name 9 Rows/ext  -CN      Cueing 9 Verbal;Demo  -CN      Sets 9 2  -CN      Reps 9 10  -CN      Time 9 GTB  -CN         Exercise 10    Exercise Name 10 B shoulder flex with loop  -CN      Cueing 10 Verbal;Demo  -CN      Sets 10 2  -CN      Reps 10 10  -CN      Time 10 RTB  -CN         Exercise 11    Exercise Name 11 Wall walk  -CN      Cueing 11 Verbal;Demo  -CN      Reps 11 5 laps  -CN      Time 11 RTB  -CN         Exercise 12    Exercise Name 12 FIR stretch with towel  -CN      Cueing 12 Verbal;Demo  -CN      Reps 12 10  -CN      Time 12 10s, with towel  -CN      Additional Comments cues for scap squeeze with FIR  -CN         Exercise 14    Exercise Name 14 standing bilateral ER  -CN      Cueing 14 Verbal;Demo  -CN      Sets 14 2  -CN      Reps 14 10  -CN      Time 14 RTB  -CN         Exercise 17    Exercise Name 17 Standing ABCs with YTB under foot  -CN      Cueing 17 Verbal;Demo  -CN      Reps 17 1  -CN                User Key  (r) = Recorded By, (t) = Taken By, (c) = Cosigned By      Initials Name Provider Type    Angelic Washington, PT Physical Therapist                                  PT OP Goals       Row Name 12/20/23 1200          PT Short Term Goals    STG Date to Achieve 12/02/23  -CN     STG 1 Pt will report pain rated 2/10 at worst in order to demonstrate ability to return to normalized ADLs and functional activities.  -CN     STG 1 Progress Ongoing  -CN     STG 2 Pt will be independent with initial HEP for symptom management.  -CN     STG 2 Progress Met  -CN        Long Term Goals    LTG Date to Achieve 01/01/24  -CN     LTG 1 Pt will be independent and compliant with advanced HEP for long term management of symptoms and prevention of future occurrence.  -CN     LTG 1 Progress Ongoing  -CN     LTG 2 Pt will be able to achieve abduction of 0-150 degrees FIR to T7 w/o pain in order to improve functional tasks and ADL  performance.  -CN     LTG 2 Progress Ongoing;Partially Met  -CN     LTG 3 Pt will report 50% improvement with sleep quality in order to improve QOL  -CN     LTG 3 Progress Met  -CN     LTG 4 Pt will report pain free full return to exercise with  to demonstrate improvement in symptoms and quality of life  -CN     LTG 4 Progress Ongoing  -CN               User Key  (r) = Recorded By, (t) = Taken By, (c) = Cosigned By      Initials Name Provider Type    Angelic Washington, PT Physical Therapist                    Therapy Education  Given: HEP, Symptoms/condition management, Pain management, Posture/body mechanics, Mobility training  Program: Reinforced, Progressed  How Provided: Verbal, Demonstration  Provided to: Patient  Level of Understanding: Teach back education performed, Verbalized, Demonstrated              Time Calculation:   Start Time: 1132  Stop Time: 1212  Time Calculation (min): 40 min  Timed Charges  08287 - PT Therapeutic Exercise Minutes: 40  Total Minutes  Timed Charges Total Minutes: 40   Total Minutes: 40  Therapy Charges for Today       Code Description Service Date Service Provider Modifiers Qty    36226963880  PT THER PROC EA 15 MIN 12/20/2023 Angelic Arnold, PT GP 3                      Angelic Arnold PT  12/20/2023

## 2023-12-22 ENCOUNTER — OFFICE VISIT (OUTPATIENT)
Dept: ORTHOPEDIC SURGERY | Facility: CLINIC | Age: 53
End: 2023-12-22
Payer: COMMERCIAL

## 2023-12-22 VITALS — WEIGHT: 196.3 LBS | TEMPERATURE: 98.6 F | HEIGHT: 64 IN | BODY MASS INDEX: 33.51 KG/M2

## 2023-12-22 DIAGNOSIS — M75.112 INCOMPLETE TEAR OF LEFT ROTATOR CUFF, UNSPECIFIED WHETHER TRAUMATIC: Primary | ICD-10-CM

## 2023-12-22 PROCEDURE — 99213 OFFICE O/P EST LOW 20 MIN: CPT | Performed by: ORTHOPAEDIC SURGERY

## 2024-01-04 ENCOUNTER — HOSPITAL ENCOUNTER (OUTPATIENT)
Dept: PHYSICAL THERAPY | Facility: HOSPITAL | Age: 54
Setting detail: THERAPIES SERIES
Discharge: HOME | End: 2024-01-04
Payer: COMMERCIAL

## 2024-01-04 DIAGNOSIS — R29.3 POOR POSTURE: ICD-10-CM

## 2024-01-04 DIAGNOSIS — M62.81 DECREASED MUSCLE STRENGTH: ICD-10-CM

## 2024-01-04 DIAGNOSIS — M25.512 LEFT SHOULDER PAIN, UNSPECIFIED CHRONICITY: Primary | ICD-10-CM

## 2024-01-04 PROCEDURE — 97110 THERAPEUTIC EXERCISES: CPT | Performed by: PHYSICAL THERAPIST

## 2024-01-04 NOTE — THERAPY TREATMENT NOTE
Outpatient Physical Therapy Ortho Treatment Note  [facility]     Patient Name: Savannah Valenzuela  : 1970  MRN: [id number]  Today's Date: 2024      Visit Date: 2024    Visit Dx:    ICD-10-CM ICD-9-CM   1. Left shoulder pain, unspecified chronicity  M25.512 719.41   2. Poor posture  R29.3 781.92   3. Decreased muscle strength  M62.81 728.87       Patient Active Problem List   Diagnosis    Migraine    Allergic rhinitis    Depression    Obesity, Class II, BMI 35-39.9    S/P laparoscopic sleeve gastrectomy    Iron deficiency anemia    Paget's disease of bone        Past Medical History:   Diagnosis Date    Allergic rhinitis     Anemia     Chronic fatigue 2020    Depression     Encounter for screening for malignant neoplasm of colon 2021    Added automatically from request for surgery [id number]    Fatigue     GERD (gastroesophageal reflux disease)     Hematuria, microscopic 2016    Hiatal hernia     Hyperlipidemia     Hypertension     Low back strain     Fell on my back on aluminum bleachers    Medication management     Migraine     Multiple food allergies     Physical exam, annual     Rotator cuff syndrome 2023        Past Surgical History:   Procedure Laterality Date     SECTION      COLONOSCOPY N/A 2021    Procedure: COLONOSCOPY INTO CECUM AND T.I. WITH COLD BIOPSY POLYPECTOMIES;  Surgeon: Ursula Torres MD;  Location: [facility] ENDOSCOPY;  Service: Gastroenterology;  Laterality: N/A;  PRE- SCREENING  POST- DIVERTICULOSIS, POLYPS, HEMORRHOIDS    DILATATION AND CURETTAGE      ENDOSCOPY N/A 2020    Procedure: ESOPHAGOGASTRODUODENOSCOPY WITH BIOPSY;  Surgeon: Cayetano Khan Jr., MD;  Location: [facility] ENDOSCOPY;  Service: General;  Laterality: N/A;  PREOP/ DYSPEPSIA  POSTOP/ GASTRITIS, ESOPHAGITIS, HIATAL HERNIA    GASTRIC SLEEVE LAPAROSCOPIC N/A 10/5/2020    Procedure: GASTRIC SLEEVE LAPAROSCOPIC WITH paraesophageal hernia repair, lysis of  adhesions;  Surgeon: Cayetano Khan Jr., MD;  [place] [facility] [place] [facility] [place]  Service: Bariatric;  Laterality: N/A;    WISDOM TOOTH EXTRACTION                          PT Assessment/Plan       Row Name 01/04/24 1345          PT Assessment    Assessment Comments Ms Valenzuela returns to the clinic reporting continue L shoulder pain, particularly with FIR and abduction ROM. She reports that she is eligible for a repeat injection in the next 1-2 weeks.  We progressed resistance with all banded exercises today.  Reviewed anatomy of the shoulder and physiology of healing. We decided to pull her back to once a week vs BIW. Updated her HEP, strengthening to be once every other day, stretches/ROMdaily.  She reports good adherence to HEP.  Ms. Valenzuela continues to be a good candidate for skilled physical therapy.  -GJ        PT Plan    PT Plan Comments assess response to increase in T band resistance and to pulling back on formal therapy visits.  May consider FIR mobs at [facility] next session, likely no new exercises  -GJ               User Key  (r) = Recorded By, (t) = Taken By, (c) = Cosigned By      Initials Name Provider Type    Camilo Ponce, PT Physical Therapist                       OP Exercises       Row Name 01/04/24 1304 01/04/24 1300          Subjective    Subjective Comments -- my (L) shoulder still hurts, especially with (FIR)  -GJ        Total Minutes    54613 - PT Therapeutic Exercise Minutes 40  -GJ --        Exercise 3    Exercise Name 3 -- single arm row  -GJ     Cueing 3 -- Verbal;Demo  -GJ     Sets 3 -- 2  -GJ     Reps 3 -- 10  -GJ     Time 3 -- GTB single handle  -GJ        Exercise 4    Exercise Name 4 -- UBE  -GJ     Time 4 -- 2fwd/2retro  -GJ        Exercise 5    Exercise Name 5 -- standing HA  -GJ     Cueing 5 -- Verbal;Demo  -GJ     Sets 5 -- 2  -GJ     Reps 5 -- 10  -GJ     Time 5 -- GTB  -GJ        Exercise 6    Exercise Name 6 -- standing D2  -GJ     Cueing 6 -- Verbal;Demo  -GJ     Sets 6 -- 2   -GJ     Reps 6 -- 10  -GJ     Time 6 -- GTB  -GJ        Exercise 7    Exercise Name 7 -- prone ITY  -GJ     Cueing 7 -- Verbal;Demo  -GJ     Sets 7 -- 2  -GJ     Reps 7 -- 10  -GJ        Exercise 8    Exercise Name 8 -- shoulder taps in wall plank  -GJ     Cueing 8 -- Verbal;Demo  -GJ     Sets 8 -- 2  -GJ     Reps 8 -- 15  -GJ        Exercise 9    Exercise Name 9 -- Rows/ext  -GJ     Cueing 9 -- Verbal;Demo  -GJ     Sets 9 -- 2  -GJ     Reps 9 -- 10  -GJ     Time 9 -- GTB  -GJ        Exercise 10    Exercise Name 10 -- B shoulder flex with loop  -GJ     Cueing 10 -- Verbal;Demo  -GJ     Sets 10 -- 2  -GJ     Reps 10 -- 10  -GJ     Time 10 -- GTB  -GJ        Exercise 11    Exercise Name 11 -- Wall walk  -GJ     Cueing 11 -- Verbal;Demo  -GJ     Reps 11 -- 5 laps  -GJ     Time 11 -- GTB  -GJ        Exercise 14    Exercise Name 14 -- standing bilateral ER  -GJ     Cueing 14 -- Verbal;Demo  -GJ     Sets 14 -- 2  -GJ     Reps 14 -- 10  -GJ     Time 14 -- GTB  -GJ               User Key  (r) = Recorded By, (t) = Taken By, (c) = Cosigned By      Initials Name Provider Type    Camilo Ponce, PT Physical Therapist                                  PT OP Goals       Row Name 01/04/24 1300          PT Short Term Goals    STG Date to Achieve 12/02/23  -GJ     STG 1 Pt will report pain rated 2/10 at worst in order to demonstrate ability to return to normalized ADLs and functional activities.  -GJ     STG 1 Progress Ongoing  -GJ     STG 2 Pt will be independent with initial HEP for symptom management.  -GJ     STG 2 Progress Met  -GJ        Long Term Goals    LTG Date to Achieve 01/01/24  -GJ     LTG 1 Pt will be independent and compliant with advanced HEP for long term management of symptoms and prevention of future occurrence.  -GJ     LTG 1 Progress Ongoing  -GJ     LTG 2 Pt will be able to achieve abduction of 0-150 degrees FIR to T7 w/o pain in order to improve functional tasks and ADL performance.  -GJ     LTG 2 Progress  Ongoing;Partially Met  -GJ     LTG 3 Pt will report 50% improvement with sleep quality in order to improve QOL  -GJ     LTG 3 Progress Met  -GJ     LTG 4 Pt will report pain free full return to exercise with  to demonstrate improvement in symptoms and quality of life  -GJ     LTG 4 Progress Ongoing  -GJ               User Key  (r) = Recorded By, (t) = Taken By, (c) = Cosigned By      Initials Name Provider Type     Camilo Milner, PT Physical Therapist                    Therapy Education  Education Details: [id number], reviewed activity modifications, discussed pulling her formal visits back to once a week.  Given: HEP, Symptoms/condition management, Pain management, Posture/body mechanics, Fall prevention and home safety, Mobility training  Program: Reinforced, Progressed, Modified  How Provided: Verbal, Demonstration, Written  Provided to: Patient  Level of Understanding: Teach back education performed, Verbalized, Demonstrated              Time Calculation:   Start Time: 1315  Stop Time: 1400  Time Calculation (min): 45 min  Timed Charges  00921 - PT Therapeutic Exercise Minutes: 40  Total Minutes  Timed Charges Total Minutes: 40   Total Minutes: 40  Therapy Charges for Today       Code Description Service Date Service Provider Modifiers Qty    [id number]  PT THER PROC EA 15 MIN 1/4/2024 Camilo Milner, PT GP 3                      Camilo Milner, PT  1/4/2024

## 2024-01-08 ENCOUNTER — APPOINTMENT (OUTPATIENT)
Dept: PHYSICAL THERAPY | Facility: HOSPITAL | Age: 54
End: 2024-01-08
Payer: COMMERCIAL

## 2024-01-12 ENCOUNTER — HOSPITAL ENCOUNTER (OUTPATIENT)
Dept: PHYSICAL THERAPY | Facility: HOSPITAL | Age: 54
Setting detail: THERAPIES SERIES
Discharge: HOME | End: 2024-01-12
Payer: COMMERCIAL

## 2024-01-12 DIAGNOSIS — R29.3 POOR POSTURE: ICD-10-CM

## 2024-01-12 DIAGNOSIS — M62.81 DECREASED MUSCLE STRENGTH: ICD-10-CM

## 2024-01-12 DIAGNOSIS — M25.512 LEFT SHOULDER PAIN, UNSPECIFIED CHRONICITY: Primary | ICD-10-CM

## 2024-01-12 PROCEDURE — 97110 THERAPEUTIC EXERCISES: CPT | Performed by: PHYSICAL THERAPIST

## 2024-01-12 PROCEDURE — 97530 THERAPEUTIC ACTIVITIES: CPT | Performed by: PHYSICAL THERAPIST

## 2024-01-12 PROCEDURE — 97140 MANUAL THERAPY 1/> REGIONS: CPT | Performed by: PHYSICAL THERAPIST

## 2024-01-12 NOTE — THERAPY PROGRESS REPORT/RE-CERT
Outpatient Physical Therapy Ortho Progress Note  [facility]     Patient Name: Savannah Valenzuela  : 1970  MRN: [id number]  Today's Date: 2024      Visit Date: 2024    Visit Dx:    ICD-10-CM ICD-9-CM   1. Left shoulder pain, unspecified chronicity  M25.512 719.41   2. Poor posture  R29.3 781.92   3. Decreased muscle strength  M62.81 728.87       Patient Active Problem List   Diagnosis    Migraine    Allergic rhinitis    Depression    Obesity, Class II, BMI 35-39.9    S/P laparoscopic sleeve gastrectomy    Iron deficiency anemia    Paget's disease of bone        Past Medical History:   Diagnosis Date    Allergic rhinitis     Anemia     Chronic fatigue 2020    Depression     Encounter for screening for malignant neoplasm of colon 2021    Added automatically from request for surgery [id number]    Fatigue     GERD (gastroesophageal reflux disease)     Hematuria, microscopic 2016    Hiatal hernia     Hyperlipidemia     Hypertension     Low back strain     Fell on my back on aluminum bleachers    Medication management     Migraine     Multiple food allergies     Physical exam, annual     Rotator cuff syndrome 2023        Past Surgical History:   Procedure Laterality Date     SECTION      COLONOSCOPY N/A 2021    Procedure: COLONOSCOPY INTO CECUM AND T.I. WITH COLD BIOPSY POLYPECTOMIES;  Surgeon: Ursula Torres MD;  Location: [facility] ENDOSCOPY;  Service: Gastroenterology;  Laterality: N/A;  PRE- SCREENING  POST- DIVERTICULOSIS, POLYPS, HEMORRHOIDS    DILATATION AND CURETTAGE      ENDOSCOPY N/A 2020    Procedure: ESOPHAGOGASTRODUODENOSCOPY WITH BIOPSY;  Surgeon: Cayetano Khan Jr., MD;  Location: [facility] ENDOSCOPY;  Service: General;  Laterality: N/A;  PREOP/ DYSPEPSIA  POSTOP/ GASTRITIS, ESOPHAGITIS, HIATAL HERNIA    GASTRIC SLEEVE LAPAROSCOPIC N/A 10/5/2020    Procedure: GASTRIC SLEEVE LAPAROSCOPIC WITH paraesophageal hernia repair, lysis of  adhesions;  Surgeon: Cayetano Khan Jr., MD;  Location: [facility] OR [facility] [place];  Service: Bariatric;  Laterality: N/A;    WISDOM TOOTH EXTRACTION          PT Ortho       Row Name 01/12/24 1100       Posture/Observations    Forward Head Mild;Moderate  -GJ    Rounded Shoulders Bilateral:;Mild;Moderate  -GJ       Shoulder Impingement/Rotator Cuff Special Tests    Full Can Test (RC Lesion) Left:  painful, able to hold  -GJ    Empty Can Test (RC Lesion) Left:;Positive  painful, unable to hold  -GJ       Left Upper Ext    Lt Shoulder Abduction AROM 95 seated, painful  -GJ    Lt Shoulder Flexion AROM 145 seated, painful  -GJ    Lt Shoulder Flexion PROM 140 supine, painful  -GJ    Lt Shoulder External Rotation PROM 40 supine pos painful  -GJ    Lt Shoulder Internal Rotation AROM FIR midline sacrum,  -GJ       MMT Left Upper Ext    Lt Shoulder Flexion MMT, Gross Movement (4/5) good  in available range to isometric testing  -GJ    Lt Shoulder ABduction MMT, Gross Movement (4-/5) good minus  in available range to isometric testing  -GJ    Lt Shoulder Internal Rotation MMT, Gross Movement (4+/5) good plus  -GJ    Lt Shoulder External Rotation MMT, Gross Movement (4/5) good  -GJ              User Key  (r) = Recorded By, (t) = Taken By, (c) = Cosigned By      Initials Name Provider Type    GJ Camilo Milner, PT Physical Therapist                                 PT Assessment/Plan       Row Name 01/12/24 1245          PT Assessment    Functional Limitations Limitation in home management;Limitations in community activities;Limitations in functional capacity and performance;Performance in leisure activities;Performance in self-care ADL;Performance in work activities  -GJ     Impairments Impaired flexibility;Impaired muscle endurance;Impaired muscle length;Impaired muscle power;Impaired postural alignment;Joint mobility;Muscle strength;Pain;Poor body mechanics;Posture;Range of motion  -GJ     Assessment Comments Ms. Valenzuela is  52 y/o R  handed female. She has attended 12 sessions of physical therapy for her L shoulder pain.  Progress towards functional goals is limited, having met 1 of 2 STG's and 0 of 4 LTG's.  She reports frustration re: pain/progress at this time. She is due to see MD on 1/15/2024 for likely repeat injection for her L shoulder.  See ortho section for updated ROM/strength assessments. SHe demonstrates decreased A/PROM of the L shoulder primaryily secondary to pain. We have pulled her back to once a week for formal PT sessions. Today, we decided to hold strengthening until at least one week from today to see if this allows for relief from her pain and updated her HEP for a ROM program only. She is in agreement with this plan.  If she continues to experience worsening symptoms/decreasing ROM, may consider holding formal therapy at 1/19/2024 session, also pending MD visit. Ms. Valenzuela continues to be a candidate for physical therapy.  -GJ     Please refer to paper survey for additional self-reported information Yes  -GJ     Rehab Potential Good  -GJ     Patient/caregiver participated in establishment of treatment plan and goals Yes  -GJ     Patient would benefit from skilled therapy intervention Yes  -GJ        PT Plan    PT Frequency 1x/week;2x/week  -GJ     Predicted Duration of Therapy Intervention (PT) 10 visits  -GJ     Planned CPT's? PT RE-EVAL: 29170;PT THER PROC EA 15 MIN: 19679;PT THER ACT EA 15 MIN: 92736;PT MANUAL THERAPY EA 15 MIN: 74404;PT NEUROMUSC RE-EDUCATION EA 15 MIN: 44104;PT HOT OR COLD PACK TREAT MCARE;PT ELECTRICAL STIM UNATTEND:   -GJ     PT Plan Comments assess response to injection and holding strengthening activities.  DIscuss if we should hold formal therapy vs. continue.  Work on ROM of the L shoudler, possibly change strenghening program to SL ER, SA punch, SL flexion, rows (bent over)  -GJ               User Key  (r) = Recorded By, (t) = Taken By, (c) = Cosigned By      Initials Name Provider Type     Camilo Ponce, PT Physical Therapist                       OP Exercises       Row Name 01/12/24 1127 01/12/24 1100          Subjective    Subjective Comments -- i'm getting frustrated  -GJ        Total Minutes    65016 - PT Therapeutic Exercise Minutes 11  -GJ --     12116 - PT Therapeutic Activity Minutes 15  assessement /education  -GJ --     55082 - PT Manual Therapy Minutes 17  -GJ --        Exercise 1    Exercise Name 1 -- doorway pec stretch  -GJ     Cueing 1 -- Verbal  -GJ     Reps 1 -- 3  -GJ     Time 1 -- 20 sec  -GJ        Exercise 2    Exercise Name 2 -- supine shoulder flexion in supine, dowel  -GJ     Cueing 2 -- Verbal;Demo  -GJ     Reps 2 -- 15  -GJ        Exercise 4    Exercise Name 4 -- UBE  -GJ     Time 4 -- 2fwd/2retro  -GJ        Exercise 12    Exercise Name 12 -- FIR stretch with towel  -GJ     Cueing 12 -- Verbal;Demo  -GJ     Reps 12 -- 10  -GJ     Time 12 -- 10s, with towel  -GJ        Exercise 13    Exercise Name 13 -- shoulder flexion on wall  -GJ     Cueing 13 -- Verbal;Demo  -GJ     Reps 13 -- 15  -GJ               User Key  (r) = Recorded By, (t) = Taken By, (c) = Cosigned By      Initials Name Provider Type    Camilo Ponce, PT Physical Therapist                             Manual Rx (last 36 hours)       Manual Treatments       Row Name 01/12/24 1127             Total Minutes    04037 - PT Manual Therapy Minutes 17  -GJ         Manual Rx 2    Manual Rx 2 Location RS ER/IR, 110 scaption  -GJ      Manual Rx 2 Type EC, 3 x 45 s each  -GJ      Manual Rx 2 Grade L shoulder  -GJ         Manual Rx 4    Manual Rx 4 Location PROM L shoulder, flexion, ER,  -GJ                User Key  (r) = Recorded By, (t) = Taken By, (c) = Cosigned By      Initials Name Provider Type    Camilo Ponce, PT Physical Therapist                     PT OP Goals       Row Name 01/12/24 1100          PT Short Term Goals    STG Date to Achieve 02/09/24  -GJ     STG 1 Pt will report pain rated 2/10 at  worst in order to demonstrate ability to return to normalized ADLs and functional activities.  -GJ     STG 1 Progress Ongoing  -GJ     STG 2 Pt will be independent with initial HEP for symptom management.  -GJ     STG 2 Progress Met  -GJ        Long Term Goals    LTG Date to Achieve 03/08/24  -GJ     LTG 1 Pt will be independent and compliant with advanced HEP for long term management of symptoms and prevention of future occurrence.  -GJ     LTG 1 Progress Ongoing  -GJ     LTG 2 Pt will be able to achieve abduction of 0-150 degrees FIR to T7 w/o pain in order to improve functional tasks and ADL performance.  -GJ     LTG 2 Progress Ongoing;Partially Met  -GJ     LTG 2 Progress Comments see ortho  -GJ     LTG 3 Pt will report 50% improvement with sleep quality in order to improve QOL  -GJ     LTG 3 Progress Met  -GJ     LTG 4 Pt will report pain free full return to exercise with  to demonstrate improvement in symptoms and quality of life  -GJ     LTG 4 Progress Ongoing  -GJ               User Key  (r) = Recorded By, (t) = Taken By, (c) = Cosigned By      Initials Name Provider Type    Camilo Ponce, PT Physical Therapist                    Therapy Education  Education Details: [id number] (updated ROM only program) reviewed activity modifications. Encouraged her to hold all strengthening activities currently and to focus on ROM activities (new HEP issued). Discussed ergonomic and postural concerns/awareness in relationship go her condition. DIscussed activity modificaitons  Given: HEP, Symptoms/condition management, Pain management, Posture/body mechanics, Fall prevention and home safety, Mobility training  Program: Reinforced, New, Modified, Progressed  How Provided: Verbal, Demonstration, Written  Provided to: Patient  Level of Understanding: Teach back education performed, Verbalized, Demonstrated              Time Calculation:   Start Time: 1130  Stop Time: 1215  Time Calculation (min): 45  min  Timed Charges  86261 - PT Therapeutic Exercise Minutes: 11  67447 - PT Manual Therapy Minutes: 17  51778 - PT Therapeutic Activity Minutes: 15 (assessement /education)  Total Minutes  Timed Charges Total Minutes: 43   Total Minutes: 43  Therapy Charges for Today       Code Description Service Date Service Provider Modifiers Qty    87264504740  PT THER PROC EA 15 MIN 1/12/2024 Camilo Milner, PT GP 1    [id number]  PT THERAPEUTIC ACT EA 15 MIN 1/12/2024 Camilo Milner, PT GP 1    [id number]  PT MANUAL THERAPY EA 15 MIN 1/12/2024 Camilo Milner, PT GP 1                      Camilo Milner, PT  1/12/2024

## 2024-01-12 NOTE — PROGRESS NOTES
"  Patient: Savannah Valenzuela  YOB: 1970  Date of Service: 1/12/2024    Chief Complaints: Left shoulder pain    Subjective:    History of Present Illness: Pt is seen in the office today with complaints of left shoulder pain she has a high-grade partial rotator cuff tear with a long discussion regarding options I think another injection is reasonable today however I would like her to start thinking about potentially arthroscopy with debridement versus repair        Allergies:   Allergies   Allergen Reactions    Latex Urinary Retention and Irritability     Painful urination with latex cath; Patient states \"sensitivity\"         Medications:   Home Medications:  Current Outpatient Medications on File Prior to Visit   Medication Sig    buPROPion XL (WELLBUTRIN XL) 150 MG 24 hr tablet Take 1 tablet by mouth Daily.    Calcium 250 MG capsule Take 500 mg by mouth 3 (Three) Times a Day.    FLUoxetine (PROzac) 40 MG capsule Take 1 capsule by mouth Daily.    multivitamin with minerals tablet tablet Take 1 tablet by mouth Daily.    Progesterone (Prometrium) 200 MG capsule Take 1 capsule by mouth Daily in the evening    topiramate (TOPAMAX) 50 MG tablet Take 1 tablet by mouth Daily for 180 days.     No current facility-administered medications on file prior to visit.     Current Medications:  Scheduled Meds:  Continuous Infusions:No current facility-administered medications for this visit.    PRN Meds:.    I have reviewed the patient's medical history in detail and updated the computerized patient record.  Review and summarization of old records include:    Past Medical History:   Diagnosis Date    Allergic rhinitis     Anemia     Chronic fatigue 03/27/2020    Depression     Encounter for screening for malignant neoplasm of colon 05/19/2021    Added automatically from request for surgery [id number]    Fatigue     GERD (gastroesophageal reflux disease)     Hematuria, microscopic 08/24/2016    Hiatal hernia     " Hyperlipidemia     Hypertension     Low back strain     Fell on my back on aluminum bleachers    Medication management     Migraine     Multiple food allergies     Physical exam, annual     Rotator cuff syndrome 2023        Past Surgical History:   Procedure Laterality Date     SECTION      COLONOSCOPY N/A 2021    Procedure: COLONOSCOPY INTO CECUM AND T.I. WITH COLD BIOPSY POLYPECTOMIES;  Surgeon: Ursula Torres MD;  Location: [facility] ENDOSCOPY;  Service: Gastroenterology;  Laterality: N/A;  PRE- SCREENING  POST- DIVERTICULOSIS, POLYPS, HEMORRHOIDS    DILATATION AND CURETTAGE      ENDOSCOPY N/A 2020    Procedure: ESOPHAGOGASTRODUODENOSCOPY WITH BIOPSY;  Surgeon: Cayetano Khan Jr., MD;  Location: [facility] ENDOSCOPY;  Service: General;  Laterality: N/A;  PREOP/ DYSPEPSIA  POSTOP/ GASTRITIS, ESOPHAGITIS, HIATAL HERNIA    GASTRIC SLEEVE LAPAROSCOPIC N/A 10/5/2020    Procedure: GASTRIC SLEEVE LAPAROSCOPIC WITH paraesophageal hernia repair, lysis of adhesions;  Surgeon: Cayetano Khan Jr., MD;  [place] [facility] [place] [facility] [place]  Service: Bariatric;  Laterality: N/A;    WISDOM TOOTH EXTRACTION          Social History     Occupational History    Not on file   Tobacco Use    Smoking status: Never     Passive exposure: Past    Smokeless tobacco: Never   Vaping Use    Vaping Use: Never used   Substance and Sexual Activity    Alcohol use: No     Comment: Kay: rare    Drug use: No    Sexual activity: Not Currently     Partners: Male     Birth control/protection: Post-menopausal      Social History     Social History Narrative    Not on file        Family History   Problem Relation Age of Onset    Heart disease Father     Hyperlipidemia Father     Malig Hyperthermia Neg Hx        ROS: 14 point review of systems was performed and was negative except for documented findings in HPI and today's encounter.     Allergies:   Allergies   Allergen Reactions    Latex Urinary Retention and Irritability  "    Painful urination with latex cath; Patient states \"sensitivity\"       Constitutional:  Denies fever, shaking or chills   Eyes:  Denies change in visual acuity   HENT:  Denies nasal congestion or sore throat   Respiratory:  Denies cough or shortness of breath   Cardiovascular:  Denies chest pain or severe LE edema   GI:  Denies abdominal pain, nausea, vomiting, bloody stools or diarrhea   Musculoskeletal:  Numbness, tingling, or loss of motor function only as noted above in history of present illness.  : Denies painful urination or hematuria  Integument:  Denies rash, lesion or ulceration   Neurologic:  Denies headache or focal weakness  Endocrine:  Denies lymphadenopathy  Psych:  Denies confusion or change in mental status   Hem:  Denies active bleeding      Physical Exam: 53 y.o. female  Wt Readings from Last 3 Encounters:   12/22/23 89 kg (196 lb 4.8 oz)   11/17/23 89.8 kg (198 lb)   11/13/23 92.1 kg (203 lb)       There is no height or weight on file to calculate BMI.    There were no vitals filed for this visit.  Vital signs reviewed.   General Appearance:    Alert, cooperative, in no acute distress                    Ortho exam  Physical exam of the left shoulder reveals no overlying skin changes no lymphedema no lymphadenopathy.  Patient has active flexion 180 with mild symptoms abduction is similar external rotation is to 50 and internal rotation to the upper lumbar spine with mild symptoms.  Patient has good rotator cuff strength 4+ over 5 with isometric strength testing with pain.  Patient has a positive impingement and a positive Aiken sign.  Patient has good cervical range of motion which is full and asymptomatic no radicular symptoms.  Patient has a normal elbow exam.  Good distal pulses are presentPatient has pain with overhead activity and a positive Neer sign and a positive empty can sign  They have a positive drop arm any definitive painful arc                Assessment: Left shoulder partial " rotator cuff tear we did review her MRI.  She is not somebody that I say we should inject every 3 months I think it is reasonable to inject her today she is done physical therapy done everything if her symptoms return I really think we should proceed with arthroscopy with debridement versus repair of the cuff we did discuss this in detail discussed the difference in the 2 and the time down with both of them.  She will think about it and give me a call    Plan:   Follow up as indicated.  Ice, elevate, and rest as needed.  Discussed conservative measures of pain control including ice, bracing.  Also talked about the importance of strengthening   Jasmine Page M.D.    Large Joint Arthrocentesis: L subacromial bursa  Date/Time: 1/15/2024 4:26 PM  Consent given by: patient  Site marked: site marked  Timeout: Immediately prior to procedure a time out was called to verify the correct patient, procedure, equipment, support staff and site/side marked as required   Supporting Documentation  Indications: pain   Procedure Details  Location: shoulder - L subacromial bursa  Preparation: Patient was prepped and draped in the usual sterile fashion  Needle gauge: 21G.  Approach: posterior  Medications administered: 80 mg methylPREDNISolone acetate 80 MG/ML; 2 mL lidocaine PF 1% 1 %  Patient tolerance: patient tolerated the procedure well with no immediate complications

## 2024-01-15 ENCOUNTER — APPOINTMENT (OUTPATIENT)
Dept: PHYSICAL THERAPY | Facility: HOSPITAL | Age: 54
End: 2024-01-15
Payer: COMMERCIAL

## 2024-01-15 ENCOUNTER — OFFICE VISIT (OUTPATIENT)
Dept: ORTHOPEDIC SURGERY | Facility: CLINIC | Age: 54
End: 2024-01-15
Payer: COMMERCIAL

## 2024-01-15 VITALS — TEMPERATURE: 98.2 F | BODY MASS INDEX: 34.42 KG/M2 | WEIGHT: 201.6 LBS | HEIGHT: 64 IN

## 2024-01-15 DIAGNOSIS — M75.112 INCOMPLETE TEAR OF LEFT ROTATOR CUFF, UNSPECIFIED WHETHER TRAUMATIC: Primary | ICD-10-CM

## 2024-01-15 RX ADMIN — METHYLPREDNISOLONE ACETATE 80 MG: 80 INJECTION, SUSPENSION INTRA-ARTICULAR; INTRALESIONAL; INTRAMUSCULAR; SOFT TISSUE at 16:26

## 2024-01-15 RX ADMIN — LIDOCAINE HYDROCHLORIDE 2 ML: 10 INJECTION, SOLUTION EPIDURAL; INFILTRATION; INTRACAUDAL; PERINEURAL at 16:26

## 2024-01-16 RX ORDER — METHYLPREDNISOLONE ACETATE 80 MG/ML
80 INJECTION, SUSPENSION INTRA-ARTICULAR; INTRALESIONAL; INTRAMUSCULAR; SOFT TISSUE
Status: COMPLETED | OUTPATIENT
Start: 2024-01-15 | End: 2024-01-15

## 2024-01-16 RX ORDER — LIDOCAINE HYDROCHLORIDE 10 MG/ML
2 INJECTION, SOLUTION EPIDURAL; INFILTRATION; INTRACAUDAL; PERINEURAL
Status: COMPLETED | OUTPATIENT
Start: 2024-01-15 | End: 2024-01-15

## 2024-01-19 ENCOUNTER — HOSPITAL ENCOUNTER (OUTPATIENT)
Dept: PHYSICAL THERAPY | Facility: HOSPITAL | Age: 54
Setting detail: THERAPIES SERIES
Discharge: HOME | End: 2024-01-19
Payer: COMMERCIAL

## 2024-01-19 DIAGNOSIS — M25.512 LEFT SHOULDER PAIN, UNSPECIFIED CHRONICITY: Primary | ICD-10-CM

## 2024-01-19 DIAGNOSIS — R29.3 POOR POSTURE: ICD-10-CM

## 2024-01-19 DIAGNOSIS — M62.81 DECREASED MUSCLE STRENGTH: ICD-10-CM

## 2024-01-19 PROCEDURE — 97110 THERAPEUTIC EXERCISES: CPT | Performed by: PHYSICAL THERAPIST

## 2024-01-19 NOTE — THERAPY TREATMENT NOTE
Outpatient Physical Therapy Ortho Treatment Note  [facility]     Patient Name: Savannah Valenzuela  : 1970  MRN: [id number]  Today's Date: 2024      Visit Date: 2024    Visit Dx:    ICD-10-CM ICD-9-CM   1. Left shoulder pain, unspecified chronicity  M25.512 719.41   2. Poor posture  R29.3 781.92   3. Decreased muscle strength  M62.81 728.87       Patient Active Problem List   Diagnosis    Migraine    Allergic rhinitis    Depression    Obesity, Class II, BMI 35-39.9    S/P laparoscopic sleeve gastrectomy    Iron deficiency anemia    Paget's disease of bone        Past Medical History:   Diagnosis Date    Allergic rhinitis     Anemia     Chronic fatigue 2020    Depression     Encounter for screening for malignant neoplasm of colon 2021    Added automatically from request for surgery [id number]    Fatigue     GERD (gastroesophageal reflux disease)     Hematuria, microscopic 2016    Hiatal hernia     Hyperlipidemia     Hypertension     Low back strain     Fell on my back on aluminum bleachers    Medication management     Migraine     Multiple food allergies     Physical exam, annual     Rotator cuff syndrome 2023        Past Surgical History:   Procedure Laterality Date     SECTION      COLONOSCOPY N/A 2021    Procedure: COLONOSCOPY INTO CECUM AND T.I. WITH COLD BIOPSY POLYPECTOMIES;  Surgeon: Ursula Torres MD;  Location: [facility] ENDOSCOPY;  Service: Gastroenterology;  Laterality: N/A;  PRE- SCREENING  POST- DIVERTICULOSIS, POLYPS, HEMORRHOIDS    DILATATION AND CURETTAGE      ENDOSCOPY N/A 2020    Procedure: ESOPHAGOGASTRODUODENOSCOPY WITH BIOPSY;  Surgeon: Cayetano Khan Jr., MD;  Location: [facility] ENDOSCOPY;  Service: General;  Laterality: N/A;  PREOP/ DYSPEPSIA  POSTOP/ GASTRITIS, ESOPHAGITIS, HIATAL HERNIA    GASTRIC SLEEVE LAPAROSCOPIC N/A 10/5/2020    Procedure: GASTRIC SLEEVE LAPAROSCOPIC WITH paraesophageal hernia repair, lysis of  adhesions;  Surgeon: Cayetano Khan Jr., MD;  Location: [facility] OR [facility] [place];  Service: Bariatric;  Laterality: N/A;    WISDOM TOOTH EXTRACTION                          PT Assessment/Plan       Row Name 01/19/24 1206          PT Assessment    Assessment Comments Ms. Valenzuela returns reporting she received an injection to her L shoulder on 1/15/2024, which helped somewhat. She continues to report frustration secondary to L shoulder pain with dressing activities/household activities.  We reviewed current program which is primarily ROM. Updated several scapular gridle/RC strengthening activities which were not painful at the time.  Updated HEP and encouraged her to perform ROM daily, strengthening BIW.  Answered questions and encouraged her to call with questions. We will transition to an independent program at this time secondary to pt being independent with her HEP.  Ms. Valenzuela is in agreement with this plan.  -GJ        PT Plan    PT Plan Comments pt to transition to an independent program, will call with questions/concerns or return if needed  -GJ               User Key  (r) = Recorded By, (t) = Taken By, (c) = Cosigned By      Initials Name Provider Type    Camilo Ponce, PT Physical Therapist                       OP Exercises       Row Name 01/19/24 1124 01/19/24 1100          Subjective    Subjective Comments -- had an injection, it's better, but still frustrating b/c it's painful to get dressed.  -GJ        Total Minutes    56261 - PT Therapeutic Exercise Minutes 30  -GJ --        Exercise 1    Exercise Name 1 -- doorway pec stretch  -GJ     Additional Comments -- reviewed  -GJ        Exercise 2    Exercise Name 2 -- supine shoulder flexion in supine, dowel  -GJ     Additional Comments -- reviewed  -GJ        Exercise 4    Exercise Name 4 -- UBE  -GJ     Time 4 -- 2fwd/2retro  -GJ        Exercise 12    Exercise Name 12 -- FIR stretch with towel  -GJ     Additional Comments -- reviewed  -GJ        Exercise 13     Exercise Name 13 -- shoulder flexion on wall  -GJ     Cueing 13 -- Verbal;Demo  -GJ     Reps 13 -- 15  -GJ        Exercise 14    Exercise Name 14 -- SA punch  -GJ     Cueing 14 -- Verbal;Demo  -GJ     Reps 14 -- 20  -GJ     Time 14 -- 4#  -GJ        Exercise 15    Exercise Name 15 -- SL flexion  -GJ     Cueing 15 -- Verbal;Demo  -GJ     Sets 15 -- 2  -GJ     Reps 15 -- 10  -GJ     Time 15 -- 1#  -GJ        Exercise 16    Exercise Name 16 -- SL ER  -GJ     Cueing 16 -- Verbal;Demo  -GJ     Sets 16 -- 2  -GJ     Reps 16 -- 10  -GJ     Time 16 -- 1#, towel under humerus  -GJ        Exercise 17    Exercise Name 17 -- supine alphabet  -GJ     Cueing 17 -- Verbal;Demo  -GJ     Reps 17 -- 1 A-Z  -GJ     Time 17 -- 1#  -GJ               User Key  (r) = Recorded By, (t) = Taken By, (c) = Cosigned By      Initials Name Provider Type    GJ Camilo Milner, PT Physical Therapist                                  PT OP Goals       Row Name 01/19/24 1100          PT Short Term Goals    STG Date to Achieve 02/09/24  -GJ     STG 1 Pt will report pain rated 2/10 at worst in order to demonstrate ability to return to normalized ADLs and functional activities.  -GJ     STG 1 Progress Not Met  -GJ     STG 2 Pt will be independent with initial HEP for symptom management.  -GJ     STG 2 Progress Met  -GJ        Long Term Goals    LTG Date to Achieve 03/08/24  -GJ     LTG 1 Pt will be independent and compliant with advanced HEP for long term management of symptoms and prevention of future occurrence.  -GJ     LTG 1 Progress Met  -GJ     LTG 2 Pt will be able to achieve abduction of 0-150 degrees FIR to T7 w/o pain in order to improve functional tasks and ADL performance.  -GJ     LTG 2 Progress Partially Met  -GJ     LTG 3 Pt will report 50% improvement with sleep quality in order to improve QOL  -GJ     LTG 3 Progress Met  -GJ     LTG 4 Pt will report pain free full return to exercise with  to demonstrate improvement in  symptoms and quality of life  -     LTG 4 Progress Not Met  -               User Key  (r) = Recorded By, (t) = Taken By, (c) = Cosigned By      Initials Name Provider Type    Camilo Ponce, PT Physical Therapist                    Therapy Education  Education Details: reviewed activity modifications, ROM exercises daily, strengthening BIW. Encouraged pt to call with questions. answered questions  Given: HEP, Symptoms/condition management, Pain management, Posture/body mechanics, Fall prevention and home safety, Mobility training  Program: Reinforced, New  How Provided: Verbal, Demonstration, Written  Provided to: Patient  Level of Understanding: Teach back education performed, Verbalized, Demonstrated              Time Calculation:   Start Time: 1125  Stop Time: 1200  Time Calculation (min): 35 min  Timed Charges  23237 - PT Therapeutic Exercise Minutes: 30  Total Minutes  Timed Charges Total Minutes: 30   Total Minutes: 30  Therapy Charges for Today       Code Description Service Date Service Provider Modifiers Qty    [id number]  PT THER PROC EA 15 MIN 1/19/2024 Camilo Milner, PT GP 2                      Camilo Milner PT  1/19/2024

## 2024-04-03 ENCOUNTER — PREP FOR SURGERY (OUTPATIENT)
Dept: OTHER | Facility: HOSPITAL | Age: 54
End: 2024-04-03
Payer: COMMERCIAL

## 2024-04-03 DIAGNOSIS — M75.112 INCOMPLETE TEAR OF LEFT ROTATOR CUFF, UNSPECIFIED WHETHER TRAUMATIC: Primary | ICD-10-CM

## 2024-04-10 PROBLEM — M75.112 INCOMPLETE TEAR OF LEFT ROTATOR CUFF: Status: ACTIVE | Noted: 2024-04-03

## 2024-05-10 RX ORDER — BUPROPION HYDROCHLORIDE 150 MG/1
150 TABLET ORAL DAILY
Qty: 90 TABLET | Refills: 1 | Status: SHIPPED | OUTPATIENT
Start: 2024-05-10

## 2024-05-10 RX ORDER — FLUOXETINE HYDROCHLORIDE 40 MG/1
40 CAPSULE ORAL DAILY
Qty: 90 CAPSULE | Refills: 1 | Status: SHIPPED | OUTPATIENT
Start: 2024-05-10

## 2024-05-17 ENCOUNTER — OFFICE VISIT (OUTPATIENT)
Dept: BARIATRICS/WEIGHT MGMT | Facility: CLINIC | Age: 54
End: 2024-05-17
Payer: COMMERCIAL

## 2024-05-17 VITALS
SYSTOLIC BLOOD PRESSURE: 134 MMHG | HEIGHT: 65 IN | DIASTOLIC BLOOD PRESSURE: 76 MMHG | WEIGHT: 197 LBS | BODY MASS INDEX: 32.82 KG/M2 | HEART RATE: 80 BPM | TEMPERATURE: 97 F

## 2024-05-17 DIAGNOSIS — F32.A DEPRESSION, UNSPECIFIED DEPRESSION TYPE: Chronic | ICD-10-CM

## 2024-05-17 DIAGNOSIS — K91.2 POSTOPERATIVE MALABSORPTION: ICD-10-CM

## 2024-05-17 DIAGNOSIS — Z98.84 S/P LAPAROSCOPIC SLEEVE GASTRECTOMY: Primary | ICD-10-CM

## 2024-05-17 DIAGNOSIS — D50.9 IRON DEFICIENCY ANEMIA, UNSPECIFIED IRON DEFICIENCY ANEMIA TYPE: Chronic | ICD-10-CM

## 2024-05-17 PROBLEM — E66.812 OBESITY, CLASS II, BMI 35-39.9: Status: RESOLVED | Noted: 2020-04-16 | Resolved: 2024-05-17

## 2024-05-17 PROBLEM — E66.9 OBESITY, CLASS II, BMI 35-39.9: Status: RESOLVED | Noted: 2020-04-16 | Resolved: 2024-05-17

## 2024-05-17 NOTE — PROGRESS NOTES
[facility] BARIATRIC SURGERY  [address]  [facility] [id number] [phone]  [address]  [facility] [id number] [phone]  Dept: [phone]  5/17/2024      Savannah Valenzuela.  [id number]  [id number]  1970  female      Chief Complaint   Patient presents with    Follow-up     Fup sleeve       BH Post-Op Bariatric Surgery:   Savannah Valenzuela is status post Laparoscopic Sleeve/PEH procedure, performed on 10/5/20     HPI:       Today's weight is 89.4 kg (197 lb) pounds, today's BMI is Body mass index is 33.22 kg/m²., she has a loss of 1 pounds since the last visit and her weight loss since surgery is 42 pounds. The patient reports a decreased portion size and loss of appetite.    Savannah Valenzuela denies nausea, vomiting, reflux and reports that she had started back to the gym but was having shoulder pain it turns out she has a partial tear to her right should and a bone spur. She really enjoys going to the gym. She is hoping that if they only have to take care of the spur she will be out for two weeks but if the tear needs repair it would be a 3-6 months of recovery and rehab.      Diet and Exercise: Diet history reviewed and discussed with the patient. Weight loss/gains to date discussed with the patient. The patient states they are eating 60-90 grams of protein per day. She reports eating 3 meals per day, a typical portion size of 1/2 cup, eating 1 snacks per day, drinking 5-6 or more 8-oz. glasses of water per day, no carbonated beverage consumption and exercising regularly- walking 3 days per week    She hasn't had a single soda in the last year. She has been stressed a bit with work the last couple of months and has been noting more sweet cravings.     Supplements: bariatric specific MTV with iron.     Review of Systems   Constitutional:  Positive for appetite change. Negative for fatigue and unexpected weight change.   HENT:  Negative.     Eyes: Negative.    Respiratory: Negative.     Cardiovascular: Negative.  Negative for leg swelling.   Gastrointestinal:  Negative for abdominal distention, abdominal pain, constipation, diarrhea, nausea and vomiting.   Genitourinary:  Negative for difficulty urinating, frequency and urgency.   Musculoskeletal:  Negative for back pain.   Skin: Negative.    Psychiatric/Behavioral: Negative.     All other systems reviewed and are negative.      Patient Active Problem List   Diagnosis    Migraine    Allergic rhinitis    Depression    S/P laparoscopic sleeve gastrectomy    Iron deficiency anemia    Paget's disease of bone    Incomplete tear of left rotator cuff    Postoperative malabsorption       Past Medical History:   Diagnosis Date    Allergic rhinitis     Anemia     Chronic fatigue 03/27/2020    Depression     Encounter for screening for malignant neoplasm of colon 05/19/2021    Added automatically from request for surgery [id number]    Fatigue     GERD (gastroesophageal reflux disease)     Hematuria, microscopic 08/24/2016    Hiatal hernia     Hyperlipidemia     Hypertension     Low back strain 1989    Fell on my back on aluminum bleachers    Medication management     Migraine     Multiple food allergies     Physical exam, annual     Rotator cuff syndrome 09/2023       The following portions of the patient's history were reviewed and updated as appropriate: allergies, current medications, past family history, past medical history, past social history, past surgical history, and problem list.    Vitals:    05/17/24 1043   BP: 134/76   Pulse: 80   Temp: 97 °F (36.1 °C)       Physical Exam  Vitals and nursing note reviewed.   Constitutional:       Appearance: She is well-developed.   Neck:      Thyroid: No thyromegaly.   Cardiovascular:      Rate and Rhythm: Normal rate.   Pulmonary:      Effort: Pulmonary effort is normal. No respiratory distress.   Abdominal:      Palpations: Abdomen is soft.    Musculoskeletal:         General: No tenderness.   Skin:     General: Skin is warm and dry.   Neurological:      Mental Status: She is alert.   Psychiatric:         Behavior: Behavior normal.         Assessment:   Post-op, the patient is doing well.     Encounter Diagnoses   Name Primary?    S/P laparoscopic sleeve gastrectomy Yes    Iron deficiency anemia, unspecified iron deficiency anemia type     Depression, unspecified depression type     Postoperative malabsorption        Plan:   Continue topiramate will update annual lab work.  Encouraged patient to be sure to get plenty of lean protein per day through small frequent meals all with a protein source.   Activity restrictions: none.   Recommended patient be sure to get at least 70 grams of protein per day by eating small, frequent meals all with high lean protein choices. Be sure to limit/cut back on daily carbohydrate intake. Discussed with the patient the recommended amount of water per day to intake- half of body weight in ounces. Reviewed vitamin requirements. Be sure to do routine exercise, 150 minutes per week minimum, including both cardio and strength training.     Instructions / Recommendations: dietary counseling recommended, recommended a daily protein intake of  grams, vitamin supplement(s) recommended, recommended exercising at least 150 minutes per week, behavior modifications recommended and instructed to call the office for concerns, questions, or problems.     The patient was instructed to follow up in 1 year .     Total time spent during this encounter today was 35 minutes

## 2024-05-22 ENCOUNTER — LAB (OUTPATIENT)
Dept: LAB | Facility: HOSPITAL | Age: 54
End: 2024-05-22
Payer: COMMERCIAL

## 2024-05-22 DIAGNOSIS — K91.2 POSTOPERATIVE MALABSORPTION: ICD-10-CM

## 2024-05-22 DIAGNOSIS — Z98.84 S/P LAPAROSCOPIC SLEEVE GASTRECTOMY: ICD-10-CM

## 2024-05-22 DIAGNOSIS — F32.A DEPRESSION, UNSPECIFIED DEPRESSION TYPE: ICD-10-CM

## 2024-05-22 DIAGNOSIS — D50.9 IRON DEFICIENCY ANEMIA, UNSPECIFIED IRON DEFICIENCY ANEMIA TYPE: ICD-10-CM

## 2024-05-22 LAB
25(OH)D3 SERPL-MCNC: 41 NG/ML (ref 30–100)
ALBUMIN SERPL-MCNC: 4.3 G/DL (ref 3.5–5.2)
ALBUMIN/GLOB SERPL: 1.9 G/DL
ALP SERPL-CCNC: 115 U/L (ref 39–117)
ALT SERPL W P-5'-P-CCNC: 13 U/L (ref 1–33)
ANION GAP SERPL CALCULATED.3IONS-SCNC: 9.6 MMOL/L (ref 5–15)
AST SERPL-CCNC: 16 U/L (ref 1–32)
BASOPHILS # BLD AUTO: 0.06 10*3/MM3 (ref 0–0.2)
BASOPHILS NFR BLD AUTO: 1.4 % (ref 0–1.5)
BILIRUB SERPL-MCNC: 0.5 MG/DL (ref 0–1.2)
BUN SERPL-MCNC: 14 MG/DL (ref 6–20)
BUN/CREAT SERPL: 10.9 (ref 7–25)
CALCIUM SPEC-SCNC: 9.2 MG/DL (ref 8.6–10.5)
CHLORIDE SERPL-SCNC: 111 MMOL/L (ref 98–107)
CO2 SERPL-SCNC: 22.4 MMOL/L (ref 22–29)
CREAT SERPL-MCNC: 1.28 MG/DL (ref 0.57–1)
DEPRECATED RDW RBC AUTO: 39.7 FL (ref 37–54)
EGFRCR SERPLBLD CKD-EPI 2021: 50.8 ML/MIN/1.73
EOSINOPHIL # BLD AUTO: 0.1 10*3/MM3 (ref 0–0.4)
EOSINOPHIL NFR BLD AUTO: 2.3 % (ref 0.3–6.2)
ERYTHROCYTE [DISTWIDTH] IN BLOOD BY AUTOMATED COUNT: 12.5 % (ref 12.3–15.4)
FERRITIN SERPL-MCNC: 7.18 NG/ML (ref 13–150)
FOLATE SERPL-MCNC: 13.5 NG/ML (ref 4.78–24.2)
GLOBULIN UR ELPH-MCNC: 2.3 GM/DL
GLUCOSE SERPL-MCNC: 84 MG/DL (ref 65–99)
HCT VFR BLD AUTO: 35.7 % (ref 34–46.6)
HGB BLD-MCNC: 11.2 G/DL (ref 12–15.9)
IMM GRANULOCYTES # BLD AUTO: 0.01 10*3/MM3 (ref 0–0.05)
IMM GRANULOCYTES NFR BLD AUTO: 0.2 % (ref 0–0.5)
IRON 24H UR-MRATE: 32 MCG/DL (ref 37–145)
LYMPHOCYTES # BLD AUTO: 1.3 10*3/MM3 (ref 0.7–3.1)
LYMPHOCYTES NFR BLD AUTO: 29.3 % (ref 19.6–45.3)
MCH RBC QN AUTO: 27.2 PG (ref 26.6–33)
MCHC RBC AUTO-ENTMCNC: 31.4 G/DL (ref 31.5–35.7)
MCV RBC AUTO: 86.7 FL (ref 79–97)
MONOCYTES # BLD AUTO: 0.4 10*3/MM3 (ref 0.1–0.9)
MONOCYTES NFR BLD AUTO: 9 % (ref 5–12)
NEUTROPHILS NFR BLD AUTO: 2.57 10*3/MM3 (ref 1.7–7)
NEUTROPHILS NFR BLD AUTO: 57.8 % (ref 42.7–76)
NRBC BLD AUTO-RTO: 0 /100 WBC (ref 0–0.2)
PLATELET # BLD AUTO: 304 10*3/MM3 (ref 140–450)
PMV BLD AUTO: 9.6 FL (ref 6–12)
POTASSIUM SERPL-SCNC: 4.6 MMOL/L (ref 3.5–5.2)
PREALB SERPL-MCNC: 23.3 MG/DL (ref 20–40)
PROT SERPL-MCNC: 6.6 G/DL (ref 6–8.5)
RBC # BLD AUTO: 4.12 10*6/MM3 (ref 3.77–5.28)
SODIUM SERPL-SCNC: 143 MMOL/L (ref 136–145)
WBC NRBC COR # BLD AUTO: 4.44 10*3/MM3 (ref 3.4–10.8)

## 2024-05-22 PROCEDURE — 36415 COLL VENOUS BLD VENIPUNCTURE: CPT

## 2024-05-22 PROCEDURE — 82746 ASSAY OF FOLIC ACID SERUM: CPT

## 2024-05-22 PROCEDURE — 83540 ASSAY OF IRON: CPT

## 2024-05-22 PROCEDURE — 80053 COMPREHEN METABOLIC PANEL: CPT

## 2024-05-22 PROCEDURE — 82306 VITAMIN D 25 HYDROXY: CPT

## 2024-05-22 PROCEDURE — 85025 COMPLETE CBC W/AUTO DIFF WBC: CPT

## 2024-05-22 PROCEDURE — 84134 ASSAY OF PREALBUMIN: CPT

## 2024-05-22 PROCEDURE — 84425 ASSAY OF VITAMIN B-1: CPT

## 2024-05-22 PROCEDURE — 83921 ORGANIC ACID SINGLE QUANT: CPT

## 2024-05-22 PROCEDURE — 82728 ASSAY OF FERRITIN: CPT

## 2024-05-26 LAB — VIT B1 BLD-SCNC: 87.6 NMOL/L (ref 66.5–200)

## 2024-05-28 LAB — METHYLMALONATE SERPL-SCNC: 185 NMOL/L (ref 0–378)

## 2024-06-04 ENCOUNTER — PRE-ADMISSION TESTING (OUTPATIENT)
Dept: PREADMISSION TESTING | Facility: HOSPITAL | Age: 54
End: 2024-06-04
Payer: COMMERCIAL

## 2024-06-04 VITALS
RESPIRATION RATE: 16 BRPM | HEIGHT: 65 IN | BODY MASS INDEX: 31.97 KG/M2 | SYSTOLIC BLOOD PRESSURE: 133 MMHG | OXYGEN SATURATION: 100 % | WEIGHT: 191.9 LBS | HEART RATE: 72 BPM | TEMPERATURE: 98.1 F | DIASTOLIC BLOOD PRESSURE: 79 MMHG

## 2024-06-04 LAB
ANION GAP SERPL CALCULATED.3IONS-SCNC: 8 MMOL/L (ref 5–15)
BUN SERPL-MCNC: 20 MG/DL (ref 6–20)
BUN/CREAT SERPL: 18.2 (ref 7–25)
CALCIUM SPEC-SCNC: 9.1 MG/DL (ref 8.6–10.5)
CHLORIDE SERPL-SCNC: 110 MMOL/L (ref 98–107)
CO2 SERPL-SCNC: 24 MMOL/L (ref 22–29)
CREAT SERPL-MCNC: 1.1 MG/DL (ref 0.57–1)
DEPRECATED RDW RBC AUTO: 38.3 FL (ref 37–54)
EGFRCR SERPLBLD CKD-EPI 2021: 61 ML/MIN/1.73
ERYTHROCYTE [DISTWIDTH] IN BLOOD BY AUTOMATED COUNT: 12.6 % (ref 12.3–15.4)
GLUCOSE SERPL-MCNC: 96 MG/DL (ref 65–99)
HCT VFR BLD AUTO: 36.6 % (ref 34–46.6)
HGB BLD-MCNC: 11.5 G/DL (ref 12–15.9)
MCH RBC QN AUTO: 26.4 PG (ref 26.6–33)
MCHC RBC AUTO-ENTMCNC: 31.4 G/DL (ref 31.5–35.7)
MCV RBC AUTO: 84.1 FL (ref 79–97)
PLATELET # BLD AUTO: 301 10*3/MM3 (ref 140–450)
PMV BLD AUTO: 10 FL (ref 6–12)
POTASSIUM SERPL-SCNC: 4.3 MMOL/L (ref 3.5–5.2)
RBC # BLD AUTO: 4.35 10*6/MM3 (ref 3.77–5.28)
SODIUM SERPL-SCNC: 142 MMOL/L (ref 136–145)
WBC NRBC COR # BLD AUTO: 3.82 10*3/MM3 (ref 3.4–10.8)

## 2024-06-04 PROCEDURE — 85027 COMPLETE CBC AUTOMATED: CPT

## 2024-06-04 PROCEDURE — 80048 BASIC METABOLIC PNL TOTAL CA: CPT

## 2024-06-04 PROCEDURE — 36415 COLL VENOUS BLD VENIPUNCTURE: CPT

## 2024-06-04 RX ORDER — IBUPROFEN 200 MG
200-400 TABLET ORAL EVERY 6 HOURS PRN
COMMUNITY

## 2024-06-04 NOTE — DISCHARGE INSTRUCTIONS
Take the following medications the morning of surgery: FLUOXETINE AND BUPROPION      If you are on prescription narcotic pain medication to control your pain you may also take that medication the morning of surgery.    General Instructions:  Do not eat solid food after midnight the night before surgery.  You may drink clear liquids day of surgery but must stop at least one hour before your hospital arrival time.  It is beneficial for you to have a clear drink that contains carbohydrates the day of surgery.  We suggest a 12 to 20 ounce bottle of Gatorade or Powerade for non-diabetic patients or a 12 to 20 ounce bottle of G2 or Powerade Zero for diabetic patients.   Clear liquids are liquids you can see through.  Nothing red in color.     Plain water                               Sports drinks  Sodas                                   Gelatin (Jell-O)  Fruit juices without pulp such as white grape juice and apple juice  Popsicles that contain no fruit or yogurt  Tea or coffee (no cream or milk added)  Gatorade / Powerade  G2 / Powerade Zero      Bring any papers given to you in the doctor’s office.  Wear clean comfortable clothes.  Do not wear contact lenses, false eyelashes or make-up.  Bring a case for your glasses.   Remove all piercings.  Leave jewelry and any other valuables at home.  The Pre-Admission Testing nurse will instruct you to bring medications if unable to obtain an accurate list in Pre-Admission Testing.            Preventing a Surgical Site Infection:  For 2 to 3 days before surgery, avoid shaving with a razor because the razor can irritate skin and make it easier to develop an infection.    Any areas of open skin can increase the risk of a post-operative wound infection by allowing bacteria to enter and travel throughout the body.  Notify your surgeon if you have any skin wounds / rashes even if it is not near the expected surgical site.  The area will need assessed to determine if surgery should be  delayed until it is healed.  The night prior to surgery shower using a fresh bar of anti-bacterial soap (such as Dial) and clean washcloth.  Sleep in a clean bed with clean clothing.  Do not allow pets to sleep with you.  Shower on the morning of surgery using a fresh bar of anti-bacterial soap (such as Dial) and clean washcloth.  Dry with a clean towel and dress in clean clothing.  Ask your surgeon if you will be receiving antibiotics prior to surgery.  Make sure you, your family, and all healthcare providers clean their hands with soap and water or an alcohol based hand  before caring for you or your wound.    Day of surgery:  Your arrival time is approximately two hours before your scheduled surgery time.  Upon arrival, a Pre-op nurse and Anesthesiologist will review your health history, obtain vital signs, and answer questions you may have.  The only belongings needed at this time will be a list of your home medications and if applicable your C-PAP/BI-PAP machine.  A Pre-op nurse will start an IV and you may receive medication in preparation for surgery, including something to help you relax.     Please be aware that surgery does come with discomfort.  We want to make every effort to control your discomfort so please discuss any uncontrolled symptoms with your nurse.   Your doctor will most likely have prescribed pain medications.      If you are going home after surgery you will receive individualized written care instructions before being discharged.  A responsible adult must drive you to and from the hospital on the day of your surgery and ideally stay with you through the night.   .  Discharge prescriptions can be filled by the hospital pharmacy during regular pharmacy hours.  If you are having surgery late in the day/evening your prescription may be e-prescribed to your pharmacy.  Please verify your pharmacy hours or chose a 24 hour pharmacy to avoid not having access to your prescription because  your pharmacy has closed for the day.    If you are staying overnight following surgery, you will be transported to your hospital room following the recovery period.  [facility] has all private rooms.    If you have any questions please call Pre-Admission Testing at [phone].  Deductibles and co-payments are collected on the day of service. Please be prepared to pay the required co-pay, deductible or deposit on the day of service as defined by your plan.    Call your surgeon immediately if you experience any of the following symptoms:  Sore Throat  Shortness of Breath or difficulty breathing  Cough  Chills  Body soreness or muscle pain  Headache  Fever  New loss of taste or smell  Do not arrive for your surgery ill.  Your procedure will need to be rescheduled to another time.  You will need to call your physician before the day of surgery to avoid any unnecessary exposure to hospital staff as well as other patients.

## 2024-06-07 NOTE — PROGRESS NOTES
"  Patient: Savannah Valenzuela  YOB: 1970  Date of Service: 6/7/2024    Chief Complaints: Left shoulder pain    Subjective:    History of Present Illness: Pt is seen in the office today with complaints of left shoulder pain she has a high-grade partial rotator cuff tear she is failed conservative management she is here for preop discussion for arthroscopy next week        Allergies:   Allergies   Allergen Reactions    Latex Urinary Retention and Irritability     Painful urination with latex cath; Patient states \"sensitivity\"         Medications:   Home Medications:  Current Outpatient Medications on File Prior to Visit   Medication Sig    buPROPion XL (WELLBUTRIN XL) 150 MG 24 hr tablet Take 1 tablet by mouth Daily.    Calcium 250 MG capsule Take 500 mg by mouth 3 (Three) Times a Day.    FLUoxetine (PROzac) 40 MG capsule Take 1 capsule by mouth Daily.    ibuprofen (ADVIL,MOTRIN) 200 MG tablet Take 1-2 tablets by mouth Every 6 (Six) Hours As Needed for Mild Pain. WILL HOLD FOR SURGERY    multivitamin with minerals tablet tablet Take 1 tablet by mouth Daily.    Progesterone (Prometrium) 200 MG capsule Take 1 capsule by mouth Daily in the evening (Patient taking differently: Take 1 capsule by mouth Every Evening.)    topiramate (TOPAMAX) 50 MG tablet Take 1 tablet by mouth Daily for 180 days. (Patient taking differently: Take 1 tablet by mouth Every Evening.)     No current facility-administered medications on file prior to visit.     Current Medications:  Scheduled Meds:  Continuous Infusions:No current facility-administered medications for this visit.    PRN Meds:.    I have reviewed the patient's medical history in detail and updated the computerized patient record.  Review and summarization of old records include:    Past Medical History:   Diagnosis Date    Allergic rhinitis     Anemia     Chronic fatigue 03/27/2020    RESOLVED    Depression     Encounter for screening for malignant neoplasm of colon " 2021    Added automatically from request for surgery [id number]    Fatigue     GERD (gastroesophageal reflux disease)     HISTORY OF    Hematuria, microscopic 2016    Hiatal hernia     CORRECTED WITH SLEEVE    History of sleeve gastrectomy     History of transfusion     AT BIRTH.    Hyperlipidemia     PRIOR TO SLEEVE GASTRECTOMY    Hypertension     PRIOR TO SLEEVE GASTRECTOMY    Low back strain     Fell on my back on aluminum bleachers    Medication management     Migraine     Multiple food allergies     Physical exam, annual     Rotator cuff syndrome 2023    LEFT SHOULDER        Past Surgical History:   Procedure Laterality Date     SECTION      COLONOSCOPY N/A 2021    Procedure: COLONOSCOPY INTO CECUM AND T.I. WITH COLD BIOPSY POLYPECTOMIES;  Surgeon: Ursula Torres MD;  Location: [facility] ENDOSCOPY;  Service: Gastroenterology;  Laterality: N/A;  PRE- SCREENING  POST- DIVERTICULOSIS, POLYPS, HEMORRHOIDS    DILATATION AND CURETTAGE      ENDOSCOPY N/A 2020    Procedure: ESOPHAGOGASTRODUODENOSCOPY WITH BIOPSY;  Surgeon: Cayetano Khan Jr., MD;  Location: [facility] ENDOSCOPY;  Service: General;  Laterality: N/A;  PREOP/ DYSPEPSIA  POSTOP/ GASTRITIS, ESOPHAGITIS, HIATAL HERNIA    GASTRIC SLEEVE LAPAROSCOPIC N/A 10/5/2020    Procedure: GASTRIC SLEEVE LAPAROSCOPIC WITH paraesophageal hernia repair, lysis of adhesions;  Surgeon: Cayetano Khan Jr., MD;  Location: [facility] OR [facility];  Service: Bariatric;  Laterality: N/A;    WISDOM TOOTH EXTRACTION          Social History     Occupational History    Not on file   Tobacco Use    Smoking status: Never     Passive exposure: Past    Smokeless tobacco: Never   Vaping Use    Vaping status: Never Used   Substance and Sexual Activity    Alcohol use: No     Comment: Kay: rare    Drug use: No    Sexual activity: Not Currently     Partners: Male     Birth control/protection: Post-menopausal      Social History     Social History  "Narrative    Not on file        Family History   Problem Relation Age of Onset    Heart disease Father     Hyperlipidemia Father     Malig Hyperthermia Neg Hx        ROS: 14 point review of systems was performed and was negative except for documented findings in HPI and today's encounter.     Allergies:   Allergies   Allergen Reactions    Latex Urinary Retention and Irritability     Painful urination with latex cath; Patient states \"sensitivity\"       Constitutional:  Denies fever, shaking or chills   Eyes:  Denies change in visual acuity   HENT:  Denies nasal congestion or sore throat   Respiratory:  Denies cough or shortness of breath   Cardiovascular:  Denies chest pain or severe LE edema   GI:  Denies abdominal pain, nausea, vomiting, bloody stools or diarrhea   Musculoskeletal:  Numbness, tingling, or loss of motor function only as noted above in history of present illness.  : Denies painful urination or hematuria  Integument:  Denies rash, lesion or ulceration   Neurologic:  Denies headache or focal weakness  Endocrine:  Denies lymphadenopathy  Psych:  Denies confusion or change in mental status   Hem:  Denies active bleeding      Physical Exam: 53 y.o. female  Wt Readings from Last 3 Encounters:   06/04/24 87 kg (191 lb 14.4 oz)   05/17/24 89.4 kg (197 lb)   01/15/24 91.4 kg (201 lb 9.6 oz)       There is no height or weight on file to calculate BMI.    There were no vitals filed for this visit.  Vital signs reviewed.   General Appearance:    Alert, cooperative, in no acute distress                    Ortho exam    Physical exam of the left shoulder reveals no overlying skin changes no lymphedema no lymphadenopathy.  Patient has active flexion 180 with mild symptoms abduction is similar external rotation is to 50 and internal rotation to the upper lumbar spine with mild symptoms.  Patient has good rotator cuff strength 4+ over 5 with isometric strength testing with pain.  Patient has a positive impingement " "and a positive Aiken sign.  Patient has good cervical range of motion which is full and asymptomatic no radicular symptoms.  Patient has a normal elbow exam.  Good distal pulses are presentPatient has pain with overhead activity and a positive Neer sign and a positive empty can sign  They have a positive drop arm any definitive painful arc   Physical Exam: 53 y.o. female  General Appearance:    Alert, cooperative, in no acute distress                      Vitals:    06/10/24 0906   Temp: 98.4 °F (36.9 °C)   Weight: 86.6 kg (190 lb 14.4 oz)   Height: 162.6 cm (64\")   PainSc:   7        Head:    Normocephalic, without obvious abnormality, atraumatic   Eyes:            conjunctivae and sclerae normal, no pallor, corneas clear,    Ears:    Ears appear intact with no abnormalities noted   Throat:   No oral lesions, no thrush, oral mucosa moist   Neck:   No adenopathy, supple, trachea midline, no thyromegaly,    Back:     No kyphosis present, no scoliosis present, no skin lesions,      erythema or scars, no tenderness to percussion or                   palpation,   range of motion normal   Lungs:     ,respirations regular, even and                  unlabored    Heart:    Regular rhythm and normal rate               Chest Wall:    No abnormalities observed               Pulses:   Pulses palpable and equal bilaterally   Skin:   No bleeding, bruising or rash   Lymph nodes:   No palpable adenopathy   Neurologic:   Appears neurologic intact                Assessment: Left shoulder pain with partial rotator cuff tear.  I really feel like it is a high-grade partial plan is to proceed arthroscopy next week debridement versus most likely repair we discussed in detail risk benefits and alternatives perioperative regimen of both  We discussed the risk benefits and alternatives The patient voiced understanding of the risks, benefits, and alternative forms of treatment that were discussed and the patient consents to proceed with the " above listed surgery.  All risks, benefits and alternatives were discussed.  Risks including to but not exclusive to anesthetic complications, including death, MI, CVA, infection, bleeding DVT, fracture, residual pain and need for future surgery.  She understands and agrees to proceed    Plan:   Follow up as indicated.  Ice, elevate, and rest as needed.  Discussed conservative measures of pain control including ice, bracing.  Also talked about the importance of strengthening and maintaining ideal body weight    Jasmine Page M.D.

## 2024-06-10 ENCOUNTER — OFFICE VISIT (OUTPATIENT)
Dept: ORTHOPEDIC SURGERY | Facility: CLINIC | Age: 54
End: 2024-06-10
Payer: COMMERCIAL

## 2024-06-10 VITALS — BODY MASS INDEX: 32.59 KG/M2 | TEMPERATURE: 98.4 F | HEIGHT: 64 IN | WEIGHT: 190.9 LBS

## 2024-06-10 DIAGNOSIS — M75.112 INCOMPLETE TEAR OF LEFT ROTATOR CUFF, UNSPECIFIED WHETHER TRAUMATIC: Primary | ICD-10-CM

## 2024-06-10 PROCEDURE — 99213 OFFICE O/P EST LOW 20 MIN: CPT | Performed by: ORTHOPAEDIC SURGERY

## 2024-06-18 ENCOUNTER — HOSPITAL ENCOUNTER (OUTPATIENT)
Facility: HOSPITAL | Age: 54
Setting detail: HOSPITAL OUTPATIENT SURGERY
Discharge: HOME | End: 2024-06-18
Attending: ORTHOPAEDIC SURGERY | Admitting: ORTHOPAEDIC SURGERY
Payer: COMMERCIAL

## 2024-06-18 ENCOUNTER — ANESTHESIA EVENT (OUTPATIENT)
Dept: PERIOP | Facility: HOSPITAL | Age: 54
End: 2024-06-18
Payer: COMMERCIAL

## 2024-06-18 ENCOUNTER — ANESTHESIA (OUTPATIENT)
Dept: PERIOP | Facility: HOSPITAL | Age: 54
End: 2024-06-18
Payer: COMMERCIAL

## 2024-06-18 VITALS
HEART RATE: 69 BPM | OXYGEN SATURATION: 98 % | DIASTOLIC BLOOD PRESSURE: 74 MMHG | BODY MASS INDEX: 32.77 KG/M2 | WEIGHT: 190.92 LBS | SYSTOLIC BLOOD PRESSURE: 129 MMHG | RESPIRATION RATE: 16 BRPM | TEMPERATURE: 98.3 F

## 2024-06-18 DIAGNOSIS — M77.9 CAPSULITIS: Primary | ICD-10-CM

## 2024-06-18 DIAGNOSIS — M75.112 INCOMPLETE TEAR OF LEFT ROTATOR CUFF, UNSPECIFIED WHETHER TRAUMATIC: ICD-10-CM

## 2024-06-18 DIAGNOSIS — Z98.890 STATUS POST MANIPULATION OF ACROMIOCLAVICULAR JOINT: ICD-10-CM

## 2024-06-18 PROCEDURE — 97110 THERAPEUTIC EXERCISES: CPT

## 2024-06-18 PROCEDURE — 23700 MNPJ ANES SHO JT FIXJ APRATS: CPT | Performed by: ORTHOPAEDIC SURGERY

## 2024-06-18 PROCEDURE — C9290 INJ, BUPIVACAINE LIPOSOME: HCPCS | Performed by: ANESTHESIOLOGY

## 2024-06-18 PROCEDURE — 25010000002 ONDANSETRON PER 1 MG: Performed by: NURSE ANESTHETIST, CERTIFIED REGISTERED

## 2024-06-18 PROCEDURE — 25010000002 MIDAZOLAM PER 1 MG: Performed by: ANESTHESIOLOGY

## 2024-06-18 PROCEDURE — 97165 OT EVAL LOW COMPLEX 30 MIN: CPT

## 2024-06-18 PROCEDURE — 25810000003 LACTATED RINGERS PER 1000 ML: Performed by: ANESTHESIOLOGY

## 2024-06-18 PROCEDURE — 25010000002 PROPOFOL 10 MG/ML EMULSION: Performed by: NURSE ANESTHETIST, CERTIFIED REGISTERED

## 2024-06-18 PROCEDURE — 25010000002 METHYLPREDNISOLONE PER 80 MG: Performed by: ORTHOPAEDIC SURGERY

## 2024-06-18 PROCEDURE — 25010000002 CEFAZOLIN PER 500 MG: Performed by: ORTHOPAEDIC SURGERY

## 2024-06-18 PROCEDURE — 25010000002 BUPIVACAINE (PF) 0.5 % SOLUTION: Performed by: ANESTHESIOLOGY

## 2024-06-18 PROCEDURE — 0 BUPIVACAINE LIPOSOME 1.3 % SUSPENSION: Performed by: ANESTHESIOLOGY

## 2024-06-18 PROCEDURE — 25010000002 SUGAMMADEX 200 MG/2ML SOLUTION: Performed by: NURSE ANESTHETIST, CERTIFIED REGISTERED

## 2024-06-18 PROCEDURE — 25010000002 DEXAMETHASONE PER 1 MG: Performed by: NURSE ANESTHETIST, CERTIFIED REGISTERED

## 2024-06-18 RX ORDER — OXYCODONE AND ACETAMINOPHEN 7.5; 325 MG/1; MG/1
1 TABLET ORAL EVERY 4 HOURS PRN
Status: DISCONTINUED | OUTPATIENT
Start: 2024-06-18 | End: 2024-06-18 | Stop reason: HOSPADM

## 2024-06-18 RX ORDER — DEXAMETHASONE SODIUM PHOSPHATE 4 MG/ML
INJECTION, SOLUTION INTRA-ARTICULAR; INTRALESIONAL; INTRAMUSCULAR; INTRAVENOUS; SOFT TISSUE AS NEEDED
Status: DISCONTINUED | OUTPATIENT
Start: 2024-06-18 | End: 2024-06-18 | Stop reason: SURG

## 2024-06-18 RX ORDER — DROPERIDOL 2.5 MG/ML
0.62 INJECTION, SOLUTION INTRAMUSCULAR; INTRAVENOUS
Status: DISCONTINUED | OUTPATIENT
Start: 2024-06-18 | End: 2024-06-18 | Stop reason: HOSPADM

## 2024-06-18 RX ORDER — FAMOTIDINE 10 MG/ML
20 INJECTION, SOLUTION INTRAVENOUS ONCE
Status: COMPLETED | OUTPATIENT
Start: 2024-06-18 | End: 2024-06-18

## 2024-06-18 RX ORDER — LIDOCAINE HYDROCHLORIDE 20 MG/ML
INJECTION, SOLUTION INFILTRATION; PERINEURAL AS NEEDED
Status: DISCONTINUED | OUTPATIENT
Start: 2024-06-18 | End: 2024-06-18 | Stop reason: SURG

## 2024-06-18 RX ORDER — BUPIVACAINE HYDROCHLORIDE 5 MG/ML
INJECTION, SOLUTION EPIDURAL; INTRACAUDAL
Status: COMPLETED | OUTPATIENT
Start: 2024-06-18 | End: 2024-06-18

## 2024-06-18 RX ORDER — HYDROCODONE BITARTRATE AND ACETAMINOPHEN 5; 325 MG/1; MG/1
1 TABLET ORAL ONCE AS NEEDED
Status: DISCONTINUED | OUTPATIENT
Start: 2024-06-18 | End: 2024-06-18 | Stop reason: HOSPADM

## 2024-06-18 RX ORDER — SODIUM CHLORIDE, SODIUM LACTATE, POTASSIUM CHLORIDE, CALCIUM CHLORIDE 600; 310; 30; 20 MG/100ML; MG/100ML; MG/100ML; MG/100ML
9 INJECTION, SOLUTION INTRAVENOUS CONTINUOUS
Status: DISCONTINUED | OUTPATIENT
Start: 2024-06-18 | End: 2024-06-18 | Stop reason: HOSPADM

## 2024-06-18 RX ORDER — SODIUM CHLORIDE 0.9 % (FLUSH) 0.9 %
3-10 SYRINGE (ML) INJECTION AS NEEDED
Status: DISCONTINUED | OUTPATIENT
Start: 2024-06-18 | End: 2024-06-18 | Stop reason: HOSPADM

## 2024-06-18 RX ORDER — PROPOFOL 10 MG/ML
VIAL (ML) INTRAVENOUS AS NEEDED
Status: DISCONTINUED | OUTPATIENT
Start: 2024-06-18 | End: 2024-06-18 | Stop reason: SURG

## 2024-06-18 RX ORDER — ONDANSETRON 2 MG/ML
INJECTION INTRAMUSCULAR; INTRAVENOUS AS NEEDED
Status: DISCONTINUED | OUTPATIENT
Start: 2024-06-18 | End: 2024-06-18 | Stop reason: SURG

## 2024-06-18 RX ORDER — NALOXONE HCL 0.4 MG/ML
0.2 VIAL (ML) INJECTION AS NEEDED
Status: DISCONTINUED | OUTPATIENT
Start: 2024-06-18 | End: 2024-06-18 | Stop reason: HOSPADM

## 2024-06-18 RX ORDER — HYDROCODONE BITARTRATE AND ACETAMINOPHEN 5; 325 MG/1; MG/1
1-2 TABLET ORAL EVERY 4 HOURS PRN
Qty: 40 TABLET | Refills: 0 | Status: SHIPPED | OUTPATIENT
Start: 2024-06-18

## 2024-06-18 RX ORDER — HYDRALAZINE HYDROCHLORIDE 20 MG/ML
5 INJECTION INTRAMUSCULAR; INTRAVENOUS
Status: DISCONTINUED | OUTPATIENT
Start: 2024-06-18 | End: 2024-06-18 | Stop reason: HOSPADM

## 2024-06-18 RX ORDER — SODIUM CHLORIDE 0.9 % (FLUSH) 0.9 %
3 SYRINGE (ML) INJECTION EVERY 12 HOURS SCHEDULED
Status: DISCONTINUED | OUTPATIENT
Start: 2024-06-18 | End: 2024-06-18 | Stop reason: HOSPADM

## 2024-06-18 RX ORDER — MIDAZOLAM HYDROCHLORIDE 1 MG/ML
1 INJECTION INTRAMUSCULAR; INTRAVENOUS
Status: DISCONTINUED | OUTPATIENT
Start: 2024-06-18 | End: 2024-06-18 | Stop reason: HOSPADM

## 2024-06-18 RX ORDER — HYDROMORPHONE HYDROCHLORIDE 1 MG/ML
0.5 INJECTION, SOLUTION INTRAMUSCULAR; INTRAVENOUS; SUBCUTANEOUS
Status: DISCONTINUED | OUTPATIENT
Start: 2024-06-18 | End: 2024-06-18 | Stop reason: HOSPADM

## 2024-06-18 RX ORDER — ONDANSETRON 2 MG/ML
4 INJECTION INTRAMUSCULAR; INTRAVENOUS ONCE AS NEEDED
Status: DISCONTINUED | OUTPATIENT
Start: 2024-06-18 | End: 2024-06-18 | Stop reason: HOSPADM

## 2024-06-18 RX ORDER — PROMETHAZINE HYDROCHLORIDE 25 MG/1
25 TABLET ORAL ONCE AS NEEDED
Status: DISCONTINUED | OUTPATIENT
Start: 2024-06-18 | End: 2024-06-18 | Stop reason: HOSPADM

## 2024-06-18 RX ORDER — FENTANYL CITRATE 50 UG/ML
50 INJECTION, SOLUTION INTRAMUSCULAR; INTRAVENOUS
Status: DISCONTINUED | OUTPATIENT
Start: 2024-06-18 | End: 2024-06-18 | Stop reason: HOSPADM

## 2024-06-18 RX ORDER — EPHEDRINE SULFATE 50 MG/ML
5 INJECTION, SOLUTION INTRAVENOUS ONCE AS NEEDED
Status: DISCONTINUED | OUTPATIENT
Start: 2024-06-18 | End: 2024-06-18 | Stop reason: HOSPADM

## 2024-06-18 RX ORDER — LABETALOL HYDROCHLORIDE 5 MG/ML
5 INJECTION, SOLUTION INTRAVENOUS
Status: DISCONTINUED | OUTPATIENT
Start: 2024-06-18 | End: 2024-06-18 | Stop reason: HOSPADM

## 2024-06-18 RX ORDER — DIPHENHYDRAMINE HYDROCHLORIDE 50 MG/ML
12.5 INJECTION INTRAMUSCULAR; INTRAVENOUS
Status: DISCONTINUED | OUTPATIENT
Start: 2024-06-18 | End: 2024-06-18 | Stop reason: HOSPADM

## 2024-06-18 RX ORDER — LIDOCAINE HYDROCHLORIDE 10 MG/ML
0.5 INJECTION, SOLUTION INFILTRATION; PERINEURAL ONCE AS NEEDED
Status: DISCONTINUED | OUTPATIENT
Start: 2024-06-18 | End: 2024-06-18 | Stop reason: HOSPADM

## 2024-06-18 RX ORDER — FLUMAZENIL 0.1 MG/ML
0.2 INJECTION INTRAVENOUS AS NEEDED
Status: DISCONTINUED | OUTPATIENT
Start: 2024-06-18 | End: 2024-06-18 | Stop reason: HOSPADM

## 2024-06-18 RX ORDER — IPRATROPIUM BROMIDE AND ALBUTEROL SULFATE 2.5; .5 MG/3ML; MG/3ML
3 SOLUTION RESPIRATORY (INHALATION) ONCE AS NEEDED
Status: DISCONTINUED | OUTPATIENT
Start: 2024-06-18 | End: 2024-06-18 | Stop reason: HOSPADM

## 2024-06-18 RX ORDER — PROMETHAZINE HYDROCHLORIDE 25 MG/1
25 SUPPOSITORY RECTAL ONCE AS NEEDED
Status: DISCONTINUED | OUTPATIENT
Start: 2024-06-18 | End: 2024-06-18 | Stop reason: HOSPADM

## 2024-06-18 RX ORDER — ROCURONIUM BROMIDE 10 MG/ML
INJECTION, SOLUTION INTRAVENOUS AS NEEDED
Status: DISCONTINUED | OUTPATIENT
Start: 2024-06-18 | End: 2024-06-18 | Stop reason: SURG

## 2024-06-18 RX ADMIN — LIDOCAINE HYDROCHLORIDE 80 MG: 20 INJECTION, SOLUTION INFILTRATION; PERINEURAL at 10:17

## 2024-06-18 RX ADMIN — SUGAMMADEX 200 MG: 100 INJECTION, SOLUTION INTRAVENOUS at 10:38

## 2024-06-18 RX ADMIN — FAMOTIDINE 20 MG: 10 INJECTION INTRAVENOUS at 08:23

## 2024-06-18 RX ADMIN — ONDANSETRON 4 MG: 2 INJECTION INTRAMUSCULAR; INTRAVENOUS at 10:36

## 2024-06-18 RX ADMIN — BUPIVACAINE 10 ML: 13.3 INJECTION, SUSPENSION, LIPOSOMAL INFILTRATION at 09:05

## 2024-06-18 RX ADMIN — SODIUM CHLORIDE, POTASSIUM CHLORIDE, SODIUM LACTATE AND CALCIUM CHLORIDE 9 ML/HR: 600; 310; 30; 20 INJECTION, SOLUTION INTRAVENOUS at 08:23

## 2024-06-18 RX ADMIN — SUGAMMADEX 200 MG: 100 INJECTION, SOLUTION INTRAVENOUS at 10:39

## 2024-06-18 RX ADMIN — DEXAMETHASONE SODIUM PHOSPHATE 8 MG: 4 INJECTION, SOLUTION INTRA-ARTICULAR; INTRALESIONAL; INTRAMUSCULAR; INTRAVENOUS; SOFT TISSUE at 10:17

## 2024-06-18 RX ADMIN — MIDAZOLAM 1 MG: 1 INJECTION INTRAMUSCULAR; INTRAVENOUS at 08:57

## 2024-06-18 RX ADMIN — ROCURONIUM BROMIDE 50 MG: 10 INJECTION, SOLUTION INTRAVENOUS at 10:17

## 2024-06-18 RX ADMIN — BUPIVACAINE HYDROCHLORIDE 10 ML: 5 INJECTION, SOLUTION EPIDURAL; INTRACAUDAL; PERINEURAL at 09:05

## 2024-06-18 RX ADMIN — PROPOFOL 200 MG: 10 INJECTION, EMULSION INTRAVENOUS at 10:17

## 2024-06-18 RX ADMIN — SODIUM CHLORIDE 2 G: 900 INJECTION INTRAVENOUS at 10:09

## 2024-06-18 NOTE — ANESTHESIA PROCEDURE NOTES
Airway  Urgency: elective    Date/Time: 6/18/2024 10:21 AM  Airway not difficult    General Information and Staff    Patient location during procedure: OR  Anesthesiologist: Graciela Lacey MD  CRNA/CAA: Cecelia Fermin CRNA    Indications and Patient Condition  Indications for airway management: airway protection    Preoxygenated: yes  Mask difficulty assessment: 2 - vent by mask + OA or adjuvant +/- NMBA    Final Airway Details  Final airway type: endotracheal airway      Successful airway: ETT  Cuffed: yes   Successful intubation technique: direct laryngoscopy  Facilitating devices/methods: intubating stylet  Endotracheal tube insertion site: oral  Blade: Everett  Blade size: 3  ETT size (mm): 7.0  Cormack-Lehane Classification: grade I - full view of glottis  Placement verified by: chest auscultation and capnometry   Cuff volume (mL): 6  Measured from: teeth  ETT/EBT  to teeth (cm): 21  Number of attempts at approach: 1  Assessment: lips, teeth, and gum same as pre-op and atraumatic intubation

## 2024-06-18 NOTE — OP NOTE
Brief Operative Note      Facility: [facility]  Patient Name: Savannah Valenzuela  YOB: 1970  Date: 6/18/2024  Medical Record Number: [id number]      Pre-op Diagnosis: Adhesive capsulitis left, partial rotator cuff tear      Post-op Diagnosis:   Same        Procedure(s):  LEFT SHOULDER MANIPULATION WITH STEROID INJECTION    Surgeon(s):  Jasmine Page MD    Anesthesia: General with Block  Anesthesiologist: Graciela Lacey MD  CRNA: Cecelia Fermin CRNA    Staff:   Circulator: Stella Abreu RN  Scrub Person: Alan Villela  Assistants :   none    Estimated Blood Loss:none    Specimens:  none    Drains: None    Findings: See Dictation    Complications: None    Indication for procedure:  This pt has a  several month history of left shoulder pain she was found have a partial rotator cuff tear and failed conservative management so she presented today for arthroscopy possible debridement possible repair.      Description of procedure.  ,  Patient was taken the operating room after induction of adequate general tracheal anesthesia she underwent exam under anesthesia patient underwent exam under anesthesia with symmetric and marked limitation of range of motion.  They then underwent gentle manipulation of adhesions with return of full range of motion flexion 180 abduction 180 external rotation to 80 and internal rotation to 60. The patient was then injected sterilely with  Marcaine and Depo-Medrol.  They tolerated this well,  sling was placed and the patient will start physical therapy today.     
fair balance

## 2024-06-18 NOTE — ANESTHESIA PREPROCEDURE EVALUATION
Anesthesia Evaluation     Patient summary reviewed and Nursing notes reviewed   no history of anesthetic complications:   NPO Solid Status: > 8 hours  NPO Liquid Status: > 2 hours           Airway   Mallampati: II  TM distance: >3 FB  Neck ROM: full  Possible difficult intubation  Dental    (+) implants        Pulmonary - normal exam   Cardiovascular - normal exam  Exercise tolerance: good (4-7 METS)    ECG reviewed    (+) hypertension, hyperlipidemia  (-) angina, orthopnea, PND, TRIPP      Neuro/Psych  (+) headaches, psychiatric history Depression  GI/Hepatic/Renal/Endo    (+) obesity, hiatal hernia (s/p gastric sleeve), GERDRenal disease: Cr 1.1.    Musculoskeletal     Abdominal    Substance History      OB/GYN          Other        ROS/Med Hx Other: Previous grade II view                Anesthesia Plan    ASA 2     general     (With PNB for POPC PSR    I have reviewed the patient's history and chart with the patient, including all pertinent laboratory results and imaging. I have explained the risks of anesthesia including but not limited to dental damage, corneal abrasion, nerve injury, MI, stroke, aspiration, and death. Patient has agreed to proceed.      Risks of peripheral nerve block were discussed with patient including but not limited to: inadequate block, bleeding, infection, persistent numbness or weakness, nerve damage, painful dysasthesia and need to protect limb while numb.      /83 (BP Location: Left arm, Patient Position: Sitting)   Pulse 65   Temp 36.8 °C (98.2 °F) (Oral)   Resp 18   Wt 86.6 kg (190 lb 14.7 oz)   LMP 04/05/2023 (Approximate)   SpO2 99%   BMI 32.77 kg/m²   )  intravenous induction     Anesthetic plan, risks, benefits, and alternatives have been provided, discussed and informed consent has been obtained with: patient.      CODE STATUS:

## 2024-06-18 NOTE — ANESTHESIA POSTPROCEDURE EVALUATION
Patient: Savannah Valenzuela    Procedure Summary       Date: 06/18/24 Room / Location:  [facility] [address] [place] [address] [facility]    Anesthesia Start: 1012 Anesthesia Stop: 1052    Procedure: LEFT SHOULDER MANIPULATION WITH STEROID INJECTION (Left: Shoulder) Diagnosis:       Incomplete tear of left rotator cuff, unspecified whether traumatic      (Incomplete tear of left rotator cuff, unspecified whether traumatic [M75.112])    Surgeons: Jasmine Page MD Provider: Graciela Lacey MD    Anesthesia Type: general ASA Status: 2            Anesthesia Type: general    Vitals  Vitals Value Taken Time   /63 06/18/24 1115   Temp 36.8 °C (98.3 °F) 06/18/24 1115   Pulse 70 06/18/24 1120   Resp 16 06/18/24 1115   SpO2 99 % 06/18/24 1120   Vitals shown include unfiled device data.        Post Anesthesia Care and Evaluation    Patient location during evaluation: bedside  Patient participation: complete - patient participated  Level of consciousness: awake and alert  Pain management: adequate    Airway patency: patent  Anesthetic complications: No anesthetic complications  PONV Status: controlled  Cardiovascular status: acceptable  Respiratory status: acceptable  Hydration status: acceptable

## 2024-06-18 NOTE — H&P
"   History & Physical       Patient: Savannah Valenzuela    Date of Admission: No admission date for patient encounter.    YOB: 1970    Medical Record Number: [id number]    Attending Physician: Jasmine Page MD        Chief Complaints: Incomplete tear of left rotator cuff, unspecified whether traumatic [M75.112]      History of Present Illness: This patient has many month history of left shoulder pain she has an MRI which is remarkable for a partial rotator cuff tear its at least 50% of the tendon.  She has failed conservative management she presents for arthroscopy possible debridement most likely repair     Allergies:   Allergies   Allergen Reactions    Latex Urinary Retention and Irritability     Painful urination with latex cath; Patient states \"sensitivity\"         Medications:   Home Medications:  No current facility-administered medications on file prior to encounter.     Current Outpatient Medications on File Prior to Encounter   Medication Sig    Calcium 250 MG capsule Take 500 mg by mouth 3 (Three) Times a Day.    multivitamin with minerals tablet tablet Take 1 tablet by mouth Daily.    Progesterone (Prometrium) 200 MG capsule Take 1 capsule by mouth Daily in the evening (Patient taking differently: Take 1 capsule by mouth Every Evening.)    topiramate (TOPAMAX) 50 MG tablet Take 1 tablet by mouth Daily for 180 days. (Patient taking differently: Take 1 tablet by mouth Every Evening.)     Current Medications:  Scheduled Meds:  Continuous Infusions:No current facility-administered medications for this encounter.    PRN Meds:.    Past Medical History:   Diagnosis Date    Allergic rhinitis     Anemia     Chronic fatigue 03/27/2020    RESOLVED    Depression     Encounter for screening for malignant neoplasm of colon 05/19/2021    Added automatically from request for surgery [id number]    Fatigue     GERD (gastroesophageal reflux disease)     HISTORY OF    Hematuria, microscopic 08/24/2016    Hiatal " hernia     CORRECTED WITH SLEEVE    History of sleeve gastrectomy     History of transfusion     AT BIRTH.    Hyperlipidemia     PRIOR TO SLEEVE GASTRECTOMY    Hypertension     PRIOR TO SLEEVE GASTRECTOMY    Low back strain     Fell on my back on aluminum bleachers    Medication management     Migraine     Multiple food allergies     Physical exam, annual     Rotator cuff syndrome 2023    LEFT SHOULDER        Past Surgical History:   Procedure Laterality Date     SECTION      COLONOSCOPY N/A 2021    Procedure: COLONOSCOPY INTO CECUM AND T.I. WITH COLD BIOPSY POLYPECTOMIES;  Surgeon: Ursula Torres MD;  Location: [facility] ENDOSCOPY;  Service: Gastroenterology;  Laterality: N/A;  PRE- SCREENING  POST- DIVERTICULOSIS, POLYPS, HEMORRHOIDS    DILATATION AND CURETTAGE      ENDOSCOPY N/A 2020    Procedure: ESOPHAGOGASTRODUODENOSCOPY WITH BIOPSY;  Surgeon: Cayetano Khan Jr., MD;  Location: [facility] ENDOSCOPY;  Service: General;  Laterality: N/A;  PREOP/ DYSPEPSIA  POSTOP/ GASTRITIS, ESOPHAGITIS, HIATAL HERNIA    GASTRIC SLEEVE LAPAROSCOPIC N/A 10/5/2020    Procedure: GASTRIC SLEEVE LAPAROSCOPIC WITH paraesophageal hernia repair, lysis of adhesions;  Surgeon: Cayetano Khan Jr., MD;  Location: [facility] OR [facility];  Service: Bariatric;  Laterality: N/A;    WISDOM TOOTH EXTRACTION          Social History     Occupational History    Not on file   Tobacco Use    Smoking status: Never     Passive exposure: Past    Smokeless tobacco: Never   Vaping Use    Vaping status: Never Used   Substance and Sexual Activity    Alcohol use: No     Comment: Kay: rare    Drug use: No    Sexual activity: Not Currently     Partners: Male     Birth control/protection: Post-menopausal      Social History     Social History Narrative    Not on file        Family History   Problem Relation Age of Onset    Heart disease Father     Hyperlipidemia Father     Malig Hyperthermia Neg Hx        Review of  Systems      Physical Exam: 53 y.o. female  General Appearance:    Alert, cooperative, in no acute distress                    There were no vitals filed for this visit.     Head:  Normocephalic, without obvious abnormality, atraumatic   Eyes:          Conjunctivae and sclerae normal, no pallor, corneas clear,    Ears:  Ears appear intact with no abnormalities noted   Throat: No oral lesions, no thrush, oral mucosa moist   Neck: No adenopathy, supple, trachea midline, no thyromegaly,    Back:   No kyphosis present, no scoliosis present, no skin lesions,      erythema or scars, no tenderness to percussion or                   palpation,range of motion normal   Lungs:   C respirations regular, even and                 unlabored    Heart:  Regular rhythm and normal rate               Chest Wall:  No abnormalities observed               Pulses: Pulses palpable and equal bilaterally   Skin: No bleeding, bruising or rash   Lymph nodes: No palpable adenopathy   Neurologic: Appears neurologic intact             Assessment:    Incomplete tear of left rotator cuff          Plan: All risks, benefits and alternatives were discussed.  Risks including but not exclusive to anesthetic complications, including death, MI, CVA, infection, bleeding DVT, PE,  fracture, residual pain and need for future surgery.  Patient understood all and agrees to proceed.

## 2024-06-18 NOTE — ANESTHESIA POSTPROCEDURE EVALUATION
Patient: Savannah Valenzuela    Procedure Summary       Date: 06/18/24 Room / Location:  [facility]    Anesthesia Start: 1012 Anesthesia Stop: 1052    Procedure: LEFT SHOULDER MANIPULATION WITH STEROID INJECTION (Left: Shoulder) Diagnosis:       Incomplete tear of left rotator cuff, unspecified whether traumatic      (Incomplete tear of left rotator cuff, unspecified whether traumatic [M75.112])    Surgeons: Jasmine Page MD Provider: Graciela Lacey MD    Anesthesia Type: general ASA Status: 2            Anesthesia Type: general    Vitals  Vitals Value Taken Time   /63 06/18/24 1115   Temp 36.8 °C (98.3 °F) 06/18/24 1115   Pulse 70 06/18/24 1120   Resp 16 06/18/24 1115   SpO2 99 % 06/18/24 1120   Vitals shown include unfiled device data.        Post Anesthesia Care and Evaluation    Patient location during evaluation: bedside  Patient participation: complete - patient participated  Level of consciousness: awake and alert  Pain management: adequate    Airway patency: patent  Anesthetic complications: No anesthetic complications  PONV Status: controlled  Cardiovascular status: acceptable and hemodynamically stable  Respiratory status: acceptable, spontaneous ventilation and nonlabored ventilation  Hydration status: acceptable    Comments: /74   Pulse 69   Temp 36.8 °C (98.3 °F) (Oral)   Resp 16   Wt 86.6 kg (190 lb 14.7 oz)   LMP 04/05/2023 (Approximate)   SpO2 98%   BMI 32.77 kg/m²

## 2024-06-18 NOTE — ANESTHESIA PROCEDURE NOTES
Interscalene nerve block      Patient reassessed immediately prior to procedure    Patient location during procedure: pre-op  Start time: 6/18/2024 9:01 AM  Stop time: 6/18/2024 9:05 AM  Reason for block: at surgeon's request and post-op pain management  Performed by  Anesthesiologist: Graciela Lacey MD  Preanesthetic Checklist  Completed: patient identified, IV checked, site marked, risks and benefits discussed, surgical consent, monitors and equipment checked, pre-op evaluation and timeout performed  Prep:  Pt Position: supine  Sterile barriers:cap, gloves and mask  Prep: ChloraPrep  Patient monitoring: blood pressure monitoring, continuous pulse oximetry and EKG  Procedure    Sedation: yes  Performed under: local infiltration  Guidance:ultrasound guided    ULTRASOUND INTERPRETATION.  Using ultrasound guidance a 22 G gauge needle was placed in close proximity to the brachial plexus nerve, at which point, under ultrasound guidance anesthetic was injected in the area of the nerve and spread of the anesthesia was seen on ultrasound in close proximity thereto.  There were no abnormalities seen on ultrasound; a digital image was taken; and the patient tolerated the procedure with no complications. Images:still images obtained, printed/placed on chart    Laterality:left  Block Type:interscalene  Injection Technique:single-shot  Needle Type:short-bevel and echogenic  Needle Gauge:22 G  Resistance on Injection: none    Medications Used: bupivacaine liposome (EXPAREL) 1.3 % injection - Infiltration   10 mL - 6/18/2024 9:05:00 AM  bupivacaine (PF) (MARCAINE) 0.5 % injection - Injection   10 mL - 6/18/2024 9:05:00 AM      Medications  Comment:Ultrasound Interpretation:  Using ultrasound guidance the needle was placed in close proximity to the target nerve and anesthetic was injected in the area of the target nerve and/or bundles, and spread of the anesthetic was seen on ultrasound in close proximity thereto.  There  were no abnormalities seen on ultrasound; a digital image was taken; and the patient tolerated the procedure with no complications.    Block placed for postoperative pain control per surgeon request.    Post Assessment  Injection Assessment: negative aspiration for heme, no paresthesia on injection and incremental injection  Patient Tolerance:comfortable throughout block  Complications:no  Performed by: Graciela Lacey MD

## 2024-06-18 NOTE — THERAPY EVALUATION
Savannah Valenzuela is a 53 y.o. female who presents s/p surgical manipulation of left shoulder.     Shoulder PROM measurements include flexion 180*, abduction 180*, external rotation 80*, internal rotation 60* . Treatment this date includes Shoulder PROM into all cardinal planes, Caregiver instruction of shoulder PROM , and Review of discharge plan (sling use, ARTEM GANNON). She tolerated all ROM without pain or discomfort - her mother was at bedside and was able to provide hands on learning for carryover in the d/c environment.     Goal: Pt/caregiver will be IND with PROM performance to facilitate improved mobility prior to outpatient PT eval. GOAL MET 6/18.    The pt's plan is to discharge to home with a F/U in OP PT tomorrow. Pt was provided with written instruction regarding HEP/PROM performance following DC.

## 2024-06-19 ENCOUNTER — HOSPITAL ENCOUNTER (OUTPATIENT)
Dept: PHYSICAL THERAPY | Facility: HOSPITAL | Age: 54
Setting detail: THERAPIES SERIES
Discharge: HOME | End: 2024-06-19
Payer: COMMERCIAL

## 2024-06-19 DIAGNOSIS — Z47.89 ORTHOPEDIC AFTERCARE: ICD-10-CM

## 2024-06-19 DIAGNOSIS — M62.81 DECREASED MUSCLE STRENGTH: ICD-10-CM

## 2024-06-19 DIAGNOSIS — M25.512 LEFT SHOULDER PAIN, UNSPECIFIED CHRONICITY: Primary | ICD-10-CM

## 2024-06-19 DIAGNOSIS — R29.3 POOR POSTURE: ICD-10-CM

## 2024-06-19 PROCEDURE — 97161 PT EVAL LOW COMPLEX 20 MIN: CPT | Performed by: PHYSICAL THERAPIST

## 2024-06-19 PROCEDURE — 97140 MANUAL THERAPY 1/> REGIONS: CPT | Performed by: PHYSICAL THERAPIST

## 2024-06-20 ENCOUNTER — HOSPITAL ENCOUNTER (OUTPATIENT)
Dept: PHYSICAL THERAPY | Facility: HOSPITAL | Age: 54
Setting detail: THERAPIES SERIES
Discharge: HOME | End: 2024-06-20
Payer: COMMERCIAL

## 2024-06-20 DIAGNOSIS — M62.81 DECREASED MUSCLE STRENGTH: ICD-10-CM

## 2024-06-20 DIAGNOSIS — Z47.89 ORTHOPEDIC AFTERCARE: ICD-10-CM

## 2024-06-20 DIAGNOSIS — M25.512 LEFT SHOULDER PAIN, UNSPECIFIED CHRONICITY: Primary | ICD-10-CM

## 2024-06-20 DIAGNOSIS — R29.3 POOR POSTURE: ICD-10-CM

## 2024-06-20 PROCEDURE — 97140 MANUAL THERAPY 1/> REGIONS: CPT | Performed by: PHYSICAL THERAPIST

## 2024-06-20 NOTE — THERAPY EVALUATION
Outpatient Physical Therapy Ortho Initial Evaluation  [facility]     Patient Name: Savannah Valenzuela  : 1970  MRN: [id number]  Today's Date: 2024      Visit Date: 2024    Patient Active Problem List   Diagnosis    Migraine    Allergic rhinitis    Depression    S/P laparoscopic sleeve gastrectomy    Iron deficiency anemia    Paget's disease of bone    Incomplete tear of left rotator cuff    Postoperative malabsorption        Past Medical History:   Diagnosis Date    Allergic rhinitis     Anemia     Chronic fatigue 2020    RESOLVED    Depression     Encounter for screening for malignant neoplasm of colon 2021    Added automatically from request for surgery [id number]    Fatigue     GERD (gastroesophageal reflux disease)     HISTORY OF    Hematuria, microscopic 2016    Hiatal hernia     CORRECTED WITH SLEEVE    History of sleeve gastrectomy     History of transfusion     AT BIRTH.    Hyperlipidemia     PRIOR TO SLEEVE GASTRECTOMY    Hypertension     PRIOR TO SLEEVE GASTRECTOMY    Low back strain     Fell on my back on aluminum bleachers    Medication management     Migraine     Multiple food allergies     Physical exam, annual     Rotator cuff syndrome 2023    LEFT SHOULDER        Past Surgical History:   Procedure Laterality Date     SECTION      COLONOSCOPY N/A 2021    Procedure: COLONOSCOPY INTO CECUM AND T.I. WITH COLD BIOPSY POLYPECTOMIES;  Surgeon: Ursula Torres MD;  Location: [facility] ENDOSCOPY;  Service: Gastroenterology;  Laterality: N/A;  PRE- SCREENING  POST- DIVERTICULOSIS, POLYPS, HEMORRHOIDS    DILATATION AND CURETTAGE      ENDOSCOPY N/A 2020    Procedure: ESOPHAGOGASTRODUODENOSCOPY WITH BIOPSY;  Surgeon: Cayetano Khan Jr., MD;  Location: [facility] ENDOSCOPY;  Service: General;  Laterality: N/A;  PREOP/ DYSPEPSIA  POSTOP/ GASTRITIS, ESOPHAGITIS, HIATAL HERNIA    GASTRIC SLEEVE LAPAROSCOPIC N/A 10/5/2020    Procedure: GASTRIC SLEEVE  LAPAROSCOPIC WITH paraesophageal hernia repair, lysis of adhesions;  Surgeon: Cayetano Khan Jr., MD;  [place] [facility] [place] [facility] [place]  Service: Bariatric;  Laterality: N/A;    SHOULDER ARTHROSCOPY W/ ROTATOR CUFF REPAIR Left 6/18/2024    Procedure: LEFT SHOULDER MANIPULATION WITH STEROID INJECTION;  Surgeon: Jasmine Page MD;  [place] [facility] [place] [facility] [place]  Service: Orthopedics;  Laterality: Left;    WISDOM TOOTH EXTRACTION         Visit Dx:     ICD-10-CM ICD-9-CM   1. Left shoulder pain, unspecified chronicity  M25.512 719.41   2. Decreased muscle strength  M62.81 728.87   3. Poor posture  R29.3 781.92   4. Orthopedic aftercare  Z47.89 V54.9          Patient History       Row Name 06/19/24 1300             History    Chief Complaint Difficulty with daily activities;Pain  -GJ      Type of Pain Shoulder pain  L  -GJ      Date Current Problem(s) Began 10/01/21  -      Brief Description of Current Complaint Ms. Valenzuela is a 52 y/o R handed female. She is 1 day s/p L shoulder manipulation (6/18/2024).  L shoulder pain since 10/20/2023 without known KYUNG.  She reports still having heavy feeling L shoulder/arm.  She reports compliance with HEP every 3 hours. Continued numbness in humeral region. 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 Currently off work until 6/25/2024 secondary to above procedure.  -GJ      Previous treatment for THIS PROBLEM Rehabilitation;Surgery  -GJ      Surgery Date: 06/18/24  -GJ      Patient/Caregiver Goals Relieve pain;Improve mobility;Improve strength;Know what to do to help the symptoms  -GJ      Hand Dominance right-handed  -GJ      Occupation/sports/leisure activities patient  at [employer]  -      Are you or can you be pregnant No  -GJ         Pain     Pain Location Shoulder  L  -GJ      What Performance Factors Make the Current Problem(s) WORSE? movement  -GJ         Fall Risk Assessment    Any falls in the past year: No  -GJ         Daily Activities    Primary Language  English  -GJ      Are you able to read Yes  -GJ      Are you able to write Yes  -GJ      How does patient learn best? Demonstration;Pictures/Video;Reading;Listening  -GJ      Teaching needs identified Home Exercise Program;Management of Condition  -GJ      Patient is concerned about/has problems with Difficulty with self care (i.e. bathing, dressing, toileting:;Grasping objects lifting;Performing home management (household chores, shopping, care of dependents);Performing job responsibilities/community activities (work, school,;Reaching over head;Repetitive movements of the hand, arm, shoulder  -GJ      Barriers to learning None  -GJ      Pt Participated in POC and Goals Yes  -GJ                User Key  (r) = Recorded By, (t) = Taken By, (c) = Cosigned By      Initials Name Provider Type    Camilo Ponce, PT Physical Therapist                     PT Ortho       Row Name 06/19/24 1300       Posture/Observations    Alignment Options Forward head;Cervical lordosis;Rounded shoulders;Scapular elevation;Scapular winging;Scoliosis  -GJ    Forward Head Mild;Moderate  -GJ    Rounded Shoulders Bilateral:;Mild;Moderate  -GJ    Scapular Elevation Left:;Mild  -GJ    Scapular winging Left:;Mild  -GJ    Observations --  Patient demonstrated redness across anterior chest and left upper and lower arm in the right upper and lower arm possibly consistent with mild reaction to medication/anesthesia.  Encourage patient to monitor for signs and symptoms of worsening irritation  -GJ       Special Tests/Palpation    Special Tests/Palpation Shoulder  -GJ       Shoulder Girdle Palpation    Shoulder Girdle Palpation? Yes  -GJ    Upper Trap Left:;Guarded/taut  -GJ    Levator Scapula Left:;Guarded/taut  -GJ       General ROM    Head/Neck/Trunk Neck Extension;Neck Flexion;Neck Lt Rotation;Neck Rt Rotation  -GJ    RT Upper Ext Rt Shoulder ABduction;Rt Shoulder Flexion;Rt Shoulder External Rotation;Rt Shoulder Internal Rotation  -GJ    LT  Upper Ext Lt Shoulder ABduction;Lt Shoulder Flexion;Lt Shoulder External Rotation;Lt Shoulder Internal Rotation  -GJ    GENERAL ROM COMMENTS Cervical spine active range of motion grossly within functional limits in all planes.  Bilateral elbow active range of motion in bilateral wrist active range of motion grossly within functional limits and symmetrical  -GJ       Right Upper Ext    Rt Shoulder Abduction AROM 170 seated  -GJ    Rt Shoulder Abduction PROM 180 supine  -GJ    Rt Shoulder Flexion AROM 160 seated  -GJ    Rt Shoulder Flexion PROM 170 supine  -GJ    Rt Shoulder External Rotation AROM KATIE midline T 3  -GJ    Rt Shoulder External Rotation PROM 100 supine pos  -GJ    Rt Shoulder Internal Rotation AROM FIR midline T 6  -GJ    Rt Shoulder Internal Rotation PROM 65 supine, POS  -GJ       Left Upper Ext    Lt Shoulder Abduction AROM 40 seated  Limited secondary to block has not worn off completely  -GJ    Lt Shoulder Abduction PROM 170 supine  -GJ    Lt Shoulder Flexion AROM 70 seated  Limited secondary to block has not worn off completely  -GJ    Lt Shoulder Flexion PROM 165 supine  -GJ    Lt Shoulder External Rotation AROM KATIE Unable, block not fully worn off  -GJ    Lt Shoulder External Rotation PROM 65 supine, POS  -GJ    Lt Shoulder Internal Rotation AROM FIR ipsilateral hip pocket  -GJ    Lt Shoulder Internal Rotation PROM 65 supine, pos  -GJ       MMT (Manual Muscle Testing)    Rt Upper Ext Rt Shoulder Flexion;Rt Shoulder ABduction;Rt Shoulder Internal Rotation;Rt Shoulder External Rotation;Rt Elbow Flexion;Rt Elbow Extension  -GJ    Lt Upper Ext Lt Shoulder Flexion;Lt Shoulder ABduction;Lt Shoulder Internal Rotation;Lt Shoulder External Rotation;Lt Elbow Extension;Lt Elbow Flexion  -GJ       MMT Right Upper Ext    Rt Shoulder Flexion MMT, Gross Movement (4+/5) good plus  -GJ    Rt Shoulder ABduction MMT, Gross Movement (4+/5) good plus  -GJ    Rt Shoulder Internal Rotation MMT, Gross Movement (5/5)  normal  -GJ    Rt Shoulder External Rotation MMT, Gross Movement (5/5) normal  -GJ    Rt Elbow Flexion MMT, Gross Movement: (5/5) normal  -GJ    Rt Elbow Extension MMT, Gross Movement: (5/5) normal  -GJ       MMT Left Upper Ext    Lt Shoulder Flexion MMT, Gross Movement --  Not tested secondary to recent procedure and block not being fully worn off  -GJ    Lt Shoulder ABduction MMT, Gross Movement --  Not tested secondary to recent procedure and block not being fully worn off  -GJ    Lt Shoulder Internal Rotation MMT, Gross Movement --  Not tested secondary to recent procedure and block not being fully worn off  -GJ    Lt Shoulder External Rotation MMT, Gross Movement --  Not tested secondary to recent procedure and block not being fully worn off  -GJ    Lt Elbow Flexion MMT, Gross Movement --  Grossly noted to be 3+ out of 5 however not formally tested  -GJ    Lt Elbow Extension MMT, Gross Movement --  Grossly noted to be 3+ out of 5 however not formally tested  -GJ       Flexibility    Flexibility Tested? Upper Extremity  -GJ       Upper Extremity Flexibility    Upper Trapezius Left:;Mildly limited;Moderately limited  -GJ    Levator Scapula Left:;Mildly limited;Moderately limited  -GJ    Latissimus Dorsi Left:;Mildly limited;Moderately limited  -GJ              User Key  (r) = Recorded By, (t) = Taken By, (c) = Cosigned By      Initials Name Provider Type    Camilo Ponce, PT Physical Therapist                                Therapy Education  Education Details: [id number]  discussed dx, px, poc, discussed anatomy of the shoulder and physiology of healing particularly following her procedure. discussed realistic expectations and time frames for therapy. Discussed activity modification.  Encourage patient to continue focusing on range of motion and stretching every 3 hours including waking once through the night for the first 3 days.  Given: HEP, Symptoms/condition management, Pain management, Posture/body  mechanics, Mobility training, Edema management  Program: New  How Provided: Verbal, Demonstration, Written  Provided to: Patient  Level of Understanding: Teach back education performed, Verbalized, Demonstrated      PT OP Goals       Row Name 06/19/24 1300          PT Short Term Goals    STG Date to Achieve 07/19/24  -     STG 1 pt. to be I with initial HEP to facilitate self management of their condition  -GJ     STG 1 Progress New  -     STG 2 pt. to be educated in/verbalize understanding of the importance of posture/ergonomics in association with their condition to facilitate self management of their condition  -GJ     STG 2 Progress New  -        Long Term Goals    LTG Date to Achieve 09/17/24  -GJ     LTG 1 pt. to be I with advanced HEP to facilitate self management of their condition  -GJ     LTG 1 Progress New  -GJ     LTG 2 pt. to demonstrate L shoulder flexion, abduction AROM >/=155 to facilitate ease of performing functional/household activities  -GJ     LTG 2 Progress New  -     LTG 3 pt. to demonstrate placing/retrieving small object from an above the shoulder level shelf with her LUE to facilitate ease of performing household/work related activities  -GJ     LTG 3 Progress New  -     LTG 4 Patient to demonstrate greater than equal to 4+ out of 5 muscle testing of the left shoulder flexion, abduction, external rotation to facilitate ease of performance of functional activities  -GJ     LTG 4 Progress New  -     LTG 5 Patient to demonstrate left shoulder active range of motion functional internal rotation greater than equal to midline L1 to facilitate ease of performing dressing activities  -     LTG 5 Progress New  [facility]        Time Calculation    PT Goal Re-Cert Due Date 09/17/24  -               User Key  (r) = Recorded By, (t) = Taken By, (c) = Cosigned By      Initials Name Provider Type    Camilo Ponce, PT Physical Therapist                     PT Assessment/Plan       Row Name  06/19/24 1409          PT Assessment    Functional Limitations Limitation in home management;Limitations in community activities;Performance in self-care ADL;Performance in leisure activities  -     Impairments Impaired flexibility;Impaired muscle endurance;Impaired muscle length;Impaired muscle power;Impaired postural alignment;Joint mobility;Muscle strength;Motor function;Pain;Poor body mechanics;Posture;Range of motion  -GJ     Assessment Comments Ms. Valenzuela is a 53-year-old right-handed female.  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  She is 1 day status post left shoulder manipulation (6/18/2024).  She reports having left shoulder pain since October 2023 without known KYUNG.  She reports still having a heavy feeling in her left shoulder/arm with associated numbness secondary to the block.  She reports compliance with her HEP every 3 hours since procedure.  She is currently off work until 6/25/2024 secondary to the above procedure. Ms. Valenzuela presents to the clinic wearing a sling on left upper extremity.  She demonstrates partial motor return following her block.  Active range of motion left shoulder limited secondary to block not being fully resolved.  She demonstrates good passive range of motion left shoulder with fluid mobility at this time. She reports a quick DASH score of 11, scored 0-100, 0 represents no perceived disability.  Ms. Valenzuela demonstrates evolving/stable s/s consistent following above procedure which limits her participation in self-care, dressing, household activities, leisure activities, work activities.    Aggravating/Personal factors affecting recovery include,  but are not limited to, chronicity of condition.  Ms. Valenzuela may benefit from skilled physical therapy to address the above impairments.  -GJ     Please refer to paper survey for additional self-reported information Yes  -GJ     Rehab Potential Excellent  -GJ     Patient/caregiver participated in establishment of treatment plan and  goals Yes  -GJ     Patient would benefit from skilled therapy intervention Yes  -GJ        PT Plan    PT Frequency 1x/week;2x/week;3x/week;4x/week;5x/week  -GJ     Predicted Duration of Therapy Intervention (PT) 10 visits (5 days in a row, then BIW x 4 weeks )  -GJ     Planned CPT's? PT EVAL LOW COMPLEXITY: 40033;PT RE-EVAL: 32684;PT THER PROC EA 15 MIN: 40993;PT THER ACT EA 15 MIN: 98138;PT MANUAL THERAPY EA 15 MIN: 36413;PT NEUROMUSC RE-EDUCATION EA 15 MIN: 33370;PT HOT OR COLD PACK TREAT MCARE;PT ELECTRICAL STIM UNATTEND:   -GJ     PT Plan Comments warm up on UBE if has full motor, pulleys, focus on ROM of GHJ, sapulothoracic tissues  -GJ               User Key  (r) = Recorded By, (t) = Taken By, (c) = Cosigned By      Initials Name Provider Type    Camilo Ponce, PT Physical Therapist                       OP Exercises       Row Name 06/19/24 2200 06/19/24 1314          Total Minutes    50363 - PT Manual Therapy Minutes -- 25  -GJ        Exercise 1    Exercise Name 1 UBE  -GJ --     Additional Comments Next session  -GJ --        Exercise 2    Exercise Name 2 PROM flexion, abd, ER, IR  -GJ --     Cueing 2 Verbal  -GJ --     Additional Comments Demonstrated  -GJ --        Exercise 3    Exercise Name 3 shoulder shrugs, scap retraction  -GJ --     Cueing 3 Verbal;Demo  -GJ --     Reps 3 5  -GJ --     Time 3 5s  -GJ --        Exercise 4    Exercise Name 4 supine AAROM flexion with self  -GJ --     Cueing 4 Demo;Verbal  -GJ --     Additional Comments Demonstrated for when patient status for motor  -GJ --        Exercise 5    Exercise Name 5 supine AAROM ER  -GJ --     Cueing 5 Verbal;Demo  -GJ --     Time 5 Towel under humerus  -GJ --     Additional Comments Demonstrated for when patient status for motor  -GJ --               User Key  (r) = Recorded By, (t) = Taken By, (c) = Cosigned By      Initials Name Provider Type    Camilo Ponce, PT Physical Therapist                  Manual Rx (Last 36 Hours)        Manual Treatments       Row Name 06/19/24 1314             Total Minutes    65523 - PT Manual Therapy Minutes 25  -GJ         Manual Rx 1    Manual Rx 1 Location Left shoulder  -GJ      Manual Rx 1 Type Passive range of motion into flexion, abduction, external rotation, internal rotation  -      Manual Rx 1 Grade Gentle oscillations for relaxation  -GJ      Manual Rx 1 Duration Patient supine  -                User Key  (r) = Recorded By, (t) = Taken By, (c) = Cosigned By      Initials Name Provider Type    Camilo Ponce, PT Physical Therapist                                Outcome Measure Options: Quick DASH  Quick DASH  Open a tight or new jar.: No Difficulty  Do heavy household chores (e.g., wash walls, wash floors): No Difficulty  Carry a shopping bag or briefcase: No Difficulty  Wash your back: Moderate Difficulty  Use a knife to cut food: No Difficulty  Recreational activities in which you take some force or impact through your arm, should or hand (e.g. golf, hammering, tennis, etc.): Moderate Difficulty  During the past week, to what extent has your arm, shoulder, or hand problem interfered with your normal social activites with family, friends, neighbors or groups?: Not at all  During the past week, were you limited in your work or other regular daily activities as a result of your arm, shoulder or hand problem?: Not limited at all  Arm, Shoulder, or hand pain: None  Tingling (pins and needles) in your arm, shoulder, or hand: None  During the past week, how much difficulty have you had sleeping because of the pain in your arm, shoulder or hand?: Mild Difficulty  Number of Questions Answered: 11  Quick DASH Score: 11.36         Time Calculation:     Start Time: 1315  Stop Time: 1400  Time Calculation (min): 45 min  Timed Charges  68627 - PT Manual Therapy Minutes: 25  Total Minutes  Timed Charges Total Minutes: 25   Total Minutes: 25     Therapy Charges for Today       Code Description Service  Date Service Provider Modifiers Qty    11783193769 HC PT EVAL LOW COMPLEXITY 1 6/19/2024 Camilo Milner, PT GP 1    88854008424 HC PT MANUAL THERAPY EA 15 MIN 6/19/2024 Camilo Milner, PT GP 2            PT G-Codes  Outcome Measure Options: Quick DASH  Quick DASH Score: 11.36         Camilo Milner, PT  6/19/2024

## 2024-06-20 NOTE — THERAPY TREATMENT NOTE
Outpatient Physical Therapy Ortho Treatment Note  [facility]     Patient Name: Savannah Valenzuela  : 1970  MRN: [id number]  Today's Date: 2024      Visit Date: 2024    Visit Dx:    ICD-10-CM ICD-9-CM   1. Left shoulder pain, unspecified chronicity  M25.512 719.41   2. Decreased muscle strength  M62.81 728.87   3. Poor posture  R29.3 781.92   4. Orthopedic aftercare  Z47.89 V54.9       Patient Active Problem List   Diagnosis    Migraine    Allergic rhinitis    Depression    S/P laparoscopic sleeve gastrectomy    Iron deficiency anemia    Paget's disease of bone    Incomplete tear of left rotator cuff    Postoperative malabsorption        Past Medical History:   Diagnosis Date    Allergic rhinitis     Anemia     Chronic fatigue 2020    RESOLVED    Depression     Encounter for screening for malignant neoplasm of colon 2021    Added automatically from request for surgery [id number]    Fatigue     GERD (gastroesophageal reflux disease)     HISTORY OF    Hematuria, microscopic 2016    Hiatal hernia     CORRECTED WITH SLEEVE    History of sleeve gastrectomy     History of transfusion     AT BIRTH.    Hyperlipidemia     PRIOR TO SLEEVE GASTRECTOMY    Hypertension     PRIOR TO SLEEVE GASTRECTOMY    Low back strain     Fell on my back on aluminum bleachers    Medication management     Migraine     Multiple food allergies     Physical exam, annual     Rotator cuff syndrome 2023    LEFT SHOULDER        Past Surgical History:   Procedure Laterality Date     SECTION      COLONOSCOPY N/A 2021    Procedure: COLONOSCOPY INTO CECUM AND T.I. WITH COLD BIOPSY POLYPECTOMIES;  Surgeon: Ursula Torres MD;  Location: [facility] ENDOSCOPY;  Service: Gastroenterology;  Laterality: N/A;  PRE- SCREENING  POST- DIVERTICULOSIS, POLYPS, HEMORRHOIDS    DILATATION AND CURETTAGE      ENDOSCOPY N/A 2020    Procedure: ESOPHAGOGASTRODUODENOSCOPY WITH BIOPSY;  Surgeon: Cayetano Khan  Tima Zarate MD;  Location: [facility] ENDOSCOPY;  Service: General;  Laterality: N/A;  PREOP/ DYSPEPSIA  POSTOP/ GASTRITIS, ESOPHAGITIS, HIATAL HERNIA    GASTRIC SLEEVE LAPAROSCOPIC N/A 10/5/2020    Procedure: GASTRIC SLEEVE LAPAROSCOPIC WITH paraesophageal hernia repair, lysis of adhesions;  Surgeon: Cayetano Khan Jr., MD;  Location:  [facility];  Service: Bariatric;  Laterality: N/A;    SHOULDER ARTHROSCOPY W/ ROTATOR CUFF REPAIR Left 6/18/2024    Procedure: LEFT SHOULDER MANIPULATION WITH STEROID INJECTION;  Surgeon: Jasmine Page MD;  [place] [facility] [place] [facility] [place]  Service: Orthopedics;  Laterality: Left;    WISDOM TOOTH EXTRACTION          PT Ortho       Row Name 06/20/24 1000       Left Upper Ext    Lt Shoulder Abduction PROM 170 supine  -GJ    Lt Shoulder Flexion PROM 155 supine  -GJ    Lt Shoulder External Rotation PROM 80 supine POS  -GJ              User Key  (r) = Recorded By, (t) = Taken By, (c) = Cosigned By      Initials Name Provider Type    Camilo Ponce, PT Physical Therapist                                 PT Assessment/Plan       Row Name 06/20/24 1023          PT Assessment    Assessment Comments Ms. Valenzuela returns to the clinic for her first follow up PT session after her evaluation for her L shoulder manipulation. She is 2 day s/p L shoulder manipulation. She reports having full motor return.  Session somewhat limited secondary to time constraints (traffic issues).  Briefly warmed up on UBE/pulleys and focused on PROM. SHe demosntrates increased pain response today, requiring cuing for deep breathing techniques/relaxation. Performed joint mobs and PROM to L shoulder into all planes. She has maintained flexion/abd PROM, improved ER PROM, all with increased pain response. Current plan is to see her at least tomorrow and saturday, then progress to 1-2 x's per week for several weeks. Ms. Valenzuela continues to be a good candidate for skilled physical therapy.  -GJ        PT Plan    PT Plan  Comments focus on manual mobs/ROM of L GHJ  -GJ               User Key  (r) = Recorded By, (t) = Taken By, (c) = Cosigned By      Initials Name Provider Type    Camilo Ponce, PT Physical Therapist                       OP Exercises       Row Name 06/20/24 1020 06/20/24 1000          Subjective    Subjective Comments -- i woke up this morning and my feeling came back  -GJ        Total Minutes    70996 - PT Manual Therapy Minutes 23  -GJ --        Exercise 1    Exercise Name 1 -- UBE  -GJ     Time 1 -- 3 min  -GJ        Exercise 6    Exercise Name 6 -- pulleys  -GJ     Cueing 6 -- Verbal;Demo  -GJ     Reps 6 -- 2 min  -GJ               User Key  (r) = Recorded By, (t) = Taken By, (c) = Cosigned By      Initials Name Provider Type    Camilo Ponce, PT Physical Therapist                             Manual Rx (Last 36 Hours)       Manual Treatments       Row Name 06/20/24 1020 06/20/24 0900          Total Minutes    58203 - PT Manual Therapy Minutes 23  -GJ --        Manual Rx 1    Manual Rx 1 Location -- Left shoulder  -GJ     Manual Rx 1 Type -- Passive range of motion into flexion, abduction, external rotation, internal rotation  -GJ     Manual Rx 1 Grade -- Gentle oscillations for relaxation  -GJ     Manual Rx 1 Duration -- Patient supine  -GJ        Manual Rx 2    Manual Rx 2 Location -- lateral distraction to L GHJ,  -GJ     Manual Rx 2 Type -- PA mobs to L GHJ multiple planes  -GJ               User Key  (r) = Recorded By, (t) = Taken By, (c) = Cosigned By      Initials Name Provider Type    Camilo Ponce, PT Physical Therapist                     PT OP Goals       Row Name 06/20/24 1000          PT Short Term Goals    STG Date to Achieve 07/19/24  -GJ     STG 1 pt. to be I with initial HEP to facilitate self management of their condition  -GJ     STG 1 Progress Ongoing  -GJ     STG 2 pt. to be educated in/verbalize understanding of the importance of posture/ergonomics in association with their  condition to facilitate self management of their condition  -GJ     STG 2 Progress Ongoing  -        Long Term Goals    LTG Date to Achieve 09/17/24  -GJ     LTG 1 pt. to be I with advanced HEP to facilitate self management of their condition  -GJ     LTG 1 Progress Ongoing  -GJ     LTG 2 pt. to demonstrate L shoulder flexion, abduction AROM >/=155 to facilitate ease of performing functional/household activities  -GJ     LTG 2 Progress Ongoing  -GJ     LTG 3 pt. to demonstrate placing/retrieving small object from an above the shoulder level shelf with her LUE to facilitate ease of performing household/work related activities  -GJ     LTG 3 Progress Ongoing  -GJ     LTG 4 Patient to demonstrate greater than equal to 4+ out of 5 muscle testing of the left shoulder flexion, abduction, external rotation to facilitate ease of performance of functional activities  -GJ     LTG 4 Progress Ongoing  -GJ     LTG 5 Patient to demonstrate left shoulder active range of motion functional internal rotation greater than equal to midline L1 to facilitate ease of performing dressing activities  -GJ     LTG 5 Progress Ongoing  -               User Key  (r) = Recorded By, (t) = Taken By, (c) = Cosigned By      Initials Name Provider Type    Camilo Ponce, PT Physical Therapist                    Therapy Education  Education Details: reviewed activity modifications, continue ROM every 3 hours. ok to sleep through the night. transition from PROM ROM with care giver to AAROM activities performed by herself  Given: HEP, Symptoms/condition management, Pain management, Posture/body mechanics, Fall prevention and home safety, Mobility training  Program: Reinforced, Modified  How Provided: Verbal, Demonstration  Provided to: Patient  Level of Understanding: Teach back education performed, Verbalized, Demonstrated              Time Calculation:   Start Time: 1020  Stop Time: 1049  Time Calculation (min): 29 min  Timed Charges  58901 -  PT Manual Therapy Minutes: 23  Total Minutes  Timed Charges Total Minutes: 23   Total Minutes: 23  Therapy Charges for Today       Code Description Service Date Service Provider Modifiers Qty    60624413340 HC PT MANUAL THERAPY EA 15 MIN 6/20/2024 Camilo Milner, PT GP 2                      Camilo Milner, PT  6/20/2024

## 2024-06-21 ENCOUNTER — HOSPITAL ENCOUNTER (OUTPATIENT)
Dept: PHYSICAL THERAPY | Facility: HOSPITAL | Age: 54
Setting detail: THERAPIES SERIES
Discharge: HOME | End: 2024-06-21
Payer: COMMERCIAL

## 2024-06-21 DIAGNOSIS — M25.512 LEFT SHOULDER PAIN, UNSPECIFIED CHRONICITY: Primary | ICD-10-CM

## 2024-06-21 DIAGNOSIS — R29.3 POOR POSTURE: ICD-10-CM

## 2024-06-21 DIAGNOSIS — M62.81 DECREASED MUSCLE STRENGTH: ICD-10-CM

## 2024-06-21 DIAGNOSIS — Z47.89 ORTHOPEDIC AFTERCARE: ICD-10-CM

## 2024-06-21 PROCEDURE — 97110 THERAPEUTIC EXERCISES: CPT

## 2024-06-21 PROCEDURE — 97140 MANUAL THERAPY 1/> REGIONS: CPT

## 2024-06-21 NOTE — THERAPY TREATMENT NOTE
Outpatient Physical Therapy Ortho Treatment Note  [facility]     Patient Name: Savannah Valenzuela  : 1970  MRN: [id number]  Today's Date: 2024      Visit Date: 2024    Visit Dx:    ICD-10-CM ICD-9-CM   1. Left shoulder pain, unspecified chronicity  M25.512 719.41   2. Decreased muscle strength  M62.81 728.87   3. Poor posture  R29.3 781.92   4. Orthopedic aftercare  Z47.89 V54.9       Patient Active Problem List   Diagnosis    Migraine    Allergic rhinitis    Depression    S/P laparoscopic sleeve gastrectomy    Iron deficiency anemia    Paget's disease of bone    Incomplete tear of left rotator cuff    Postoperative malabsorption        Past Medical History:   Diagnosis Date    Allergic rhinitis     Anemia     Chronic fatigue 2020    RESOLVED    Depression     Encounter for screening for malignant neoplasm of colon 2021    Added automatically from request for surgery [id number]    Fatigue     GERD (gastroesophageal reflux disease)     HISTORY OF    Hematuria, microscopic 2016    Hiatal hernia     CORRECTED WITH SLEEVE    History of sleeve gastrectomy     History of transfusion     AT BIRTH.    Hyperlipidemia     PRIOR TO SLEEVE GASTRECTOMY    Hypertension     PRIOR TO SLEEVE GASTRECTOMY    Low back strain     Fell on my back on aluminum bleachers    Medication management     Migraine     Multiple food allergies     Physical exam, annual     Rotator cuff syndrome 2023    LEFT SHOULDER        Past Surgical History:   Procedure Laterality Date     SECTION      COLONOSCOPY N/A 2021    Procedure: COLONOSCOPY INTO CECUM AND T.I. WITH COLD BIOPSY POLYPECTOMIES;  Surgeon: Ursula Torres MD;  Location: [facility] ENDOSCOPY;  Service: Gastroenterology;  Laterality: N/A;  PRE- SCREENING  POST- DIVERTICULOSIS, POLYPS, HEMORRHOIDS    DILATATION AND CURETTAGE      ENDOSCOPY N/A 2020    Procedure: ESOPHAGOGASTRODUODENOSCOPY WITH BIOPSY;  Surgeon: Cayetano Khan  Tima Zarate MD;  Location: [facility] ENDOSCOPY;  Service: General;  Laterality: N/A;  PREOP/ DYSPEPSIA  POSTOP/ GASTRITIS, ESOPHAGITIS, HIATAL HERNIA    GASTRIC SLEEVE LAPAROSCOPIC N/A 10/5/2020    Procedure: GASTRIC SLEEVE LAPAROSCOPIC WITH paraesophageal hernia repair, lysis of adhesions;  Surgeon: Cayetano Khan Jr., MD;  Location:  [facility];  Service: Bariatric;  Laterality: N/A;    SHOULDER ARTHROSCOPY W/ ROTATOR CUFF REPAIR Left 6/18/2024    Procedure: LEFT SHOULDER MANIPULATION WITH STEROID INJECTION;  Surgeon: Jasmine Page MD;  [facility]: [facility];  Service: Orthopedics;  Laterality: Left;    WISDOM TOOTH EXTRACTION                          PT Assessment/Plan       Row Name 06/21/24 0800          PT Assessment    Assessment Comments Pt returns for 3rd session s/p L shoulder manipulation and reports continued soreness with excellent compliance with HEP. Initiated functional internal rotation with pulley, shoulder extension with dowel, shoulder flexion at counter,  doorway stretch, and wall slides into flex and AB all with good tolerance. Largest limitations remain in L shoulder FIR and extension. Updated HEP and reviewed frequency with pt (3x per day), pt reports good compliance.  -RS        PT Plan    PT Plan Comments continue to focus on L GH mobility and PROM, likely one session over weekend and with follow up 2x per week next week  -RS               User Key  (r) = Recorded By, (t) = Taken By, (c) = Cosigned By      Initials Name Provider Type    RS Teresa Jose, PT Physical Therapist                       OP Exercises       Row Name 06/21/24 0700             Subjective    Subjective Comments Pt reports soreness L shoulder, doing her HEP every 3 hours  -RS         Subjective Pain    Able to rate subjective pain? yes  -RS      Subjective Pain Comment sore  -RS         Total Minutes    74664 - PT Therapeutic Exercise Minutes 20  -RS      71835 - PT Manual Therapy Minutes 23  -RS          Exercise 1    Exercise Name 1 UBE  -RS      Time 1 2/2  -RS         Exercise 6    Exercise Name 6 pulleys  -RS      Cueing 6 Verbal;Demo  -RS      Reps 6 2 min ea x3  -RS      Additional Comments flex, AB, IR  -RS         Exercise 7    Exercise Name 7 wall slide flex and AB  -RS      Cueing 7 Verbal;Demo  -RS      Reps 7 10  -RS      Time 7 5  -RS         Exercise 8    Exercise Name 8 counter walkout  -RS      Cueing 8 Verbal;Demo  -RS      Reps 8 10  -RS      Time 8 10  -RS         Exercise 9    Exercise Name 9 doorway 90-90  -RS      Cueing 9 Verbal;Demo  -RS      Sets 9 10  -RS      Reps 9 10  -RS         Exercise 10    Exercise Name 10 shoulder ext with cane  -RS      Cueing 10 Verbal;Demo  -RS      Reps 10 15  -RS      Time 10 5  -RS                User Key  (r) = Recorded By, (t) = Taken By, (c) = Cosigned By      Initials Name Provider Type    RS Teresa Jose, PT Physical Therapist                             Manual Rx (Last 36 Hours)       Manual Treatments       Row Name 06/21/24 0700             Total Minutes    10690 - PT Manual Therapy Minutes 23  -RS         Manual Rx 1    Manual Rx 1 Location Left shoulder  -RS      Manual Rx 1 Type Passive range of motion into flexion, abduction, external rotation, internal rotation  -RS      Manual Rx 1 Grade Gentle oscillations for relaxation  -RS      Manual Rx 1 Duration Patient supine  -RS         Manual Rx 2    Manual Rx 2 Location lateral distraction to L GHJ,  -RS      Manual Rx 2 Type PA mobs to L GHJ multiple planes  -RS                User Key  (r) = Recorded By, (t) = Taken By, (c) = Cosigned By      Initials Name Provider Type    RS Teresa Jose, PT Physical Therapist                     PT OP Goals       Row Name 06/21/24 0800          PT Short Term Goals    STG Date to Achieve 07/19/24  -RS     STG 1 pt. to be I with initial HEP to facilitate self management of their condition  -RS     STG 1 Progress Met  -RS     STG 2 pt. to be educated  in/verbalize understanding of the importance of posture/ergonomics in association with their condition to facilitate self management of their condition  -RS     STG 2 Progress Ongoing;Progressing  -RS        Long Term Goals    LTG Date to Achieve 09/17/24  -RS     LTG 1 pt. to be I with advanced HEP to facilitate self management of their condition  -RS     LTG 1 Progress Ongoing  -RS     LTG 2 pt. to demonstrate L shoulder flexion, abduction AROM >/=155 to facilitate ease of performing functional/household activities  -RS     LTG 2 Progress Ongoing  -RS     LTG 3 pt. to demonstrate placing/retrieving small object from an above the shoulder level shelf with her LUE to facilitate ease of performing household/work related activities  -RS     LTG 3 Progress Ongoing  -RS     LTG 4 Patient to demonstrate greater than equal to 4+ out of 5 muscle testing of the left shoulder flexion, abduction, external rotation to facilitate ease of performance of functional activities  -RS     LTG 4 Progress Ongoing  -RS     LTG 5 Patient to demonstrate left shoulder active range of motion functional internal rotation greater than equal to midline L1 to facilitate ease of performing dressing activities  -RS     LTG 5 Progress Ongoing  -RS               User Key  (r) = Recorded By, (t) = Taken By, (c) = Cosigned By      Initials Name Provider Type    Teresa Delgado, PT Physical Therapist                    Therapy Education  Given: HEP  Program: Reinforced, Progressed  How Provided: Verbal, Demonstration, Written  Provided to: Patient  Level of Understanding: Verbalized, Demonstrated              Time Calculation:   Start Time: 0745  Stop Time: 0830  Time Calculation (min): 45 min  Timed Charges  94509 - PT Therapeutic Exercise Minutes: 20  63809 - PT Manual Therapy Minutes: 23  Total Minutes  Timed Charges Total Minutes: 43   Total Minutes: 43  Therapy Charges for Today       Code Description Service Date Service Provider Modifiers  Qty    62163052029  PT THER PROC EA 15 MIN 6/21/2024 Teresa Jose, PT GP 1    [id number]  PT MANUAL THERAPY EA 15 MIN 6/21/2024 Teresa Jose, PT GP 2                      Teresa Jose, PT  6/21/2024

## 2024-06-22 ENCOUNTER — HOSPITAL ENCOUNTER (OUTPATIENT)
Dept: PHYSICAL THERAPY | Facility: HOSPITAL | Age: 54
Setting detail: THERAPIES SERIES
Discharge: HOME | End: 2024-06-22
Payer: COMMERCIAL

## 2024-06-22 DIAGNOSIS — M62.81 DECREASED MUSCLE STRENGTH: ICD-10-CM

## 2024-06-22 DIAGNOSIS — Z47.89 ORTHOPEDIC AFTERCARE: ICD-10-CM

## 2024-06-22 DIAGNOSIS — M25.512 LEFT SHOULDER PAIN, UNSPECIFIED CHRONICITY: Primary | ICD-10-CM

## 2024-06-22 DIAGNOSIS — R29.3 POOR POSTURE: ICD-10-CM

## 2024-06-22 PROCEDURE — 97110 THERAPEUTIC EXERCISES: CPT

## 2024-06-22 PROCEDURE — 97140 MANUAL THERAPY 1/> REGIONS: CPT

## 2024-06-22 NOTE — THERAPY TREATMENT NOTE
Outpatient Physical Therapy Ortho Treatment Note  [facility]     Patient Name: Savannah Valenzuela  : 1970  MRN: [id number]  Today's Date: 2024      Visit Date: 2024    Visit Dx:    ICD-10-CM ICD-9-CM   1. Left shoulder pain, unspecified chronicity  M25.512 719.41   2. Decreased muscle strength  M62.81 728.87   3. Poor posture  R29.3 781.92   4. Orthopedic aftercare  Z47.89 V54.9       Patient Active Problem List   Diagnosis    Migraine    Allergic rhinitis    Depression    S/P laparoscopic sleeve gastrectomy    Iron deficiency anemia    Paget's disease of bone    Incomplete tear of left rotator cuff    Postoperative malabsorption        Past Medical History:   Diagnosis Date    Allergic rhinitis     Anemia     Chronic fatigue 2020    RESOLVED    Depression     Encounter for screening for malignant neoplasm of colon 2021    Added automatically from request for surgery [id number]    Fatigue     GERD (gastroesophageal reflux disease)     HISTORY OF    Hematuria, microscopic 2016    Hiatal hernia     CORRECTED WITH SLEEVE    History of sleeve gastrectomy     History of transfusion     AT BIRTH.    Hyperlipidemia     PRIOR TO SLEEVE GASTRECTOMY    Hypertension     PRIOR TO SLEEVE GASTRECTOMY    Low back strain     Fell on my back on aluminum bleachers    Medication management     Migraine     Multiple food allergies     Physical exam, annual     Rotator cuff syndrome 2023    LEFT SHOULDER        Past Surgical History:   Procedure Laterality Date     SECTION      COLONOSCOPY N/A 2021    Procedure: COLONOSCOPY INTO CECUM AND T.I. WITH COLD BIOPSY POLYPECTOMIES;  Surgeon: Ursula Torres MD;  Location: [facility] ENDOSCOPY;  Service: Gastroenterology;  Laterality: N/A;  PRE- SCREENING  POST- DIVERTICULOSIS, POLYPS, HEMORRHOIDS    DILATATION AND CURETTAGE      ENDOSCOPY N/A 2020    Procedure: ESOPHAGOGASTRODUODENOSCOPY WITH BIOPSY;  Surgeon: Cayetano Khan  Tima Zarate MD;  Location: [facility] ENDOSCOPY;  Service: General;  Laterality: N/A;  PREOP/ DYSPEPSIA  POSTOP/ GASTRITIS, ESOPHAGITIS, HIATAL HERNIA    GASTRIC SLEEVE LAPAROSCOPIC N/A 10/5/2020    Procedure: GASTRIC SLEEVE LAPAROSCOPIC WITH paraesophageal hernia repair, lysis of adhesions;  Surgeon: Cayetano Khan Jr., MD;  [place] [facility] [place] [facility] [place]  Service: Bariatric;  Laterality: N/A;    SHOULDER ARTHROSCOPY W/ ROTATOR CUFF REPAIR Left 6/18/2024    Procedure: LEFT SHOULDER MANIPULATION WITH STEROID INJECTION;  Surgeon: Jasmine Page MD;  [place] [facility] [place] [facility] [place] [facility] [place]  Service: Orthopedics;  Laterality: Left;    WISDOM TOOTH EXTRACTION                          PT Assessment/Plan       Row Name 06/22/24 0858          PT Assessment    Assessment Comments Pt reporting slight soreness this date, however overall progressing well s/p L shoulder ZORAIDA on 6/18/24. Continued with manual intervention and added FIR with towel. Pt with good flex and ER ROM, working toward full FIR which was most bothersome prior to surgery. Pt compliant with HEP and progressing well toward therapy goals.  -CN        PT Plan    PT Plan Comments Continue with PT 2x/week with emphasis on recovering full ROM.  -CN               User Key  (r) = Recorded By, (t) = Taken By, (c) = Cosigned By      Initials Name Provider Type    CN Angelic Arnold, PT Physical Therapist                       OP Exercises       Row Name 06/22/24 0800 06/22/24 0700          Subjective    Subjective Comments I am doing ok, it is just sore.  -CN --        Subjective Pain    Able to rate subjective pain? yes  -CN --     Pre-Treatment Pain Level 3  -CN --        Total Minutes    83618 - PT Therapeutic Exercise Minutes 17  -CN --     98452 - PT Manual Therapy Minutes -- 23  -CN        Exercise 1    Exercise Name 1 UBE  -CN --     Time 1 2/2  -CN --        Exercise 6    Exercise Name 6 pulleys  -CN --     Cueing 6 Verbal;Demo  -CN --     Reps 6 2 min ea x3  -CN --      Additional Comments flex, AB, IR  -CN --        Exercise 7    Exercise Name 7 wall slide flex and AB  -CN --     Cueing 7 Verbal;Demo  -CN --     Reps 7 10  -CN --     Time 7 5  -CN --        Exercise 9    Exercise Name 9 doorway 90-90  -CN --     Cueing 9 Verbal;Demo  -CN --     Sets 9 10  -CN --     Reps 9 10  -CN --        Exercise 10    Exercise Name 10 shoulder ext with cane  -CN --     Cueing 10 Verbal;Demo  -CN --     Reps 10 15  -CN --     Time 10 5  -CN --        Exercise 11    Exercise Name 11 FIR with towel  -CN --     Cueing 11 Verbal;Demo  -CN --     Reps 11 10  -CN --     Time 11 5 sec  -CN --               User Key  (r) = Recorded By, (t) = Taken By, (c) = Cosigned By      Initials Name Provider Type    Angelic Washington, RATNA Physical Therapist                             Manual Rx (Last 36 Hours)       Manual Treatments       Row Name 06/22/24 0700             Total Minutes    51382 - PT Manual Therapy Minutes 23  -CN         Manual Rx 1    Manual Rx 1 Location Left shoulder  -CN      Manual Rx 1 Type Passive range of motion into flexion, abduction, external rotation, internal rotation  -CN      Manual Rx 1 Grade Gentle oscillations for relaxation  -CN      Manual Rx 1 Duration Patient supine  -CN         Manual Rx 2    Manual Rx 2 Location lateral distraction to L GHJ,  -CN      Manual Rx 2 Type PA mobs to L GHJ multiple planes  -CN                User Key  (r) = Recorded By, (t) = Taken By, (c) = Cosigned By      Initials Name Provider Type    Angelic Washington, RATNA Physical Therapist                        Therapy Education  Given: HEP  Program: Reinforced, Progressed  How Provided: Verbal, Demonstration  Provided to: Patient  Level of Understanding: Verbalized, Demonstrated              Time Calculation:   Start Time: 0825  Stop Time: 0906  Time Calculation (min): 41 min  Timed Charges  06760 - PT Therapeutic Exercise Minutes: 17  99250 - PT Manual Therapy Minutes: 23  Total  Minutes  Timed Charges Total Minutes: 17   Total Minutes: 17  Therapy Charges for Today       Code Description Service Date Service Provider Modifiers Qty    62605242257 HC PT THER PROC EA 15 MIN 6/22/2024 Angelic Arnold, PT GP 1    [id number]  PT MANUAL THERAPY EA 15 MIN 6/22/2024 Angelic Arnold, PT GP 2                      Angelic Arnold, PT  6/22/2024

## 2024-06-23 ENCOUNTER — APPOINTMENT (OUTPATIENT)
Dept: PHYSICAL THERAPY | Facility: HOSPITAL | Age: 54
End: 2024-06-23
Payer: COMMERCIAL

## 2024-06-25 ENCOUNTER — HOSPITAL ENCOUNTER (OUTPATIENT)
Dept: PHYSICAL THERAPY | Facility: HOSPITAL | Age: 54
Setting detail: THERAPIES SERIES
Discharge: HOME | End: 2024-06-25
Payer: COMMERCIAL

## 2024-06-25 DIAGNOSIS — M62.81 DECREASED MUSCLE STRENGTH: ICD-10-CM

## 2024-06-25 DIAGNOSIS — M25.512 LEFT SHOULDER PAIN, UNSPECIFIED CHRONICITY: Primary | ICD-10-CM

## 2024-06-25 DIAGNOSIS — Z47.89 ORTHOPEDIC AFTERCARE: ICD-10-CM

## 2024-06-25 DIAGNOSIS — R29.3 POOR POSTURE: ICD-10-CM

## 2024-06-25 PROCEDURE — 97110 THERAPEUTIC EXERCISES: CPT

## 2024-06-25 NOTE — THERAPY TREATMENT NOTE
Outpatient Physical Therapy Ortho Treatment Note  [facility]     Patient Name: Savannah Valenzuela  : 1970  MRN: [id number]  Today's Date: 2024      Visit Date: 2024    Visit Dx:    ICD-10-CM ICD-9-CM   1. Left shoulder pain, unspecified chronicity  M25.512 719.41   2. Decreased muscle strength  M62.81 728.87   3. Poor posture  R29.3 781.92   4. Orthopedic aftercare  Z47.89 V54.9       Patient Active Problem List   Diagnosis    Migraine    Allergic rhinitis    Depression    S/P laparoscopic sleeve gastrectomy    Iron deficiency anemia    Paget's disease of bone    Incomplete tear of left rotator cuff    Postoperative malabsorption        Past Medical History:   Diagnosis Date    Allergic rhinitis     Anemia     Chronic fatigue 2020    RESOLVED    Depression     Encounter for screening for malignant neoplasm of colon 2021    Added automatically from request for surgery [id number]    Fatigue     GERD (gastroesophageal reflux disease)     HISTORY OF    Hematuria, microscopic 2016    Hiatal hernia     CORRECTED WITH SLEEVE    History of sleeve gastrectomy     History of transfusion     AT BIRTH.    Hyperlipidemia     PRIOR TO SLEEVE GASTRECTOMY    Hypertension     PRIOR TO SLEEVE GASTRECTOMY    Low back strain     Fell on my back on aluminum bleachers    Medication management     Migraine     Multiple food allergies     Physical exam, annual     Rotator cuff syndrome 2023    LEFT SHOULDER        Past Surgical History:   Procedure Laterality Date     SECTION      COLONOSCOPY N/A 2021    Procedure: COLONOSCOPY INTO CECUM AND T.I. WITH COLD BIOPSY POLYPECTOMIES;  Surgeon: Ursula Torres MD;  Location: [facility] ENDOSCOPY;  Service: Gastroenterology;  Laterality: N/A;  PRE- SCREENING  POST- DIVERTICULOSIS, POLYPS, HEMORRHOIDS    DILATATION AND CURETTAGE      ENDOSCOPY N/A 2020    Procedure: ESOPHAGOGASTRODUODENOSCOPY WITH BIOPSY;  Surgeon: Cayetano Khan  Tima Zarate MD;  Location: [facility] ENDOSCOPY;  Service: General;  Laterality: N/A;  PREOP/ DYSPEPSIA  POSTOP/ GASTRITIS, ESOPHAGITIS, HIATAL HERNIA    GASTRIC SLEEVE LAPAROSCOPIC N/A 10/5/2020    Procedure: GASTRIC SLEEVE LAPAROSCOPIC WITH paraesophageal hernia repair, lysis of adhesions;  Surgeon: Cayetano Khan Jr., MD;  Location: [facility] OR [facility];  Service: Bariatric;  Laterality: N/A;    SHOULDER ARTHROSCOPY W/ ROTATOR CUFF REPAIR Left 6/18/2024    Procedure: LEFT SHOULDER MANIPULATION WITH STEROID INJECTION;  Surgeon: Jasmine Page MD;  Location: [facility] OR [facility];  Service: Orthopedics;  Laterality: Left;    WISDOM TOOTH EXTRACTION                          PT Assessment/Plan       Row Name 06/25/24 1400          PT Assessment    Assessment Comments Ms. Valenzuela returns for physical therapy treatment s/p L shoulder manipulation reporting feelings of soreness but otherwise no complaints. She recently got a set of pulleys for home, and performs her HEP with great compliance. She notes that she can lift her L arm into further range in flexion which she is pleased about. Continued stretching in all planes with emphasis on IR/Flexion primarily with good response. Towel IR stretch is more difficult than pulley IR due to prolonged stretch at end ranges. Added vertical finger wall walks with emphasis on slow controlled movement for additional range challenge w/ added strength component. Pt. tolerated well with good controll on eccentric decent. Pt. remains a good candidate for skilled PT.  -ER        PT Plan    PT Plan Comments more visits? Continue ROM in all planes, consider IR with dowel/sleeper stretch if tolerable, SB rollout/bannister slide for range, supine alt shoulder flexion, SL ROM if tolerable?  -ER               User Key  (r) = Recorded By, (t) = Taken By, (c) = Cosigned By      Initials Name Provider Type    ER Ya Norris, PT Physical Therapist                       OP Exercises       Row Name 06/25/24  1300             Subjective    Subjective Comments I am a little sore. I got pulleys for home.  -ER         Subjective Pain    Able to rate subjective pain? yes  -ER      Pre-Treatment Pain Level 2  -ER         Total Minutes    30289 - PT Therapeutic Exercise Minutes 38  -ER         Exercise 1    Exercise Name 1 UBE  -ER      Time 1 2/2  -ER         Exercise 6    Exercise Name 6 pulleys  -ER      Cueing 6 Verbal;Demo  -ER      Reps 6 2 min ea x3  -ER      Additional Comments flex/ABD/IR  -ER         Exercise 7    Exercise Name 7 wall slide flex and AB  -ER      Cueing 7 Verbal;Demo  -ER      Reps 7 10  -ER      Time 7 5  -ER         Exercise 8    Exercise Name 8 vertical wall walks  -ER      Cueing 8 Verbal;Demo  -ER      Reps 8 5x up/down  -ER         Exercise 9    Exercise Name 9 doorway 90-90  -ER      Cueing 9 Verbal;Demo  -ER      Sets 9 10  -ER      Reps 9 10  -ER         Exercise 10    Exercise Name 10 shoulder ext with cane  -ER      Cueing 10 Verbal;Demo  -ER      Reps 10 15  -ER      Time 10 5  -ER         Exercise 11    Exercise Name 11 FIR with towel  -ER      Cueing 11 Verbal;Demo  -ER      Reps 11 10  -ER      Time 11 5 sec  -ER                User Key  (r) = Recorded By, (t) = Taken By, (c) = Cosigned By      Initials Name Provider Type    ER Ya Norris, PT Physical Therapist                                  PT OP Goals       Row Name 06/25/24 1400          PT Short Term Goals    STG Date to Achieve 07/19/24  -ER     STG 1 pt. to be I with initial HEP to facilitate self management of their condition  -ER     STG 1 Progress Met  -ER     STG 2 pt. to be educated in/verbalize understanding of the importance of posture/ergonomics in association with their condition to facilitate self management of their condition  -ER     STG 2 Progress Ongoing;Progressing  -ER        Long Term Goals    LTG Date to Achieve 09/17/24  -ER     LTG 1 pt. to be I with advanced HEP to facilitate self management of their  condition  -ER     LTG 1 Progress Ongoing  -ER     LTG 2 pt. to demonstrate L shoulder flexion, abduction AROM >/=155 to facilitate ease of performing functional/household activities  -ER     LTG 2 Progress Ongoing  -ER     LTG 3 pt. to demonstrate placing/retrieving small object from an above the shoulder level shelf with her LUE to facilitate ease of performing household/work related activities  -ER     LTG 3 Progress Ongoing  -ER     LTG 4 Patient to demonstrate greater than equal to 4+ out of 5 muscle testing of the left shoulder flexion, abduction, external rotation to facilitate ease of performance of functional activities  -ER     LTG 4 Progress Ongoing  -ER     LTG 5 Patient to demonstrate left shoulder active range of motion functional internal rotation greater than equal to midline L1 to facilitate ease of performing dressing activities  -ER     LTG 5 Progress Ongoing  -ER               User Key  (r) = Recorded By, (t) = Taken By, (c) = Cosigned By      Initials Name Provider Type    ER Ya Norris, PT Physical Therapist                    Therapy Education  Education Details: reinforced HEP, pulley use  Given: HEP, Symptoms/condition management  Program: Reinforced  How Provided: Verbal  Provided to: Patient  Level of Understanding: Teach back education performed, Verbalized, Demonstrated              Time Calculation:   Start Time: 1345  Stop Time: 1423  Time Calculation (min): 38 min  Total Timed Code Minutes- PT: 38 minute(s)  Timed Charges  94425 - PT Therapeutic Exercise Minutes: 38  Total Minutes  Timed Charges Total Minutes: 38   Total Minutes: 38  Therapy Charges for Today       Code Description Service Date Service Provider Modifiers Qty    49635427738 HC PT THER PROC EA 15 MIN 6/25/2024 Ya Norris, PT GP 3                      Ya Norris PT  6/25/2024

## 2024-06-27 ENCOUNTER — HOSPITAL ENCOUNTER (OUTPATIENT)
Dept: PHYSICAL THERAPY | Facility: HOSPITAL | Age: 54
Setting detail: THERAPIES SERIES
Discharge: HOME | End: 2024-06-27
Payer: COMMERCIAL

## 2024-06-27 DIAGNOSIS — M25.512 LEFT SHOULDER PAIN, UNSPECIFIED CHRONICITY: Primary | ICD-10-CM

## 2024-06-27 DIAGNOSIS — R29.3 POOR POSTURE: ICD-10-CM

## 2024-06-27 DIAGNOSIS — M62.81 DECREASED MUSCLE STRENGTH: ICD-10-CM

## 2024-06-27 DIAGNOSIS — Z47.89 ORTHOPEDIC AFTERCARE: ICD-10-CM

## 2024-06-27 PROCEDURE — 97110 THERAPEUTIC EXERCISES: CPT

## 2024-06-27 PROCEDURE — 97140 MANUAL THERAPY 1/> REGIONS: CPT

## 2024-06-28 NOTE — THERAPY TREATMENT NOTE
Outpatient Physical Therapy Ortho Treatment Note  [facility]     Patient Name: Savannah Valenzuela  : 1970  MRN: [id number]  Today's Date: 2024      Visit Date: 2024    Visit Dx:    ICD-10-CM ICD-9-CM   1. Left shoulder pain, unspecified chronicity  M25.512 719.41   2. Decreased muscle strength  M62.81 728.87   3. Orthopedic aftercare  Z47.89 V54.9   4. Poor posture  R29.3 781.92       Patient Active Problem List   Diagnosis    Migraine    Allergic rhinitis    Depression    S/P laparoscopic sleeve gastrectomy    Iron deficiency anemia    Paget's disease of bone    Incomplete tear of left rotator cuff    Postoperative malabsorption        Past Medical History:   Diagnosis Date    Allergic rhinitis     Anemia     Chronic fatigue 2020    RESOLVED    Depression     Encounter for screening for malignant neoplasm of colon 2021    Added automatically from request for surgery [id number]    Fatigue     GERD (gastroesophageal reflux disease)     HISTORY OF    Hematuria, microscopic 2016    Hiatal hernia     CORRECTED WITH SLEEVE    History of sleeve gastrectomy     History of transfusion     AT BIRTH.    Hyperlipidemia     PRIOR TO SLEEVE GASTRECTOMY    Hypertension     PRIOR TO SLEEVE GASTRECTOMY    Low back strain     Fell on my back on aluminum bleachers    Medication management     Migraine     Multiple food allergies     Physical exam, annual     Rotator cuff syndrome 2023    LEFT SHOULDER        Past Surgical History:   Procedure Laterality Date     SECTION      COLONOSCOPY N/A 2021    Procedure: COLONOSCOPY INTO CECUM AND T.I. WITH COLD BIOPSY POLYPECTOMIES;  Surgeon: Ursula Torres MD;  Location: [facility] ENDOSCOPY;  Service: Gastroenterology;  Laterality: N/A;  PRE- SCREENING  POST- DIVERTICULOSIS, POLYPS, HEMORRHOIDS    DILATATION AND CURETTAGE      ENDOSCOPY N/A 2020    Procedure: ESOPHAGOGASTRODUODENOSCOPY WITH BIOPSY;  Surgeon: Cayetano Khan  Tima Zarate MD;  Location: [facility] ENDOSCOPY;  Service: General;  Laterality: N/A;  PREOP/ DYSPEPSIA  POSTOP/ GASTRITIS, ESOPHAGITIS, HIATAL HERNIA    GASTRIC SLEEVE LAPAROSCOPIC N/A 10/5/2020    Procedure: GASTRIC SLEEVE LAPAROSCOPIC WITH paraesophageal hernia repair, lysis of adhesions;  Surgeon: Cayetano Khan Jr., MD;  Location:  [facility];  Service: Bariatric;  Laterality: N/A;    SHOULDER ARTHROSCOPY W/ ROTATOR CUFF REPAIR Left 6/18/2024    Procedure: LEFT SHOULDER MANIPULATION WITH STEROID INJECTION;  Surgeon: Jasmine Page MD;  Location: [facility] OR [facility];  Service: Orthopedics;  Laterality: Left;    WISDOM TOOTH EXTRACTION          PT Ortho       Row Name 06/27/24 1500       Subjective    Subjective Comments Pt reports she's doing good, shoulder is okay. (P)   -VS       Left Upper Ext    Lt Shoulder Abduction AROM 130 seated (P)   -VS    Lt Shoulder Abduction PROM 175 supine (P)   measured in scaption  -VS    Lt Shoulder Flexion AROM 140 seated (P)   -VS    Lt Shoulder Flexion PROM 156 supine (P)   -VS    Lt Shoulder External Rotation AROM KATIE ipsilateral superior angle of scapula (P)   -VS    Lt Shoulder External Rotation PROM 76 supine (P)   towel roll under elbow  -VS    Lt Shoulder Internal Rotation AROM FIR midline at bra strap (P)   measured after performing IR manual therapy  -VS              User Key  (r) = Recorded By, (t) = Taken By, (c) = Cosigned By      Initials Name Provider Type    VS Tatiana Campo, PT Student PT Student                                 PT Assessment/Plan       Row Name 06/27/24 1600          PT Assessment    Assessment Comments Pt is a 53 y.o R handed female s/p 1wk 2days L shoulder manipulation. She reports her shoulder feels okay today. Pt has overall maintained PROM of L shoulder and has greatly improved on AROM of L shoulder. Added manual IR mobilization to increase FIR; pt responded well, getting in line with her bra strap. She was very pleased with the outcome  of the mobilization performed. Pt was educated this range will most not likely maintain and will most likely be performed again. Added SL shoulder AROM (dlex, abd, ER) for resistance against gravity and ROM, wall washes CW/CCW for L shoulder control and ROM, supine HA and ER for strengthening. Pt tolerated tx moderately well with moderate complaints of intense soreness. Pt continues to be a good candidate for skilled physical therapy to address strength and ROM. (P)   -VS        PT Plan    PT Plan Comments Continue adding strengthening per pt tolerance (SL ER with 1lb, sitting L shoulder AROM with YTB isometric, resume wall walks), supine alt shoulder flexion, SB rollout/bannister for ROM, IR sleeper stretch (P)   -VS               User Key  (r) = Recorded By, (t) = Taken By, (c) = Cosigned By      Initials Name Provider Type    VS Tatiana Campo, PT Student PT Student                       OP Exercises       Row Name 06/27/24 1500             Subjective    Subjective Comments Pt reports she's doing good, shoulder is okay. (P)   -VS         Total Minutes    26753 - PT Therapeutic Exercise Minutes 37 (P)   -VS      72329 - PT Manual Therapy Minutes 10 (P)   -VS         Exercise 1    Exercise Name 1 UBE (P)   -VS      Time 1 4min (P)   -VS      Additional Comments fwd/bkwd (P)   -VS         Exercise 2    Exercise Name 2 wall washes CW/CCW (P)   -VS      Cueing 2 Verbal;Demo (P)   -VS      Sets 2 1 (P)   -VS      Reps 2 10e way (P)   -VS      Additional Comments cue to keep elbows close together (P)   -VS         Exercise 3    Exercise Name 3 supine HA (P)   -VS      Cueing 3 Verbal;Demo (P)   -VS      Sets 3 2 (P)   -VS      Reps 3 10 (P)   -VS      Additional Comments RTB (P)   -VS         Exercise 4    Exercise Name 4 supine ER (P)   -VS      Cueing 4 Verbal;Demo (P)   -VS      Sets 4 2 (P)   -VS      Reps 4 10 (P)   -VS      Additional Comments RTB (P)   -VS         Exercise 6    Exercise Name 6 pulleys (P)   -VS       Cueing 6 Verbal;Demo (P)   -VS      Sets 6 1 (P)   -VS      Reps 6 15e (P)   -VS      Additional Comments flex/abd/IR (P)   -VS         Exercise 7    Exercise Name 7 wall slide flex and AB (P)   -VS      Cueing 7 Verbal;Demo (P)   -VS      Sets 7 1 (P)   -VS      Reps 7 10e (P)   -VS      Time 7 5s (P)   -VS         Exercise 8    Exercise Name 8 -- (P)   -VS      Cueing 8 -- (P)   -VS      Reps 8 -- (P)   -VS         Exercise 9    Exercise Name 9 -- (P)   -VS      Cueing 9 -- (P)   -VS      Sets 9 -- (P)   -VS      Reps 9 -- (P)   -VS         Exercise 12    Exercise Name 12 SL L shoulder AROM (P)   -VS      Cueing 12 Verbal;Demo (P)   -VS      Reps 12 2 x 10e motion (P)   -VS      Additional Comments flex, abd, ER (P)   -VS                User Key  (r) = Recorded By, (t) = Taken By, (c) = Cosigned By      Initials Name Provider Type    VS Tatiana aCmpo, PT Student PT Student                             Manual Rx (Last 36 Hours)       Manual Treatments       Row Name 06/27/24 1500             Total Minutes    27196 - PT Manual Therapy Minutes 10 (P)   -VS         Manual Rx 1    Manual Rx 1 Location L shoulder (P)   -VS      Manual Rx 1 Type Doorway IR mobilization stretch; cervical ext x2 + R cervical R x2 + R cervical SB x2 = 1 set (P)   -VS      Manual Rx 1 Grade to pt tolerance (P)   -VS                User Key  (r) = Recorded By, (t) = Taken By, (c) = Cosigned By      Initials Name Provider Type    VS Tatiana Campo, PT Student PT Student                     PT OP Goals       Row Name 06/27/24 1600          PT Short Term Goals    STG Date to Achieve 07/19/24 (P)   -VS     STG 1 pt. to be I with initial HEP to facilitate self management of their condition (P)   -VS     STG 1 Progress Met (P)   -VS     STG 2 pt. to be educated in/verbalize understanding of the importance of posture/ergonomics in association with their condition to facilitate self management of their condition (P)   -VS     STG 2 Progress  Ongoing;Progressing (P)   -VS        Long Term Goals    LTG Date to Achieve 09/17/24 (P)   -VS     LTG 1 pt. to be I with advanced HEP to facilitate self management of their condition (P)   -VS     LTG 1 Progress Ongoing (P)   -VS     LTG 2 pt. to demonstrate L shoulder flexion, abduction AROM >/=155 to facilitate ease of performing functional/household activities (P)   -VS     LTG 2 Progress Ongoing;Progressing (P)   -VS     LTG 3 pt. to demonstrate placing/retrieving small object from an above the shoulder level shelf with her LUE to facilitate ease of performing household/work related activities (P)   -VS     LTG 3 Progress Ongoing (P)   -VS     LTG 4 Patient to demonstrate greater than equal to 4+ out of 5 muscle testing of the left shoulder flexion, abduction, external rotation to facilitate ease of performance of functional activities (P)   -VS     LTG 4 Progress Ongoing (P)   -VS     LTG 5 Patient to demonstrate left shoulder active range of motion functional internal rotation greater than equal to midline L1 to facilitate ease of performing dressing activities (P)   -VS     LTG 5 Progress Met (P)   -VS     LTG 5 Progress Comments Met after performing IR mobilization (P)   -VS               User Key  (r) = Recorded By, (t) = Taken By, (c) = Cosigned By      Initials Name Provider Type    VS Tatiana Campo, PT Student PT Student                    Therapy Education  Education Details: (P) Pt educated on duration of soreness after manipulation. Educated on scapular resting position with shoulder ROM.  Given: (P) Symptoms/condition management, Pain management, Posture/body mechanics  Program: (P) Reinforced  How Provided: (P) Verbal  Provided to: (P) Patient  Level of Understanding: (P) Verbalized, Demonstrated, Teach back education performed              Time Calculation:   Start Time: (P) 1446  Stop Time: (P) 1537  Time Calculation (min): (P) 51 min  Timed Charges  83592 - PT Therapeutic Exercise Minutes:  (P) 37  52845 - PT Manual Therapy Minutes: (P) 10  Total Minutes  Timed Charges Total Minutes: (P) 47   Total Minutes: (P) 47  Therapy Charges for Today       Code Description Service Date Service Provider Modifiers Qty    23842988930 HC PT THER PROC EA 15 MIN 6/27/2024 Tatiana Campo, PT Student GP 2    [id number]  PT MANUAL THERAPY EA 15 MIN 6/27/2024 Tatiana Campo, PT Student GP 1                      Tatiana Campo, PT Student  6/28/2024

## 2024-07-01 DIAGNOSIS — K91.2 POSTOPERATIVE MALABSORPTION: ICD-10-CM

## 2024-07-01 DIAGNOSIS — D50.9 IRON DEFICIENCY ANEMIA, UNSPECIFIED IRON DEFICIENCY ANEMIA TYPE: Primary | Chronic | ICD-10-CM

## 2024-07-01 RX ORDER — SODIUM CHLORIDE 9 MG/ML
20 INJECTION, SOLUTION INTRAVENOUS ONCE
OUTPATIENT
Start: 2024-07-01

## 2024-07-01 RX ORDER — PROCHLORPERAZINE MALEATE 5 MG/1
10 TABLET ORAL ONCE
OUTPATIENT
Start: 2024-07-01 | End: 2024-07-01

## 2024-07-02 ENCOUNTER — LAB (OUTPATIENT)
Dept: LAB | Facility: HOSPITAL | Age: 54
End: 2024-07-02
Payer: COMMERCIAL

## 2024-07-02 DIAGNOSIS — D50.9 IRON DEFICIENCY ANEMIA, UNSPECIFIED IRON DEFICIENCY ANEMIA TYPE: ICD-10-CM

## 2024-07-02 DIAGNOSIS — D50.9 IRON DEFICIENCY ANEMIA, UNSPECIFIED IRON DEFICIENCY ANEMIA TYPE: Primary | ICD-10-CM

## 2024-07-02 LAB
FERRITIN SERPL-MCNC: 12.9 NG/ML (ref 13–150)
IRON 24H UR-MRATE: 40 MCG/DL (ref 37–145)

## 2024-07-02 PROCEDURE — 83540 ASSAY OF IRON: CPT

## 2024-07-02 PROCEDURE — 36415 COLL VENOUS BLD VENIPUNCTURE: CPT

## 2024-07-02 PROCEDURE — 82728 ASSAY OF FERRITIN: CPT

## 2024-07-03 DIAGNOSIS — D50.8 OTHER IRON DEFICIENCY ANEMIA: Primary | ICD-10-CM

## 2024-07-03 NOTE — PROGRESS NOTES
"Shoulder manipulation follow Up       Patient: Savannah Valenzuela        YOB: 1970      Chief Complaints: shoulder pain left      History of Present Illness: Pt is here f/u shoulder manipulation we were presenting for possible arthroscopy possible repair but she had marked limitation of range of motion we injected her she does states she is doing much better she still little limited in motion but overall much better      Allergies:   Allergies   Allergen Reactions    Latex Urinary Retention and Irritability     Painful urination with latex cath; Patient states \"sensitivity\"         Medications:   Home Medications:  Current Outpatient Medications on File Prior to Visit   Medication Sig    buPROPion XL (WELLBUTRIN XL) 150 MG 24 hr tablet Take 1 tablet by mouth Daily.    Calcium 250 MG capsule Take 500 mg by mouth 3 (Three) Times a Day.    FLUoxetine (PROzac) 40 MG capsule Take 1 capsule by mouth Daily.    HYDROcodone-acetaminophen (NORCO) 5-325 MG per tablet Take 1-2 tablets by mouth Every 4 (Four) Hours As Needed (Pain).    ibuprofen (ADVIL,MOTRIN) 200 MG tablet Take 1-2 tablets by mouth Every 6 (Six) Hours As Needed for Mild Pain. WILL HOLD FOR SURGERY    multivitamin with minerals tablet tablet Take 1 tablet by mouth Daily.    Progesterone (Prometrium) 200 MG capsule Take 1 capsule by mouth Daily in the evening (Patient taking differently: Take 1 capsule by mouth Every Evening.)    topiramate (TOPAMAX) 50 MG tablet Take 1 tablet by mouth Daily for 180 days. (Patient taking differently: Take 1 tablet by mouth Every Evening.)     No current facility-administered medications on file prior to visit.     Current Medications:  Scheduled Meds:  Continuous Infusions:No current facility-administered medications for this visit.    PRN Meds:.          Physical Exam: 53 y.o. female  General Appearance:    Alert, cooperative, in no acute distress                 There were no vitals filed for this visit.   Active " flexion is 160 passively to 170 external rotation to 45 internal rotation to her upper lumbar spine rotator cuff strength 4+/5  Assessment  S/P shoulder manipulation I do think she has better motion still not at 100% I would like her to get better her motion if she still symptomatic would proceed again with arthroscopy and possible rotator cuff repair        Plan: Continue Physical Therapy.                Answers submitted by the patient for this visit:  Other (Submitted on 7/6/2024)  Please describe your symptoms.: Procedure followup  Have you had these symptoms before?: Yes  How long have you been having these symptoms?: Greater than 2 weeks  Please list any medications you are currently taking for this condition.: Ibuprofen  Primary Reason for Visit (Submitted on 7/6/2024)  What is the primary reason for your visit?: Other

## 2024-07-08 ENCOUNTER — HOSPITAL ENCOUNTER (OUTPATIENT)
Dept: PHYSICAL THERAPY | Facility: HOSPITAL | Age: 54
Setting detail: THERAPIES SERIES
Discharge: HOME | End: 2024-07-08
Payer: COMMERCIAL

## 2024-07-08 ENCOUNTER — OFFICE VISIT (OUTPATIENT)
Dept: ORTHOPEDIC SURGERY | Facility: CLINIC | Age: 54
End: 2024-07-08
Payer: COMMERCIAL

## 2024-07-08 VITALS — HEIGHT: 64 IN | BODY MASS INDEX: 31.72 KG/M2 | WEIGHT: 185.8 LBS | TEMPERATURE: 98.4 F

## 2024-07-08 DIAGNOSIS — M25.512 LEFT SHOULDER PAIN, UNSPECIFIED CHRONICITY: Primary | ICD-10-CM

## 2024-07-08 DIAGNOSIS — M62.81 DECREASED MUSCLE STRENGTH: ICD-10-CM

## 2024-07-08 DIAGNOSIS — M75.02 ADHESIVE CAPSULITIS OF LEFT SHOULDER: Primary | ICD-10-CM

## 2024-07-08 DIAGNOSIS — R29.3 POOR POSTURE: ICD-10-CM

## 2024-07-08 DIAGNOSIS — Z47.89 ORTHOPEDIC AFTERCARE: ICD-10-CM

## 2024-07-08 PROCEDURE — 99213 OFFICE O/P EST LOW 20 MIN: CPT | Performed by: ORTHOPAEDIC SURGERY

## 2024-07-08 PROCEDURE — 97140 MANUAL THERAPY 1/> REGIONS: CPT

## 2024-07-08 PROCEDURE — 97110 THERAPEUTIC EXERCISES: CPT

## 2024-07-08 RX ORDER — MELOXICAM 15 MG/1
15 TABLET ORAL DAILY
Qty: 30 TABLET | Refills: 0 | Status: SHIPPED | OUTPATIENT
Start: 2024-07-08

## 2024-07-08 NOTE — THERAPY TREATMENT NOTE
Outpatient Physical Therapy Ortho Treatment Note  [facility]     Patient Name: Savannah Valenzuela  : 1970  MRN: [id number]  Today's Date: 2024      Visit Date: 2024    Visit Dx:    ICD-10-CM ICD-9-CM   1. Left shoulder pain, unspecified chronicity  M25.512 719.41   2. Decreased muscle strength  M62.81 728.87   3. Orthopedic aftercare  Z47.89 V54.9   4. Poor posture  R29.3 781.92       Patient Active Problem List   Diagnosis    Migraine    Allergic rhinitis    Depression    S/P laparoscopic sleeve gastrectomy    Iron deficiency anemia    Paget's disease of bone    Incomplete tear of left rotator cuff    Postoperative malabsorption        Past Medical History:   Diagnosis Date    Allergic rhinitis     Anemia     Chronic fatigue 2020    RESOLVED    Depression     Encounter for screening for malignant neoplasm of colon 2021    Added automatically from request for surgery [id number]    Fatigue     GERD (gastroesophageal reflux disease)     HISTORY OF    Hematuria, microscopic 2016    Hiatal hernia     CORRECTED WITH SLEEVE    History of sleeve gastrectomy     History of transfusion     AT BIRTH.    Hyperlipidemia     PRIOR TO SLEEVE GASTRECTOMY    Hypertension     PRIOR TO SLEEVE GASTRECTOMY    Low back strain     Fell on my back on aluminum bleachers    Medication management     Migraine     Multiple food allergies     Physical exam, annual     Rotator cuff syndrome 2023    LEFT SHOULDER        Past Surgical History:   Procedure Laterality Date     SECTION      COLONOSCOPY N/A 2021    Procedure: COLONOSCOPY INTO CECUM AND T.I. WITH COLD BIOPSY POLYPECTOMIES;  Surgeon: Ursula Torres MD;  Location: [facility] ENDOSCOPY;  Service: Gastroenterology;  Laterality: N/A;  PRE- SCREENING  POST- DIVERTICULOSIS, POLYPS, HEMORRHOIDS    DILATATION AND CURETTAGE      ENDOSCOPY N/A 2020    Procedure: ESOPHAGOGASTRODUODENOSCOPY WITH BIOPSY;  Surgeon: Cayetano Khan  MD Tessa;  Location: [facility] ENDOSCOPY;  Service: General;  Laterality: N/A;  PREOP/ DYSPEPSIA  POSTOP/ GASTRITIS, ESOPHAGITIS, HIATAL HERNIA    GASTRIC SLEEVE LAPAROSCOPIC N/A 10/5/2020    Procedure: GASTRIC SLEEVE LAPAROSCOPIC WITH paraesophageal hernia repair, lysis of adhesions;  Surgeon: Cayetano Khan Jr., MD;  Location:  [facility];  Service: Bariatric;  Laterality: N/A;    SHOULDER ARTHROSCOPY W/ ROTATOR CUFF REPAIR Left 6/18/2024    Procedure: LEFT SHOULDER MANIPULATION WITH STEROID INJECTION;  Surgeon: Jasmine Page MD;  Location:  [facility];  Service: Orthopedics;  Laterality: Left;    WISDOM TOOTH EXTRACTION          PT Ortho       Row Name 07/08/24 0800       Left Upper Ext    Lt Shoulder Abduction AROM 130 seated  measured in scaption  -GJ (r) VS (t) GJ (c)    Lt Shoulder Abduction PROM 170 supine  measured in scaption  -GJ (r) VS (t) GJ (c)    Lt Shoulder Flexion AROM 135 seated  -GJ (r) VS (t) GJ (c)    Lt Shoulder Flexion PROM 165 supine  -GJ (r) VS (t) GJ (c)    Lt Shoulder External Rotation AROM KATIE midline superior angle of scapula  -GJ (r) VS (t) GJ (c)    Lt Shoulder External Rotation PROM 85 supine  towel roll under elbow  -GJ (r) VS (t) GJ (c)    Lt Shoulder Internal Rotation AROM FIR midline at bra strap  measured after performing IR manual therapy  -GJ (r) VS (t) GJ (c)    Lt Shoulder Internal Rotation PROM 72 supine  towel roll under elbow. Visible compensation with L shoulder  -GJ (r) VS (t) GJ (c)              User Key  (r) = Recorded By, (t) = Taken By, (c) = Cosigned By      Initials Name Provider Type    GJ Camilo Milner, PT Physical Therapist    VS Tatiana Campo, RATNA Student PT Student                                 PT Assessment/Plan       Row Name 07/08/24 0900          PT Assessment    Assessment Comments Pt is 53 y.o R handed female s/p 2wks 6days L shoulder manipulation. She reports she feels alright today and her (L) shoulder is still sore, but she can tell a  difference. Pt has maintained overall L shoulder AROM, and has improved with PROM of L shoulder flex and ER. Pt experienced dizziness during FIR mobilization and required 5 minute seated rest break; pt remarks she did not have breakfast this morning and part of a cup of coffee. Pt recovered from dizziness and did not experience again during tx. Added SL ER with 1lb and resumed wall walks. Will continue to focus on strengthening and maintaining ROM. Educated pt on difference between PROM and AROM measurements. Pt tolerated tx moderately well with moderate complaints of intense soreness. Pt continues to be a good candidate for skilled physical therapy to address strength and ROM.  -GJ (r) VS (t) GJ (c)        PT Plan    PT Plan Comments Ask about f/u appointment with doctor (from 7/8). Assess tolerance with HEP additions. Continue to maintain ROM. Continue to strengthen shoulder girdle tissue, considering sitting L shoulder AROM with YTB isometic into flexion, sitting alt shoulder flexion, D2 flexion pattern (attempt with YTB per tolerance), seated  HA/ER.  -GJ (r) VS (t) GJ (c)               User Key  (r) = Recorded By, (t) = Taken By, (c) = Cosigned By      Initials Name Provider Type    Camilo Ponce, PT Physical Therapist    VS Tatiana Campo, PT Student PT Student                       OP Exercises       Row Name 07/08/24 0800             Subjective    Subjective Comments Pt reports she's doing alright; (L) Shoulder is still sore, but reports she can tell a difference.  -GJ (r) VS (t) GJ (c)         Total Minutes    60276 - PT Therapeutic Exercise Minutes 35  -GJ (r) VS (t) GJ (c)      99000 - PT Manual Therapy Minutes 10  -GJ (r) VS (t) GJ (c)         Exercise 1    Exercise Name 1 UBE  -GJ (r) VS (t) GJ (c)      Time 1 4min  -GJ (r) VS (t) GJ (c)      Additional Comments fwd/bkwd  -GJ (r) VS (t) GJ (c)         Exercise 2    Exercise Name 2 wall washes CW/CCW  -GJ (r) VS (t) GJ (c)      Cueing 2  Verbal;Demo  -GJ (r) VS (t) GJ (c)      Sets 2 1  -GJ (r) VS (t) GJ (c)      Reps 2 10e way  -GJ (r) VS (t) GJ (c)      Additional Comments cue to keep elbows close together  -GJ (r) VS (t) GJ (c)         Exercise 3    Exercise Name 3 supine HA  -GJ (r) VS (t) GJ (c)      Cueing 3 Verbal;Demo  -GJ (r) VS (t) GJ (c)      Sets 3 2  -GJ (r) VS (t) GJ (c)      Reps 3 10  -GJ (r) VS (t) GJ (c)      Additional Comments RTB  -GJ (r) VS (t) GJ (c)         Exercise 4    Exercise Name 4 supine ER  -GJ (r) VS (t) GJ (c)      Cueing 4 Verbal;Demo  -GJ (r) VS (t) GJ (c)      Sets 4 2  -GJ (r) VS (t) GJ (c)      Reps 4 10  -GJ (r) VS (t) GJ (c)      Additional Comments RTB  -GJ (r) VS (t) GJ (c)         Exercise 5    Exercise Name 5 SL L shoulder ER with 1lb  -GJ (r) VS (t) GJ (c)      Cueing 5 Verbal;Demo  -GJ (r) VS (t) GJ (c)      Sets 5 2  -GJ (r) VS (t) GJ (c)      Reps 5 10  -GJ (r) VS (t) GJ (c)      Time 5 2s  -GJ (r) VS (t) GJ (c)      Additional Comments towel roll under L arm  -GJ (r) VS (t) GJ (c)         Exercise 6    Exercise Name 6 pulleys  -GJ (r) VS (t) GJ (c)      Cueing 6 Verbal;Demo  -GJ (r) VS (t) GJ (c)      Sets 6 1  -GJ (r) VS (t) GJ (c)      Reps 6 15e  -GJ (r) VS (t) GJ (c)      Additional Comments flex/abd/IR  -GJ (r) VS (t) GJ (c)         Exercise 7    Exercise Name 7 --  -GJ (r) VS (t) GJ (c)      Cueing 7 --  -GJ (r) VS (t) GJ (c)      Sets 7 --  -GJ (r) VS (t) GJ (c)      Reps 7 --  -GJ (r) VS (t) GJ (c)      Time 7 --  -GJ (r) VS (t) GJ (c)         Exercise 8    Exercise Name 8 vertical wall walks  -GJ (r) VS (t) GJ (c)      Cueing 8 Verbal;Demo  -GJ (r) VS (t) GJ (c)      Reps 8 10x up/down  -GJ (r) VS (t) GJ (c)         Exercise 12    Exercise Name 12 SL L shoulder AROM  -GJ (r) VS (t) GJ (c)      Cueing 12 Verbal;Demo  -GJ (r) VS (t) GJ (c)      Reps 12 1 x 10e motion  -GJ (r) VS (t) GJ (c)      Additional Comments flex, abd, ER  -GJ (r) VS (t) GJ (c)                User Key  (r) = Recorded By,  (t) = Taken By, (c) = Cosigned By      Initials Name Provider Type    Camilo Ponce, PT Physical Therapist    VS Tatiana Campo PT Student PT Student                             Manual Rx (Last 36 Hours)       Manual Treatments       Row Name 07/08/24 0800             Total Minutes    60346 - PT Manual Therapy Minutes 10  -GJ (r) VS (t) GJ (c)         Manual Rx 1    Manual Rx 1 Location L shoulder  -GJ (r) VS (t) GJ (c)      Manual Rx 1 Type Doorway IR mobilization stretch; cervical ext x2 + R cervical R x2 + R cervical SB x2 = 1 set  -GJ (r) VS (t) GJ (c)      Manual Rx 1 Grade to pt tolerance  -GJ (r) VS (t) GJ (c)         Manual Rx 2    Manual Rx 2 Location L shoulder gentle oscillations  -GJ (r) VS (t) GJ (c)      Manual Rx 2 Type supine. L shoulder in 45 degrees scaption  -GJ (r) VS (t) GJ (c)      Manual Rx 2 Grade I-II  -GJ (r) VS (t) GJ (c)                User Key  (r) = Recorded By, (t) = Taken By, (c) = Cosigned By      Initials Name Provider Type    Camilo Ponce, PT Physical Therapist    VS Tatiana Campo, PT Student PT Student                     PT OP Goals       Row Name 07/08/24 0900          PT Short Term Goals    STG Date to Achieve 07/19/24  -GJ (r) VS (t) GJ (c)     STG 1 pt. to be I with initial HEP to facilitate self management of their condition  -GJ (r) VS (t) GJ (c)     STG 1 Progress Met  -GJ (r) VS (t) GJ (c)     STG 2 pt. to be educated in/verbalize understanding of the importance of posture/ergonomics in association with their condition to facilitate self management of their condition  -GJ (r) VS (t) GJ (c)     STG 2 Progress Ongoing;Progressing  -GJ (r) VS (t) GJ (c)        Long Term Goals    LTG Date to Achieve 09/17/24  -GJ (r) VS (t) GJ (c)     LTG 1 pt. to be I with advanced HEP to facilitate self management of their condition  -GJ (r) VS (t) GJ (c)     LTG 1 Progress Ongoing  -GJ (r) VS (t) GJ (c)     LTG 2 pt. to demonstrate L shoulder flexion, abduction AROM >/=155  to facilitate ease of performing functional/household activities  -GJ (r) VS (t) GJ (c)     LTG 2 Progress Ongoing;Progressing  -GJ (r) VS (t) GJ (c)     LTG 3 pt. to demonstrate placing/retrieving small object from an above the shoulder level shelf with her LUE to facilitate ease of performing household/work related activities  -GJ (r) VS (t) GJ (c)     LTG 3 Progress Ongoing  -GJ (r) VS (t) GJ (c)     LTG 4 Patient to demonstrate greater than equal to 4+ out of 5 muscle testing of the left shoulder flexion, abduction, external rotation to facilitate ease of performance of functional activities  -GJ (r) VS (t) GJ (c)     LTG 4 Progress Ongoing  -GJ (r) VS (t) GJ (c)     LTG 5 Patient to demonstrate left shoulder active range of motion functional internal rotation greater than equal to midline L1 to facilitate ease of performing dressing activities  -GJ (r) VS (t) GJ (c)     LTG 5 Progress Met  -GJ (r) VS (t) GJ (c)               User Key  (r) = Recorded By, (t) = Taken By, (c) = Cosigned By      Initials Name Provider Type    GJ Camilo Milner, PT Physical Therapist    VS Tatiana Campo PT Student PT Student                    Therapy Education  Education Details: Pt educated on difference between PROM and AROM measurements. Educated on scapular resting position with AROM.  Given: HEP, Symptoms/condition management, Posture/body mechanics  Program: Progressed  How Provided: Verbal, Demonstration, Written  Provided to: Patient  Level of Understanding: Teach back education performed, Verbalized, Demonstrated              Time Calculation:   Start Time: 0831  Stop Time: 0920  Time Calculation (min): 49 min  Timed Charges  69042 - PT Therapeutic Exercise Minutes: 35  13155 - PT Manual Therapy Minutes: 10  Total Minutes  Timed Charges Total Minutes: 45   Total Minutes: 45  Therapy Charges for Today       Code Description Service Date Service Provider Modifiers Qty    52710150088  PT THER PROC EA 15 MIN 7/8/2024  Tatiana Campo, PT Student GP 2    [id number]  PT MANUAL THERAPY EA 15 MIN 7/8/2024 Tatiana Campo, PT Student GP 1                      Tatiana Campo, PT Student  7/8/2024

## 2024-07-09 ENCOUNTER — TELEPHONE (OUTPATIENT)
Dept: BARIATRICS/WEIGHT MGMT | Facility: CLINIC | Age: 54
End: 2024-07-09
Payer: COMMERCIAL

## 2024-07-09 NOTE — TELEPHONE ENCOUNTER
Patient was informed about lab results and instructions.     ----- Message from Bobbi Chavarria sent at 7/2/2024 12:47 PM EDT -----  So.. oddly enough in a little over a month ferritin levels improved from 7 to nearly 13 and serum iron came up from 32 to 40. She has mild anemia, and still has iron deficiency, but this is not low enough to warrant infusion. I can prescribe oral iron to be taken on top of her bariatric specific MTV

## 2024-07-11 ENCOUNTER — HOSPITAL ENCOUNTER (OUTPATIENT)
Dept: PHYSICAL THERAPY | Facility: HOSPITAL | Age: 54
Setting detail: THERAPIES SERIES
Discharge: HOME | End: 2024-07-11
Payer: COMMERCIAL

## 2024-07-11 DIAGNOSIS — R29.3 POOR POSTURE: ICD-10-CM

## 2024-07-11 DIAGNOSIS — M25.512 LEFT SHOULDER PAIN, UNSPECIFIED CHRONICITY: Primary | ICD-10-CM

## 2024-07-11 DIAGNOSIS — Z47.89 ORTHOPEDIC AFTERCARE: ICD-10-CM

## 2024-07-11 DIAGNOSIS — M62.81 DECREASED MUSCLE STRENGTH: ICD-10-CM

## 2024-07-11 PROCEDURE — 97110 THERAPEUTIC EXERCISES: CPT

## 2024-07-11 NOTE — THERAPY TREATMENT NOTE
Outpatient Physical Therapy Ortho Treatment Note  [facility]     Patient Name: Savannah Valenzuela  : 1970  MRN: [id number]  Today's Date: 2024      Visit Date: 2024    Visit Dx:    ICD-10-CM ICD-9-CM   1. Left shoulder pain, unspecified chronicity  M25.512 719.41   2. Decreased muscle strength  M62.81 728.87   3. Orthopedic aftercare  Z47.89 V54.9   4. Poor posture  R29.3 781.92       Patient Active Problem List   Diagnosis    Migraine    Allergic rhinitis    Depression    S/P laparoscopic sleeve gastrectomy    Iron deficiency anemia    Paget's disease of bone    Incomplete tear of left rotator cuff    Postoperative malabsorption        Past Medical History:   Diagnosis Date    Allergic rhinitis     Anemia     Chronic fatigue 2020    RESOLVED    Depression     Encounter for screening for malignant neoplasm of colon 2021    Added automatically from request for surgery [id number]    Fatigue     GERD (gastroesophageal reflux disease)     HISTORY OF    Hematuria, microscopic 2016    Hiatal hernia     CORRECTED WITH SLEEVE    History of sleeve gastrectomy     History of transfusion     AT BIRTH.    Hyperlipidemia     PRIOR TO SLEEVE GASTRECTOMY    Hypertension     PRIOR TO SLEEVE GASTRECTOMY    Low back strain     Fell on my back on aluminum bleachers    Medication management     Migraine     Multiple food allergies     Physical exam, annual     Rotator cuff syndrome 2023    LEFT SHOULDER        Past Surgical History:   Procedure Laterality Date     SECTION      COLONOSCOPY N/A 2021    Procedure: COLONOSCOPY INTO CECUM AND T.I. WITH COLD BIOPSY POLYPECTOMIES;  Surgeon: Ursula Torres MD;  Location: [facility] ENDOSCOPY;  Service: Gastroenterology;  Laterality: N/A;  PRE- SCREENING  POST- DIVERTICULOSIS, POLYPS, HEMORRHOIDS    DILATATION AND CURETTAGE      ENDOSCOPY N/A 2020    Procedure: ESOPHAGOGASTRODUODENOSCOPY WITH BIOPSY;  Surgeon: Cayetano Khan  Tima Zarate MD;  Location: [facility] ENDOSCOPY;  Service: General;  Laterality: N/A;  PREOP/ DYSPEPSIA  POSTOP/ GASTRITIS, ESOPHAGITIS, HIATAL HERNIA    GASTRIC SLEEVE LAPAROSCOPIC N/A 10/5/2020    Procedure: GASTRIC SLEEVE LAPAROSCOPIC WITH paraesophageal hernia repair, lysis of adhesions;  Surgeon: Cayetano Khan Jr., MD;  Location:  [facility];  Service: Bariatric;  Laterality: N/A;    SHOULDER ARTHROSCOPY W/ ROTATOR CUFF REPAIR Left 6/18/2024    Procedure: LEFT SHOULDER MANIPULATION WITH STEROID INJECTION;  Surgeon: Jasmine Page MD;  Location: [facility];  Service: Orthopedics;  Laterality: Left;    WISDOM TOOTH EXTRACTION                          PT Assessment/Plan       Row Name 07/11/24 1200          PT Assessment    Assessment Comments Savannah Valenzuela is a 53 year old female s/p L shoulder manipulation 6/18/2024, returning for physical therapy treatment session. She recently had f/u w/ MD who is pleased with progress, however would like to see further range progressions prior to considering arthroscopy or RCR. POC ongoing with surgical intervention options. Pt. Reports that she is doing well at work, and has no difficulties sleeping, however gets frustrated when range limits onset. She had some soreness following last session, however this date has no new complaints. Today, transitioned supine banded therex to seated. Pt. Demonstrates improved posture in sitting, with minimal compensations. Some reported pain with seated ER, however with slightly reduced range and focus on eccentric control, pain is minimized. She notes she feels she performs them better in sitting.  -ER        PT Plan    PT Plan Comments D2 flexion supine or standing with YTB, around the worlds, sleeper stretch,  gentle lateral raises/ snow angels w/ soft foam at spine  -ER               User Key  (r) = Recorded By, (t) = Taken By, (c) = Cosigned By      Initials Name Provider Type    ER Ya Norris, PT Physical Therapist                        OP Exercises       Row Name 07/11/24 1100             Subjective    Subjective Comments I was a little sore, but I am okay.  -ER         Total Minutes    61991 - PT Therapeutic Exercise Minutes 45  -ER         Exercise 1    Exercise Name 1 UBE  -ER      Time 1 4min  -ER      Additional Comments fwd/backward  -ER         Exercise 2    Exercise Name 2 wall washes CW/CCW  -ER      Cueing 2 Verbal;Demo  -ER      Sets 2 1  -ER      Reps 2 10e way  -ER      Additional Comments elbows close  -ER         Exercise 3    Exercise Name 3 Seated HA  -ER      Cueing 3 Verbal;Demo  -ER      Sets 3 2  -ER      Reps 3 10  -ER      Additional Comments RTB  -ER         Exercise 4    Exercise Name 4 Seated ER  -ER      Cueing 4 Verbal;Demo  -ER      Sets 4 2  -ER      Reps 4 10  -ER      Additional Comments RTB  -ER         Exercise 5    Exercise Name 5 SL L shoulder ER with 1lb  -ER      Cueing 5 Verbal;Demo  -ER      Sets 5 2  -ER      Reps 5 10  -ER      Time 5 2s  -ER      Additional Comments towel roll L arm  -ER         Exercise 6    Exercise Name 6 pulleys  -ER      Cueing 6 Verbal;Demo  -ER      Sets 6 1  -ER      Reps 6 15e  -ER      Additional Comments flexion/abd/IR  -ER         Exercise 8    Exercise Name 8 vertical wall walks  -ER      Cueing 8 Verbal;Demo  -ER      Reps 8 10x up/down  -ER         Exercise 9    Exercise Name 9 Seated alternating shoulder flexion  -ER      Cueing 9 Verbal;Demo  -ER      Sets 9 2  -ER      Reps 9 10e  -ER         Exercise 12    Exercise Name 12 SL L shoulder AROM  -ER      Cueing 12 Verbal;Demo  -ER      Reps 12 1 x 10e motion  -ER      Additional Comments flex, ABD, ER  -ER         Exercise 13    Exercise Name 13 Seated alternating shoulder flexion  -ER      Cueing 13 Verbal;Demo  -ER      Sets 13 1  -ER      Reps 13 10e  -ER      Time 13 cues for thumb up  -ER                User Key  (r) = Recorded By, (t) = Taken By, (c) = Cosigned By      Initials Name Provider Type    ER  Ya Norris, PT Physical Therapist                                  PT OP Goals       Row Name 07/11/24 1200          PT Short Term Goals    STG Date to Achieve 07/19/24  -ER     STG 1 pt. to be I with initial HEP to facilitate self management of their condition  -ER     STG 1 Progress Met  -ER     STG 2 pt. to be educated in/verbalize understanding of the importance of posture/ergonomics in association with their condition to facilitate self management of their condition  -ER     STG 2 Progress Ongoing;Progressing  -ER        Long Term Goals    LTG Date to Achieve 09/17/24  -ER     LTG 1 pt. to be I with advanced HEP to facilitate self management of their condition  -ER     LTG 1 Progress Ongoing  -ER     LTG 2 pt. to demonstrate L shoulder flexion, abduction AROM >/=155 to facilitate ease of performing functional/household activities  -ER     LTG 2 Progress Ongoing;Progressing  -ER     LTG 3 pt. to demonstrate placing/retrieving small object from an above the shoulder level shelf with her LUE to facilitate ease of performing household/work related activities  -ER     LTG 3 Progress Ongoing  -ER     LTG 4 Patient to demonstrate greater than equal to 4+ out of 5 muscle testing of the left shoulder flexion, abduction, external rotation to facilitate ease of performance of functional activities  -ER     LTG 4 Progress Ongoing  -ER     LTG 5 Patient to demonstrate left shoulder active range of motion functional internal rotation greater than equal to midline L1 to facilitate ease of performing dressing activities  -ER     LTG 5 Progress Met  -ER               User Key  (r) = Recorded By, (t) = Taken By, (c) = Cosigned By      Initials Name Provider Type    ER Ya Norris PT Physical Therapist                    Therapy Education  Education Details: Reinforced HEP  Given: HEP, Symptoms/condition management  Program: Reinforced  How Provided: Verbal, Demonstration  Provided to: Patient  Level of Understanding: Teach  back education performed, Verbalized, Demonstrated              Time Calculation:   Start Time: 1115  Stop Time: 1200  Time Calculation (min): 45 min  Total Timed Code Minutes- PT: 45 minute(s)  Timed Charges  35688 - PT Therapeutic Exercise Minutes: 45  Total Minutes  Timed Charges Total Minutes: 45   Total Minutes: 45  Therapy Charges for Today       Code Description Service Date Service Provider Modifiers Qty    05203233463 HC PT THER PROC EA 15 MIN 7/11/2024 Ya Norris, PT GP 3                      Ya Norris, PT  7/11/2024

## 2024-07-18 ENCOUNTER — HOSPITAL ENCOUNTER (OUTPATIENT)
Dept: PHYSICAL THERAPY | Facility: HOSPITAL | Age: 54
Setting detail: THERAPIES SERIES
Discharge: HOME | End: 2024-07-18

## 2024-07-18 DIAGNOSIS — M25.512 LEFT SHOULDER PAIN, UNSPECIFIED CHRONICITY: Primary | ICD-10-CM

## 2024-07-18 DIAGNOSIS — Z47.89 ORTHOPEDIC AFTERCARE: ICD-10-CM

## 2024-07-18 DIAGNOSIS — M62.81 DECREASED MUSCLE STRENGTH: ICD-10-CM

## 2024-07-18 DIAGNOSIS — R29.3 POOR POSTURE: ICD-10-CM

## 2024-07-18 RX ORDER — TOPIRAMATE 50 MG/1
50 TABLET, FILM COATED ORAL DAILY
Qty: 90 TABLET | Refills: 1 | Status: SHIPPED | OUTPATIENT
Start: 2024-07-18 | End: 2025-01-14

## 2024-07-18 NOTE — THERAPY TREATMENT NOTE
Outpatient Physical Therapy Ortho Treatment Note  [facility]     Patient Name: Savannah Valenzuela  : 1970  MRN: [id number]  Today's Date: 2024      Visit Date: 2024    Visit Dx:    ICD-10-CM ICD-9-CM   1. Left shoulder pain, unspecified chronicity  M25.512 719.41   2. Decreased muscle strength  M62.81 728.87   3. Orthopedic aftercare  Z47.89 V54.9   4. Poor posture  R29.3 781.92       Patient Active Problem List   Diagnosis    Migraine    Allergic rhinitis    Depression    S/P laparoscopic sleeve gastrectomy    Iron deficiency anemia    Paget's disease of bone    Incomplete tear of left rotator cuff    Postoperative malabsorption        Past Medical History:   Diagnosis Date    Allergic rhinitis     Anemia     iron deficiency    Chronic fatigue 2020    RESOLVED    Depression     Encounter for screening for malignant neoplasm of colon 2021    Added automatically from request for surgery [id number]    Fatigue     GERD (gastroesophageal reflux disease)     HISTORY OF    Hematuria, microscopic 2016    Hiatal hernia     CORRECTED WITH SLEEVE    History of sleeve gastrectomy     History of transfusion     AT BIRTH.    Hyperlipidemia     PRIOR TO SLEEVE GASTRECTOMY    Hypertension     PRIOR TO SLEEVE GASTRECTOMY    Low back strain     Fell on my back on aluminum bleachers    Medication management     Migraine     Multiple food allergies     Physical exam, annual     Rotator cuff syndrome 2023    LEFT SHOULDER        Past Surgical History:   Procedure Laterality Date     SECTION      COLONOSCOPY N/A 2021    Procedure: COLONOSCOPY INTO CECUM AND T.I. WITH COLD BIOPSY POLYPECTOMIES;  Surgeon: Ursula Torres MD;  Location: [facility] ENDOSCOPY;  Service: Gastroenterology;  Laterality: N/A;  PRE- SCREENING  POST- DIVERTICULOSIS, POLYPS, HEMORRHOIDS    DILATATION AND CURETTAGE      ENDOSCOPY N/A 2020    Procedure: ESOPHAGOGASTRODUODENOSCOPY WITH BIOPSY;  Surgeon:  Cayetano Khan Jr., MD;  Location: [facility] ENDOSCOPY;  Service: General;  Laterality: N/A;  PREOP/ DYSPEPSIA  POSTOP/ GASTRITIS, ESOPHAGITIS, HIATAL HERNIA    GASTRIC SLEEVE LAPAROSCOPIC N/A 10/5/2020    Procedure: GASTRIC SLEEVE LAPAROSCOPIC WITH paraesophageal hernia repair, lysis of adhesions;  Surgeon: Cayetano Khan Jr., MD;  Location: [facility] OR [facility];  Service: Bariatric;  Laterality: N/A;    SHOULDER ARTHROSCOPY W/ ROTATOR CUFF REPAIR Left 6/18/2024    Procedure: LEFT SHOULDER MANIPULATION WITH STEROID INJECTION;  Surgeon: Jasmine Page MD;  [place] [facility] [place] [facility] [place];  Service: Orthopedics;  Laterality: Left;    WISDOM TOOTH EXTRACTION          PT Ortho       Row Name 07/18/24 1400       Left Upper Ext    Lt Shoulder Abduction AROM 120 seated pre DDN, 150 seated poste DDN  -GJ    Lt Shoulder Flexion AROM 130 seated pre DDN, 160 seated post DDN  -GJ    Lt Shoulder Internal Rotation AROM FIR midline L5 pre DDN, FIR midline T12/L1 post DDN  -GJ              User Key  (r) = Recorded By, (t) = Taken By, (c) = Cosigned By      Initials Name Provider Type    Camilo Ponce W, PT Physical Therapist                                 PT Assessment/Plan       Row Name 07/18/24 1455          PT Assessment    Assessment Comments Ms. Valenzuela is 4 weeks 2 days s/p L shoulder manipulation.  She reports continued difficullty with FIR with associated pain.  SHe demosntrates (+) trigger points alog her  lattisimus dorsi and L UT tissues.  We reviewed indications for and the risks/benefits of DDN.  We decided to proceed with DDN to the L UT, and L lattisimus dorsi tissues. Verbal/written consent performed.  She deosntrated multiple moderate to large LTR's both the L UT, L lattisimus dorsi tissues. No immediate adverse response.  See ortho section for pre and post DDN AROM of the L shoulder. She demosntrated approximately 20-30 degrees of improved AROM in flexion and abduction followiing DDN. She demonstrated improved FIR  AROM to T 12 post DDN. All motions noted to have improved scapulo thoracic rhythm. Ms. Valenzuela continues to be a good candidate for skilled physical therapy. She is encouraged to work on ROM activities today.  -GJ        PT Plan    PT Plan Comments assess response to DDN of L lattisimsu dorsi, L UT, reassess ROM to monitor for progress/regression.  Return to exercies, warm up on UBE and pullleys, work on upper range strength.  -GJ               User Key  (r) = Recorded By, (t) = Taken By, (c) = Cosigned By      Initials Name Provider Type    Camilo Ponce, PT Physical Therapist                       OP Exercises       Row Name 07/18/24 1400             Subjective    Subjective Comments i'm still having trouble getting behind my back  -GJ         Exercise 1    Exercise Name 1 UBE  -GJ      Time 1 4min  -GJ                User Key  (r) = Recorded By, (t) = Taken By, (c) = Cosigned By      Initials Name Provider Type    Camilo Ponce, PT Physical Therapist                             Manual Rx (Last 36 Hours)       Manual Treatments       Row Name 07/18/24 1300             Manual Rx 3    Manual Rx 3 Location intramuscular manual  -GJ Assessed pt. for Dry Needling and intramuscular manual therapy. Discussed risks/benefits of Dry Needling with pt, including but not limited to muscle soreness, bruising, vasovagal response, pneumothorax, nerve injury. Patient signed written consent to proceed with treatment.    Patient position during treatment: prone, nose in nose hole  Muscles treated: L UT, L lattisimus dorsi  Response to treatment: multiple moderate to large LTR's both the L UT, L lattisimus dorsi tissues. No immediate adverse response. See ortho section for pre and post DDN AROM of the L shoulder. She demosntrated approximately 20-30 degrees of improved AROM in flexion and abduction followiing DDN. She demonstrated improved FIR AROM to T 12 post DDN. All motions noted to have improved scapulo thoracic rhythm      palpation used before, during, and after to facilitate assessment Clean needle technique observed at all times, precautions for lung fields, neurovascular structures observed.    DDN performed by Camilo Milner II, PT, DPT      Manual Rx 3 Type L lattsimus dorsi, L UT  -GJ                User Key  (r) = Recorded By, (t) = Taken By, (c) = Cosigned By      Initials Name Provider Type    GJ Camilo Milner, PT Physical Therapist                     PT OP Goals       Row Name 07/18/24 1300          PT Short Term Goals    STG Date to Achieve 07/19/24  -GJ     STG 1 pt. to be I with initial HEP to facilitate self management of their condition  -GJ     STG 1 Progress Met  -GJ     STG 2 pt. to be educated in/verbalize understanding of the importance of posture/ergonomics in association with their condition to facilitate self management of their condition  -GJ     STG 2 Progress Met  -GJ        Long Term Goals    LTG Date to Achieve 09/17/24  -GJ     LTG 1 pt. to be I with advanced HEP to facilitate self management of their condition  -GJ     LTG 1 Progress Ongoing  -GJ     LTG 2 pt. to demonstrate L shoulder flexion, abduction AROM >/=155 to facilitate ease of performing functional/household activities  -GJ     LTG 2 Progress Ongoing;Progressing  -GJ     LTG 2 Progress Comments see ortho  -GJ     LTG 3 pt. to demonstrate placing/retrieving small object from an above the shoulder level shelf with her LUE to facilitate ease of performing household/work related activities  -GJ     LTG 3 Progress Ongoing  -GJ     LTG 4 Patient to demonstrate greater than equal to 4+ out of 5 muscle testing of the left shoulder flexion, abduction, external rotation to facilitate ease of performance of functional activities  -GJ     LTG 4 Progress Ongoing  -GJ     LTG 5 Patient to demonstrate left shoulder active range of motion functional internal rotation greater than equal to midline L1 to facilitate ease of performing dressing activities   -OMAR     LTG 5 Progress Met  -OMAR               User Key  (r) = Recorded By, (t) = Taken By, (c) = Cosigned By      Initials Name Provider Type     Camilo Milner, PT Physical Therapist                    Therapy Education  Education Details: Discussed risks/benefits of DDN and it's role in therapy as an adjunct therapy and not a stand alone treatment. Discussed role of strength, postural awareness, activity modifications as integral components of the plan of care. Careful attention to detail that there is no guarantee of results with DDN. Answered questions  How Provided: Verbal  Provided to: Patient  Level of Understanding: Verbalized              Time Calculation:   Start Time: 1400  Stop Time: 1438  Time Calculation (min): 38 min  Therapy Charges for Today       Code Description Service Date Service Provider Modifiers Qty    [id number]  PT SELF PAY DRY NEEDLING SESSION 7/18/2024 Camilo Milner, PT  1                      Camilo Milner, PT  7/18/2024

## 2024-07-19 ENCOUNTER — LAB (OUTPATIENT)
Dept: LAB | Facility: HOSPITAL | Age: 54
End: 2024-07-19
Payer: COMMERCIAL

## 2024-07-19 ENCOUNTER — CONSULT (OUTPATIENT)
Dept: ONCOLOGY | Facility: CLINIC | Age: 54
End: 2024-07-19
Payer: COMMERCIAL

## 2024-07-19 VITALS
WEIGHT: 185.2 LBS | HEART RATE: 66 BPM | SYSTOLIC BLOOD PRESSURE: 138 MMHG | BODY MASS INDEX: 31.62 KG/M2 | RESPIRATION RATE: 18 BRPM | HEIGHT: 64 IN | OXYGEN SATURATION: 99 % | DIASTOLIC BLOOD PRESSURE: 84 MMHG | TEMPERATURE: 98.3 F

## 2024-07-19 DIAGNOSIS — D50.9 IRON DEFICIENCY ANEMIA, UNSPECIFIED IRON DEFICIENCY ANEMIA TYPE: Primary | Chronic | ICD-10-CM

## 2024-07-19 DIAGNOSIS — R79.89 ABNORMAL CBC: Primary | ICD-10-CM

## 2024-07-19 DIAGNOSIS — D50.9 IRON DEFICIENCY ANEMIA, UNSPECIFIED IRON DEFICIENCY ANEMIA TYPE: Chronic | ICD-10-CM

## 2024-07-19 LAB
BASOPHILS # BLD AUTO: 0.06 10*3/MM3 (ref 0–0.2)
BASOPHILS NFR BLD AUTO: 1.5 % (ref 0–1.5)
DEPRECATED RDW RBC AUTO: 44.2 FL (ref 37–54)
EOSINOPHIL # BLD AUTO: 0.08 10*3/MM3 (ref 0–0.4)
EOSINOPHIL NFR BLD AUTO: 2 % (ref 0.3–6.2)
ERYTHROCYTE [DISTWIDTH] IN BLOOD BY AUTOMATED COUNT: 14.9 % (ref 12.3–15.4)
FOLATE SERPL-MCNC: 7.17 NG/ML (ref 4.78–24.2)
HCT VFR BLD AUTO: 36.4 % (ref 34–46.6)
HGB BLD-MCNC: 11.6 G/DL (ref 12–15.9)
HGB RETIC QN AUTO: 30.3 PG (ref 29.8–36.1)
IMM GRANULOCYTES # BLD AUTO: 0.02 10*3/MM3 (ref 0–0.05)
IMM GRANULOCYTES NFR BLD AUTO: 0.5 % (ref 0–0.5)
IMM RETICS NFR: 12.3 % (ref 3–15.8)
IRON 24H UR-MRATE: 45 MCG/DL (ref 37–145)
IRON SATN MFR SERPL: 9 % (ref 20–50)
LYMPHOCYTES # BLD AUTO: 1.33 10*3/MM3 (ref 0.7–3.1)
LYMPHOCYTES NFR BLD AUTO: 33.3 % (ref 19.6–45.3)
MCH RBC QN AUTO: 26.1 PG (ref 26.6–33)
MCHC RBC AUTO-ENTMCNC: 31.9 G/DL (ref 31.5–35.7)
MCV RBC AUTO: 81.8 FL (ref 79–97)
MONOCYTES # BLD AUTO: 0.47 10*3/MM3 (ref 0.1–0.9)
MONOCYTES NFR BLD AUTO: 11.8 % (ref 5–12)
NEUTROPHILS NFR BLD AUTO: 2.04 10*3/MM3 (ref 1.7–7)
NEUTROPHILS NFR BLD AUTO: 50.9 % (ref 42.7–76)
NRBC BLD AUTO-RTO: 0 /100 WBC (ref 0–0.2)
PLATELET # BLD AUTO: 285 10*3/MM3 (ref 140–450)
PMV BLD AUTO: 9.9 FL (ref 6–12)
RBC # BLD AUTO: 4.45 10*6/MM3 (ref 3.77–5.28)
RETICS # AUTO: 0.05 10*6/MM3 (ref 0.02–0.13)
RETICS/RBC NFR AUTO: 1.08 % (ref 0.7–1.9)
TIBC SERPL-MCNC: 507 MCG/DL (ref 298–536)
TRANSFERRIN SERPL-MCNC: 340 MG/DL (ref 200–360)
VIT B12 BLD-MCNC: 368 PG/ML (ref 211–946)
WBC NRBC COR # BLD AUTO: 4 10*3/MM3 (ref 3.4–10.8)

## 2024-07-19 PROCEDURE — 84466 ASSAY OF TRANSFERRIN: CPT | Performed by: INTERNAL MEDICINE

## 2024-07-19 PROCEDURE — 36415 COLL VENOUS BLD VENIPUNCTURE: CPT

## 2024-07-19 PROCEDURE — 85046 RETICYTE/HGB CONCENTRATE: CPT

## 2024-07-19 PROCEDURE — 83540 ASSAY OF IRON: CPT | Performed by: INTERNAL MEDICINE

## 2024-07-19 PROCEDURE — 99204 OFFICE O/P NEW MOD 45 MIN: CPT | Performed by: INTERNAL MEDICINE

## 2024-07-19 PROCEDURE — 82746 ASSAY OF FOLIC ACID SERUM: CPT | Performed by: INTERNAL MEDICINE

## 2024-07-19 PROCEDURE — 85025 COMPLETE CBC W/AUTO DIFF WBC: CPT

## 2024-07-19 PROCEDURE — 82607 VITAMIN B-12: CPT | Performed by: INTERNAL MEDICINE

## 2024-07-19 NOTE — PROGRESS NOTES
Subjective     REASON FOR CONSULTATION: Persistent low ferritin  Provide an opinion on any further workup or treatment                             REQUESTING PHYSICIAN: Mathew Lemos MD    RECORDS OBTAINED:  Records of the patients history including those obtained from the referring provider were reviewed and summarized in detail.    HISTORY OF PRESENT ILLNESS:  The patient is a 53 y.o. year old female who is here for an opinion about the above issue.    History of Present Illness patient is a 53-year-old female with a history of depression and GERD who had a gastric sleeve resection 4 years ago and has been followed by bariatric clinic.  She has had a 60 pound weight loss overall from her surgery but has to struggle to keep her weight down.    She has not had a period   Since her hysterectomy 2 years ago and has had no obvious hematochezia or melena.  She had a colonoscopy 2 years ago which was benign.    She had 5 doses of IV iron in August 2023 and her hemoglobin improved and her ferritin went from 7.6-152.    Repeat testing in May of this year showed that her ferritin had dropped to 7.2 again but she was not very anemic and a course of oral iron Feosol 1 tablet a day was given for 2 months with no significant change in her ferritin which was 13 and therefore she has been referred to us to get IV iron    She has had no intolerance of oral iron but does not think that her stools changed color on the iron therefore we will I have recommended trying another preparations such as ferrous gluconate to see if this will work and if it does not then I think we can proceed with IV iron when she gets more anemic    She has had no other abdominal surgeries    She is not a smoker or drinker    No family history of malignancy        Past Medical History:   Diagnosis Date    Allergic rhinitis     Anemia     iron deficiency    Chronic fatigue 03/27/2020    RESOLVED    Depression     Encounter for screening for malignant neoplasm  of colon 2021    Added automatically from request for surgery [id number]    Fatigue     GERD (gastroesophageal reflux disease)     HISTORY OF    Hematuria, microscopic 2016    Hiatal hernia     CORRECTED WITH SLEEVE    History of sleeve gastrectomy     History of transfusion     AT BIRTH.    Hyperlipidemia     PRIOR TO SLEEVE GASTRECTOMY    Hypertension     PRIOR TO SLEEVE GASTRECTOMY    Low back strain     Fell on my back on aluminum bleachers    Medication management     Migraine     Multiple food allergies     Physical exam, annual     Rotator cuff syndrome 2023    LEFT SHOULDER        Past Surgical History:   Procedure Laterality Date     SECTION      COLONOSCOPY N/A 2021    Procedure: COLONOSCOPY INTO CECUM AND T.I. WITH COLD BIOPSY POLYPECTOMIES;  Surgeon: Ursula Torres MD;  Location: [facility] ENDOSCOPY;  Service: Gastroenterology;  Laterality: N/A;  PRE- SCREENING  POST- DIVERTICULOSIS, POLYPS, HEMORRHOIDS    DILATATION AND CURETTAGE      ENDOSCOPY N/A 2020    Procedure: ESOPHAGOGASTRODUODENOSCOPY WITH BIOPSY;  Surgeon: Cayetano Khan Jr., MD;  Location: [facility] ENDOSCOPY;  Service: General;  Laterality: N/A;  PREOP/ DYSPEPSIA  POSTOP/ GASTRITIS, ESOPHAGITIS, HIATAL HERNIA    GASTRIC SLEEVE LAPAROSCOPIC N/A 10/5/2020    Procedure: GASTRIC SLEEVE LAPAROSCOPIC WITH paraesophageal hernia repair, lysis of adhesions;  Surgeon: Cayetano Khan Jr., MD;  Location: [facility] [place] [facility] [place];  Service: Bariatric;  Laterality: N/A;    SHOULDER ARTHROSCOPY W/ ROTATOR CUFF REPAIR Left 2024    Procedure: LEFT SHOULDER MANIPULATION WITH STEROID INJECTION;  Surgeon: Jasmine Page MD;  Location: [facility] [place] [facility] [place];  Service: Orthopedics;  Laterality: Left;    WISDOM TOOTH EXTRACTION          Current Outpatient Medications on File Prior to Visit   Medication Sig Dispense Refill    buPROPion XL (WELLBUTRIN XL) 150 MG 24 hr tablet Take 1 tablet by mouth Daily. 90 tablet 1    Calcium 250  "MG capsule Take 500 mg by mouth 3 (Three) Times a Day.      FLUoxetine (PROzac) 40 MG capsule Take 1 capsule by mouth Daily. 90 capsule 1    meloxicam (MOBIC) 15 MG tablet Take 1 tablet by mouth Daily with food. 30 tablet 0    multivitamin with minerals tablet tablet Take 1 tablet by mouth Daily.      Progesterone (Prometrium) 200 MG capsule Take 1 capsule by mouth Daily in the evening 90 capsule 4    topiramate (TOPAMAX) 50 MG tablet Take 1 tablet by mouth Daily for 180 days. 90 tablet 1    HYDROcodone-acetaminophen (NORCO) 5-325 MG per tablet Take 1-2 tablets by mouth Every 4 (Four) Hours As Needed (Pain). 40 tablet 0     No current facility-administered medications on file prior to visit.        ALLERGIES:    Allergies   Allergen Reactions    Latex Urinary Retention and Irritability     Painful urination with latex cath; Patient states \"sensitivity\"          Social History     Socioeconomic History    Marital status:    Tobacco Use    Smoking status: Never     Passive exposure: Past    Smokeless tobacco: Never   Vaping Use    Vaping status: Never Used   Substance and Sexual Activity    Alcohol use: No     Comment: Kay: rare    Drug use: No    Sexual activity: Not Currently     Partners: Male     Birth control/protection: Post-menopausal        Family History   Problem Relation Age of Onset    Hypertension Mother     Hypertension Father     Heart disease Father     Hyperlipidemia Father     Malig Hyperthermia Neg Hx         Review of Systems     Objective     Vitals:    07/19/24 1615   BP: 138/84   Pulse: 66   Resp: 18   Temp: 98.3 °F (36.8 °C)   TempSrc: Oral   SpO2: 99%   Weight: 84 kg (185 lb 3.2 oz)   Height: 162.6 cm (64.02\")   PainSc: 0-No pain          No data to display                Physical Exam        CONSTITUTIONAL:  Vital signs reviewed.  No distress, looks comfortable.  EYES:  Conjunctivae and lids unremarkable.  PERRLA  EARS,NOSE,MOUTH,THROAT:  Ears and nose appear unremarkable.  " Lips, teeth, gums appear unremarkable.  RESPIRATORY:  Normal respiratory effort.  Lungs clear to auscultation bilaterally.  CARDIOVASCULAR:  Normal S1, S2.  No murmurs rubs or gallops.  No significant lower extremity edema.  GASTROINTESTINAL: Abdomen appears unremarkable.  Nontender.  No hepatomegaly.  No splenomegaly.  LYMPHATIC:  No cervical, supraclavicular, axillary lymphadenopathy.  SKIN:  Warm.  No rashes.  PSYCHIATRIC:  Normal judgment and insight.  Normal mood and affect.      RECENT LABS:  Hematology WBC   Date Value Ref Range Status   07/19/2024 4.00 3.40 - 10.80 10*3/mm3 Final   10/17/2019 5.67 3.40 - 10.80 10*3/mm3 Final     RBC   Date Value Ref Range Status   07/19/2024 4.45 3.77 - 5.28 10*6/mm3 Final   10/17/2019 4.52 3.77 - 5.28 10*6/mm3 Final     Hemoglobin   Date Value Ref Range Status   07/19/2024 11.6 (L) 12.0 - 15.9 g/dL Final     Hematocrit   Date Value Ref Range Status   07/19/2024 36.4 34.0 - 46.6 % Final     Platelets   Date Value Ref Range Status   07/19/2024 285 140 - 450 10*3/mm3 Final          Assessment & Plan     1.  Iron deficiency without significant anemia -etiology of blood loss is unclear although gastric sleeve may also impair her ability to absorb iron drop in ferritin seems  fairly quick  She has a history of GERD and esophagitis and hiatal hernia may need repeat EGD    2.  Post gastric sleeve resection in 2020     Plan  1.  Check B12 methylmalonic acid folic acid and iron profile  2, trial of ferrous gluconate  3.  CBC in 3 months and see me in 6 months    If her hemoglobin continues to drop we will give IV iron

## 2024-07-22 ENCOUNTER — PATIENT ROUNDING (BHMG ONLY) (OUTPATIENT)
Dept: ONCOLOGY | Facility: CLINIC | Age: 54
End: 2024-07-22
Payer: COMMERCIAL

## 2024-07-22 NOTE — PROGRESS NOTES
A My-Chart message has been sent to the patient for PATIENT [facility]ING with [facility]

## 2024-07-25 ENCOUNTER — HOSPITAL ENCOUNTER (OUTPATIENT)
Dept: PHYSICAL THERAPY | Facility: HOSPITAL | Age: 54
Setting detail: THERAPIES SERIES
Discharge: HOME | End: 2024-07-25
Payer: COMMERCIAL

## 2024-07-25 DIAGNOSIS — R29.3 POOR POSTURE: ICD-10-CM

## 2024-07-25 DIAGNOSIS — M25.512 LEFT SHOULDER PAIN, UNSPECIFIED CHRONICITY: Primary | ICD-10-CM

## 2024-07-25 DIAGNOSIS — Z47.89 ORTHOPEDIC AFTERCARE: ICD-10-CM

## 2024-07-25 DIAGNOSIS — M62.81 DECREASED MUSCLE STRENGTH: ICD-10-CM

## 2024-07-25 PROCEDURE — 97110 THERAPEUTIC EXERCISES: CPT

## 2024-07-25 NOTE — THERAPY PROGRESS REPORT/RE-CERT
Outpatient Physical Therapy Ortho Progress Note  [facility]     Patient Name: Savannah Valenzuela  : 1970  MRN: [id number]  Today's Date: 2024      Visit Date: 2024    Visit Dx:    ICD-10-CM ICD-9-CM   1. Left shoulder pain, unspecified chronicity  M25.512 719.41   2. Decreased muscle strength  M62.81 728.87   3. Orthopedic aftercare  Z47.89 V54.9   4. Poor posture  R29.3 781.92       Patient Active Problem List   Diagnosis    Migraine    Allergic rhinitis    Depression    S/P laparoscopic sleeve gastrectomy    Iron deficiency anemia    Paget's disease of bone    Incomplete tear of left rotator cuff    Postoperative malabsorption        Past Medical History:   Diagnosis Date    Allergic rhinitis     Anemia     iron deficiency    Chronic fatigue 2020    RESOLVED    Depression     Encounter for screening for malignant neoplasm of colon 2021    Added automatically from request for surgery [id number]    Fatigue     GERD (gastroesophageal reflux disease)     HISTORY OF    Hematuria, microscopic 2016    Hiatal hernia     CORRECTED WITH SLEEVE    History of sleeve gastrectomy     History of transfusion     AT BIRTH.    Hyperlipidemia     PRIOR TO SLEEVE GASTRECTOMY    Hypertension     PRIOR TO SLEEVE GASTRECTOMY    Low back strain     Fell on my back on aluminum bleachers    Medication management     Migraine     Multiple food allergies     Physical exam, annual     Rotator cuff syndrome 2023    LEFT SHOULDER        Past Surgical History:   Procedure Laterality Date     SECTION      COLONOSCOPY N/A 2021    Procedure: COLONOSCOPY INTO CECUM AND T.I. WITH COLD BIOPSY POLYPECTOMIES;  Surgeon: Ursula Torres MD;  Location: [facility] ENDOSCOPY;  Service: Gastroenterology;  Laterality: N/A;  PRE- SCREENING  POST- DIVERTICULOSIS, POLYPS, HEMORRHOIDS    DILATATION AND CURETTAGE      ENDOSCOPY N/A 2020    Procedure: ESOPHAGOGASTRODUODENOSCOPY WITH BIOPSY;  Surgeon:  Cayetano Khan Jr., MD;  Location: [facility] ENDOSCOPY;  Service: General;  Laterality: N/A;  PREOP/ DYSPEPSIA  POSTOP/ GASTRITIS, ESOPHAGITIS, HIATAL HERNIA    GASTRIC SLEEVE LAPAROSCOPIC N/A 10/5/2020    Procedure: GASTRIC SLEEVE LAPAROSCOPIC WITH paraesophageal hernia repair, lysis of adhesions;  Surgeon: Cayetano Khan Jr., MD;  [place] [facility] [place] [facility] [place]  Service: Bariatric;  Laterality: N/A;    SHOULDER ARTHROSCOPY W/ ROTATOR CUFF REPAIR Left 6/18/2024    Procedure: LEFT SHOULDER MANIPULATION WITH STEROID INJECTION;  Surgeon: Jasmine Page MD;  [place] [facility] [place] [facility] [place]  Service: Orthopedics;  Laterality: Left;    WISDOM TOOTH EXTRACTION          PT Ortho       Row Name 07/25/24 0900       Left Upper Ext    Lt Shoulder Abduction AROM 0-135 seated  -MP    Lt Shoulder Flexion AROM 0-138 seated  -MP    Lt Shoulder Internal Rotation AROM T12 midline  -MP              User Key  (r) = Recorded By, (t) = Taken By, (c) = Cosigned By      Initials Name Provider Type    MP Marii Moss, PT Physical Therapist                                 PT Assessment/Plan       Row Name 07/25/24 0900          PT Assessment    Functional Limitations Limitation in home management;Limitations in community activities;Performance in self-care ADL;Performance in leisure activities  -MP     Impairments Impaired flexibility;Impaired muscle endurance;Impaired muscle length;Impaired muscle power;Impaired postural alignment;Joint mobility;Muscle strength;Motor function;Pain;Poor body mechanics;Posture;Range of motion  -MP     Assessment Comments Savannah Valenzuela has been seen for 8 physical therapy treatment sessions s/p L shoulder manipulation 6/18/2024. Prior to manipulation pt. Was seen in clinic for conservative management of L shoulder, however, due to limited progress elected to undergo ZORAIDA. Treatment has included therapeutic exercise, manual therapy, patient education with home exercise program , and dry needling . Progress  to physical therapy goals is fair as pt. Has met 2/2 STGs, and 1/5 LTGs. Per chart review, MD wanting to see further progressions in end range and strength prior to considering arthroscopy or RCR, pt. Returns for next appointment with MD 7/29/2024. She reports improved mobility in all planes despite continued restrictions into FIR and end range flexion/abduction. She demonstrates improving functional range with mild compensation with flexion and abduction, working to progress strength at end of available range. She will benefit from continued skilled physical therapy to address remaining impairments and functional limitations.  -MP     Please refer to paper survey for additional self-reported information No  -MP     Rehab Potential Good  -MP     Patient/caregiver participated in establishment of treatment plan and goals Yes  -MP     Patient would benefit from skilled therapy intervention Yes  -MP        PT Plan    PT Frequency 1x/week  -MP     Predicted Duration of Therapy Intervention (PT) 6 visits  -MP     Planned CPT's? PT RE-EVAL: 22811;PT THER PROC EA 15 MIN: 27777;PT THER ACT EA 15 MIN: 43483;PT MANUAL THERAPY EA 15 MIN: 14929;PT NEUROMUSC RE-EDUCATION EA 15 MIN: 64998;PT SELF CARE/HOME MGMT/TRAIN EA 15: 98081;PT HOT OR COLD PACK TREAT MCARE;PT ELECTRICAL STIM UNATTEND:   -MP     PT Plan Comments MD appt? ball taps at wall, lat stretch? update HEP  -MP               User Key  (r) = Recorded By, (t) = Taken By, (c) = Cosigned By      Initials Name Provider Type    Marii Victoria, PT Physical Therapist                       OP Exercises       Row Name 07/25/24 0800             Subjective    Subjective Comments It is feeling a little sore, I think I slept on it wrong  -MP         Subjective Pain    Able to rate subjective pain? yes  -MP      Pre-Treatment Pain Level 0  -MP      Subjective Pain Comment tightness  -MP         Total Minutes    68649 - PT Therapeutic Exercise Minutes 41  -MP          Exercise 1    Exercise Name 1 UBE  -MP      Cueing 1 Verbal  -MP      Time 1 4min  -MP         Exercise 3    Exercise Name 3 seated HA  -MP      Cueing 3 Verbal;Demo  -MP      Sets 3 2  -MP      Reps 3 10  -MP      Additional Comments RTB  -MP         Exercise 4    Exercise Name 4 doorway ER stretch  -MP      Cueing 4 Verbal;Demo  -MP      Reps 4 5  -MP      Time 4 20s  -MP         Exercise 5    Exercise Name 5 S/L flex/abd/ER  -MP      Cueing 5 Verbal;Demo  -MP      Sets 5 2  -MP      Reps 5 10  -MP      Time 5 1#  -MP         Exercise 6    Exercise Name 6 pulleys  -MP      Cueing 6 Verbal;Demo  -MP      Sets 6 1  -MP      Reps 6 20e  -MP      Additional Comments flex/abd/IR  -MP         Exercise 7    Exercise Name 7 sleeper stretch  -MP      Cueing 7 Verbal;Demo  -MP      Reps 7 10  -MP      Time 7 10sec  -MP                User Key  (r) = Recorded By, (t) = Taken By, (c) = Cosigned By      Initials Name Provider Type    Marii Victoria, PT Physical Therapist                                  PT OP Goals       Row Name 07/25/24 0900          PT Short Term Goals    STG Date to Achieve 07/19/24  -MP     STG 1 pt. to be I with initial HEP to facilitate self management of their condition  -MP     STG 1 Progress Met  -MP     STG 2 pt. to be educated in/verbalize understanding of the importance of posture/ergonomics in association with their condition to facilitate self management of their condition  -MP     STG 2 Progress Met  -MP        Long Term Goals    LTG Date to Achieve 09/17/24  -MP     LTG 1 pt. to be I with advanced HEP to facilitate self management of their condition  -MP     LTG 1 Progress Ongoing  -MP     LTG 1 Progress Comments updating as appropriate  -MP     LTG 2 pt. to demonstrate L shoulder flexion, abduction AROM >/=155 to facilitate ease of performing functional/household activities  -MP     LTG 2 Progress Ongoing;Progressing  -MP     LTG 2 Progress Comments see ortho  -MP     LTG 3 pt. to  demonstrate placing/retrieving small object from an above the shoulder level shelf with her LUE to facilitate ease of performing household/work related activities  -MP     LTG 3 Progress Ongoing  -MP     LTG 3 Progress Comments mild compensation with flexion  -MP     LTG 4 Patient to demonstrate greater than equal to 4+ out of 5 muscle testing of the left shoulder flexion, abduction, external rotation to facilitate ease of performance of functional activities  -MP     LTG 4 Progress Ongoing  -MP     LTG 5 Patient to demonstrate left shoulder active range of motion functional internal rotation greater than equal to midline L1 to facilitate ease of performing dressing activities  -MP     LTG 5 Progress Met  -MP               User Key  (r) = Recorded By, (t) = Taken By, (c) = Cosigned By      Initials Name Provider Type    Marii Victoria PT Physical Therapist                    Therapy Education  Education Details: reviewed purpose of exercise stretches vs. strength  Given: Symptoms/condition management, HEP  Program: Reinforced  How Provided: Verbal, Demonstration  Provided to: Patient  Level of Understanding: Verbalized, Demonstrated              Time Calculation:   Start Time: 0858  Stop Time: 0939  Time Calculation (min): 41 min  Total Timed Code Minutes- PT: 41 minute(s)  Timed Charges  68469 - PT Therapeutic Exercise Minutes: 41  Total Minutes  Timed Charges Total Minutes: 41   Total Minutes: 41  Therapy Charges for Today       Code Description Service Date Service Provider Modifiers Qty    24578281677 HC PT THER PROC EA 15 MIN 7/25/2024 Marii Moss, PT GP 3                      Marii Moss PT  7/25/2024

## 2024-07-26 LAB — METHYLMALONATE SERPL-SCNC: 237 NMOL/L (ref 0–378)

## 2024-07-26 NOTE — PROGRESS NOTES
"Shoulder manipulation follow up  Patient: Savannah Valenzuela        YOB: 1970      Chief Complaints: shoulder pain left      History of Present Illness: Pt is here f/u shoulder manipulation.  On the left she states she continues to improve we take her to the OR for possible rotator cuff repair however she is quite limited she underwent manipulation she states she is feeling much better.  Therapy did do dry needling and that has helped    Allergies:   Allergies   Allergen Reactions    Latex Urinary Retention and Irritability     Painful urination with latex cath; Patient states \"sensitivity\"         Medications:   Home Medications:  Current Outpatient Medications on File Prior to Visit   Medication Sig    buPROPion XL (WELLBUTRIN XL) 150 MG 24 hr tablet Take 1 tablet by mouth Daily.    Calcium 250 MG capsule Take 500 mg by mouth 3 (Three) Times a Day.    ferrous gluconate (FERGON) 324 MG tablet Take 1 tablet by mouth Daily.    FLUoxetine (PROzac) 40 MG capsule Take 1 capsule by mouth Daily.    HYDROcodone-acetaminophen (NORCO) 5-325 MG per tablet Take 1-2 tablets by mouth Every 4 (Four) Hours As Needed (Pain).    meloxicam (MOBIC) 15 MG tablet Take 1 tablet by mouth Daily with food.    multivitamin with minerals tablet tablet Take 1 tablet by mouth Daily.    Progesterone (Prometrium) 200 MG capsule Take 1 capsule by mouth Daily in the evening    topiramate (TOPAMAX) 50 MG tablet Take 1 tablet by mouth Daily for 180 days.     No current facility-administered medications on file prior to visit.     Current Medications:  Scheduled Meds:  Continuous Infusions:No current facility-administered medications for this visit.    PRN Meds:.          Physical Exam: 53 y.o. female  General Appearance:    Alert, cooperative, in no acute distress                 There were no vitals filed for this visit.   Patient is alert and oriented ×3 no acute distress normal mood physical exam.  Physical exam of the shoulder, " incisions looked good there is no erythema,no signs or sx of infection.  Active flexion is to 160 passively and get her to about 170 external rotation to 45 internal rotation to her buttock rotator cuff strength 4+/5  Assessment  S/P shoulder manipulation      Plan: To continue physical therapy, continue working on range of motion and strength he has a partial rotator cuff tear on MRI.  If we get all of her motion back and she is still symptomatic we could consider addressing the rotator cuff.  I really think based on how she is doing now that if she gets her motion back her symptoms will be dramatically improved I will see her back in 4 weeks               Answers submitted by the patient for this visit:  Other (Submitted on 7/25/2024)  Please describe your symptoms.: F/U after shoulder manipulation  Have you had these symptoms before?: Yes  How long have you been having these symptoms?: Greater than 2 weeks  Please list any medications you are currently taking for this condition.: Meloxicam  Primary Reason for Visit (Submitted on 7/25/2024)  What is the primary reason for your visit?: Other

## 2024-07-29 ENCOUNTER — OFFICE VISIT (OUTPATIENT)
Dept: ORTHOPEDIC SURGERY | Facility: CLINIC | Age: 54
End: 2024-07-29
Payer: COMMERCIAL

## 2024-07-29 VITALS — WEIGHT: 184.9 LBS | TEMPERATURE: 98.2 F | BODY MASS INDEX: 31.57 KG/M2 | HEIGHT: 64 IN

## 2024-07-29 DIAGNOSIS — M75.02 ADHESIVE CAPSULITIS OF LEFT SHOULDER: Primary | ICD-10-CM

## 2024-07-29 PROCEDURE — 99212 OFFICE O/P EST SF 10 MIN: CPT | Performed by: ORTHOPAEDIC SURGERY

## 2024-08-01 ENCOUNTER — HOSPITAL ENCOUNTER (OUTPATIENT)
Dept: PHYSICAL THERAPY | Facility: HOSPITAL | Age: 54
Discharge: HOME | End: 2024-08-01
Payer: COMMERCIAL

## 2024-08-01 DIAGNOSIS — M25.512 LEFT SHOULDER PAIN, UNSPECIFIED CHRONICITY: Primary | ICD-10-CM

## 2024-08-01 DIAGNOSIS — R29.3 POOR POSTURE: ICD-10-CM

## 2024-08-01 DIAGNOSIS — M62.81 DECREASED MUSCLE STRENGTH: ICD-10-CM

## 2024-08-01 DIAGNOSIS — Z47.89 ORTHOPEDIC AFTERCARE: ICD-10-CM

## 2024-08-01 PROCEDURE — 97110 THERAPEUTIC EXERCISES: CPT

## 2024-08-01 PROCEDURE — 97140 MANUAL THERAPY 1/> REGIONS: CPT

## 2024-08-01 NOTE — THERAPY TREATMENT NOTE
Outpatient Physical Therapy Ortho Treatment Note  [facility]     Patient Name: Savannah Valenzuela  : 1970  MRN: [id number]  Today's Date: 2024      Visit Date: 2024    Visit Dx:    ICD-10-CM ICD-9-CM   1. Left shoulder pain, unspecified chronicity  M25.512 719.41   2. Decreased muscle strength  M62.81 728.87   3. Poor posture  R29.3 781.92   4. Orthopedic aftercare  Z47.89 V54.9       Patient Active Problem List   Diagnosis    Migraine    Allergic rhinitis    Depression    S/P laparoscopic sleeve gastrectomy    Iron deficiency anemia    Paget's disease of bone    Incomplete tear of left rotator cuff    Postoperative malabsorption        Past Medical History:   Diagnosis Date    Allergic rhinitis     Anemia     iron deficiency    Chronic fatigue 2020    RESOLVED    Depression     Encounter for screening for malignant neoplasm of colon 2021    Added automatically from request for surgery [id number]    Fatigue     GERD (gastroesophageal reflux disease)     HISTORY OF    Hematuria, microscopic 2016    Hiatal hernia     CORRECTED WITH SLEEVE    History of sleeve gastrectomy     History of transfusion     AT BIRTH.    Hyperlipidemia     PRIOR TO SLEEVE GASTRECTOMY    Hypertension     PRIOR TO SLEEVE GASTRECTOMY    Low back strain     Fell on my back on aluminum bleachers    Medication management     Migraine     Multiple food allergies     Physical exam, annual     Rotator cuff syndrome 2023    LEFT SHOULDER        Past Surgical History:   Procedure Laterality Date     SECTION      COLONOSCOPY N/A 2021    Procedure: COLONOSCOPY INTO CECUM AND T.I. WITH COLD BIOPSY POLYPECTOMIES;  Surgeon: Ursula Torres MD;  Location: [facility] ENDOSCOPY;  Service: Gastroenterology;  Laterality: N/A;  PRE- SCREENING  POST- DIVERTICULOSIS, POLYPS, HEMORRHOIDS    DILATATION AND CURETTAGE      ENDOSCOPY N/A 2020    Procedure: ESOPHAGOGASTRODUODENOSCOPY WITH BIOPSY;  Surgeon:  Cayetano Khan Jr., MD;  Location: [facility] ENDOSCOPY;  Service: General;  Laterality: N/A;  PREOP/ DYSPEPSIA  POSTOP/ GASTRITIS, ESOPHAGITIS, HIATAL HERNIA    GASTRIC SLEEVE LAPAROSCOPIC N/A 10/5/2020    Procedure: GASTRIC SLEEVE LAPAROSCOPIC WITH paraesophageal hernia repair, lysis of adhesions;  Surgeon: Cayetano Khan Jr., MD;  Location: [facility] OR [facility];  Service: Bariatric;  Laterality: N/A;    SHOULDER ARTHROSCOPY W/ ROTATOR CUFF REPAIR Left 6/18/2024    Procedure: LEFT SHOULDER MANIPULATION WITH STEROID INJECTION;  Surgeon: Jasmine Page MD;  [place] [facility] [place] [facility] [place]  Service: Orthopedics;  Laterality: Left;    WISDOM TOOTH EXTRACTION                          PT Assessment/Plan       Row Name 08/01/24 0900          PT Assessment    Assessment Comments Pt. returns reporting no new complaints, f/u with MD went well and recommended pt. continue with PT for range and strenthening and will return for f/u in 1 month to assess progress/need for further surgiical intervention. Added lat stretch, supine D2, supine flex w/ TB loop, performed manual with improved range and reduced pain with flexion following soft tissue work to lat. Previous benefit from DDN, may consider repeating at next session pending response to progressions this date.  -MP        PT Plan    PT Plan Comments repeat DDN, assess ROM before/after  -MP               User Key  (r) = Recorded By, (t) = Taken By, (c) = Cosigned By      Initials Name Provider Type    Marii Victoria, PT Physical Therapist                       OP Exercises       Row Name 08/01/24 0900             Subjective    Subjective Comments It feels good  -MP         Subjective Pain    Able to rate subjective pain? yes  -MP      Pre-Treatment Pain Level 0  -MP      Post-Treatment Pain Level 0  -MP         Total Minutes    96249 - PT Therapeutic Exercise Minutes 30  -MP      95661 - PT Manual Therapy Minutes 13  -MP         Exercise 1    Exercise Name 1 UBE  -MP       Cueing 1 Verbal  -MP      Time 1 4min  -MP         Exercise 2    Exercise Name 2 supine flex w/ TB loop  -MP      Cueing 2 Verbal;Demo  -MP      Sets 2 2  -MP      Reps 2 10  -MP         Exercise 4    Exercise Name 4 doorway ER stretch  -MP      Cueing 4 Verbal;Demo  -MP      Reps 4 5  -MP      Time 4 20s  -MP         Exercise 6    Exercise Name 6 pulleys  -MP      Cueing 6 Verbal;Demo  -MP      Sets 6 1  -MP      Reps 6 20e  -MP      Additional Comments flex/abd/IR  -MP         Exercise 7    Exercise Name 7 sleeper stretch  -MP      Cueing 7 Verbal;Demo  -MP      Reps 7 10  -MP      Time 7 10sec  -MP         Exercise 8    Exercise Name 8 lat stretch at ballet bar  -MP      Cueing 8 Verbal;Demo  -MP      Reps 8 3L  -MP      Time 8 20s  -MP         Exercise 9    Exercise Name 9 supine D2 flex  -MP      Cueing 9 Verbal;Demo  -MP      Sets 9 2  -MP      Reps 9 10  -MP      Time 9 YTB  -MP                User Key  (r) = Recorded By, (t) = Taken By, (c) = Cosigned By      Initials Name Provider Type    Marii Victoria, PT Physical Therapist                             Manual Rx (Last 36 Hours)       Manual Treatments       Row Name 08/01/24 0900             Total Minutes    18536 - PT Manual Therapy Minutes 13  -MP         Manual Rx 1    Manual Rx 1 Location L shoulder  -MP      Manual Rx 1 Type --  -MP      Manual Rx 1 Grade --  -MP         Manual Rx 2    Manual Rx 2 Location L shoulder gentle oscillations  -MP      Manual Rx 2 Type supine. L shoulder in 45 degrees scaption  -MP      Manual Rx 2 Grade I-II  -MP         Manual Rx 3    Manual Rx 3 Location PROM flex w/ lat stretch/trigger point release  -MP      Manual Rx 3 Type PROM ER/IR with GHJ post mob  -MP                User Key  (r) = Recorded By, (t) = Taken By, (c) = Cosigned By      Initials Name Provider Type    Marii Victoria PT Physical Therapist                     PT OP Goals       Row Name 08/01/24 0900          PT Short Term Goals    STG Date  to Achieve 07/19/24  -MP     STG 1 pt. to be I with initial HEP to facilitate self management of their condition  -MP     STG 1 Progress Met  -MP     STG 2 pt. to be educated in/verbalize understanding of the importance of posture/ergonomics in association with their condition to facilitate self management of their condition  -MP     STG 2 Progress Met  -MP        Long Term Goals    LTG Date to Achieve 09/17/24  -MP     LTG 1 pt. to be I with advanced HEP to facilitate self management of their condition  -MP     LTG 1 Progress Ongoing  -MP     LTG 2 pt. to demonstrate L shoulder flexion, abduction AROM >/=155 to facilitate ease of performing functional/household activities  -MP     LTG 2 Progress Ongoing;Progressing  -MP     LTG 3 pt. to demonstrate placing/retrieving small object from an above the shoulder level shelf with her LUE to facilitate ease of performing household/work related activities  -MP     LTG 3 Progress Ongoing  -MP     LTG 4 Patient to demonstrate greater than equal to 4+ out of 5 muscle testing of the left shoulder flexion, abduction, external rotation to facilitate ease of performance of functional activities  -MP     LTG 4 Progress Ongoing  -MP     LTG 5 Patient to demonstrate left shoulder active range of motion functional internal rotation greater than equal to midline L1 to facilitate ease of performing dressing activities  -MP     LTG 5 Progress Met  -MP               User Key  (r) = Recorded By, (t) = Taken By, (c) = Cosigned By      Initials Name Provider Type    Marii Victoria, PT Physical Therapist                    Therapy Education  Given: Symptoms/condition management, Posture/body mechanics  Program: Reinforced  How Provided: Verbal, Demonstration  Provided to: Patient  Level of Understanding: Verbalized, Demonstrated              Time Calculation:   Start Time: 0902  Stop Time: 0945  Time Calculation (min): 43 min  Total Timed Code Minutes- PT: 43 minute(s)  Timed  Charges  37801 - PT Therapeutic Exercise Minutes: 30  76909 - PT Manual Therapy Minutes: 13  Total Minutes  Timed Charges Total Minutes: 43   Total Minutes: 43  Therapy Charges for Today       Code Description Service Date Service Provider Modifiers Qty    [id number]  PT MANUAL THERAPY EA 15 MIN 8/1/2024 Marii Moss, PT GP 1    [id number]  PT THER PROC EA 15 MIN 8/1/2024 Marii Moss, PT GP 2                      Marii Moss, PT  8/1/2024

## 2024-08-08 ENCOUNTER — HOSPITAL ENCOUNTER (OUTPATIENT)
Dept: PHYSICAL THERAPY | Facility: HOSPITAL | Age: 54
Setting detail: THERAPIES SERIES
Discharge: HOME | End: 2024-08-08

## 2024-08-08 DIAGNOSIS — R29.3 POOR POSTURE: ICD-10-CM

## 2024-08-08 DIAGNOSIS — M62.81 DECREASED MUSCLE STRENGTH: ICD-10-CM

## 2024-08-08 DIAGNOSIS — Z47.89 ORTHOPEDIC AFTERCARE: ICD-10-CM

## 2024-08-08 DIAGNOSIS — M25.512 LEFT SHOULDER PAIN, UNSPECIFIED CHRONICITY: Primary | ICD-10-CM

## 2024-08-08 NOTE — THERAPY TREATMENT NOTE
Outpatient Physical Therapy Ortho Treatment Note  [facility]     Patient Name: Savannah Valenzuela  : 1970  MRN: [id number]  Today's Date: 2024      Visit Date: 2024    Visit Dx:    ICD-10-CM ICD-9-CM   1. Left shoulder pain, unspecified chronicity  M25.512 719.41   2. Decreased muscle strength  M62.81 728.87   3. Poor posture  R29.3 781.92   4. Orthopedic aftercare  Z47.89 V54.9       Patient Active Problem List   Diagnosis    Migraine    Allergic rhinitis    Depression    S/P laparoscopic sleeve gastrectomy    Iron deficiency anemia    Paget's disease of bone    Incomplete tear of left rotator cuff    Postoperative malabsorption        Past Medical History:   Diagnosis Date    Allergic rhinitis     Anemia     iron deficiency    Chronic fatigue 2020    RESOLVED    Depression     Encounter for screening for malignant neoplasm of colon 2021    Added automatically from request for surgery [id number]    Fatigue     GERD (gastroesophageal reflux disease)     HISTORY OF    Hematuria, microscopic 2016    Hiatal hernia     CORRECTED WITH SLEEVE    History of sleeve gastrectomy     History of transfusion     AT BIRTH.    Hyperlipidemia     PRIOR TO SLEEVE GASTRECTOMY    Hypertension     PRIOR TO SLEEVE GASTRECTOMY    Low back strain     Fell on my back on aluminum bleachers    Medication management     Migraine     Multiple food allergies     Physical exam, annual     Rotator cuff syndrome 2023    LEFT SHOULDER        Past Surgical History:   Procedure Laterality Date     SECTION      COLONOSCOPY N/A 2021    Procedure: COLONOSCOPY INTO CECUM AND T.I. WITH COLD BIOPSY POLYPECTOMIES;  Surgeon: Ursula Torres MD;  Location: [facility] ENDOSCOPY;  Service: Gastroenterology;  Laterality: N/A;  PRE- SCREENING  POST- DIVERTICULOSIS, POLYPS, HEMORRHOIDS    DILATATION AND CURETTAGE      ENDOSCOPY N/A 2020    Procedure: ESOPHAGOGASTRODUODENOSCOPY WITH BIOPSY;  Surgeon:  Cayetano Khan Jr., MD;  Location: [facility] ENDOSCOPY;  Service: General;  Laterality: N/A;  PREOP/ DYSPEPSIA  POSTOP/ GASTRITIS, ESOPHAGITIS, HIATAL HERNIA    GASTRIC SLEEVE LAPAROSCOPIC N/A 10/5/2020    Procedure: GASTRIC SLEEVE LAPAROSCOPIC WITH paraesophageal hernia repair, lysis of adhesions;  Surgeon: Cayetnao Khan Jr., MD;  Location: [facility];  Service: Bariatric;  Laterality: N/A;    SHOULDER ARTHROSCOPY W/ ROTATOR CUFF REPAIR Left 6/18/2024    Procedure: LEFT SHOULDER MANIPULATION WITH STEROID INJECTION;  Surgeon: Jasmine Page MD;  [place] [facility] [place] [facility] [place]  Service: Orthopedics;  Laterality: Left;    WISDOM TOOTH EXTRACTION          PT Ortho       Row Name 08/08/24 0900       Left Upper Ext    Lt Shoulder Abduction AROM 145  -RS    Lt Shoulder Flexion AROM 149  -RS    Lt Shoulder Internal Rotation AROM T11  -RS      Row Name 08/08/24 0800       Left Upper Ext    Lt Shoulder Abduction AROM 0-140  -RS    Lt Shoulder Flexion AROM 0-142  -RS    Lt Shoulder Internal Rotation AROM T12  -RS              User Key  (r) = Recorded By, (t) = Taken By, (c) = Cosigned By      Initials Name Provider Type    RS Teresa Jose, PT Physical Therapist                                 PT Assessment/Plan       Row Name 08/08/24 0900          PT Assessment    Assessment Comments Pt reports desire to repeat DDN this date due to overall positive response previously. She demonstrates improvement in L shoulder AROM in all diretions following treatment although improvement is less significant than previous session, pt maintained some mobility compared to last session. Noted marked response L UT and reports of soreness with L Infraspinatus treatment. No adverse effects noted at end of essoin and pt reports understandng of aftercare instructions.  -RS        PT Plan    PT Plan Comments Repeat DDN PRN, focus on functional strength  -RS               User Key  (r) = Recorded By, (t) = Taken By, (c) = Cosigned By       Initials Name Provider Type    RS Teresa Jose PT Physical Therapist                       OP Exercises       Row Name 08/08/24 0900             Subjective    Subjective Comments Pt reports positive response to last DDN treatment and desire to trial again  -RS                User Key  (r) = Recorded By, (t) = Taken By, (c) = Cosigned By      Initials Name Provider Type    RS Teresa Jose PT Physical Therapist                             Manual Rx (Last 36 Hours)       Manual Treatments       Row Name 08/08/24 0900             Manual Rx 3    Manual Rx 3 Location intramuscular manual  -RS      Manual Rx 3 Type L lattsimus dorsi, L UT, L infraspinatus    Assessed pt. for Dry Needling and intramuscular manual therapy. Discussed risks/benefits of Dry Needling with pt, including but not limited to muscle soreness, bruising, vasovagal response, pneumothorax, nerve injury. Patient signed written consent to proceed with treatment.     Patient position during treatment: prone, nose in nose hole  Muscles treated: L UT, L lattisimus dorsi, L infraspinatus  Response to treatment: multiple moderate to large LTR's  L UT,soreness L infraspinatus. No immediate adverse response. See ortho section for pre and post DDN AROM of the L shoulder.       palpation used before, during, and after to facilitate assessment Clean needle technique observed at all times, precautions for lung fields, neurovascular structures observed.     DDN performed by Teresa Jose PT, DPT  -RS                User Key  (r) = Recorded By, (t) = Taken By, (c) = Cosigned By      Initials Name Provider Type    RS Teresa Jose PT Physical Therapist                        Therapy Education  Education Details: informed consent for dry needling, written and verbal, reviewed potential risks, benefits, and aftercare instructions  Given: HEP, Symptoms/condition management  Program: New, Reinforced  How Provided: Verbal, Demonstration (written on  file)  Provided to: Patient  Level of Understanding: Verbalized, Demonstrated              Time Calculation:   Start Time: 0830  Stop Time: 0910  Time Calculation (min): 40 min                Teresa Jose, PT  8/8/2024

## 2024-08-14 RX ORDER — FERROUS GLUCONATE 324(38)MG
324 TABLET ORAL 2 TIMES DAILY
Qty: 60 TABLET | Refills: 5 | Status: SHIPPED | OUTPATIENT
Start: 2024-08-14

## 2024-08-15 ENCOUNTER — HOSPITAL ENCOUNTER (OUTPATIENT)
Dept: PHYSICAL THERAPY | Facility: HOSPITAL | Age: 54
Setting detail: THERAPIES SERIES
Discharge: HOME | End: 2024-08-15
Payer: COMMERCIAL

## 2024-08-15 DIAGNOSIS — M62.81 DECREASED MUSCLE STRENGTH: ICD-10-CM

## 2024-08-15 DIAGNOSIS — M25.512 LEFT SHOULDER PAIN, UNSPECIFIED CHRONICITY: Primary | ICD-10-CM

## 2024-08-15 DIAGNOSIS — Z47.89 ORTHOPEDIC AFTERCARE: ICD-10-CM

## 2024-08-15 DIAGNOSIS — R29.3 POOR POSTURE: ICD-10-CM

## 2024-08-15 PROCEDURE — 97110 THERAPEUTIC EXERCISES: CPT

## 2024-08-15 NOTE — THERAPY TREATMENT NOTE
Outpatient Physical Therapy Ortho Treatment Note  [facility]     Patient Name: Savannah Valenzuela  : 1970  MRN: [id number]  Today's Date: 8/15/2024      Visit Date: 08/15/2024    Visit Dx:    ICD-10-CM ICD-9-CM   1. Left shoulder pain, unspecified chronicity  M25.512 719.41   2. Decreased muscle strength  M62.81 728.87   3. Poor posture  R29.3 781.92   4. Orthopedic aftercare  Z47.89 V54.9       Patient Active Problem List   Diagnosis    Migraine    Allergic rhinitis    Depression    S/P laparoscopic sleeve gastrectomy    Iron deficiency anemia    Paget's disease of bone    Incomplete tear of left rotator cuff    Postoperative malabsorption        Past Medical History:   Diagnosis Date    Allergic rhinitis     Anemia     iron deficiency    Chronic fatigue 2020    RESOLVED    Depression     Encounter for screening for malignant neoplasm of colon 2021    Added automatically from request for surgery [id number]    Fatigue     GERD (gastroesophageal reflux disease)     HISTORY OF    Hematuria, microscopic 2016    Hiatal hernia     CORRECTED WITH SLEEVE    History of sleeve gastrectomy     History of transfusion     AT BIRTH.    Hyperlipidemia     PRIOR TO SLEEVE GASTRECTOMY    Hypertension     PRIOR TO SLEEVE GASTRECTOMY    Low back strain     Fell on my back on aluminum bleachers    Medication management     Migraine     Multiple food allergies     Physical exam, annual     Rotator cuff syndrome 2023    LEFT SHOULDER        Past Surgical History:   Procedure Laterality Date     SECTION      COLONOSCOPY N/A 2021    Procedure: COLONOSCOPY INTO CECUM AND T.I. WITH COLD BIOPSY POLYPECTOMIES;  Surgeon: Ursula oTrres MD;  Location: [facility] ENDOSCOPY;  Service: Gastroenterology;  Laterality: N/A;  PRE- SCREENING  POST- DIVERTICULOSIS, POLYPS, HEMORRHOIDS    DILATATION AND CURETTAGE      ENDOSCOPY N/A 2020    Procedure: ESOPHAGOGASTRODUODENOSCOPY WITH BIOPSY;  Surgeon:  Cayetano Khan Jr., MD;  Location: [facility] ENDOSCOPY;  Service: General;  Laterality: N/A;  PREOP/ DYSPEPSIA  POSTOP/ GASTRITIS, ESOPHAGITIS, HIATAL HERNIA    GASTRIC SLEEVE LAPAROSCOPIC N/A 10/5/2020    Procedure: GASTRIC SLEEVE LAPAROSCOPIC WITH paraesophageal hernia repair, lysis of adhesions;  Surgeon: Cayetano Khan Jr., MD;  Location:  [facility];  Service: Bariatric;  Laterality: N/A;    SHOULDER ARTHROSCOPY W/ ROTATOR CUFF REPAIR Left 6/18/2024    Procedure: LEFT SHOULDER MANIPULATION WITH STEROID INJECTION;  Surgeon: Jasmine Page MD;  [place] [facility] [place] [facility] [place]  Service: Orthopedics;  Laterality: Left;    WISDOM TOOTH EXTRACTION                          PT Assessment/Plan       Row Name 08/15/24 1300          PT Assessment    Assessment Comments Pt. returns to clinic reporting improvement following last session but not as great as following initial needling. Able to tolerate added around the [place] for FIR and ball taps up wall for functional range and strength.  With FIR greater challenge when moving weighted ball with L UE vs. reaching behiind with L UE. Pt.  remains appropriate for skilled PT.  -MP        PT Plan    PT Plan Comments DDN and manual as needed; lat pull down?  -MP               User Key  (r) = Recorded By, (t) = Taken By, (c) = Cosigned By      Initials Name Provider Type    Marii Victoria, PT Physical Therapist                       OP Exercises       Row Name 08/15/24 1300             Subjective    Subjective Comments I still can't hook my bra but I was able to reach back and unhook it last night  -MP         Subjective Pain    Able to rate subjective pain? yes  -MP      Pre-Treatment Pain Level 0  -MP      Post-Treatment Pain Level 0  -MP         Total Minutes    04631 - PT Therapeutic Exercise Minutes 40  -MP         Exercise 1    Exercise Name 1 UBE  -MP      Cueing 1 Verbal  -MP      Time 1 4min  -MP         Exercise 2    Exercise Name 2 supine flex w/ TB loop  -MP       Cueing 2 Verbal;Demo  -MP      Sets 2 2  -MP      Reps 2 10  -MP      Time 2 YTB  -MP         Exercise 4    Exercise Name 4 doorway ER stretch  -MP      Cueing 4 Verbal;Demo  -MP      Reps 4 5  -MP      Time 4 20s  -MP         Exercise 6    Exercise Name 6 pulleys  -MP      Cueing 6 Verbal;Demo  -MP      Sets 6 1  -MP      Time 6 60sec each  -MP      Additional Comments flex/abd/IR  -MP         Exercise 7    Exercise Name 7 sleeper stretch  -MP      Cueing 7 Verbal;Demo  -MP      Reps 7 10  -MP      Time 7 10sec  -MP         Exercise 9    Exercise Name 9 supine D2 flex  -MP      Cueing 9 Verbal;Demo  -MP      Sets 9 2  -MP      Reps 9 10  -MP      Time 9 YTB  -MP         Exercise 10    Exercise Name 10 around the world  -MP      Cueing 10 Verbal;Demo  -MP      Reps 10 10ea  -MP      Time 10 L1  -MP         Exercise 11    Exercise Name 11 ball taps up wall  -MP      Cueing 11 Verbal;Demo  -MP      Reps 11 10  -MP      Time 11 +10 taps at top  -MP      Additional Comments L1  -MP                User Key  (r) = Recorded By, (t) = Taken By, (c) = Cosigned By      Initials Name Provider Type    MP Marii Moss, PT Physical Therapist                                  PT OP Goals       Row Name 08/15/24 1300          PT Short Term Goals    STG Date to Achieve 07/19/24  -MP     STG 1 pt. to be I with initial HEP to facilitate self management of their condition  -MP     STG 1 Progress Met  -MP     STG 2 pt. to be educated in/verbalize understanding of the importance of posture/ergonomics in association with their condition to facilitate self management of their condition  -MP     STG 2 Progress Met  -MP        Long Term Goals    LTG Date to Achieve 09/17/24  -MP     LTG 1 pt. to be I with advanced HEP to facilitate self management of their condition  -MP     LTG 1 Progress Ongoing  -MP     LTG 2 pt. to demonstrate L shoulder flexion, abduction AROM >/=155 to facilitate ease of performing functional/household activities   -MP     LTG 2 Progress Ongoing;Progressing  -MP     LTG 3 pt. to demonstrate placing/retrieving small object from an above the shoulder level shelf with her LUE to facilitate ease of performing household/work related activities  -MP     LTG 3 Progress Ongoing  -MP     LTG 4 Patient to demonstrate greater than equal to 4+ out of 5 muscle testing of the left shoulder flexion, abduction, external rotation to facilitate ease of performance of functional activities  -MP     LTG 4 Progress Ongoing  -MP     LTG 5 Patient to demonstrate left shoulder active range of motion functional internal rotation greater than equal to midline L1 to facilitate ease of performing dressing activities  -MP     LTG 5 Progress Met  -MP               User Key  (r) = Recorded By, (t) = Taken By, (c) = Cosigned By      Initials Name Provider Type    Marii Victoria, RATNA Physical Therapist                    Therapy Education  Given: Symptoms/condition management, Posture/body mechanics  Program: Reinforced  How Provided: Verbal, Demonstration  Provided to: Patient  Level of Understanding: Verbalized, Demonstrated              Time Calculation:   Start Time: 1334  Stop Time: 1414  Time Calculation (min): 40 min  Total Timed Code Minutes- PT: 40 minute(s)  Timed Charges  25974 - PT Therapeutic Exercise Minutes: 40  Total Minutes  Timed Charges Total Minutes: 40   Total Minutes: 40  Therapy Charges for Today       Code Description Service Date Service Provider Modifiers Qty    88205671919 HC PT THER PROC EA 15 MIN 8/15/2024 Marii Moss, PT GP 3                      Marii Moss PT  8/15/2024

## 2024-08-27 ENCOUNTER — APPOINTMENT (OUTPATIENT)
Dept: PHYSICAL THERAPY | Facility: HOSPITAL | Age: 54
End: 2024-08-27
Payer: COMMERCIAL

## 2024-08-29 ENCOUNTER — HOSPITAL ENCOUNTER (OUTPATIENT)
Dept: PHYSICAL THERAPY | Facility: HOSPITAL | Age: 54
Setting detail: THERAPIES SERIES
Discharge: HOME | End: 2024-08-29
Payer: COMMERCIAL

## 2024-08-29 DIAGNOSIS — Z47.89 ORTHOPEDIC AFTERCARE: ICD-10-CM

## 2024-08-29 DIAGNOSIS — M62.81 DECREASED MUSCLE STRENGTH: ICD-10-CM

## 2024-08-29 DIAGNOSIS — R29.3 POOR POSTURE: ICD-10-CM

## 2024-08-29 DIAGNOSIS — M25.512 LEFT SHOULDER PAIN, UNSPECIFIED CHRONICITY: Primary | ICD-10-CM

## 2024-08-29 PROCEDURE — 97110 THERAPEUTIC EXERCISES: CPT

## 2024-08-29 NOTE — THERAPY PROGRESS REPORT/RE-CERT
Outpatient Physical Therapy Ortho Progress Note  [facility]     Patient Name: Savannah Valenzuela  : 1970  MRN: [id number]  Today's Date: 2024      Visit Date: 2024    Visit Dx:    ICD-10-CM ICD-9-CM   1. Left shoulder pain, unspecified chronicity  M25.512 719.41   2. Decreased muscle strength  M62.81 728.87   3. Poor posture  R29.3 781.92   4. Orthopedic aftercare  Z47.89 V54.9       Patient Active Problem List   Diagnosis    Migraine    Allergic rhinitis    Depression    S/P laparoscopic sleeve gastrectomy    Iron deficiency anemia    Paget's disease of bone    Incomplete tear of left rotator cuff    Postoperative malabsorption        Past Medical History:   Diagnosis Date    Allergic rhinitis     Anemia     iron deficiency    Chronic fatigue 2020    RESOLVED    Depression     Encounter for screening for malignant neoplasm of colon 2021    Added automatically from request for surgery [id number]    Fatigue     GERD (gastroesophageal reflux disease)     HISTORY OF    Hematuria, microscopic 2016    Hiatal hernia     CORRECTED WITH SLEEVE    History of sleeve gastrectomy     History of transfusion     AT BIRTH.    Hyperlipidemia     PRIOR TO SLEEVE GASTRECTOMY    Hypertension     PRIOR TO SLEEVE GASTRECTOMY    Low back strain     Fell on my back on aluminum bleachers    Medication management     Migraine     Multiple food allergies     Physical exam, annual     Rotator cuff syndrome 2023    LEFT SHOULDER        Past Surgical History:   Procedure Laterality Date     SECTION      COLONOSCOPY N/A 2021    Procedure: COLONOSCOPY INTO CECUM AND T.I. WITH COLD BIOPSY POLYPECTOMIES;  Surgeon: Ursula Torres MD;  Location: [facility] ENDOSCOPY;  Service: Gastroenterology;  Laterality: N/A;  PRE- SCREENING  POST- DIVERTICULOSIS, POLYPS, HEMORRHOIDS    DILATATION AND CURETTAGE      ENDOSCOPY N/A 2020    Procedure: ESOPHAGOGASTRODUODENOSCOPY WITH BIOPSY;  Surgeon:  Cayetano Khan Jr., MD;  Location: [facility] ENDOSCOPY;  Service: General;  Laterality: N/A;  PREOP/ DYSPEPSIA  POSTOP/ GASTRITIS, ESOPHAGITIS, HIATAL HERNIA    GASTRIC SLEEVE LAPAROSCOPIC N/A 10/5/2020    Procedure: GASTRIC SLEEVE LAPAROSCOPIC WITH paraesophageal hernia repair, lysis of adhesions;  Surgeon: Cayetano Khan Jr., MD;  [place] [facility] [place] [facility] [place]  Service: Bariatric;  Laterality: N/A;    SHOULDER ARTHROSCOPY W/ ROTATOR CUFF REPAIR Left 6/18/2024    Procedure: LEFT SHOULDER MANIPULATION WITH STEROID INJECTION;  Surgeon: Jasmine Page MD;  [place] [facility] [place] [facility] [place]  Service: Orthopedics;  Laterality: Left;    WISDOM TOOTH EXTRACTION          PT Ortho       Row Name 08/29/24 0900       Left Upper Ext    Lt Shoulder Abduction AROM 0-150  -RS    Lt Shoulder Flexion AROM 0-147  -RS    Lt Shoulder Internal Rotation AROM T11  -RS       MMT Left Upper Ext    Lt Shoulder Flexion MMT, Gross Movement (4/5) good  -RS    Lt Shoulder ABduction MMT, Gross Movement (4-/5) good minus  -RS    Lt Shoulder Internal Rotation MMT, Gross Movement (4+/5) good plus  -RS    Lt Shoulder External Rotation MMT, Gross Movement (4/5) good  -RS    Lt Upper Extremity Comments  pain with flex and AB  -RS              User Key  (r) = Recorded By, (t) = Taken By, (c) = Cosigned By      Initials Name Provider Type    RS Teresa Jose PT Physical Therapist                                 PT Assessment/Plan       Row Name 08/29/24 0900          PT Assessment    Functional Limitations Limitation in home management;Limitations in community activities;Performance in self-care ADL;Performance in leisure activities  -RS     Impairments Impaired flexibility;Impaired muscle endurance;Impaired muscle length;Impaired muscle power;Impaired postural alignment;Joint mobility;Muscle strength;Motor function;Pain;Poor body mechanics;Posture;Range of motion  -RS     Assessment Comments Pt has been seen by PT for 14 total sessions s/p L ZORAIDA on  6/18/24 and is progressing well toward functional goals. Treatment has included manual therapy, therex, HEP, pt education. She has met 2/2 STG and  2/5 LTG. She demonstrates improvement in L shoulder flex/AB/FIR mobility compared to initial eval and improving strength. She is limited in flexion and AB strength and reports increased pain during MMT. Progressed to include standing shoulder strength challenges all with good tolerance and no reports of increased pain, cues were provided for appropriate alignment and muscle activation.Pt remains appropriate for skilled PT to address remaining strength and mobility limitations In L shoulder.  -RS        PT Plan    PT Plan Comments resume ball bounce, consider lateral wall walks, wall push ups  -RS               User Key  (r) = Recorded By, (t) = Taken By, (c) = Cosigned By      Initials Name Provider Type    RS Teresa Jose, PT Physical Therapist                       OP Exercises       Row Name 08/29/24 0900             Subjective    Subjective Comments Pt reports that if she could stop sleeping on her shoulder it would be good, the pain in the morning doesn't last long.  -RS         Subjective Pain    Able to rate subjective pain? yes  -RS      Pre-Treatment Pain Level 0  -RS      Post-Treatment Pain Level 0  -RS         Total Minutes    56804 - PT Therapeutic Exercise Minutes 40  -RS         Exercise 1    Exercise Name 1 UBE  -RS      Cueing 1 Verbal  -RS      Time 1 4min  -RS         Exercise 2    Exercise Name 2 supine flex w/ TB loop  -RS      Cueing 2 Verbal;Demo  -RS      Sets 2 2  -RS      Reps 2 10  -RS      Time 2 RTB  -RS         Exercise 3    Exercise Name 3 facundo pose stretch for shoulder flex  -RS      Cueing 3 Verbal;Demo  -RS      Sets 3 3  -RS      Reps 3 20  -RS         Exercise 4    Exercise Name 4 --  -RS      Cueing 4 --  -RS      Reps 4 --  -RS      Time 4 --  -RS         Exercise 5    Exercise Name 5 open book  -RS      Cueing 5 Verbal;Demo   -RS      Reps 5 10  -RS         Exercise 6    Exercise Name 6 pulleys  -RS      Cueing 6 Verbal;Demo  -RS      Sets 6 1  -RS      Time 6 60sec each  -RS      Additional Comments flex/abd/IR  -RS         Exercise 7    Exercise Name 7 --  -RS      Cueing 7 --  -RS      Reps 7 --  -RS      Time 7 --  -RS         Exercise 8    Exercise Name 8 supine chest fly  -RS      Cueing 8 Verbal;Demo  -RS      Sets 8 2  -RS      Reps 8 10  -RS      Time 8 4# ea  -RS         Exercise 9    Exercise Name 9 standing D2 flex  -RS      Cueing 9 Verbal;Demo  -RS      Sets 9 2  -RS      Reps 9 10  -RS      Time 9 YTB  -RS         Exercise 10    Exercise Name 10 around the world  -RS      Cueing 10 Verbal;Demo  -RS      Reps 10 10ea  -RS      Time 10 L1  -RS         Exercise 11    Exercise Name 11 ball taps up wall  -RS      Cueing 11 --  -RS      Reps 11 --  -RS      Time 11 --  -RS         Exercise 12    Exercise Name 12 shelf taps  -RS      Cueing 12 Verbal;Demo  -RS      Time 12 10 1#  -RS         Exercise 13    Exercise Name 13 lat pull down  -RS      Cueing 13 Verbal;Demo  -RS      Sets 13 2  -RS      Reps 13 10  -RS      Time 13 50#  -RS                User Key  (r) = Recorded By, (t) = Taken By, (c) = Cosigned By      Initials Name Provider Type    RS Teresa Jose, PT Physical Therapist                                  PT OP Goals       Row Name 08/29/24 0900          PT Short Term Goals    STG Date to Achieve 07/19/24  -RS     STG 1 pt. to be I with initial HEP to facilitate self management of their condition  -RS     STG 1 Progress Met  -RS     STG 2 pt. to be educated in/verbalize understanding of the importance of posture/ergonomics in association with their condition to facilitate self management of their condition  -RS     STG 2 Progress Met  -RS        Long Term Goals    LTG Date to Achieve 09/17/24  -RS     LTG 1 pt. to be I with advanced HEP to facilitate self management of their condition  -RS     LTG 1 Progress  Ongoing;Progressing  -RS     LTG 1 Progress Comments pt reports understanding of HEP  -RS     LTG 2 pt. to demonstrate L shoulder flexion, abduction AROM >/=155 to facilitate ease of performing functional/household activities  -RS     LTG 2 Progress Ongoing;Progressing  -RS     LTG 2 Progress Comments see ortho  -RS     LTG 3 pt. to demonstrate placing/retrieving small object from an above the shoulder level shelf with her LUE to facilitate ease of performing household/work related activities  -RS     LTG 3 Progress Met  -RS     LTG 4 Patient to demonstrate greater than equal to 4+ out of 5 muscle testing of the left shoulder flexion, abduction, external rotation to facilitate ease of performance of functional activities  -RS     LTG 4 Progress Progressing  -RS     LTG 4 Progress Comments see ortho  -RS     LTG 5 Patient to demonstrate left shoulder active range of motion functional internal rotation greater than equal to midline L1 to facilitate ease of performing dressing activities  -RS     LTG 5 Progress Met  -RS               User Key  (r) = Recorded By, (t) = Taken By, (c) = Cosigned By      Initials Name Provider Type    RS Teresa Jose PT Physical Therapist                    Therapy Education  Given: HEP  Program: Reinforced  How Provided: Verbal, Demonstration  Provided to: Patient  Level of Understanding: Verbalized, Demonstrated              Time Calculation:   Start Time: 0918  Stop Time: 0959  Time Calculation (min): 41 min  Timed Charges  93702 - PT Therapeutic Exercise Minutes: 40  Total Minutes  Timed Charges Total Minutes: 40   Total Minutes: 40  Therapy Charges for Today       Code Description Service Date Service Provider Modifiers Qty    12304706268 HC PT THER PROC EA 15 MIN 8/29/2024 Teresa Jose, RATNA GP 3                      Teresa Jose PT  8/29/2024

## 2024-09-09 ENCOUNTER — OFFICE VISIT (OUTPATIENT)
Dept: ORTHOPEDIC SURGERY | Facility: CLINIC | Age: 54
End: 2024-09-09
Payer: COMMERCIAL

## 2024-09-09 VITALS — BODY MASS INDEX: 31.47 KG/M2 | HEIGHT: 64 IN | TEMPERATURE: 97.5 F | WEIGHT: 184.3 LBS

## 2024-09-09 DIAGNOSIS — M75.02 ADHESIVE CAPSULITIS OF LEFT SHOULDER: Primary | ICD-10-CM

## 2024-09-09 PROCEDURE — 99212 OFFICE O/P EST SF 10 MIN: CPT | Performed by: ORTHOPAEDIC SURGERY

## 2024-09-09 NOTE — PROGRESS NOTES
"Shoulder follow-up      Patient: Savannah Valenzuela        YOB: 1970    Medical Record Number: [id number]        Chief Complaints: Left shoulder pain      History of Present Illness: This is a 54-year-old who is here for follow-up of shoulder manipulation she has a partial rotator cuff tear.  At the time of surgery we did an evaluation her anesthesia and she had quite limitation of her range of motion.  We manipulator did an injection she is doing great she states her only complaint now she cannot hook her bra.  She states she is almost there she continues working on her range of motion      Allergies:   Allergies   Allergen Reactions    Latex Urinary Retention and Irritability     Painful urination with latex cath; Patient states \"sensitivity\"         Medications:   Home Medications:  Current Outpatient Medications on File Prior to Visit   Medication Sig    buPROPion XL (WELLBUTRIN XL) 150 MG 24 hr tablet Take 1 tablet by mouth Daily.    Calcium 250 MG capsule Take 500 mg by mouth 3 (Three) Times a Day.    ferrous gluconate (FERGON) 324 MG tablet Take 1 tablet by mouth 2 (Two) Times a Day.    FLUoxetine (PROzac) 40 MG capsule Take 1 capsule by mouth Daily.    meloxicam (MOBIC) 15 MG tablet Take 1 tablet by mouth Daily with food.    multivitamin with minerals tablet tablet Take 1 tablet by mouth Daily.    Progesterone (Prometrium) 200 MG capsule Take 1 capsule by mouth Daily in the evening    topiramate (TOPAMAX) 50 MG tablet Take 1 tablet by mouth Daily for 180 days.     No current facility-administered medications on file prior to visit.     Current Medications:  Scheduled Meds:  Continuous Infusions:No current facility-administered medications for this visit.    PRN Meds:.    Past Medical History:   Diagnosis Date    Allergic rhinitis     Anemia     iron deficiency    Chronic fatigue 03/27/2020    RESOLVED    Depression     Encounter for screening for malignant neoplasm of colon 05/19/2021    Added " automatically from request for surgery [id number]    Fatigue     GERD (gastroesophageal reflux disease)     HISTORY OF    Hematuria, microscopic 2016    Hiatal hernia     CORRECTED WITH SLEEVE    History of sleeve gastrectomy     History of transfusion     AT BIRTH.    Hyperlipidemia     PRIOR TO SLEEVE GASTRECTOMY    Hypertension     PRIOR TO SLEEVE GASTRECTOMY    Low back strain     Fell on my back on aluminum bleachers    Medication management     Migraine     Multiple food allergies     Physical exam, annual     Rotator cuff syndrome 2023    LEFT SHOULDER        Past Surgical History:   Procedure Laterality Date     SECTION      COLONOSCOPY N/A 2021    Procedure: COLONOSCOPY INTO CECUM AND T.I. WITH COLD BIOPSY POLYPECTOMIES;  Surgeon: Ursula Torres MD;  Location: [facility] ENDOSCOPY;  Service: Gastroenterology;  Laterality: N/A;  PRE- SCREENING  POST- DIVERTICULOSIS, POLYPS, HEMORRHOIDS    DILATATION AND CURETTAGE      ENDOSCOPY N/A 2020    Procedure: ESOPHAGOGASTRODUODENOSCOPY WITH BIOPSY;  Surgeon: Cayetano Khan Jr., MD;  Location: [facility] ENDOSCOPY;  Service: General;  Laterality: N/A;  PREOP/ DYSPEPSIA  POSTOP/ GASTRITIS, ESOPHAGITIS, HIATAL HERNIA    GASTRIC SLEEVE LAPAROSCOPIC N/A 10/5/2020    Procedure: GASTRIC SLEEVE LAPAROSCOPIC WITH paraesophageal hernia repair, lysis of adhesions;  Surgeon: Cayetano Khan Jr., MD;  Location: [facility] OR [facility];  Service: Bariatric;  Laterality: N/A;    SHOULDER ARTHROSCOPY W/ ROTATOR CUFF REPAIR Left 2024    Procedure: LEFT SHOULDER MANIPULATION WITH STEROID INJECTION;  Surgeon: Jasmine Page MD;  Location: [facility] OR [facility];  Service: Orthopedics;  Laterality: Left;    WISDOM TOOTH EXTRACTION          Social History     Occupational History     Employer: [employer]   Tobacco Use    Smoking status: Never     Passive exposure: Past    Smokeless tobacco: Never   Vaping Use    Vaping status: Never Used    Substance and Sexual Activity    Alcohol use: No     Comment: Kay: rare    Drug use: No    Sexual activity: Yes     Partners: Male     Birth control/protection: Post-menopausal      Social History     Social History Narrative    Not on file        Family History   Problem Relation Age of Onset    Hypertension Mother     Hypertension Father     Heart disease Father     Hyperlipidemia Father     Malig Hyperthermia Neg Hx              Review of Systems:     Review of Systems      Physical Exam: 54 y.o. female  General Appearance:    Alert, cooperative, in no acute distress                 There were no vitals filed for this visit.   Patient is alert and read ×3 no acute distress appears her above-listed at height weight and age.  Affect is normal respiratory rate is normal unlabored. Heart rate regular rate rhythm, sclera, dentition and hearing are normal for the purpose of this exam.    Ortho Exam  Physical exam of the left shoulder reveals no overlying skin changes no lymphedema no lymphadenopathy.  Patient has active flexion 180 with mild symptoms abduction is similar external rotation is to 50 and internal rotation to the mid lumbar spine with mild symptoms.  Patient has good rotator cuff strength 4+ over 5 with isometric strength testing with pain.  Patient has a positive impingement and a positive Aiken sign.  Patient has good cervical range of motion which is full and asymptomatic no radicular symptoms.  Patient has a normal elbow exam.  Good distal pulses are presentPatient has pain with overhead activity and a positive Neer sign and a positive empty can sign  They have a positive drop arm any definitive painful arc   Procedures              Assessment/Plan: Left shoulder pain she has a partial rotator cuff tear also had capsulitis she is much much better with the manipulation she states if she could Microbrush she could certainly live with what she had she would not want to do anything else.  I want  her to be diligent with her internal rotation stretching we talked about exactly how to do that I want her to take about with her to work and do it periodically throughout the day I suspect an hyperlinks she will be able to do that.  As long as she can live with what she has she will carry on if at any point her shoulder starts to bother her again she will get in here and we will consider right rotator cuff repair think that is less likely  Answers submitted by the patient for this visit:  Other (Submitted on 9/9/2024)  Please describe your symptoms.: Shoulder followup  Have you had these symptoms before?: Yes  How long have you been having these symptoms?: Greater than 2 weeks  Primary Reason for Visit (Submitted on 9/9/2024)  What is the primary reason for your visit?: Other

## 2024-09-11 ENCOUNTER — HOSPITAL ENCOUNTER (OUTPATIENT)
Dept: PHYSICAL THERAPY | Facility: HOSPITAL | Age: 54
Setting detail: THERAPIES SERIES
Discharge: HOME | End: 2024-09-11
Payer: COMMERCIAL

## 2024-09-11 DIAGNOSIS — Z47.89 ORTHOPEDIC AFTERCARE: ICD-10-CM

## 2024-09-11 DIAGNOSIS — R29.3 POOR POSTURE: ICD-10-CM

## 2024-09-11 DIAGNOSIS — M25.512 LEFT SHOULDER PAIN, UNSPECIFIED CHRONICITY: Primary | ICD-10-CM

## 2024-09-11 DIAGNOSIS — M62.81 DECREASED MUSCLE STRENGTH: ICD-10-CM

## 2024-09-11 PROCEDURE — 97110 THERAPEUTIC EXERCISES: CPT

## 2024-09-11 NOTE — THERAPY TREATMENT NOTE
Outpatient Physical Therapy Ortho Treatment Note  [facility]     Patient Name: Savannah Valenzuela  : 1970  MRN: [id number]  Today's Date: 2024      Visit Date: 2024    Visit Dx:    ICD-10-CM ICD-9-CM   1. Left shoulder pain, unspecified chronicity  M25.512 719.41   2. Decreased muscle strength  M62.81 728.87   3. Poor posture  R29.3 781.92   4. Orthopedic aftercare  Z47.89 V54.9       Patient Active Problem List   Diagnosis    Migraine    Allergic rhinitis    Depression    S/P laparoscopic sleeve gastrectomy    Iron deficiency anemia    Paget's disease of bone    Incomplete tear of left rotator cuff    Postoperative malabsorption        Past Medical History:   Diagnosis Date    Allergic rhinitis     Anemia     iron deficiency    Chronic fatigue 2020    RESOLVED    Depression     Encounter for screening for malignant neoplasm of colon 2021    Added automatically from request for surgery [id number]    Fatigue     GERD (gastroesophageal reflux disease)     HISTORY OF    Hematuria, microscopic 2016    Hiatal hernia     CORRECTED WITH SLEEVE    History of sleeve gastrectomy     History of transfusion     AT BIRTH.    Hyperlipidemia     PRIOR TO SLEEVE GASTRECTOMY    Hypertension     PRIOR TO SLEEVE GASTRECTOMY    Low back strain     Fell on my back on aluminum bleachers    Medication management     Migraine     Multiple food allergies     Physical exam, annual     Rotator cuff syndrome 2023    LEFT SHOULDER        Past Surgical History:   Procedure Laterality Date     SECTION      COLONOSCOPY N/A 2021    Procedure: COLONOSCOPY INTO CECUM AND T.I. WITH COLD BIOPSY POLYPECTOMIES;  Surgeon: Ursula Torres MD;  Location: [facility] ENDOSCOPY;  Service: Gastroenterology;  Laterality: N/A;  PRE- SCREENING  POST- DIVERTICULOSIS, POLYPS, HEMORRHOIDS    DILATATION AND CURETTAGE      ENDOSCOPY N/A 2020    Procedure: ESOPHAGOGASTRODUODENOSCOPY WITH BIOPSY;  Surgeon:  Cayetano Khan Jr., MD;  Location: [facility] ENDOSCOPY;  Service: General;  Laterality: N/A;  PREOP/ DYSPEPSIA  POSTOP/ GASTRITIS, ESOPHAGITIS, HIATAL HERNIA    GASTRIC SLEEVE LAPAROSCOPIC N/A 10/5/2020    Procedure: GASTRIC SLEEVE LAPAROSCOPIC WITH paraesophageal hernia repair, lysis of adhesions;  Surgeon: Cayetano Khan Jr., MD;  [place] [facility] [place] [facility] [place]  Service: Bariatric;  Laterality: N/A;    SHOULDER ARTHROSCOPY W/ ROTATOR CUFF REPAIR Left 6/18/2024    Procedure: LEFT SHOULDER MANIPULATION WITH STEROID INJECTION;  Surgeon: Jasmine Page MD;  [place] [facility] [place] [facility] [place]  Service: Orthopedics;  Laterality: Left;    WISDOM TOOTH EXTRACTION          PT Ortho       Row Name 09/11/24 0800       Left Upper Ext    Lt Shoulder Internal Rotation AROM to T10 with assist following mobs  -MP              User Key  (r) = Recorded By, (t) = Taken By, (c) = Cosigned By      Initials Name Provider Type    Marii Victoria, PT Physical Therapist                                 PT Assessment/Plan       Row Name 09/11/24 0900          PT Assessment    Assessment Comments Pt. is just over 12 weeks s/p L shoulder manipulation 6/18/2024. Primary difficulty remains movement into FIR. Focused on progressing range, performed FIR doorway mobilization and added qped t-spine rotation with hand in FIR. Pt. has 1 remaining skilled PT session at which time verbalizes confidence in managing condition independently with HEP.  -MP        PT Plan    PT Plan Comments D/C?  -MP               User Key  (r) = Recorded By, (t) = Taken By, (c) = Cosigned By      Initials Name Provider Type    Marii Victoria, PT Physical Therapist                       OP Exercises       Row Name 09/11/24 0800             Subjective    Subjective Comments I saw Dr. Page and really the only thing I have is I still can't reach up my back  -MP         Subjective Pain    Able to rate subjective pain? yes  -MP      Pre-Treatment Pain Level 0  -MP       Post-Treatment Pain Level 0  -MP         Total Minutes    21868 - PT Therapeutic Exercise Minutes 36  -MP      81235 - PT Manual Therapy Minutes 5  -MP         Exercise 1    Exercise Name 1 UBE  -MP      Cueing 1 Verbal  -MP      Time 1 4min  -MP         Exercise 2    Exercise Name 2 flex w/   -MP      Cueing 2 Verbal;Demo  -MP      Sets 2 2  -MP      Reps 2 10  -MP      Time 2 YTB  -MP         Exercise 4    Exercise Name 4 qped t-spine mob FIR  -MP      Cueing 4 Verbal;Demo  -MP      Reps 4 10  -MP      Time 4 5s  -MP         Exercise 6    Exercise Name 6 pulleys  -MP      Cueing 6 Verbal;Demo  -MP      Time 6 60sec each  -MP      Additional Comments flex/abd/IR  -MP         Exercise 7    Exercise Name 7 sleeper stretch  -MP      Cueing 7 Verbal;Demo  -MP      Reps 7 10  -MP      Time 7 10sec  -MP         Exercise 10    Exercise Name 10 around the world  -MP      Cueing 10 Verbal;Demo  -MP      Reps 10 15ea  -MP      Time 10 L2  -MP         Exercise 12    Exercise Name 12 shelf taps  -MP      Cueing 12 Verbal;Demo  -MP      Time 12 10 1#  -MP         Exercise 13    Exercise Name 13 lat pull down  -MP      Cueing 13 Verbal;Demo  -MP      Sets 13 2  -MP      Reps 13 10  -MP      Time 13 50#  -MP                User Key  (r) = Recorded By, (t) = Taken By, (c) = Cosigned By      Initials Name Provider Type    MP Marii Moss, PT Physical Therapist                             Manual Rx (Last 36 Hours)       Manual Treatments       Row Name 09/11/24 0800             Total Minutes    33394 - PT Manual Therapy Minutes 5  -MP         Manual Rx 1    Manual Rx 1 Location L shoulder  -MP      Manual Rx 1 Type Doorway IR mobilization stretch; cervical ext x2 + R cervical R x2 + R cervical SB x2 = 1 set  -MP      Manual Rx 1 Grade to pt tolerance  -MP                User Key  (r) = Recorded By, (t) = Taken By, (c) = Cosigned By      Initials Name Provider Type    MP Marii Moss, PT Physical Therapist                      PT OP Goals       Row Name 09/11/24 0800          PT Short Term Goals    STG Date to Achieve 07/19/24  -MP     STG 1 pt. to be I with initial HEP to facilitate self management of their condition  -MP     STG 1 Progress Met  -MP     STG 2 pt. to be educated in/verbalize understanding of the importance of posture/ergonomics in association with their condition to facilitate self management of their condition  -MP     STG 2 Progress Met  -MP        Long Term Goals    LTG Date to Achieve 09/17/24  -MP     LTG 1 pt. to be I with advanced HEP to facilitate self management of their condition  -MP     LTG 1 Progress Ongoing;Progressing  -MP     LTG 2 pt. to demonstrate L shoulder flexion, abduction AROM >/=155 to facilitate ease of performing functional/household activities  -MP     LTG 2 Progress Ongoing;Progressing  -MP     LTG 3 pt. to demonstrate placing/retrieving small object from an above the shoulder level shelf with her LUE to facilitate ease of performing household/work related activities  -MP     LTG 3 Progress Met  -MP     LTG 4 Patient to demonstrate greater than equal to 4+ out of 5 muscle testing of the left shoulder flexion, abduction, external rotation to facilitate ease of performance of functional activities  -MP     LTG 4 Progress Progressing  -MP     LTG 5 Patient to demonstrate left shoulder active range of motion functional internal rotation greater than equal to midline L1 to facilitate ease of performing dressing activities  -MP     LTG 5 Progress Met  -MP               User Key  (r) = Recorded By, (t) = Taken By, (c) = Cosigned By      Initials Name Provider Type    Marii Victoria, PT Physical Therapist                    Therapy Education  Given: Symptoms/condition management  Program: Reinforced  How Provided: Verbal, Demonstration  Provided to: Patient  Level of Understanding: Verbalized, Demonstrated              Time Calculation:   Start Time: 0900  Stop Time: 0941  Time Calculation  (min): 41 min  Total Timed Code Minutes- PT: 41 minute(s)  Timed Charges  86379 - PT Therapeutic Exercise Minutes: 36  41269 - PT Manual Therapy Minutes: 5  Total Minutes  Timed Charges Total Minutes: 41   Total Minutes: 41  Therapy Charges for Today       Code Description Service Date Service Provider Modifiers Qty    [id number]  PT THER PROC EA 15 MIN 9/11/2024 Marii Moss, PT GP 3                      Marii Moss, PT  9/11/2024

## 2024-09-19 ENCOUNTER — APPOINTMENT (OUTPATIENT)
Dept: PHYSICAL THERAPY | Facility: HOSPITAL | Age: 54
End: 2024-09-19
Payer: COMMERCIAL

## 2024-10-01 ENCOUNTER — LAB (OUTPATIENT)
Dept: LAB | Facility: HOSPITAL | Age: 54
End: 2024-10-01
Payer: COMMERCIAL

## 2024-10-01 ENCOUNTER — OFFICE VISIT (OUTPATIENT)
Dept: ONCOLOGY | Facility: CLINIC | Age: 54
End: 2024-10-01
Payer: COMMERCIAL

## 2024-10-01 VITALS
RESPIRATION RATE: 16 BRPM | SYSTOLIC BLOOD PRESSURE: 109 MMHG | BODY MASS INDEX: 31.53 KG/M2 | TEMPERATURE: 98.4 F | OXYGEN SATURATION: 98 % | HEART RATE: 73 BPM | HEIGHT: 64 IN | WEIGHT: 184.7 LBS | DIASTOLIC BLOOD PRESSURE: 73 MMHG

## 2024-10-01 DIAGNOSIS — D50.9 IRON DEFICIENCY ANEMIA, UNSPECIFIED IRON DEFICIENCY ANEMIA TYPE: ICD-10-CM

## 2024-10-01 DIAGNOSIS — D50.9 IRON DEFICIENCY ANEMIA, UNSPECIFIED IRON DEFICIENCY ANEMIA TYPE: Primary | ICD-10-CM

## 2024-10-01 LAB
BASOPHILS # BLD AUTO: 0.05 10*3/MM3 (ref 0–0.2)
BASOPHILS NFR BLD AUTO: 0.9 % (ref 0–1.5)
DEPRECATED RDW RBC AUTO: 50.5 FL (ref 37–54)
EOSINOPHIL # BLD AUTO: 0.08 10*3/MM3 (ref 0–0.4)
EOSINOPHIL NFR BLD AUTO: 1.4 % (ref 0.3–6.2)
ERYTHROCYTE [DISTWIDTH] IN BLOOD BY AUTOMATED COUNT: 15.8 % (ref 12.3–15.4)
FERRITIN SERPL-MCNC: 18.7 NG/ML (ref 13–150)
HCT VFR BLD AUTO: 41 % (ref 34–46.6)
HGB BLD-MCNC: 13.3 G/DL (ref 12–15.9)
IMM GRANULOCYTES # BLD AUTO: 0.01 10*3/MM3 (ref 0–0.05)
IMM GRANULOCYTES NFR BLD AUTO: 0.2 % (ref 0–0.5)
LYMPHOCYTES # BLD AUTO: 1.44 10*3/MM3 (ref 0.7–3.1)
LYMPHOCYTES NFR BLD AUTO: 25.9 % (ref 19.6–45.3)
MCH RBC QN AUTO: 28.6 PG (ref 26.6–33)
MCHC RBC AUTO-ENTMCNC: 32.4 G/DL (ref 31.5–35.7)
MCV RBC AUTO: 88.2 FL (ref 79–97)
MONOCYTES # BLD AUTO: 0.4 10*3/MM3 (ref 0.1–0.9)
MONOCYTES NFR BLD AUTO: 7.2 % (ref 5–12)
NEUTROPHILS NFR BLD AUTO: 3.59 10*3/MM3 (ref 1.7–7)
NEUTROPHILS NFR BLD AUTO: 64.4 % (ref 42.7–76)
NRBC BLD AUTO-RTO: 0 /100 WBC (ref 0–0.2)
PLATELET # BLD AUTO: 274 10*3/MM3 (ref 140–450)
PMV BLD AUTO: 9.7 FL (ref 6–12)
RBC # BLD AUTO: 4.65 10*6/MM3 (ref 3.77–5.28)
WBC NRBC COR # BLD AUTO: 5.57 10*3/MM3 (ref 3.4–10.8)

## 2024-10-01 PROCEDURE — 82728 ASSAY OF FERRITIN: CPT

## 2024-10-01 PROCEDURE — 85025 COMPLETE CBC W/AUTO DIFF WBC: CPT

## 2024-10-01 PROCEDURE — 99214 OFFICE O/P EST MOD 30 MIN: CPT | Performed by: INTERNAL MEDICINE

## 2024-10-01 PROCEDURE — 36415 COLL VENOUS BLD VENIPUNCTURE: CPT

## 2024-10-01 NOTE — PROGRESS NOTES
Subjective     REASON FOR CONSULTATION: Persistent low ferritin  Provide an opinion on any further workup or treatment                             REQUESTING PHYSICIAN: Mathew Lemos MD    History of Present Illness patient is a 54-year-old menopausal woman with iron deficiency felt to be related to her gastric sleeve surgery.  She has tried ferrous sulfate in the past with no improvement and because her hemoglobin was not very low we opted to try ferrous gluconate 2 pills a day which she has been on since July with an improvement in her hemoglobin to 13.6.  Her ferritin is pending at this point she notices the stools are green and appears to be absorbing this better and I think we can cut down to 1 pill a day and see how much she will tolerate    I recommended oral B12 also because of her borderline level    She is postmenopausal but had a light.  Few months ago and her gynecologist thinks this is normal    Past Medical History:   Diagnosis Date    Allergic rhinitis     Anemia     iron deficiency    Chronic fatigue 2020    RESOLVED    Depression     Encounter for screening for malignant neoplasm of colon 2021    Added automatically from request for surgery [id number]    Fatigue     GERD (gastroesophageal reflux disease)     HISTORY OF    Hematuria, microscopic 2016    Hiatal hernia     CORRECTED WITH SLEEVE    History of sleeve gastrectomy     History of transfusion     AT BIRTH.    Hyperlipidemia     PRIOR TO SLEEVE GASTRECTOMY    Hypertension     PRIOR TO SLEEVE GASTRECTOMY    Low back strain     Fell on my back on aluminum bleachers    Medication management     Migraine     Multiple food allergies     Physical exam, annual     Rotator cuff syndrome 2023    LEFT SHOULDER        Past Surgical History:   Procedure Laterality Date     SECTION      COLONOSCOPY N/A 2021    Procedure: COLONOSCOPY INTO CECUM AND T.I. WITH COLD BIOPSY POLYPECTOMIES;  Surgeon: Ursula Torres MD;   Location: [facility] ENDOSCOPY;  Service: Gastroenterology;  Laterality: N/A;  PRE- SCREENING  POST- DIVERTICULOSIS, POLYPS, HEMORRHOIDS    DILATATION AND CURETTAGE  2008    ENDOSCOPY N/A 6/5/2020    Procedure: ESOPHAGOGASTRODUODENOSCOPY WITH BIOPSY;  Surgeon: Cayetano Khan Jr., MD;  Location: [facility] ENDOSCOPY;  Service: General;  Laterality: N/A;  PREOP/ DYSPEPSIA  POSTOP/ GASTRITIS, ESOPHAGITIS, HIATAL HERNIA    GASTRIC SLEEVE LAPAROSCOPIC N/A 10/5/2020    Procedure: GASTRIC SLEEVE LAPAROSCOPIC WITH paraesophageal hernia repair, lysis of adhesions;  Surgeon: Cayetano Khan Jr., MD;  Location: [facility];  Service: Bariatric;  Laterality: N/A;    SHOULDER ARTHROSCOPY W/ ROTATOR CUFF REPAIR Left 6/18/2024    Procedure: LEFT SHOULDER MANIPULATION WITH STEROID INJECTION;  Surgeon: Jasmine Page MD;  [place] [facility] [place] [facility] [place];  Service: Orthopedics;  Laterality: Left;    WISDOM TOOTH EXTRACTION     Heme history  patient is a 53-year-old female with a history of depression and GERD who had a gastric sleeve resection 4 years ago and has been followed by bariatric clinic.  She has had a 60 pound weight loss overall from her surgery but has to struggle to keep her weight down.    She has not had a period   Since  2 years ago and has had no obvious hematochezia or melena.  She had a colonoscopy 2 years ago which was benign.    She had 5 doses of IV iron in August 2023 and her hemoglobin improved and her ferritin went from 7.6-152.    Repeat testing in May of this year showed that her ferritin had dropped to 7.2 again but she was not very anemic and a course of oral iron Feosol 1 tablet a day was given for 2 months with no significant change in her ferritin which was 13 and therefore she has been referred to us to get IV iron    She has had no intolerance of oral iron but does not think that her stools changed color on the iron therefore we will I have recommended trying another preparations such as ferrous  "gluconate to see if this will work and if it does not then I think we can proceed with IV iron when she gets more anemic    She has had no other abdominal surgeries    She is not a smoker or drinker    No family history of malignancy      Current Outpatient Medications on File Prior to Visit   Medication Sig Dispense Refill    buPROPion XL (WELLBUTRIN XL) 150 MG 24 hr tablet Take 1 tablet by mouth Daily. 90 tablet 1    Calcium 250 MG capsule Take 500 mg by mouth 3 (Three) Times a Day.      ferrous gluconate (FERGON) 324 MG tablet Take 1 tablet by mouth 2 (Two) Times a Day. 60 tablet 5    FLUoxetine (PROzac) 40 MG capsule Take 1 capsule by mouth Daily. 90 capsule 1    multivitamin with minerals tablet tablet Take 1 tablet by mouth Daily.      Progesterone (Prometrium) 200 MG capsule Take 1 capsule by mouth Daily in the evening 90 capsule 4    topiramate (TOPAMAX) 50 MG tablet Take 1 tablet by mouth Daily for 180 days. 90 tablet 1    meloxicam (MOBIC) 15 MG tablet Take 1 tablet by mouth Daily with food. (Patient not taking: Reported on 10/1/2024) 30 tablet 0     No current facility-administered medications on file prior to visit.        ALLERGIES:    Allergies   Allergen Reactions    Latex Urinary Retention and Irritability     Painful urination with latex cath; Patient states \"sensitivity\"          Social History     Socioeconomic History    Marital status:    Tobacco Use    Smoking status: Never     Passive exposure: Past    Smokeless tobacco: Never   Vaping Use    Vaping status: Never Used   Substance and Sexual Activity    Alcohol use: No     Comment: Kay: rare    Drug use: No    Sexual activity: Yes     Partners: Male     Birth control/protection: Post-menopausal        Family History   Problem Relation Age of Onset    Hypertension Mother     Hypertension Father     Heart disease Father     Hyperlipidemia Father     Malig Hyperthermia Neg Hx         Review of Systems     Objective     Vitals:    " "10/01/24 1322   Resp: 16   Weight: 83.8 kg (184 lb 11.2 oz)   Height: 162.6 cm (64.02\")   PainSc: 0-No pain         10/1/2024     1:23 PM   Current Status   ECOG score 0       Physical Exam        CONSTITUTIONAL:  Vital signs reviewed.  No distress, looks comfortable.  EYES:  Conjunctivae and lids unremarkable.  PERRLA  EARS,NOSE,MOUTH,THROAT:  Ears and nose appear unremarkable.  Lips, teeth, gums appear unremarkable.  RESPIRATORY:  Normal respiratory effort.  Lungs clear to auscultation bilaterally.  CARDIOVASCULAR:  Normal S1, S2.  No murmurs rubs or gallops.  No significant lower extremity edema.  GASTROINTESTINAL: Abdomen appears unremarkable.  Nontender.  No hepatomegaly.  No splenomegaly.  LYMPHATIC:  No cervical, supraclavicular, axillary lymphadenopathy.  SKIN:  Warm.  No rashes.  PSYCHIATRIC:  Normal judgment and insight.  Normal mood and affect.      RECENT LABS:  Hematology WBC   Date Value Ref Range Status   10/01/2024 5.57 3.40 - 10.80 10*3/mm3 Final   10/17/2019 5.67 3.40 - 10.80 10*3/mm3 Final     RBC   Date Value Ref Range Status   10/01/2024 4.65 3.77 - 5.28 10*6/mm3 Final   10/17/2019 4.52 3.77 - 5.28 10*6/mm3 Final     Hemoglobin   Date Value Ref Range Status   10/01/2024 13.3 12.0 - 15.9 g/dL Final     Hematocrit   Date Value Ref Range Status   10/01/2024 41.0 34.0 - 46.6 % Final     Platelets   Date Value Ref Range Status   10/01/2024 274 140 - 450 10*3/mm3 Final          Assessment & Plan     1.  Iron deficiency without significant anemia -etiology of blood loss is unclear although gastric sleeve may also impair her ability to absorb iron -drop in ferritin seems  fairly quick  She has a history of GERD and esophagitis and hiatal hernia may need repeat EGD  Hemoglobin improved to 13.6 on ferrous gluconate in 9/24    2.  Post gastric sleeve resection in 2020     Plan  1.  Decrease ferrous gluconate to  1 daily   2.  B12 1000 mcg twice or thrice a week.po  3.  CBC ferritin in 6 months and see me " in 12 months

## 2024-10-30 RX ORDER — FLUOXETINE 40 MG/1
40 CAPSULE ORAL DAILY
Qty: 90 CAPSULE | Refills: 1 | Status: SHIPPED | OUTPATIENT
Start: 2024-10-30

## 2024-10-30 RX ORDER — BUPROPION HYDROCHLORIDE 150 MG/1
150 TABLET ORAL DAILY
Qty: 90 TABLET | Refills: 1 | Status: SHIPPED | OUTPATIENT
Start: 2024-10-30

## 2024-11-20 ENCOUNTER — OFFICE VISIT (OUTPATIENT)
Dept: INTERNAL MEDICINE | Facility: CLINIC | Age: 54
End: 2024-11-20
Payer: COMMERCIAL

## 2024-11-20 VITALS
RESPIRATION RATE: 18 BRPM | OXYGEN SATURATION: 99 % | BODY MASS INDEX: 31.76 KG/M2 | HEIGHT: 64 IN | TEMPERATURE: 97.9 F | WEIGHT: 186 LBS | HEART RATE: 74 BPM | SYSTOLIC BLOOD PRESSURE: 112 MMHG | DIASTOLIC BLOOD PRESSURE: 70 MMHG

## 2024-11-20 DIAGNOSIS — Z00.00 ENCOUNTER FOR PREVENTIVE HEALTH EXAMINATION: Primary | ICD-10-CM

## 2024-11-20 PROBLEM — M88.9 PAGET'S DISEASE OF BONE: Chronic | Status: ACTIVE | Noted: 2023-11-17

## 2024-11-20 PROCEDURE — 99396 PREV VISIT EST AGE 40-64: CPT | Performed by: INTERNAL MEDICINE

## 2024-11-20 NOTE — PROGRESS NOTES
Subjective   Savannah Valenzuela is a 54 y.o. female.     Chief Complaint   Patient presents with    Annual Exam    Depression    Iron deficiency         History of Present Illness  In for annual preventative exam.  Sleeps about 7 hours per night.  Exercises about 2-3 and half days per week.  Energy is OK.  Diet is good.  Depression  Presents for follow-up visit. Patient is not experiencing: confusion, nervousness/anxiety, palpitations, shortness of breath, chest pain, dizziness and nausea.  Her past medical history is significant for depression.        The following portions of the patient's history were reviewed and updated as appropriate: allergies, current medications, past social history and problem list.    HISTORY  Outpatient Medications Marked as Taking for the 11/20/24 encounter (Office Visit) with Mathew Lemos MD   Medication Sig Dispense Refill    buPROPion XL (WELLBUTRIN XL) 150 MG 24 hr tablet Take 1 tablet by mouth Daily. 90 tablet 1    Calcium 250 MG capsule Take 500 mg by mouth 3 (Three) Times a Day.      ferrous gluconate (FERGON) 324 MG tablet Take 1 tablet by mouth 2 (Two) Times a Day. 60 tablet 5    FLUoxetine (PROzac) 40 MG capsule Take 1 capsule by mouth Daily. 90 capsule 1    multivitamin with minerals tablet tablet Take 1 tablet by mouth Daily.      Progesterone (Prometrium) 200 MG capsule Take 1 capsule by mouth Daily in the evening 90 capsule 1    topiramate (TOPAMAX) 50 MG tablet Take 1 tablet by mouth Daily for 180 days. 90 tablet 1     Social History     Socioeconomic History    Marital status:    Tobacco Use    Smoking status: Never     Passive exposure: Past    Smokeless tobacco: Never   Vaping Use    Vaping status: Never Used   Substance and Sexual Activity    Alcohol use: No     Comment: Kay: rare    Drug use: No    Sexual activity: Yes     Partners: Male     Birth control/protection: Post-menopausal     Family History   Problem Relation Age of Onset    Hypertension  Mother     Hypertension Father     Heart disease Father     Hyperlipidemia Father     Malig Hyperthermia Neg Hx      Past Medical History:   Diagnosis Date    Allergic rhinitis     Anemia     iron deficiency    Chronic fatigue 2020    RESOLVED    Depression     Encounter for screening for malignant neoplasm of colon 2021    Added automatically from request for surgery [id number]    Fatigue     GERD (gastroesophageal reflux disease)     HISTORY OF    Hematuria, microscopic 2016    Hiatal hernia     CORRECTED WITH SLEEVE    History of sleeve gastrectomy     History of transfusion     AT BIRTH.    Hyperlipidemia     PRIOR TO SLEEVE GASTRECTOMY    Hypertension     PRIOR TO SLEEVE GASTRECTOMY    Low back strain     Fell on my back on aluminum bleachers    Medication management     Migraine     Multiple food allergies     Physical exam, annual     Rotator cuff syndrome 2023    LEFT SHOULDER     Past Surgical History:   Procedure Laterality Date     SECTION      COLONOSCOPY N/A 2021    Procedure: COLONOSCOPY INTO CECUM AND T.I. WITH COLD BIOPSY POLYPECTOMIES;  Surgeon: Ursula Torres MD;  Location: [facility] ENDOSCOPY;  Service: Gastroenterology;  Laterality: N/A;  PRE- SCREENING  POST- DIVERTICULOSIS, POLYPS, HEMORRHOIDS    DILATATION AND CURETTAGE      ENDOSCOPY N/A 2020    Procedure: ESOPHAGOGASTRODUODENOSCOPY WITH BIOPSY;  Surgeon: Cayetano Khan Jr., MD;  Location: [facility] ENDOSCOPY;  Service: General;  Laterality: N/A;  PREOP/ DYSPEPSIA  POSTOP/ GASTRITIS, ESOPHAGITIS, HIATAL HERNIA    GASTRIC SLEEVE LAPAROSCOPIC N/A 10/5/2020    Procedure: GASTRIC SLEEVE LAPAROSCOPIC WITH paraesophageal hernia repair, lysis of adhesions;  Surgeon: Cayetano Khan Jr., MD;  Location: [facility] OR [facility] [place] [facility];  Service: Bariatric;  Laterality: N/A;    SHOULDER ARTHROSCOPY W/ ROTATOR CUFF REPAIR Left 2024    Procedure: LEFT SHOULDER MANIPULATION WITH STEROID INJECTION;  Surgeon: Camilo  Jasmine CORONA MD;  Location: [facility] OR [facility] [place];  Service: Orthopedics;  Laterality: Left;    WISDOM TOOTH EXTRACTION         Review of Systems   Constitutional:  Negative for appetite change, chills, diaphoresis, fatigue, fever and unexpected weight change.   Respiratory:  Negative for cough, chest tightness, shortness of breath and wheezing.    Cardiovascular:  Negative for chest pain, palpitations and leg swelling.   Gastrointestinal:  Negative for abdominal pain, anal bleeding, blood in stool, constipation, diarrhea, nausea, rectal pain and vomiting.   Endocrine: Negative for cold intolerance, heat intolerance and polyuria.   Genitourinary:  Negative for difficulty urinating, dysuria, flank pain, frequency, hematuria and urgency.   Musculoskeletal:  Positive for back pain. Negative for arthralgias and myalgias.   Allergic/Immunologic: Negative for environmental allergies.   Neurological:  Positive for headaches (Migraines controlled). Negative for dizziness, syncope, speech difficulty, weakness and light-headedness.   Hematological:  Does not bruise/bleed easily.   Psychiatric/Behavioral:  Positive for dysphoric mood. Negative for agitation, confusion and sleep disturbance. The patient is not nervous/anxious.        Objective   Vitals:    11/20/24 0930   BP: 112/70   Pulse: 74   Resp: 18   Temp: 97.9 °F (36.6 °C)   SpO2: 99%          11/20/24  0930   Weight: 84.4 kg (186 lb)    [unfilled]  Body mass index is 31.93 kg/m².      Physical Exam   Constitutional: She is oriented to person, place, and time. She appears well-developed.   HENT:   Head: Normocephalic and atraumatic.   Right Ear: Tympanic membrane and external ear normal.   Left Ear: Tympanic membrane and external ear normal.   Nose: Nose normal.   Eyes: Pupils are equal, round, and reactive to light. Conjunctivae are normal.   Neck: No JVD present. No thyromegaly present.   Cardiovascular: Normal rate, regular rhythm and normal heart sounds. Exam reveals no  gallop.   No murmur heard.  Pulmonary/Chest: Effort normal and breath sounds normal. No respiratory distress. She has no wheezes. She has no rales.   Abdominal: Soft. Normal appearance and bowel sounds are normal. She exhibits no distension and no mass. There is no abdominal tenderness. There is no guarding. No hernia.   Musculoskeletal: Normal range of motion.   Lymphadenopathy:     She has no cervical adenopathy.   Neurological: She is alert and oriented to person, place, and time. She displays normal reflexes. No cranial nerve deficit. Coordination normal.   Skin: Skin is warm and dry.   Psychiatric: Her behavior is normal. Mood, judgment and thought content normal.   Nursing note and vitals reviewed.        Problems Addressed this Visit    None  Visit Diagnoses       Encounter for preventive health examination    -  Primary          Diagnoses         Codes Comments    Encounter for preventive health examination    -  Primary ICD-10-CM: Z00.00  ICD-9-CM: V70.0           Assessment & Plan   Infract annual preventive exam and follow-up of depression today November 2024.  Blood pressure is excellent.  Depression is doing very well.  Requires an iron infusion occasionally related to gastric bypass surgery.  Lab work including CBC, CMP, lipids, ferritin and A1c all done annually.  Blood sugars have been modestly elevated over the years.  We will check an CMP, A1c, lipids today.   She will get her lab work fasting tomorrow.  In the past year she switched from ferrous sulfate to ferrous gluconate and that solves the problem with her absorption of iron.  She is now controlling her anemia and iron levels with oral iron rather than transfusions.  Recheck annually.      Prevention counseling was performed today. The counseling performed was routine health maintenance topics including BMI and exercise.    The above information was reviewed again today 11/20/24.  It continues to be accurate as reflected above and is  unchanged.  History, physical and review of systems all reviewed and are unchanged.  Medications were reviewed today and continue the current dosing.             Daphne disclaimer:   Much of this encounter note is an electronic transcription/translation of spoken language to printed text. The electronic translation of spoken language may permit erroneous, or at times, nonsensical words or phrases to be inadvertently transcribed; Although I have reviewed the note for such errors, some may still exist.

## 2024-11-22 ENCOUNTER — LAB (OUTPATIENT)
Dept: LAB | Facility: HOSPITAL | Age: 54
End: 2024-11-22
Payer: COMMERCIAL

## 2024-11-22 LAB
ALBUMIN SERPL-MCNC: 3.8 G/DL (ref 3.5–5.2)
ALBUMIN/GLOB SERPL: 1.4 G/DL
ALP SERPL-CCNC: 132 U/L (ref 39–117)
ALT SERPL W P-5'-P-CCNC: 13 U/L (ref 1–33)
ANION GAP SERPL CALCULATED.3IONS-SCNC: 8.4 MMOL/L (ref 5–15)
AST SERPL-CCNC: 13 U/L (ref 1–32)
BILIRUB SERPL-MCNC: 0.3 MG/DL (ref 0–1.2)
BUN SERPL-MCNC: 13 MG/DL (ref 6–20)
BUN/CREAT SERPL: 11 (ref 7–25)
CALCIUM SPEC-SCNC: 9.3 MG/DL (ref 8.6–10.5)
CHLORIDE SERPL-SCNC: 110 MMOL/L (ref 98–107)
CHOLEST SERPL-MCNC: 192 MG/DL (ref 0–200)
CO2 SERPL-SCNC: 23.6 MMOL/L (ref 22–29)
CREAT SERPL-MCNC: 1.18 MG/DL (ref 0.57–1)
EGFRCR SERPLBLD CKD-EPI 2021: 55.7 ML/MIN/1.73
GLOBULIN UR ELPH-MCNC: 2.7 GM/DL
GLUCOSE SERPL-MCNC: 96 MG/DL (ref 65–99)
HBA1C MFR BLD: 5.6 % (ref 4.8–5.6)
HDLC SERPL QL: 3.76
HDLC SERPL-MCNC: 51 MG/DL (ref 40–60)
LDLC SERPL CALC-MCNC: 123 MG/DL (ref 0–100)
POTASSIUM SERPL-SCNC: 4.2 MMOL/L (ref 3.5–5.2)
PROT SERPL-MCNC: 6.5 G/DL (ref 6–8.5)
SODIUM SERPL-SCNC: 142 MMOL/L (ref 136–145)
TRIGL SERPL-MCNC: 101 MG/DL (ref 0–150)
VLDLC SERPL-MCNC: 18 MG/DL (ref 5–40)

## 2024-11-22 PROCEDURE — 80061 LIPID PANEL: CPT | Performed by: INTERNAL MEDICINE

## 2024-11-22 PROCEDURE — 83036 HEMOGLOBIN GLYCOSYLATED A1C: CPT | Performed by: INTERNAL MEDICINE

## 2024-11-22 PROCEDURE — 36415 COLL VENOUS BLD VENIPUNCTURE: CPT | Performed by: INTERNAL MEDICINE

## 2024-11-22 PROCEDURE — 80053 COMPREHEN METABOLIC PANEL: CPT | Performed by: INTERNAL MEDICINE

## 2025-01-21 RX ORDER — TOPIRAMATE 50 MG/1
50 TABLET, FILM COATED ORAL DAILY
Qty: 90 TABLET | Refills: 1 | Status: CANCELLED | OUTPATIENT
Start: 2025-01-21 | End: 2025-07-20

## 2025-01-23 RX ORDER — TOPIRAMATE 50 MG/1
50 TABLET, FILM COATED ORAL DAILY
Qty: 90 TABLET | Refills: 1 | Status: SHIPPED | OUTPATIENT
Start: 2025-01-23 | End: 2025-07-22

## 2025-03-13 RX ORDER — FLUOXETINE HYDROCHLORIDE 40 MG/1
40 CAPSULE ORAL DAILY
Qty: 90 CAPSULE | Refills: 1 | Status: SHIPPED | OUTPATIENT
Start: 2025-03-13

## 2025-03-18 ENCOUNTER — LAB (OUTPATIENT)
Dept: LAB | Facility: HOSPITAL | Age: 55
End: 2025-03-18
Payer: COMMERCIAL

## 2025-03-18 DIAGNOSIS — D50.9 IRON DEFICIENCY ANEMIA, UNSPECIFIED IRON DEFICIENCY ANEMIA TYPE: ICD-10-CM

## 2025-03-18 LAB
BASOPHILS # BLD AUTO: 0.04 10*3/MM3 (ref 0–0.2)
BASOPHILS NFR BLD AUTO: 0.8 % (ref 0–1.5)
DEPRECATED RDW RBC AUTO: 41.8 FL (ref 37–54)
EOSINOPHIL # BLD AUTO: 0.08 10*3/MM3 (ref 0–0.4)
EOSINOPHIL NFR BLD AUTO: 1.7 % (ref 0.3–6.2)
ERYTHROCYTE [DISTWIDTH] IN BLOOD BY AUTOMATED COUNT: 12.3 % (ref 12.3–15.4)
FERRITIN SERPL-MCNC: 23.4 NG/ML (ref 13–150)
HCT VFR BLD AUTO: 42.5 % (ref 34–46.6)
HGB BLD-MCNC: 14.2 G/DL (ref 12–15.9)
IMM GRANULOCYTES # BLD AUTO: 0.01 10*3/MM3 (ref 0–0.05)
IMM GRANULOCYTES NFR BLD AUTO: 0.2 % (ref 0–0.5)
LYMPHOCYTES # BLD AUTO: 1.07 10*3/MM3 (ref 0.7–3.1)
LYMPHOCYTES NFR BLD AUTO: 22.7 % (ref 19.6–45.3)
MCH RBC QN AUTO: 30.8 PG (ref 26.6–33)
MCHC RBC AUTO-ENTMCNC: 33.4 G/DL (ref 31.5–35.7)
MCV RBC AUTO: 92.2 FL (ref 79–97)
MONOCYTES # BLD AUTO: 0.43 10*3/MM3 (ref 0.1–0.9)
MONOCYTES NFR BLD AUTO: 9.1 % (ref 5–12)
NEUTROPHILS NFR BLD AUTO: 3.09 10*3/MM3 (ref 1.7–7)
NEUTROPHILS NFR BLD AUTO: 65.5 % (ref 42.7–76)
NRBC BLD AUTO-RTO: 0 /100 WBC (ref 0–0.2)
PLATELET # BLD AUTO: 269 10*3/MM3 (ref 140–450)
PMV BLD AUTO: 9.5 FL (ref 6–12)
RBC # BLD AUTO: 4.61 10*6/MM3 (ref 3.77–5.28)
VIT B12 BLD-MCNC: 467 PG/ML (ref 211–946)
WBC NRBC COR # BLD AUTO: 4.72 10*3/MM3 (ref 3.4–10.8)

## 2025-03-18 PROCEDURE — 36415 COLL VENOUS BLD VENIPUNCTURE: CPT

## 2025-03-18 PROCEDURE — 85025 COMPLETE CBC W/AUTO DIFF WBC: CPT

## 2025-03-18 PROCEDURE — 82728 ASSAY OF FERRITIN: CPT

## 2025-03-18 PROCEDURE — 82607 VITAMIN B-12: CPT | Performed by: INTERNAL MEDICINE

## 2025-05-22 ENCOUNTER — OFFICE VISIT (OUTPATIENT)
Dept: BARIATRICS/WEIGHT MGMT | Facility: CLINIC | Age: 55
End: 2025-05-22
Payer: COMMERCIAL

## 2025-05-22 VITALS
HEIGHT: 65 IN | BODY MASS INDEX: 32.99 KG/M2 | SYSTOLIC BLOOD PRESSURE: 130 MMHG | DIASTOLIC BLOOD PRESSURE: 90 MMHG | WEIGHT: 198 LBS | TEMPERATURE: 98.1 F | HEART RATE: 82 BPM

## 2025-05-22 DIAGNOSIS — D50.9 IRON DEFICIENCY ANEMIA, UNSPECIFIED IRON DEFICIENCY ANEMIA TYPE: Chronic | ICD-10-CM

## 2025-05-22 DIAGNOSIS — F32.A DEPRESSION, UNSPECIFIED DEPRESSION TYPE: Chronic | ICD-10-CM

## 2025-05-22 DIAGNOSIS — Z98.84 S/P LAPAROSCOPIC SLEEVE GASTRECTOMY: ICD-10-CM

## 2025-05-22 DIAGNOSIS — E66.811 OBESITY, CLASS I, BMI 30-34.9: Primary | ICD-10-CM

## 2025-05-22 DIAGNOSIS — K91.2 POSTOPERATIVE MALABSORPTION: ICD-10-CM

## 2025-05-22 PROCEDURE — 99214 OFFICE O/P EST MOD 30 MIN: CPT | Performed by: NURSE PRACTITIONER

## 2025-05-22 RX ORDER — TOPIRAMATE 50 MG/1
50 TABLET, FILM COATED ORAL DAILY
Qty: 90 TABLET | Refills: 1 | Status: SHIPPED | OUTPATIENT
Start: 2025-05-22 | End: 2025-11-18

## 2025-05-22 NOTE — PROGRESS NOTES
[facility]  [address]  [facility] [id number] [phone]  [address]  [facility] [id number] [phone]  Dept: [phone]  5/22/2025      Savannah Valenzuela.  [id number]  [id number]  1970  female      Chief Complaint   Patient presents with    Follow-up     Fup sleeve       BH Post-Op Bariatric Surgery:   Savannah Valenzuela is status post Laparoscopic Sleeve procedure, performed on 10/5/20    HPI:     Today's weight is 89.8 kg (198 lb) pounds, today's BMI is Body mass index is 33.39 kg/m²., she has a gain of 1 pounds since the last visit and her weight loss since surgery is 41 pounds. The patient reports a decreased portion size and loss of appetite.    Savannah Valenzuela denies nausea, vomiting, reflux and reports that she has been busy with work, home life, and some family stress and has just not been as focused on herself over the past 4 months. She continues to take topiramate and has been successful in continuing to avoid soda and carbonated beverages. She doesn't feel that the quality of her food has changed markedly. That being said, she feels the healthiest when her weight is between 179-182lb. She has not been able to be active or had much time for exercise.      Diet and Exercise: Diet history reviewed and discussed with the patient. Weight loss/gains to date discussed with the patient. The patient states they are eating ? grams of protein per day. She reports eating 2-3 meals per day, a typical portion size of 2-3 cup, eating 1 snacks per day, drinking 2-4 or more 8-oz. glasses of water per day, no carbonated beverage consumption and exercising regularly.     Supplements: bariatric specific MTV with iron, ferrous gluconate.     Review of Systems   Constitutional:  Positive for appetite change and fatigue. Negative for unexpected weight change.   HENT: Negative.     Eyes: Negative.    Respiratory: Negative.      Cardiovascular: Negative.  Negative for leg swelling.   Gastrointestinal:  Negative for abdominal distention, abdominal pain, constipation, diarrhea, nausea and vomiting.        Mild, intermittent heartburn   Genitourinary:  Negative for difficulty urinating, frequency and urgency.   Musculoskeletal:  Negative for back pain.   Skin: Negative.    Psychiatric/Behavioral: Negative.     All other systems reviewed and are negative.      Patient Active Problem List   Diagnosis    Migraine    Allergic rhinitis    Depression    S/P laparoscopic sleeve gastrectomy    Iron deficiency anemia    Paget's disease of bone    Incomplete tear of left rotator cuff    Postoperative malabsorption    Obesity, Class I, BMI 30-34.9       Past Medical History:   Diagnosis Date    Allergic rhinitis     Anemia     iron deficiency    Chronic fatigue 03/27/2020    RESOLVED    Depression     Encounter for screening for malignant neoplasm of colon 05/19/2021    Added automatically from request for surgery [id number]    Fatigue     GERD (gastroesophageal reflux disease)     HISTORY OF    Hematuria, microscopic 08/24/2016    Hiatal hernia     CORRECTED WITH SLEEVE    History of sleeve gastrectomy     History of transfusion     AT BIRTH.    Hyperlipidemia     PRIOR TO SLEEVE GASTRECTOMY    Hypertension     PRIOR TO SLEEVE GASTRECTOMY    Low back strain 1989    Fell on my back on aluminum bleachers    Medication management     Migraine     Multiple food allergies     Physical exam, annual     Rotator cuff syndrome 09/2023    LEFT SHOULDER       The following portions of the patient's history were reviewed and updated as appropriate: allergies, current medications, past family history, past medical history, past social history, past surgical history, and problem list.    Vitals:    05/22/25 0704   BP: 130/90   Pulse: 82   Temp: 98.1 °F (36.7 °C)       Physical Exam  Vitals and nursing note reviewed.   Constitutional:       Appearance: She is  well-developed.   Neck:      Thyroid: No thyromegaly.   Cardiovascular:      Rate and Rhythm: Normal rate.   Pulmonary:      Effort: Pulmonary effort is normal. No respiratory distress.   Abdominal:      Palpations: Abdomen is soft.   Musculoskeletal:         General: No tenderness.   Skin:     General: Skin is warm and dry.   Neurological:      Mental Status: She is alert.   Psychiatric:         Behavior: Behavior normal.         Assessment:   Post-op, the patient has been busy and stressed and unable to make time to be active. She continues to do well on topiramate and has not gained compared to her last weight in our office but has gained 15lb from her preferred weight.       Plan:   Will check back in in 3-6 months. Continue topiramate, focus on well rounded nutrition and start carving out a small amount of time a few days a week for exercise to help mitigate stress.  Encounter Diagnoses   Name Primary?    Obesity, Class I, BMI 30-34.9 Yes    S/P laparoscopic sleeve gastrectomy     Postoperative malabsorption     Depression, unspecified depression type     Iron deficiency anemia, unspecified iron deficiency anemia type        Encouraged patient to be sure to get plenty of lean protein per day through small frequent meals all with a protein source.   Activity restrictions: none.   Recommended patient be sure to get at least 70 grams of protein per day by eating small, frequent meals all with high lean protein choices. Be sure to limit/cut back on daily carbohydrate intake. Discussed with the patient the recommended amount of water per day to intake- half of body weight in ounces. Reviewed vitamin requirements. Be sure to do routine exercise, 150 minutes per week minimum, including both cardio and strength training.     Instructions / Recommendations: dietary counseling recommended, recommended a daily protein intake of  grams, vitamin supplement(s) recommended, recommended exercising at least 150 minutes  per week, behavior modifications recommended and instructed to call the office for concerns, questions, or problems.     The patient was instructed to follow up in 3-6 months .    Total time spent during this encounter today was 30 minutes

## (undated) DEVICE — INTENDED FOR TISSUE SEPARATION, AND OTHER PROCEDURES THAT REQUIRE A SHARP SURGICAL BLADE TO PUNCTURE OR CUT.: Brand: BARD-PARKER ® CARBON RIB-BACK BLADES

## (undated) DEVICE — LAPAROSCOPIC SMOKE FILTRATION SYSTEM: Brand: PALL LAPAROSHIELD® PLUS LAPAROSCOPIC SMOKE FILTRATION SYSTEM

## (undated) DEVICE — ADAPT CLN BIOGUARD AIR/H2O DISP

## (undated) DEVICE — CANN O2 ETCO2 FITS ALL CONN CO2 SMPL A/ 7IN DISP LF

## (undated) DEVICE — GLV SURG SIGNATURE ESSENTIAL PF LTX SZ8.5

## (undated) DEVICE — BITEBLOCK OMNI BLOC

## (undated) DEVICE — PROB ABL APOLLORF XL90 ASP 90DEG

## (undated) DEVICE — SUT VIC 2/0 CT2 27IN J269H

## (undated) DEVICE — KTTNER ENDO BLNT DISSCT

## (undated) DEVICE — UNDYED BRAIDED (POLYGLACTIN 910), SYNTHETIC ABSORBABLE SUTURE: Brand: COATED VICRYL

## (undated) DEVICE — SINGLE-USE BIOPSY FORCEPS: Brand: RADIAL JAW 4

## (undated) DEVICE — TBG ARTHSCP PT W CONN/REDUC 8FT

## (undated) DEVICE — MARKR SKIN W/RULR AND LBL

## (undated) DEVICE — TUBING, SUCTION, 1/4" X 10', STRAIGHT: Brand: MEDLINE

## (undated) DEVICE — PK ARTHSCP SHLDR TOWER 40

## (undated) DEVICE — IRRIGATOR BULB ASEPTO 60CC STRL

## (undated) DEVICE — APL DUPLOSPRAYER MIS 40CM

## (undated) DEVICE — INTENDED TO SUPPORT AND MAINTAIN THE POSITION OF AN ANESTHETIZED PATIENT DURING SURGERY: Brand: BERKETTE BEACH CHAIR LIMB POSITIONER

## (undated) DEVICE — LN SMPL CO2 SHTRM SD STREAM W/M LUER

## (undated) DEVICE — MSK PROC CURAPLEX O2 2/ADAPT 7FT

## (undated) DEVICE — PENCL SMOKE/EVAC MEGADYNE TELESCP 10FT

## (undated) DEVICE — GLV SURG BIOGEL LTX PF 6 1/2

## (undated) DEVICE — ENDOPATH XCEL BLADELESS TROCARS WITH STABILITY SLEEVES: Brand: ENDOPATH XCEL

## (undated) DEVICE — SENSR O2 OXIMAX FNGR A/ 18IN NONSTR

## (undated) DEVICE — BUR SHAVER CLEARCUT 12FLUT 5MM 13CM

## (undated) DEVICE — GLV SURG BIOGEL LTX PF 8 1/2

## (undated) DEVICE — DRSNG WND GZ CURAD OIL EMULSION 3X3IN STRL

## (undated) DEVICE — SUTURELASSO STR 90D  AR406890

## (undated) DEVICE — CLMP STD 22CM DISP

## (undated) DEVICE — ENSEAL LAPAROSCOPIC TISSUE SEALER G2 ARTICULATING CURVED JAW FOR USE WITH G2 GENERATOR 5MM DIAMETER 45CM SHAFT LENGTH: Brand: ENSEAL

## (undated) DEVICE — THE TORRENT IRRIGATION SCOPE CONNECTOR IS USED WITH THE TORRENT IRRIGATION TUBING TO PROVIDE IRRIGATION FLUIDS SUCH AS STERILE WATER DURING GASTROINTESTINAL ENDOSCOPIC PROCEDURES WHEN USED IN CONJUNCTION WITH AN IRRIGATION PUMP (OR ELECTROSURGICAL UNIT).: Brand: TORRENT

## (undated) DEVICE — STRIP,CLOSURE,WOUND,MEDI-STRIP,1/2X4: Brand: MEDLINE

## (undated) DEVICE — ARM SLING: Brand: DEROYAL

## (undated) DEVICE — KT ORCA ORCAPOD DISP STRL

## (undated) DEVICE — ANTIBACTERIAL UNDYED BRAIDED (POLYGLACTIN 910), SYNTHETIC ABSORBABLE SURGICAL SUTURE: Brand: COATED VICRYL

## (undated) DEVICE — VIOLET BRAIDED (POLYGLACTIN 910), SYNTHETIC ABSORBABLE SUTURE: Brand: COATED VICRYL

## (undated) DEVICE — PATIENT RETURN ELECTRODE, SINGLE-USE, CONTACT QUALITY MONITORING, ADULT, WITH 9FT CORD, FOR PATIENTS WEIGING OVER 33LBS. (15KG): Brand: MEGADYNE

## (undated) DEVICE — SKIN PREP TRAY W/CHG: Brand: MEDLINE INDUSTRIES, INC.

## (undated) DEVICE — TRAP FLD MINIVAC MEGADYNE 100ML

## (undated) DEVICE — PK OSC LAP SLV 40

## (undated) DEVICE — BLD SHAVER BONECUTTER 5MM 13CM

## (undated) DEVICE — VISUALIZATION SYSTEM: Brand: CLEARIFY

## (undated) DEVICE — ECHELON FLEX POWERED PLUS LONG ARTICULATING ENDOSCOPIC LINEAR CUTTER, 60MM: Brand: ECHELON FLEX

## (undated) DEVICE — DUAL LUMEN STOMACH TUBE,ANTI-REFLUX VALVE: Brand: SALEM SUMP

## (undated) DEVICE — SYRINGE,TOOMEY,IRRIGATION,70CC,STERILE: Brand: MEDLINE

## (undated) DEVICE — FRCP BX RADJAW4 NDL 2.8 240CM LG OG BX40

## (undated) DEVICE — BLANKT WARM LOWR/BDY 100X120CM

## (undated) DEVICE — INTENT TO BE USED WITH SUTURE MATERIAL FOR TISSUE CLOSURE: Brand: RICHARD-ALLAN® NEEDLE 1/2 CIRCLE TAPER